# Patient Record
Sex: MALE | NOT HISPANIC OR LATINO | URBAN - METROPOLITAN AREA
[De-identification: names, ages, dates, MRNs, and addresses within clinical notes are randomized per-mention and may not be internally consistent; named-entity substitution may affect disease eponyms.]

---

## 2020-05-07 ENCOUNTER — APPOINTMENT (EMERGENCY)
Dept: CT IMAGING | Facility: HOSPITAL | Age: 55
DRG: 199 | End: 2020-05-07
Payer: COMMERCIAL

## 2020-05-07 ENCOUNTER — HOSPITAL ENCOUNTER (INPATIENT)
Facility: HOSPITAL | Age: 55
LOS: 1 days | Discharge: HOME | DRG: 199 | End: 2020-05-08
Attending: EMERGENCY MEDICINE | Admitting: INTERNAL MEDICINE
Payer: COMMERCIAL

## 2020-05-07 DIAGNOSIS — I10 BENIGN ESSENTIAL HYPERTENSION: ICD-10-CM

## 2020-05-07 DIAGNOSIS — R77.8 ELEVATED TROPONIN: ICD-10-CM

## 2020-05-07 DIAGNOSIS — H53.2 DOUBLE VISION: Primary | ICD-10-CM

## 2020-05-07 DIAGNOSIS — I16.0 HYPERTENSIVE URGENCY: ICD-10-CM

## 2020-05-07 DIAGNOSIS — I63.9 CVA (CEREBRAL VASCULAR ACCIDENT) (HCC): ICD-10-CM

## 2020-05-07 DIAGNOSIS — R42 DIZZINESS: ICD-10-CM

## 2020-05-07 PROBLEM — R73.01 IMPAIRED FASTING GLUCOSE: Status: ACTIVE | Noted: 2020-05-07

## 2020-05-07 PROBLEM — E66.9 OBESITY (BMI 35.0-39.9 WITHOUT COMORBIDITY): Status: ACTIVE | Noted: 2020-05-07

## 2020-05-07 LAB
ALBUMIN SERPL BCP-MCNC: 3.5 G/DL (ref 3.5–5)
ALP SERPL-CCNC: 65 U/L (ref 46–116)
ALT SERPL W P-5'-P-CCNC: 30 U/L (ref 12–78)
ANION GAP SERPL CALCULATED.3IONS-SCNC: 6 MMOL/L (ref 4–13)
APTT PPP: 30 SECONDS (ref 23–37)
AST SERPL W P-5'-P-CCNC: 15 U/L (ref 5–45)
ATRIAL RATE: 53 BPM
BASOPHILS # BLD AUTO: 0.05 THOUSANDS/ΜL (ref 0–0.1)
BASOPHILS NFR BLD AUTO: 1 % (ref 0–1)
BILIRUB SERPL-MCNC: 1.3 MG/DL (ref 0.2–1)
BUN SERPL-MCNC: 19 MG/DL (ref 5–25)
CALCIUM SERPL-MCNC: 8.7 MG/DL (ref 8.3–10.1)
CHLORIDE SERPL-SCNC: 97 MMOL/L (ref 100–108)
CO2 SERPL-SCNC: 29 MMOL/L (ref 21–32)
CREAT SERPL-MCNC: 1.17 MG/DL (ref 0.6–1.3)
EOSINOPHIL # BLD AUTO: 0.09 THOUSAND/ΜL (ref 0–0.61)
EOSINOPHIL NFR BLD AUTO: 1 % (ref 0–6)
ERYTHROCYTE [DISTWIDTH] IN BLOOD BY AUTOMATED COUNT: 13.7 % (ref 11.6–15.1)
GFR SERPL CREATININE-BSD FRML MDRD: 70 ML/MIN/1.73SQ M
GLUCOSE SERPL-MCNC: 153 MG/DL (ref 65–140)
HCT VFR BLD AUTO: 52.7 % (ref 36.5–49.3)
HGB BLD-MCNC: 17.6 G/DL (ref 12–17)
IMM GRANULOCYTES # BLD AUTO: 0.03 THOUSAND/UL (ref 0–0.2)
IMM GRANULOCYTES NFR BLD AUTO: 0 % (ref 0–2)
INR PPP: 1.07 (ref 0.84–1.19)
LYMPHOCYTES # BLD AUTO: 1.29 THOUSANDS/ΜL (ref 0.6–4.47)
LYMPHOCYTES NFR BLD AUTO: 16 % (ref 14–44)
MCH RBC QN AUTO: 28.3 PG (ref 26.8–34.3)
MCHC RBC AUTO-ENTMCNC: 33.4 G/DL (ref 31.4–37.4)
MCV RBC AUTO: 85 FL (ref 82–98)
MONOCYTES # BLD AUTO: 0.74 THOUSAND/ΜL (ref 0.17–1.22)
MONOCYTES NFR BLD AUTO: 9 % (ref 4–12)
NEUTROPHILS # BLD AUTO: 6.08 THOUSANDS/ΜL (ref 1.85–7.62)
NEUTS SEG NFR BLD AUTO: 73 % (ref 43–75)
NRBC BLD AUTO-RTO: 0 /100 WBCS
P AXIS: 68 DEGREES
PLATELET # BLD AUTO: 182 THOUSANDS/UL (ref 149–390)
PMV BLD AUTO: 10.7 FL (ref 8.9–12.7)
POTASSIUM SERPL-SCNC: 3.5 MMOL/L (ref 3.5–5.3)
PR INTERVAL: 148 MS
PROT SERPL-MCNC: 7.3 G/DL (ref 6.4–8.2)
PROTHROMBIN TIME: 13.6 SECONDS (ref 11.6–14.5)
QRS AXIS: 72 DEGREES
QRSD INTERVAL: 100 MS
QT INTERVAL: 478 MS
QTC INTERVAL: 448 MS
RBC # BLD AUTO: 6.22 MILLION/UL (ref 3.88–5.62)
SODIUM SERPL-SCNC: 132 MMOL/L (ref 136–145)
T WAVE AXIS: -79 DEGREES
TROPONIN I SERPL-MCNC: 0.17 NG/ML
VENTRICULAR RATE: 53 BPM
WBC # BLD AUTO: 8.28 THOUSAND/UL (ref 4.31–10.16)

## 2020-05-07 PROCEDURE — 85610 PROTHROMBIN TIME: CPT | Performed by: PHYSICIAN ASSISTANT

## 2020-05-07 PROCEDURE — 99285 EMERGENCY DEPT VISIT HI MDM: CPT | Performed by: PHYSICIAN ASSISTANT

## 2020-05-07 PROCEDURE — 99223 1ST HOSP IP/OBS HIGH 75: CPT | Performed by: INTERNAL MEDICINE

## 2020-05-07 PROCEDURE — 85730 THROMBOPLASTIN TIME PARTIAL: CPT | Performed by: PHYSICIAN ASSISTANT

## 2020-05-07 PROCEDURE — 85025 COMPLETE CBC W/AUTO DIFF WBC: CPT | Performed by: PHYSICIAN ASSISTANT

## 2020-05-07 PROCEDURE — 93010 ELECTROCARDIOGRAM REPORT: CPT | Performed by: INTERNAL MEDICINE

## 2020-05-07 PROCEDURE — 70450 CT HEAD/BRAIN W/O DYE: CPT

## 2020-05-07 PROCEDURE — 36415 COLL VENOUS BLD VENIPUNCTURE: CPT | Performed by: PHYSICIAN ASSISTANT

## 2020-05-07 PROCEDURE — 93005 ELECTROCARDIOGRAM TRACING: CPT

## 2020-05-07 PROCEDURE — 99285 EMERGENCY DEPT VISIT HI MDM: CPT

## 2020-05-07 PROCEDURE — 80053 COMPREHEN METABOLIC PANEL: CPT | Performed by: PHYSICIAN ASSISTANT

## 2020-05-07 PROCEDURE — 84484 ASSAY OF TROPONIN QUANT: CPT | Performed by: PHYSICIAN ASSISTANT

## 2020-05-07 PROCEDURE — 70498 CT ANGIOGRAPHY NECK: CPT

## 2020-05-07 PROCEDURE — 70496 CT ANGIOGRAPHY HEAD: CPT

## 2020-05-07 RX ORDER — ONDANSETRON 2 MG/ML
4 INJECTION INTRAMUSCULAR; INTRAVENOUS EVERY 6 HOURS PRN
Status: DISCONTINUED | OUTPATIENT
Start: 2020-05-07 | End: 2020-05-08 | Stop reason: HOSPADM

## 2020-05-07 RX ORDER — SODIUM CHLORIDE 9 MG/ML
125 INJECTION, SOLUTION INTRAVENOUS CONTINUOUS
Status: DISCONTINUED | OUTPATIENT
Start: 2020-05-07 | End: 2020-05-07

## 2020-05-07 RX ORDER — SODIUM CHLORIDE 9 MG/ML
125 INJECTION, SOLUTION INTRAVENOUS CONTINUOUS
Status: DISCONTINUED | OUTPATIENT
Start: 2020-05-07 | End: 2020-05-08

## 2020-05-07 RX ORDER — ATORVASTATIN CALCIUM 40 MG/1
40 TABLET, FILM COATED ORAL EVERY EVENING
Status: DISCONTINUED | OUTPATIENT
Start: 2020-05-07 | End: 2020-05-08

## 2020-05-07 RX ORDER — ACETAMINOPHEN 325 MG/1
650 TABLET ORAL EVERY 4 HOURS PRN
Status: DISCONTINUED | OUTPATIENT
Start: 2020-05-07 | End: 2020-05-08 | Stop reason: HOSPADM

## 2020-05-07 RX ORDER — ASPIRIN 81 MG/1
81 TABLET, CHEWABLE ORAL DAILY
Status: DISCONTINUED | OUTPATIENT
Start: 2020-05-08 | End: 2020-05-08 | Stop reason: HOSPADM

## 2020-05-07 RX ORDER — AMLODIPINE BESYLATE 5 MG/1
5 TABLET ORAL DAILY
Status: DISCONTINUED | OUTPATIENT
Start: 2020-05-08 | End: 2020-05-08 | Stop reason: HOSPADM

## 2020-05-07 RX ORDER — LABETALOL 20 MG/4 ML (5 MG/ML) INTRAVENOUS SYRINGE
10 EVERY 6 HOURS PRN
Status: DISCONTINUED | OUTPATIENT
Start: 2020-05-07 | End: 2020-05-08 | Stop reason: HOSPADM

## 2020-05-07 RX ORDER — ASPIRIN 81 MG/1
324 TABLET, CHEWABLE ORAL ONCE
Status: COMPLETED | OUTPATIENT
Start: 2020-05-07 | End: 2020-05-07

## 2020-05-07 RX ADMIN — IOHEXOL 85 ML: 350 INJECTION, SOLUTION INTRAVENOUS at 16:06

## 2020-05-07 RX ADMIN — SODIUM CHLORIDE 125 ML/HR: 0.9 INJECTION, SOLUTION INTRAVENOUS at 14:02

## 2020-05-07 RX ADMIN — ATORVASTATIN CALCIUM 40 MG: 40 TABLET, FILM COATED ORAL at 20:38

## 2020-05-07 RX ADMIN — SODIUM CHLORIDE 125 ML/HR: 0.9 INJECTION, SOLUTION INTRAVENOUS at 16:43

## 2020-05-07 RX ADMIN — ASPIRIN 81 MG 324 MG: 81 TABLET ORAL at 15:24

## 2020-05-07 RX ADMIN — SODIUM CHLORIDE 125 ML/HR: 0.9 INJECTION, SOLUTION INTRAVENOUS at 16:49

## 2020-05-08 ENCOUNTER — APPOINTMENT (INPATIENT)
Dept: MRI IMAGING | Facility: HOSPITAL | Age: 55
DRG: 199 | End: 2020-05-08
Payer: COMMERCIAL

## 2020-05-08 ENCOUNTER — APPOINTMENT (INPATIENT)
Dept: NON INVASIVE DIAGNOSTICS | Facility: HOSPITAL | Age: 55
DRG: 199 | End: 2020-05-08
Payer: COMMERCIAL

## 2020-05-08 VITALS
SYSTOLIC BLOOD PRESSURE: 171 MMHG | HEIGHT: 68 IN | RESPIRATION RATE: 17 BRPM | DIASTOLIC BLOOD PRESSURE: 104 MMHG | BODY MASS INDEX: 36.84 KG/M2 | HEART RATE: 56 BPM | WEIGHT: 243.1 LBS | TEMPERATURE: 98 F | OXYGEN SATURATION: 92 %

## 2020-05-08 LAB
ALBUMIN SERPL BCP-MCNC: 3.1 G/DL (ref 3.5–5)
ALP SERPL-CCNC: 59 U/L (ref 46–116)
ALT SERPL W P-5'-P-CCNC: 26 U/L (ref 12–78)
ANION GAP SERPL CALCULATED.3IONS-SCNC: 9 MMOL/L (ref 4–13)
AST SERPL W P-5'-P-CCNC: 16 U/L (ref 5–45)
BILIRUB SERPL-MCNC: 1.2 MG/DL (ref 0.2–1)
BUN SERPL-MCNC: 17 MG/DL (ref 5–25)
CALCIUM SERPL-MCNC: 7.9 MG/DL (ref 8.3–10.1)
CHLORIDE SERPL-SCNC: 103 MMOL/L (ref 100–108)
CHOLEST SERPL-MCNC: 127 MG/DL (ref 50–200)
CO2 SERPL-SCNC: 26 MMOL/L (ref 21–32)
CREAT SERPL-MCNC: 0.96 MG/DL (ref 0.6–1.3)
CRP SERPL QL: 1.6 MG/L
ERYTHROCYTE [DISTWIDTH] IN BLOOD BY AUTOMATED COUNT: 13.7 % (ref 11.6–15.1)
EST. AVERAGE GLUCOSE BLD GHB EST-MCNC: 137 MG/DL
EST. AVERAGE GLUCOSE BLD GHB EST-MCNC: 140 MG/DL
GFR SERPL CREATININE-BSD FRML MDRD: 89 ML/MIN/1.73SQ M
GLUCOSE SERPL-MCNC: 135 MG/DL (ref 65–140)
HBA1C MFR BLD: 6.4 %
HBA1C MFR BLD: 6.5 %
HCT VFR BLD AUTO: 50.1 % (ref 36.5–49.3)
HCYS SERPL-SCNC: 9.4 UMOL/L (ref 5.3–14.2)
HDLC SERPL-MCNC: 29 MG/DL
HGB BLD-MCNC: 16.5 G/DL (ref 12–17)
LDLC SERPL CALC-MCNC: 88 MG/DL (ref 0–100)
MCH RBC QN AUTO: 28.3 PG (ref 26.8–34.3)
MCHC RBC AUTO-ENTMCNC: 32.9 G/DL (ref 31.4–37.4)
MCV RBC AUTO: 86 FL (ref 82–98)
PLATELET # BLD AUTO: 161 THOUSANDS/UL (ref 149–390)
PMV BLD AUTO: 11.2 FL (ref 8.9–12.7)
POTASSIUM SERPL-SCNC: 3.5 MMOL/L (ref 3.5–5.3)
PROT SERPL-MCNC: 6.5 G/DL (ref 6.4–8.2)
RBC # BLD AUTO: 5.84 MILLION/UL (ref 3.88–5.62)
SODIUM SERPL-SCNC: 138 MMOL/L (ref 136–145)
TRIGL SERPL-MCNC: 49 MG/DL
TSH SERPL DL<=0.05 MIU/L-ACNC: 0.81 UIU/ML (ref 0.36–3.74)
WBC # BLD AUTO: 7.61 THOUSAND/UL (ref 4.31–10.16)

## 2020-05-08 PROCEDURE — 83090 ASSAY OF HOMOCYSTEINE: CPT | Performed by: INTERNAL MEDICINE

## 2020-05-08 PROCEDURE — 97163 PT EVAL HIGH COMPLEX 45 MIN: CPT

## 2020-05-08 PROCEDURE — 87086 URINE CULTURE/COLONY COUNT: CPT | Performed by: INTERNAL MEDICINE

## 2020-05-08 PROCEDURE — 85027 COMPLETE CBC AUTOMATED: CPT | Performed by: INTERNAL MEDICINE

## 2020-05-08 PROCEDURE — 93306 TTE W/DOPPLER COMPLETE: CPT

## 2020-05-08 PROCEDURE — 99232 SBSQ HOSP IP/OBS MODERATE 35: CPT | Performed by: INTERNAL MEDICINE

## 2020-05-08 PROCEDURE — 93975 VASCULAR STUDY: CPT | Performed by: SURGERY

## 2020-05-08 PROCEDURE — 80053 COMPREHEN METABOLIC PANEL: CPT | Performed by: INTERNAL MEDICINE

## 2020-05-08 PROCEDURE — 83036 HEMOGLOBIN GLYCOSYLATED A1C: CPT | Performed by: INTERNAL MEDICINE

## 2020-05-08 PROCEDURE — 70553 MRI BRAIN STEM W/O & W/DYE: CPT

## 2020-05-08 PROCEDURE — 84443 ASSAY THYROID STIM HORMONE: CPT | Performed by: INTERNAL MEDICINE

## 2020-05-08 PROCEDURE — A9585 GADOBUTROL INJECTION: HCPCS | Performed by: PHYSICIAN ASSISTANT

## 2020-05-08 PROCEDURE — 86140 C-REACTIVE PROTEIN: CPT | Performed by: INTERNAL MEDICINE

## 2020-05-08 PROCEDURE — 93975 VASCULAR STUDY: CPT

## 2020-05-08 PROCEDURE — 99238 HOSP IP/OBS DSCHRG MGMT 30/<: CPT | Performed by: INTERNAL MEDICINE

## 2020-05-08 PROCEDURE — 97165 OT EVAL LOW COMPLEX 30 MIN: CPT

## 2020-05-08 PROCEDURE — 99255 IP/OBS CONSLTJ NEW/EST HI 80: CPT | Performed by: PSYCHIATRY & NEUROLOGY

## 2020-05-08 PROCEDURE — 80061 LIPID PANEL: CPT | Performed by: INTERNAL MEDICINE

## 2020-05-08 RX ORDER — NICOTINE 21 MG/24HR
14 PATCH, TRANSDERMAL 24 HOURS TRANSDERMAL DAILY
Status: DISCONTINUED | OUTPATIENT
Start: 2020-05-08 | End: 2020-05-08 | Stop reason: HOSPADM

## 2020-05-08 RX ORDER — ATORVASTATIN CALCIUM 40 MG/1
40 TABLET, FILM COATED ORAL EVERY EVENING
Qty: 30 TABLET | Refills: 5 | Status: SHIPPED | OUTPATIENT
Start: 2020-05-08

## 2020-05-08 RX ORDER — METOPROLOL SUCCINATE 25 MG/1
50 TABLET, EXTENDED RELEASE ORAL DAILY
Qty: 30 TABLET | Refills: 5 | Status: SHIPPED | OUTPATIENT
Start: 2020-05-09 | End: 2020-06-23

## 2020-05-08 RX ORDER — METOPROLOL SUCCINATE 25 MG/1
25 TABLET, EXTENDED RELEASE ORAL DAILY
Status: DISCONTINUED | OUTPATIENT
Start: 2020-05-08 | End: 2020-05-08 | Stop reason: HOSPADM

## 2020-05-08 RX ORDER — ASPIRIN 81 MG/1
81 TABLET, CHEWABLE ORAL DAILY
Refills: 0
Start: 2020-05-09 | End: 2020-06-24 | Stop reason: HOSPADM

## 2020-05-08 RX ORDER — AMLODIPINE BESYLATE 5 MG/1
5 TABLET ORAL DAILY
Qty: 30 TABLET | Refills: 1 | Status: SHIPPED | OUTPATIENT
Start: 2020-05-09 | End: 2020-06-23

## 2020-05-08 RX ORDER — ATORVASTATIN CALCIUM 10 MG/1
10 TABLET, FILM COATED ORAL EVERY EVENING
Status: DISCONTINUED | OUTPATIENT
Start: 2020-05-08 | End: 2020-05-08 | Stop reason: HOSPADM

## 2020-05-08 RX ADMIN — METOPROLOL SUCCINATE 25 MG: 25 TABLET, EXTENDED RELEASE ORAL at 12:09

## 2020-05-08 RX ADMIN — ASPIRIN 81 MG 81 MG: 81 TABLET ORAL at 09:23

## 2020-05-08 RX ADMIN — NICOTINE 1 PATCH: 7 PATCH TRANSDERMAL at 09:19

## 2020-05-08 RX ADMIN — NICOTINE 14 MG: 14 PATCH TRANSDERMAL at 12:04

## 2020-05-08 RX ADMIN — PERFLUTREN 0.8 ML/MIN: 6.52 INJECTION, SUSPENSION INTRAVENOUS at 15:53

## 2020-05-08 RX ADMIN — LABETALOL 20 MG/4 ML (5 MG/ML) INTRAVENOUS SYRINGE 10 MG: at 14:27

## 2020-05-08 RX ADMIN — SODIUM CHLORIDE 125 ML/HR: 0.9 INJECTION, SOLUTION INTRAVENOUS at 05:48

## 2020-05-08 RX ADMIN — GADOBUTROL 11 ML: 604.72 INJECTION INTRAVENOUS at 10:13

## 2020-05-08 RX ADMIN — AMLODIPINE BESYLATE 5 MG: 5 TABLET ORAL at 09:23

## 2020-05-09 LAB — BACTERIA UR CULT: NORMAL

## 2020-05-11 PROCEDURE — 93306 TTE W/DOPPLER COMPLETE: CPT | Performed by: INTERNAL MEDICINE

## 2020-06-09 ENCOUNTER — TELEPHONE (OUTPATIENT)
Dept: NEUROLOGY | Facility: CLINIC | Age: 55
End: 2020-06-09

## 2020-06-23 ENCOUNTER — HOSPITAL ENCOUNTER (INPATIENT)
Facility: HOSPITAL | Age: 55
LOS: 1 days | Discharge: HOME | DRG: 045 | End: 2020-06-24
Attending: EMERGENCY MEDICINE | Admitting: INTERNAL MEDICINE
Payer: COMMERCIAL

## 2020-06-23 ENCOUNTER — APPOINTMENT (EMERGENCY)
Dept: CT IMAGING | Facility: HOSPITAL | Age: 55
DRG: 045 | End: 2020-06-23
Payer: COMMERCIAL

## 2020-06-23 ENCOUNTER — APPOINTMENT (INPATIENT)
Dept: MRI IMAGING | Facility: HOSPITAL | Age: 55
DRG: 045 | End: 2020-06-23
Payer: COMMERCIAL

## 2020-06-23 DIAGNOSIS — R93.89 ABNORMAL CT SCAN: ICD-10-CM

## 2020-06-23 DIAGNOSIS — I63.9 CVA (CEREBRAL VASCULAR ACCIDENT) (HCC): Primary | ICD-10-CM

## 2020-06-23 DIAGNOSIS — R77.8 ELEVATED TROPONIN: ICD-10-CM

## 2020-06-23 DIAGNOSIS — I72.0 CAROTID ANEURYSM, LEFT (HCC): ICD-10-CM

## 2020-06-23 DIAGNOSIS — I16.0 HYPERTENSIVE URGENCY: ICD-10-CM

## 2020-06-23 DIAGNOSIS — H53.2 DIPLOPIA: ICD-10-CM

## 2020-06-23 DIAGNOSIS — R73.01 IMPAIRED FASTING GLUCOSE: ICD-10-CM

## 2020-06-23 LAB
ANION GAP SERPL CALCULATED.3IONS-SCNC: 9 MMOL/L (ref 4–13)
APTT PPP: 31 SECONDS (ref 23–37)
ATRIAL RATE: 66 BPM
BUN SERPL-MCNC: 12 MG/DL (ref 5–25)
CALCIUM SERPL-MCNC: 8.4 MG/DL (ref 8.3–10.1)
CHLORIDE SERPL-SCNC: 102 MMOL/L (ref 100–108)
CO2 SERPL-SCNC: 27 MMOL/L (ref 21–32)
CREAT SERPL-MCNC: 1.11 MG/DL (ref 0.6–1.3)
DEPRECATED AT III PPP: 91 % OF NORMAL (ref 92–136)
ERYTHROCYTE [DISTWIDTH] IN BLOOD BY AUTOMATED COUNT: 13.7 % (ref 11.6–15.1)
GFR SERPL CREATININE-BSD FRML MDRD: 75 ML/MIN/1.73SQ M
GLUCOSE SERPL-MCNC: 259 MG/DL (ref 65–140)
GLUCOSE SERPL-MCNC: 261 MG/DL (ref 65–140)
HCT VFR BLD AUTO: 51 % (ref 36.5–49.3)
HGB BLD-MCNC: 17.2 G/DL (ref 12–17)
INR PPP: 1.15 (ref 0.84–1.19)
MCH RBC QN AUTO: 29.1 PG (ref 26.8–34.3)
MCHC RBC AUTO-ENTMCNC: 33.7 G/DL (ref 31.4–37.4)
MCV RBC AUTO: 86 FL (ref 82–98)
P AXIS: 64 DEGREES
PLATELET # BLD AUTO: 162 THOUSANDS/UL (ref 149–390)
PLATELET # BLD AUTO: 181 THOUSANDS/UL (ref 149–390)
PMV BLD AUTO: 10.5 FL (ref 8.9–12.7)
PMV BLD AUTO: 11.1 FL (ref 8.9–12.7)
POTASSIUM SERPL-SCNC: 4 MMOL/L (ref 3.5–5.3)
PR INTERVAL: 146 MS
PROT C AG ACT/NOR PPP IA: 93 % OF NORMAL (ref 60–150)
PROTHROMBIN TIME: 14.4 SECONDS (ref 11.6–14.5)
QRS AXIS: 66 DEGREES
QRSD INTERVAL: 100 MS
QT INTERVAL: 422 MS
QTC INTERVAL: 442 MS
RBC # BLD AUTO: 5.92 MILLION/UL (ref 3.88–5.62)
SODIUM SERPL-SCNC: 138 MMOL/L (ref 136–145)
T WAVE AXIS: 32 DEGREES
TROPONIN I SERPL-MCNC: 0.08 NG/ML
VENTRICULAR RATE: 66 BPM
WBC # BLD AUTO: 7.84 THOUSAND/UL (ref 4.31–10.16)

## 2020-06-23 PROCEDURE — 84484 ASSAY OF TROPONIN QUANT: CPT | Performed by: EMERGENCY MEDICINE

## 2020-06-23 PROCEDURE — 81240 F2 GENE: CPT | Performed by: PHYSICIAN ASSISTANT

## 2020-06-23 PROCEDURE — 85670 THROMBIN TIME PLASMA: CPT | Performed by: PHYSICIAN ASSISTANT

## 2020-06-23 PROCEDURE — 86146 BETA-2 GLYCOPROTEIN ANTIBODY: CPT | Performed by: PHYSICIAN ASSISTANT

## 2020-06-23 PROCEDURE — 99285 EMERGENCY DEPT VISIT HI MDM: CPT

## 2020-06-23 PROCEDURE — 80048 BASIC METABOLIC PNL TOTAL CA: CPT | Performed by: EMERGENCY MEDICINE

## 2020-06-23 PROCEDURE — 70498 CT ANGIOGRAPHY NECK: CPT

## 2020-06-23 PROCEDURE — 99291 CRITICAL CARE FIRST HOUR: CPT | Performed by: EMERGENCY MEDICINE

## 2020-06-23 PROCEDURE — 85732 THROMBOPLASTIN TIME PARTIAL: CPT | Performed by: PHYSICIAN ASSISTANT

## 2020-06-23 PROCEDURE — 99255 IP/OBS CONSLTJ NEW/EST HI 80: CPT | Performed by: PSYCHIATRY & NEUROLOGY

## 2020-06-23 PROCEDURE — 85303 CLOT INHIBIT PROT C ACTIVITY: CPT | Performed by: PHYSICIAN ASSISTANT

## 2020-06-23 PROCEDURE — 85049 AUTOMATED PLATELET COUNT: CPT | Performed by: INTERNAL MEDICINE

## 2020-06-23 PROCEDURE — 81241 F5 GENE: CPT | Performed by: PHYSICIAN ASSISTANT

## 2020-06-23 PROCEDURE — 36415 COLL VENOUS BLD VENIPUNCTURE: CPT | Performed by: PHYSICIAN ASSISTANT

## 2020-06-23 PROCEDURE — 85613 RUSSELL VIPER VENOM DILUTED: CPT | Performed by: PHYSICIAN ASSISTANT

## 2020-06-23 PROCEDURE — 93005 ELECTROCARDIOGRAM TRACING: CPT

## 2020-06-23 PROCEDURE — 85705 THROMBOPLASTIN INHIBITION: CPT | Performed by: PHYSICIAN ASSISTANT

## 2020-06-23 PROCEDURE — 85300 ANTITHROMBIN III ACTIVITY: CPT | Performed by: PHYSICIAN ASSISTANT

## 2020-06-23 PROCEDURE — 70496 CT ANGIOGRAPHY HEAD: CPT

## 2020-06-23 PROCEDURE — 93010 ELECTROCARDIOGRAM REPORT: CPT | Performed by: INTERNAL MEDICINE

## 2020-06-23 PROCEDURE — 85027 COMPLETE CBC AUTOMATED: CPT | Performed by: EMERGENCY MEDICINE

## 2020-06-23 PROCEDURE — 85306 CLOT INHIBIT PROT S FREE: CPT | Performed by: PHYSICIAN ASSISTANT

## 2020-06-23 PROCEDURE — 99223 1ST HOSP IP/OBS HIGH 75: CPT | Performed by: INTERNAL MEDICINE

## 2020-06-23 PROCEDURE — 70551 MRI BRAIN STEM W/O DYE: CPT

## 2020-06-23 PROCEDURE — 85305 CLOT INHIBIT PROT S TOTAL: CPT | Performed by: PHYSICIAN ASSISTANT

## 2020-06-23 PROCEDURE — 85610 PROTHROMBIN TIME: CPT | Performed by: EMERGENCY MEDICINE

## 2020-06-23 PROCEDURE — 85730 THROMBOPLASTIN TIME PARTIAL: CPT | Performed by: EMERGENCY MEDICINE

## 2020-06-23 PROCEDURE — 82948 REAGENT STRIP/BLOOD GLUCOSE: CPT

## 2020-06-23 PROCEDURE — 97165 OT EVAL LOW COMPLEX 30 MIN: CPT

## 2020-06-23 PROCEDURE — 86147 CARDIOLIPIN ANTIBODY EA IG: CPT | Performed by: PHYSICIAN ASSISTANT

## 2020-06-23 RX ORDER — ATORVASTATIN CALCIUM 40 MG/1
40 TABLET, FILM COATED ORAL EVERY EVENING
Status: DISCONTINUED | OUTPATIENT
Start: 2020-06-23 | End: 2020-06-24 | Stop reason: SDUPTHER

## 2020-06-23 RX ORDER — ASPIRIN 81 MG/1
243 TABLET ORAL DAILY
Status: DISCONTINUED | OUTPATIENT
Start: 2020-06-23 | End: 2020-06-23 | Stop reason: ALTCHOICE

## 2020-06-23 RX ORDER — ASPIRIN 81 MG/1
81 TABLET, CHEWABLE ORAL DAILY
Status: DISCONTINUED | OUTPATIENT
Start: 2020-06-23 | End: 2020-06-24 | Stop reason: SDUPTHER

## 2020-06-23 RX ORDER — CARVEDILOL 25 MG/1
25 TABLET ORAL 2 TIMES DAILY WITH MEALS
COMMUNITY

## 2020-06-23 RX ORDER — ASPIRIN 325 MG
325 TABLET ORAL ONCE
Status: DISCONTINUED | OUTPATIENT
Start: 2020-06-23 | End: 2020-06-23

## 2020-06-23 RX ORDER — HEPARIN SODIUM 5000 [USP'U]/ML
5000 INJECTION, SOLUTION INTRAVENOUS; SUBCUTANEOUS EVERY 8 HOURS SCHEDULED
Status: DISCONTINUED | OUTPATIENT
Start: 2020-06-23 | End: 2020-06-24 | Stop reason: HOSPADM

## 2020-06-23 RX ORDER — AMLODIPINE BESYLATE AND BENAZEPRIL HYDROCHLORIDE 10; 40 MG/1; MG/1
1 CAPSULE ORAL DAILY
COMMUNITY

## 2020-06-23 RX ORDER — CARVEDILOL 12.5 MG/1
25 TABLET ORAL 2 TIMES DAILY WITH MEALS
Status: DISCONTINUED | OUTPATIENT
Start: 2020-06-23 | End: 2020-06-24 | Stop reason: HOSPADM

## 2020-06-23 RX ORDER — LISINOPRIL 20 MG/1
20 TABLET ORAL DAILY
Status: DISCONTINUED | OUTPATIENT
Start: 2020-06-23 | End: 2020-06-24

## 2020-06-23 RX ORDER — AMLODIPINE BESYLATE 5 MG/1
10 TABLET ORAL DAILY
Status: DISCONTINUED | OUTPATIENT
Start: 2020-06-23 | End: 2020-06-24 | Stop reason: HOSPADM

## 2020-06-23 RX ADMIN — ASPIRIN 243 MG: 81 TABLET ORAL at 09:42

## 2020-06-23 RX ADMIN — IOHEXOL 100 ML: 350 INJECTION, SOLUTION INTRAVENOUS at 09:19

## 2020-06-23 RX ADMIN — HEPARIN SODIUM 5000 UNITS: 5000 INJECTION INTRAVENOUS; SUBCUTANEOUS at 22:29

## 2020-06-23 RX ADMIN — HEPARIN SODIUM 5000 UNITS: 5000 INJECTION INTRAVENOUS; SUBCUTANEOUS at 14:46

## 2020-06-23 RX ADMIN — NICOTINE 1 PATCH: 7 PATCH TRANSDERMAL at 12:36

## 2020-06-23 RX ADMIN — ATORVASTATIN CALCIUM 40 MG: 40 TABLET, FILM COATED ORAL at 17:30

## 2020-06-23 RX ADMIN — CARVEDILOL 25 MG: 12.5 TABLET, FILM COATED ORAL at 17:30

## 2020-06-24 ENCOUNTER — APPOINTMENT (INPATIENT)
Dept: NON INVASIVE DIAGNOSTICS | Facility: HOSPITAL | Age: 55
DRG: 045 | End: 2020-06-24
Payer: COMMERCIAL

## 2020-06-24 VITALS
HEIGHT: 67 IN | BODY MASS INDEX: 35.44 KG/M2 | WEIGHT: 225.8 LBS | DIASTOLIC BLOOD PRESSURE: 62 MMHG | RESPIRATION RATE: 16 BRPM | HEART RATE: 50 BPM | SYSTOLIC BLOOD PRESSURE: 142 MMHG | OXYGEN SATURATION: 99 % | TEMPERATURE: 98.1 F

## 2020-06-24 LAB
ALBUMIN SERPL BCP-MCNC: 3.3 G/DL (ref 3.5–5)
ALP SERPL-CCNC: 68 U/L (ref 46–116)
ALT SERPL W P-5'-P-CCNC: 49 U/L (ref 12–78)
ANION GAP SERPL CALCULATED.3IONS-SCNC: 6 MMOL/L (ref 4–13)
AST SERPL W P-5'-P-CCNC: 21 U/L (ref 5–45)
BILIRUB SERPL-MCNC: 0.8 MG/DL (ref 0.2–1)
BUN SERPL-MCNC: 13 MG/DL (ref 5–25)
CALCIUM SERPL-MCNC: 8.3 MG/DL (ref 8.3–10.1)
CARDIOLIPIN IGA SER IA-ACNC: <9 APL U/ML (ref 0–11)
CARDIOLIPIN IGG SER IA-ACNC: <9 GPL U/ML (ref 0–14)
CARDIOLIPIN IGM SER IA-ACNC: <9 MPL U/ML (ref 0–12)
CHLORIDE SERPL-SCNC: 105 MMOL/L (ref 100–108)
CHOLEST SERPL-MCNC: 75 MG/DL (ref 50–200)
CO2 SERPL-SCNC: 27 MMOL/L (ref 21–32)
CREAT SERPL-MCNC: 0.98 MG/DL (ref 0.6–1.3)
ERYTHROCYTE [DISTWIDTH] IN BLOOD BY AUTOMATED COUNT: 13.7 % (ref 11.6–15.1)
GFR SERPL CREATININE-BSD FRML MDRD: 87 ML/MIN/1.73SQ M
GLUCOSE SERPL-MCNC: 151 MG/DL (ref 65–140)
HCT VFR BLD AUTO: 47.7 % (ref 36.5–49.3)
HDLC SERPL-MCNC: 28 MG/DL
HGB BLD-MCNC: 15.9 G/DL (ref 12–17)
LDLC SERPL CALC-MCNC: 36 MG/DL (ref 0–100)
MCH RBC QN AUTO: 28.3 PG (ref 26.8–34.3)
MCHC RBC AUTO-ENTMCNC: 33.3 G/DL (ref 31.4–37.4)
MCV RBC AUTO: 85 FL (ref 82–98)
PLATELET # BLD AUTO: 165 THOUSANDS/UL (ref 149–390)
PMV BLD AUTO: 10.6 FL (ref 8.9–12.7)
POTASSIUM SERPL-SCNC: 3.9 MMOL/L (ref 3.5–5.3)
PROT SERPL-MCNC: 6.6 G/DL (ref 6.4–8.2)
RBC # BLD AUTO: 5.61 MILLION/UL (ref 3.88–5.62)
SARS-COV-2 RNA RESP QL NAA+PROBE: NEGATIVE
SODIUM SERPL-SCNC: 138 MMOL/L (ref 136–145)
TRIGL SERPL-MCNC: 55 MG/DL
WBC # BLD AUTO: 8.58 THOUSAND/UL (ref 4.31–10.16)

## 2020-06-24 PROCEDURE — 99232 SBSQ HOSP IP/OBS MODERATE 35: CPT | Performed by: INTERNAL MEDICINE

## 2020-06-24 PROCEDURE — 87635 SARS-COV-2 COVID-19 AMP PRB: CPT | Performed by: INTERNAL MEDICINE

## 2020-06-24 PROCEDURE — 93312 ECHO TRANSESOPHAGEAL: CPT

## 2020-06-24 PROCEDURE — 80061 LIPID PANEL: CPT | Performed by: INTERNAL MEDICINE

## 2020-06-24 PROCEDURE — 85027 COMPLETE CBC AUTOMATED: CPT | Performed by: INTERNAL MEDICINE

## 2020-06-24 PROCEDURE — 99232 SBSQ HOSP IP/OBS MODERATE 35: CPT | Performed by: PSYCHIATRY & NEUROLOGY

## 2020-06-24 PROCEDURE — 80053 COMPREHEN METABOLIC PANEL: CPT | Performed by: INTERNAL MEDICINE

## 2020-06-24 RX ORDER — PROPOFOL 10 MG/ML
INJECTION, EMULSION INTRAVENOUS AS NEEDED
Status: DISCONTINUED | OUTPATIENT
Start: 2020-06-24 | End: 2020-06-24 | Stop reason: SURG

## 2020-06-24 RX ORDER — ASPIRIN 81 MG/1
81 TABLET, CHEWABLE ORAL DAILY
Status: DISCONTINUED | OUTPATIENT
Start: 2020-06-24 | End: 2020-06-24

## 2020-06-24 RX ORDER — NICOTINE 21 MG/24HR
1 PATCH, TRANSDERMAL 24 HOURS TRANSDERMAL DAILY
Qty: 28 PATCH | Refills: 0 | Status: SHIPPED | OUTPATIENT
Start: 2020-06-24

## 2020-06-24 RX ORDER — LISINOPRIL 20 MG/1
40 TABLET ORAL DAILY
Status: DISCONTINUED | OUTPATIENT
Start: 2020-06-24 | End: 2020-06-24 | Stop reason: HOSPADM

## 2020-06-24 RX ORDER — CLOPIDOGREL BISULFATE 75 MG/1
300 TABLET ORAL ONCE
Status: DISCONTINUED | OUTPATIENT
Start: 2020-06-24 | End: 2020-06-24

## 2020-06-24 RX ORDER — NICOTINE 21 MG/24HR
21 PATCH, TRANSDERMAL 24 HOURS TRANSDERMAL DAILY
Status: DISCONTINUED | OUTPATIENT
Start: 2020-06-24 | End: 2020-06-24 | Stop reason: HOSPADM

## 2020-06-24 RX ORDER — CLOPIDOGREL BISULFATE 75 MG/1
300 TABLET ORAL ONCE
Status: COMPLETED | OUTPATIENT
Start: 2020-06-24 | End: 2020-06-24

## 2020-06-24 RX ORDER — ATORVASTATIN CALCIUM 40 MG/1
40 TABLET, FILM COATED ORAL EVERY EVENING
Status: DISCONTINUED | OUTPATIENT
Start: 2020-06-24 | End: 2020-06-24 | Stop reason: HOSPADM

## 2020-06-24 RX ORDER — SODIUM CHLORIDE 9 MG/ML
INJECTION, SOLUTION INTRAVENOUS CONTINUOUS PRN
Status: DISCONTINUED | OUTPATIENT
Start: 2020-06-24 | End: 2020-06-24 | Stop reason: SURG

## 2020-06-24 RX ORDER — CLOPIDOGREL BISULFATE 75 MG/1
75 TABLET ORAL DAILY
Status: DISCONTINUED | OUTPATIENT
Start: 2020-06-25 | End: 2020-06-24 | Stop reason: HOSPADM

## 2020-06-24 RX ORDER — CLOPIDOGREL BISULFATE 75 MG/1
75 TABLET ORAL DAILY
Qty: 30 TABLET | Refills: 0 | Status: SHIPPED | OUTPATIENT
Start: 2020-06-25

## 2020-06-24 RX ADMIN — PROPOFOL 100 MG: 10 INJECTION, EMULSION INTRAVENOUS at 16:01

## 2020-06-24 RX ADMIN — LISINOPRIL 40 MG: 20 TABLET ORAL at 12:44

## 2020-06-24 RX ADMIN — PROPOFOL 50 MG: 10 INJECTION, EMULSION INTRAVENOUS at 16:10

## 2020-06-24 RX ADMIN — PROPOFOL 50 MG: 10 INJECTION, EMULSION INTRAVENOUS at 16:23

## 2020-06-24 RX ADMIN — NICOTINE 1 PATCH: 7 PATCH TRANSDERMAL at 08:21

## 2020-06-24 RX ADMIN — PROPOFOL 50 MG: 10 INJECTION, EMULSION INTRAVENOUS at 16:21

## 2020-06-24 RX ADMIN — ASPIRIN 81 MG 81 MG: 81 TABLET ORAL at 08:19

## 2020-06-24 RX ADMIN — CARVEDILOL 25 MG: 12.5 TABLET, FILM COATED ORAL at 08:11

## 2020-06-24 RX ADMIN — PROPOFOL 50 MG: 10 INJECTION, EMULSION INTRAVENOUS at 16:18

## 2020-06-24 RX ADMIN — PROPOFOL 100 MG: 10 INJECTION, EMULSION INTRAVENOUS at 16:02

## 2020-06-24 RX ADMIN — HEPARIN SODIUM 5000 UNITS: 5000 INJECTION INTRAVENOUS; SUBCUTANEOUS at 05:25

## 2020-06-24 RX ADMIN — CARVEDILOL 25 MG: 12.5 TABLET, FILM COATED ORAL at 17:49

## 2020-06-24 RX ADMIN — PROPOFOL 50 MG: 10 INJECTION, EMULSION INTRAVENOUS at 16:14

## 2020-06-24 RX ADMIN — AMLODIPINE BESYLATE 10 MG: 5 TABLET ORAL at 08:10

## 2020-06-24 RX ADMIN — PROPOFOL 50 MG: 10 INJECTION, EMULSION INTRAVENOUS at 16:05

## 2020-06-24 RX ADMIN — SODIUM CHLORIDE: 0.9 INJECTION, SOLUTION INTRAVENOUS at 15:38

## 2020-06-24 RX ADMIN — PROPOFOL 50 MG: 10 INJECTION, EMULSION INTRAVENOUS at 16:04

## 2020-06-24 RX ADMIN — ATORVASTATIN CALCIUM 40 MG: 40 TABLET, FILM COATED ORAL at 17:50

## 2020-06-24 RX ADMIN — NICOTINE 1 PATCH: 7 PATCH TRANSDERMAL at 05:25

## 2020-06-24 RX ADMIN — NICOTINE 21 MG: 21 PATCH, EXTENDED RELEASE TRANSDERMAL at 12:50

## 2020-06-24 RX ADMIN — CLOPIDOGREL BISULFATE 300 MG: 75 TABLET ORAL at 17:53

## 2020-06-25 LAB
APTT SCREEN TO CONFIRM RATIO: 0.92 RATIO (ref 0–1.4)
CONFIRM APTT/NORMAL: 38.7 SEC (ref 0–55)
LA PPP-IMP: NORMAL
PROT S ACT/NOR PPP: 75 % (ref 57–157)
PROT S PPP-ACNC: 84 % (ref 60–150)
SCREEN APTT: 33 SEC (ref 0–51.9)
SCREEN DRVVT: 39.2 SEC (ref 0–47)
THROMBIN TIME: 18.9 SEC (ref 0–23)

## 2020-06-25 PROCEDURE — 93306 TTE W/DOPPLER COMPLETE: CPT | Performed by: INTERNAL MEDICINE

## 2020-06-26 LAB
B2 GLYCOPROT1 IGA SER-ACNC: <9 GPI IGA UNITS (ref 0–25)
B2 GLYCOPROT1 IGG SER-ACNC: <9 GPI IGG UNITS (ref 0–20)
B2 GLYCOPROT1 IGM SER-ACNC: <9 GPI IGM UNITS (ref 0–32)

## 2020-06-27 LAB — PROT S ACT/NOR PPP: 83 % (ref 63–140)

## 2020-06-29 LAB — F2 GENE MUT ANL BLD/T: NORMAL

## 2020-07-01 LAB — F5 GENE MUT ANL BLD/T: NORMAL

## 2020-07-06 ENCOUNTER — TELEPHONE (OUTPATIENT)
Dept: NEUROLOGY | Facility: CLINIC | Age: 55
End: 2020-07-06

## 2020-07-06 NOTE — PROGRESS NOTES
Patient ID: Sherrie Soto is a 47 y o  male  Assessment/Plan:  Annika Vicente is a 24-year-old gentleman who had recent left mid brain, and left thumb make infarction in May 2020, who then presented as a stroke alert for left leg weakness, left-sided numbness  MRI of the brain at that time confirmed acute to subacute infarct in the right corona Radiata, unclear etiology however suspect most likely related to microvascular disease  Patient however with multiple infarcts at this time, concern for possible embolic etiology  Thrombus panel was normal  --ASHTYN with EF of 65 percent, no thrombus noted, no evidence of a PFO   --continue Plavix 75 milligrams daily  --continue statin therapy  --blood pressure goal 130-140  --pt has stopped smoking  --recently diagnosed with DM, encouraged good blood glucose control, hemoglobin A1c goal less than 7 0  --given no clear etiology at this point, patient needs to see Cardiology with consideration for a loop recorder  --patient courage to obtain updated eye exam, particularly given new diagnosis of diabetes as well    -discussed with patient that should he have any further signs or symptoms suggestive of CVA, no new focal weakness, issues with his please, loss of vision, significant headache, he should call 911 or report back to the ER          No problem-specific Assessment & Plan notes found for this encounter  Diagnoses and all orders for this visit:    Cerebrovascular accident (CVA) due to thrombosis of vertebral artery, unspecified blood vessel laterality ([facility] [address] )  -     Ambulatory referral to Cardiology; Future    Hypertensive urgency  -     Ambulatory referral to Cardiology; Future           Subjective:    HPI   Annika Vicente is a 24-year-old gentleman with history of hypertension, diabetes  Patient presented to the hospital back in May with complaints of diplopia, dizziness and gait dysfunction  His diplopia improved when he closed 1 eye    Patient had presented to Urgent Care initially and was told to go to the ER but he did not follow through  He went home and went to sleep however noted increased worsening of his diplopia the next day, he was leaning to the right with ambulation, as well as having right hand numbness  No headache, no dysarthria, no facial droop  He subsequently presented back to the ER, blood pressure on arrival noted at 190/111  CT/CTA were unremarkable  MRI of the brain showed CVA in the left midbrain in the oculomotor own nucleus, and inferior medial nucleus as well as additional infarcts in the medial left thalamus, chronic infarcts seen in the right basal ganglia as well as chronic small-vessel disease  At that time, he was started on aspirin, his blood pressure meds were increased  He remained stable and was subsequently discharged a few days later  Patient then presented back to the hospital in June for left lower extremity weakness, left upper and lower extremity numbness  Patient went to stand up in the morning to ambulate to the bathroom he noted weakness and numbness on his left, he was dragging his left leg  He presented to the ER, BP at the time noted 211/103  His numbness had resolved in the ED  He was able to ambulate  Patient had been compliant with his aspirin and Lipitor  CT head showed new focal area of hypodensity in the right corona radiata, likely subacute ischemia  CTA head and neck without large vessel occlusion  Patient had previously been diagnosed at his last visit with diagnoses of diabetes however had not been able to obtain his diabetic medication, he also confirmed at the time that his blood pressure had been at a control  He continued to smoke  He was seen by Neurology, MRI revealed evidence of 2 recent ischemic strokes as well as acute to subacute infarction right corona radiata  Due to his prior strokes as well, concern for possible embolic etiology  Patient did undergo AHSTYN without any clear cardiac source    His aspirin was changed to Plavix, he was continued on his statin therapy  Today, patient states he is doing well  On occasion, he will move note mild slurred speech, mostly when fatigued or stressed  He denies any diplopia, however does note with extreme right lateral gaze that he will have difficulty with his vision  He has not been seen by an eye doctor  He denies any dizziness, no headaches  No focal weakness, no reported falls  Fortunately in the interim, he did stop smoking  He continues on Plavix and statin therapy  No side effects  He does have some chronic issues with right upper extremity numbness and tingling, no issues with dexterity or dropping of objects  This was present prior, told he might have some CTS  Patient in the interim between visits at the hospital was diagnosed with diabetes, he is on metformin  He states blood sugars running 110-190  Patient is following with his PCP with regards to his blood pressure  No reports of dizziness  In speaking with patient, he states that prior to his hospitalization, he did note episodes of palpitations  He had none during his hospital events, there was no evidence during his hospital stay of atrial fibrillation  Given his strokes, still consider embolic source  the below studies reviewed with patient at today's visit    MRI brain with without contrast 05/08/2020;   Acute infarct in the medial left midbrain and medial left thalamus No enhancing intraparenchymal lesion Moderate to severe periventricular and white matter T2 hyperintensities likely related to chronic small vessel No acute hemorrhage seen    MRI brain without contrast 06/23/2020;1  Acute to subacute infarct in the right corona radiata  No significant edema or mass effect  No hemorrhagic transformation     Sequela of chronic infarcts and white matter microangiopathy      CTA head/neck 06/23/2020;   1   Patent major vasculature of [place] of [place] without critical stenosis    2   Incidentally noted 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 measuring 1 5 mm at the neck and 3 mm from base to the apex    3   No hemodynamically significant stenosis of major cervical vasculature  Less than 50% stenosis at proximal internal carotid arteries by NASCET criteria   4   Dental caries and periapical lucencies are noted  ECHO 05/08/2020;  LEFT VENTRICLE:  Systolic function was normal  Ejection fraction was estimated to be 60 %  There were no regional wall motion abnormalities  There was mild to moderate concentric hypertrophy  Features were consistent with a pseudonormal left ventricular filling pattern, with concomitant abnormal relaxation and increased filling pressure (grade 2 diastolic dysfunction)    MITRAL VALVE:There was trace regurgitation        ASHTYN 06/24/2020;  LEFT VENTRICLE:  Systolic function was normal  Ejection fraction was estimated to be 65 %  There were no regional wall motion abnormalities  Wall thickness was increased   LEFT ATRIUM: No thrombus was identified   LEFT ATRIAL APPENDAGE: No thrombus was identified  ATRIAL SEPTUM: No defect or patent foramen ovale was identified  A bubble study was performed  There was no right-to-left shunt, with provocative maneuvers to increase right atrial pressure  MITRAL VALVE: There was trace regurgitation  TRICUSPID VALVE: There was trace regurgitation      Labs 06/24/2020;CH= 75, TG = 55, HDL = 20, LDL = 36, CBC normal, glucose 151, LFTs normal, GFR = 87, factor 5 Leiden negative,  prothrombin gene mutation negative , protein S activity/total and free antibodies normal, beta 2 glycoprotein antibodies normal, lupus anticoagulant not detected, anticardiolipin antibodies negative, protein C activity normal, anti thrombin 3 activity normal            05/08/2020; hemoglobin A1c = 6 5, TSH = 0 812      The following portions of the patient's history were reviewed and updated as appropriate: allergies, current medications, past family history, past medical history, past social history, past surgical history and problem list          Objective:  Current Outpatient Medications   Medication Sig Dispense Refill    amLODIPine-benazepril (LOTREL) 10-40 MG per capsule Take 1 capsule by mouth daily      atorvastatin (LIPITOR) 40 mg tablet Take 1 tablet (40 mg total) by mouth every evening 30 tablet 5    carvedilol (COREG) 25 mg tablet Take 25 mg by mouth 2 (two) times a day with meals      clopidogrel (PLAVIX) 75 mg tablet Take 1 tablet (75 mg total) by mouth daily 30 tablet 0    metFORMIN (GLUCOPHAGE) 500 mg tablet Take 1 tablet (500 mg total) by mouth 2 (two) times a day with meals 60 tablet 0    nicotine (NICODERM CQ) 21 mg/24 hr TD 24 hr patch Place 1 patch on the skin daily 28 patch 0     No current facility-administered medications for this visit  Blood pressure 165/88, pulse 60, temperature 97 6 °F (36 4 °C), weight 105 kg (231 lb)  Physical Exam   Constitutional: He appears well-developed  HENT:   Head: Normocephalic  Eyes: Pupils are equal, round, and reactive to light  Neck: Normal range of motion  Pulmonary/Chest: Effort normal    Musculoskeletal: Normal range of motion  Neurological: He has normal strength and normal reflexes  Coordination normal    Skin: Skin is warm  Psychiatric: He has a normal mood and affect  His speech is normal and behavior is normal  Judgment and thought content normal        Neurological Exam  Mental Status  Awake, alert and oriented to person, place and time  Speech is normal  Language is fluent with no aphasia  Cranial Nerves  CN II: Left visual acuity: normal  Patient with mild right lateral field cut  Right funduscopic exam: disc intact  Left funduscopic exam: disc intact  CN III, IV, VI: Extraocular movements intact bilaterally  Pupils equal round and reactive to light bilaterally    CN V: Facial sensation is normal   CN VII: Full and symmetric facial movement  CN VIII: Hearing is normal   CN XI: Shoulder shrug strength is normal   CN XII: Tongue midline without atrophy or fasciculations  Motor  Normal muscle bulk throughout  Normal muscle tone  No abnormal involuntary movements  Strength is 5/5 throughout all four extremities  Sensory  Temperature is normal in upper and lower extremities  Vibration is normal in upper and lower extremities  Reflexes  Deep tendon reflexes are 2+ and symmetric in all four extremities with downgoing toes bilaterally  Coordination  Finger-to-nose, rapid alternating movements and heel-to-shin normal bilaterally without dysmetria  Gait  Casual gait is normal including stance, stride, and arm swing  ROS:  Personally reviewed with patient at today's visit    Review of Systems  Constitutional: Negative  Negative for appetite change and fever  HENT: Negative  Negative for hearing loss, tinnitus, trouble swallowing and voice change  Eyes: Negative  Negative for photophobia and pain  Respiratory: Negative  Negative for shortness of breath  Cardiovascular: Negative  Negative for palpitations  Gastrointestinal: Negative  Negative for nausea and vomiting  Endocrine: Negative  Negative for cold intolerance  Genitourinary: Negative  Negative for dysuria, frequency and urgency  Musculoskeletal: Negative  Negative for myalgias and neck pain  Skin: Negative  Negative for rash  Neurological: Negative  Negative for dizziness, tremors, seizures, syncope, facial asymmetry, speech difficulty, weakness, light-headedness, numbness and headaches  Hematological: Negative  Does not bruise/bleed easily  Psychiatric/Behavioral: Negative    Negative for confusion, hallucinations and sleep disturbance

## 2020-07-07 ENCOUNTER — OFFICE VISIT (OUTPATIENT)
Dept: NEUROLOGY | Facility: CLINIC | Age: 55
End: 2020-07-07
Payer: COMMERCIAL

## 2020-07-07 VITALS
SYSTOLIC BLOOD PRESSURE: 165 MMHG | TEMPERATURE: 97.6 F | BODY MASS INDEX: 36.18 KG/M2 | WEIGHT: 231 LBS | HEART RATE: 60 BPM | DIASTOLIC BLOOD PRESSURE: 88 MMHG

## 2020-07-07 DIAGNOSIS — I63.019 CEREBROVASCULAR ACCIDENT (CVA) DUE TO THROMBOSIS OF VERTEBRAL ARTERY, UNSPECIFIED BLOOD VESSEL LATERALITY (HCC): Primary | ICD-10-CM

## 2020-07-07 DIAGNOSIS — I16.0 HYPERTENSIVE URGENCY: ICD-10-CM

## 2020-07-07 PROCEDURE — 99215 OFFICE O/P EST HI 40 MIN: CPT | Performed by: NURSE PRACTITIONER

## 2020-07-07 NOTE — PROGRESS NOTES
Review of Systems   Constitutional: Negative  Negative for appetite change and fever  HENT: Negative  Negative for hearing loss, tinnitus, trouble swallowing and voice change  Eyes: Negative  Negative for photophobia and pain  Respiratory: Negative  Negative for shortness of breath  Cardiovascular: Negative  Negative for palpitations  Gastrointestinal: Negative  Negative for nausea and vomiting  Endocrine: Negative  Negative for cold intolerance  Genitourinary: Negative  Negative for dysuria, frequency and urgency  Musculoskeletal: Negative  Negative for myalgias and neck pain  Skin: Negative  Negative for rash  Neurological: Negative  Negative for dizziness, tremors, seizures, syncope, facial asymmetry, speech difficulty, weakness, light-headedness, numbness and headaches  Hematological: Negative  Does not bruise/bleed easily  Psychiatric/Behavioral: Negative  Negative for confusion, hallucinations and sleep disturbance

## 2020-07-07 NOTE — PATIENT INSTRUCTIONS
continue Plavix, statin therapy  Encouraged good blood sugar control  Will refer to Cardiology for potential loop recorder  Patient following with PCP  Patient to have updated eye exam  Encouraged good blood pressure control, goal 130-140  Patient to report back to the ER with any signs or symptoms suggestive of a stroke

## 2020-08-17 ENCOUNTER — TELEPHONE (OUTPATIENT)
Dept: NEUROLOGY | Facility: CLINIC | Age: 55
End: 2020-08-17

## 2020-08-17 ENCOUNTER — TELEPHONE (OUTPATIENT)
Dept: OTHER | Facility: OTHER | Age: 55
End: 2020-08-17

## 2020-09-22 ENCOUNTER — TELEPHONE (OUTPATIENT)
Dept: NEUROLOGY | Facility: CLINIC | Age: 55
End: 2020-09-22

## 2020-09-22 NOTE — TELEPHONE ENCOUNTER
Amie from Dr Chelsie Boyer, PCP called and requested a copy of the office notes from 7/7/2020  [phone]

## 2020-09-23 ENCOUNTER — TELEPHONE (OUTPATIENT)
Dept: NEUROLOGY | Facility: CLINIC | Age: 55
End: 2020-09-23

## 2020-09-23 NOTE — TELEPHONE ENCOUNTER
L/m on v/m to confirm appt with Brooklyn Ricks on 10/6/20 @ 8:45 am   Requested c/b from patient to confirm the patient will be keeping the appointment

## 2020-09-23 NOTE — TELEPHONE ENCOUNTER
Patient calling in  He states that he was never contacted by cardiology so he never setup an appt  Patient states that he is frustrated that he was never called by cardiology  Patient states that he would only like to be seen in [facility]  I advised that he call [phone] to set up an appt  Transferred call      FYI - patient states he will call back to confirm Oct appt w Rigo Stephenson

## 2020-09-24 NOTE — TELEPHONE ENCOUNTER
2nd attempt to confirm 10/6/20 appointment with [facility]

## 2020-10-28 DIAGNOSIS — I63.9 CVA (CEREBRAL VASCULAR ACCIDENT) (HCC): ICD-10-CM

## 2020-10-28 RX ORDER — ATORVASTATIN CALCIUM 40 MG/1
TABLET, FILM COATED ORAL
Qty: 30 TABLET | Refills: 5 | OUTPATIENT
Start: 2020-10-28

## 2021-03-25 ENCOUNTER — TELEPHONE (OUTPATIENT)
Dept: NEUROLOGY | Facility: CLINIC | Age: 56
End: 2021-03-25

## 2021-03-25 NOTE — TELEPHONE ENCOUNTER
Spoke w pt  Trying to sched consult from Ref  Turns out he saw Brooklyn in [facility] on 7/7/20 as HFU  Pt canceled [place] f/u w Carol Gorman    Tried to sched f/u w Allen Rodriguez in [facility]  Pt will speak to pcp and c/b    Closing task

## 2022-10-05 NOTE — TELEPHONE ENCOUNTER
Patient stated he never heard from cardiology  Provided him their contact number  He will contact them 
Reports that he has been diagnosed with depression, OCD, mixed personality disorder  See HPI

## 2024-01-24 ENCOUNTER — OFFICE VISIT (OUTPATIENT)
Dept: FAMILY MEDICINE CLINIC | Facility: HOSPITAL | Age: 59
End: 2024-01-24
Payer: COMMERCIAL

## 2024-01-24 VITALS
OXYGEN SATURATION: 98 % | HEIGHT: 70 IN | DIASTOLIC BLOOD PRESSURE: 126 MMHG | RESPIRATION RATE: 16 BRPM | BODY MASS INDEX: 26.63 KG/M2 | SYSTOLIC BLOOD PRESSURE: 218 MMHG | HEART RATE: 76 BPM | TEMPERATURE: 98.4 F | WEIGHT: 186 LBS

## 2024-01-24 DIAGNOSIS — F17.200 TOBACCO DEPENDENCE: ICD-10-CM

## 2024-01-24 DIAGNOSIS — E78.2 MIXED HYPERLIPIDEMIA: ICD-10-CM

## 2024-01-24 DIAGNOSIS — N52.8 OTHER MALE ERECTILE DYSFUNCTION: ICD-10-CM

## 2024-01-24 DIAGNOSIS — I10 PRIMARY HYPERTENSION: ICD-10-CM

## 2024-01-24 DIAGNOSIS — E11.65 TYPE 2 DIABETES MELLITUS WITH HYPERGLYCEMIA, WITHOUT LONG-TERM CURRENT USE OF INSULIN (HCC): Primary | ICD-10-CM

## 2024-01-24 DIAGNOSIS — Z86.73 S/P STROKE DUE TO CEREBROVASCULAR DISEASE: ICD-10-CM

## 2024-01-24 PROBLEM — I16.0 HYPERTENSIVE URGENCY: Status: RESOLVED | Noted: 2020-05-07 | Resolved: 2024-01-24

## 2024-01-24 PROBLEM — H53.2 DIPLOPIA: Status: RESOLVED | Noted: 2020-05-07 | Resolved: 2024-01-24

## 2024-01-24 PROBLEM — E66.9 OBESITY (BMI 35.0-39.9 WITHOUT COMORBIDITY): Status: RESOLVED | Noted: 2020-05-07 | Resolved: 2024-01-24

## 2024-01-24 PROBLEM — R73.01 IMPAIRED FASTING GLUCOSE: Status: RESOLVED | Noted: 2020-05-07 | Resolved: 2024-01-24

## 2024-01-24 PROBLEM — I63.9 STROKE (CEREBRUM) (HCC): Status: RESOLVED | Noted: 2020-06-23 | Resolved: 2024-01-24

## 2024-01-24 PROCEDURE — 99205 OFFICE O/P NEW HI 60 MIN: CPT | Performed by: INTERNAL MEDICINE

## 2024-01-24 RX ORDER — ATORVASTATIN CALCIUM 40 MG/1
40 TABLET, FILM COATED ORAL EVERY EVENING
Qty: 90 TABLET | Refills: 3 | Status: SHIPPED | OUTPATIENT
Start: 2024-01-24

## 2024-01-24 RX ORDER — NICOTINE 21 MG/24HR
1 PATCH, TRANSDERMAL 24 HOURS TRANSDERMAL EVERY 24 HOURS
Qty: 28 PATCH | Refills: 0 | Status: SHIPPED | OUTPATIENT
Start: 2024-01-24

## 2024-01-24 RX ORDER — CARVEDILOL 25 MG/1
25 TABLET ORAL 2 TIMES DAILY WITH MEALS
Qty: 180 TABLET | Refills: 3 | Status: SHIPPED | OUTPATIENT
Start: 2024-01-24

## 2024-01-24 RX ORDER — SILDENAFIL 100 MG/1
100 TABLET, FILM COATED ORAL DAILY PRN
Qty: 10 TABLET | Refills: 0 | Status: SHIPPED | OUTPATIENT
Start: 2024-01-24

## 2024-01-24 RX ORDER — AMLODIPINE AND BENAZEPRIL HYDROCHLORIDE 10; 40 MG/1; MG/1
1 CAPSULE ORAL DAILY
Qty: 90 CAPSULE | Refills: 3 | Status: SHIPPED | OUTPATIENT
Start: 2024-01-24

## 2024-01-24 RX ORDER — CLOPIDOGREL BISULFATE 75 MG/1
75 TABLET ORAL DAILY
Qty: 90 TABLET | Refills: 3 | Status: SHIPPED | OUTPATIENT
Start: 2024-01-24

## 2024-01-24 NOTE — ASSESSMENT & PLAN NOTE
Lab Results   Component Value Date    HGBA1C 6.4 (H) 05/08/2020    HGBA1C 6.5 (H) 05/08/2020     Pt presents to establish care at this practice    Pt has not been taking any meds for a month.  His glucose was >400 last November when mother last check glucose.    Metformin caused diarrhea, pt didn't tolerate it    Pt denies loss of vision, hasn't seen ophthalmology  Denies symptoms of neuropathy    He changed his diet and decreased sweets intake and cut out sodas.    I am resuming Jardiance at 25 mg dose.  Will get blood test and urine test and see patient back in 2 weeks.    Patient's mother has been giving him some of her 75-25 insulin before dinner, will see if he needs to be on insulin.  I told him that it was hard for me to advise him about insulin without blood tests

## 2024-01-24 NOTE — ASSESSMENT & PLAN NOTE
He has difficulty maintaining erections.  Libido is intact.  Denies chest pain, shortness of breath, obstructive urinary complaints, low back pain.  He was on sildenafil 100 mg before that helped with erections.  I reordered that.

## 2024-01-24 NOTE — ASSESSMENT & PLAN NOTE
Pt has not taken antihypertensives for a month  Has htn since 50 yrs old    Patient's blood pressure is uncontrolled.  He is asymptomatic and probably has had uncontrolled hypertension for a long time.  He has no evidence of acute endorgan damage.    I am resuming his amlodipine-benazepril and carvedilol.    Ordered blood tests and will reassess him in 2 weeks

## 2024-01-24 NOTE — ASSESSMENT & PLAN NOTE
He has history of CVA.  I advised him to start taking blood pressure medication to prevent more strokes.  He will continue Plavix and atorvastatin

## 2024-01-24 NOTE — PROGRESS NOTES
Assessment/Plan:    Type 2 diabetes mellitus with hyperglycemia, without long-term current use of insulin ([facility])    Lab Results   Component Value Date    HGBA1C 6.4 (H) 05/08/2020    HGBA1C 6.5 (H) 05/08/2020     Pt presents to establish care at this practice    Pt has not been taking any meds for a month.  His glucose was >400 last November when mother last check glucose.    Metformin caused diarrhea, pt didn't tolerate it    Pt denies loss of vision, hasn't seen ophthalmology  Denies symptoms of neuropathy    He changed his diet and decreased sweets intake and cut out sodas.    I am resuming Jardiance at 25 mg dose.  Will get blood test and urine test and see patient back in 2 weeks.    Patient's mother has been giving him some of her 75-25 insulin before dinner, will see if he needs to be on insulin.  I told him that it was hard for me to advise him about insulin without blood tests      Primary hypertension  Pt has not taken antihypertensives for a month  Has htn since 50 yrs old    Patient's blood pressure is uncontrolled.  He is asymptomatic and probably has had uncontrolled hypertension for a long time.  He has no evidence of acute endorgan damage.    I am resuming his amlodipine-benazepril and carvedilol.    Ordered blood tests and will reassess him in 2 weeks      Mixed hyperlipidemia  He is s/p stroke and has hyperlipidemia.  I resumed his atorvastatin.  Will check his lipids    Other male erectile dysfunction  He has difficulty maintaining erections.  Libido is intact.  Denies chest pain, shortness of breath, obstructive urinary complaints, low back pain.  He was on sildenafil 100 mg before that helped with erections.  I reordered that.    S/P stroke due to cerebrovascular disease  He has history of CVA.  I advised him to start taking blood pressure medication to prevent more strokes.  He will continue Plavix and atorvastatin     Diagnoses and all orders for this visit:    Type 2 diabetes mellitus with  hyperglycemia, without long-term current use of insulin (HCC)  -     Empagliflozin 25 MG TABS; Take 1 tablet (25 mg total) by mouth daily  -     Hemoglobin A1C; Future  -     Comprehensive metabolic panel; Future  -     CBC and Platelet; Future  -     TSH, 3rd generation with Free T4 reflex; Future  -     Lipid Panel with Direct LDL reflex; Future  -     Albumin / creatinine urine ratio; Future  -     Hemoglobin A1C  -     Comprehensive metabolic panel  -     CBC and Platelet  -     TSH, 3rd generation with Free T4 reflex  -     Lipid Panel with Direct LDL reflex  -     Albumin / creatinine urine ratio    S/P stroke due to cerebrovascular disease  -     clopidogrel (PLAVIX) 75 mg tablet; Take 1 tablet (75 mg total) by mouth daily    Primary hypertension  -     amLODIPine-benazepril (LOTREL) 10-40 MG per capsule; Take 1 capsule by mouth daily  -     carvedilol (COREG) 25 mg tablet; Take 1 tablet (25 mg total) by mouth 2 (two) times a day with meals    Other male erectile dysfunction  -     sildenafil (VIAGRA) 100 mg tablet; Take 1 tablet (100 mg total) by mouth daily as needed for erectile dysfunction    Mixed hyperlipidemia  -     atorvastatin (LIPITOR) 40 mg tablet; Take 1 tablet (40 mg total) by mouth every evening    Tobacco dependence  -     nicotine (NICODERM CQ) 21 mg/24 hr TD 24 hr patch; Place 1 patch on the skin over 24 hours every 24 hours  -     nicotine polacrilex (Nicorette) 2 mg gum; Chew 1 each (2 mg total) as needed for smoking cessation    Other orders  -     Discontinue: Empagliflozin (Jardiance) 10 MG TABS tablet; Take 10 mg by mouth every morning          Subjective:      Patient ID: Wilder Vargas is a 58 y.o. male.      HPI    Patient presents for follow-up of chronic conditions as detailed in the assessment and plan.      The following portions of the patient's history were reviewed and updated as appropriate: current medications, past family history, past medical history, past social history,  "past surgical history and problem list.    Review of Systems   Constitutional:  Negative for fever.   HENT:  Negative for hearing loss.    Eyes:  Negative for visual disturbance.   Respiratory:  Negative for cough and shortness of breath.    Cardiovascular:  Negative for chest pain and palpitations.   Gastrointestinal:  Negative for abdominal pain, blood in stool, constipation and diarrhea.   Endocrine: Negative for polydipsia.   Genitourinary:  Negative for difficulty urinating and hematuria.   Musculoskeletal:  Negative for back pain and myalgias.   Skin:  Negative for rash.   Neurological:  Negative for weakness, numbness and headaches.   Psychiatric/Behavioral:  Negative for dysphoric mood.    All other systems reviewed and are negative.        Objective:    BP (!) 218/126 (BP Location: Right arm, Patient Position: Sitting)   Pulse 76   Temp 98.4 °F (36.9 °C) (Tympanic)   Resp 16   Ht 5' 10\" (1.778 m)   Wt 84.4 kg (186 lb)   SpO2 98%   BMI 26.69 kg/m²      Physical Exam  HENT:      Head: Normocephalic.      Mouth/Throat:      Mouth: Mucous membranes are moist.      Pharynx: No oropharyngeal exudate.   Eyes:      Conjunctiva/sclera: Conjunctivae normal.   Cardiovascular:      Rate and Rhythm: Normal rate and regular rhythm.      Heart sounds: No murmur heard.     No gallop.   Pulmonary:      Effort: No respiratory distress.      Breath sounds: No wheezing or rales.   Abdominal:      General: Bowel sounds are normal.      Palpations: Abdomen is soft.      Tenderness: There is no abdominal tenderness.   Musculoskeletal:         General: No tenderness.      Cervical back: Neck supple.   Skin:     General: Skin is warm and dry.      Comments: He has no lower extremity edema   Neurological:      Mental Status: He is alert and oriented to person, place, and time.      Cranial Nerves: No cranial nerve deficit.      Motor: No weakness.      Gait: Gait normal.   Psychiatric:         Mood and Affect: Mood normal.    "      Thought Content: Thought content normal.           Depression Screening and Follow-up Plan: Patient was screened for depression during today's encounter. They screened negative with a PHQ-2 score of 0.    Tobacco Cessation Counseling: Tobacco cessation counseling was provided. The patient is sincerely urged to quit consumption of tobacco. He is ready to quit tobacco. Medication options discussed. Nicotine patch was prescribed.         Gilmar Oro MD

## 2024-02-11 LAB
ALBUMIN SERPL-MCNC: 4.3 G/DL (ref 3.8–4.9)
ALBUMIN/CREAT UR: 229 MG/G CREAT (ref 0–29)
ALBUMIN/GLOB SERPL: 1.5 {RATIO} (ref 1.2–2.2)
ALP SERPL-CCNC: 89 IU/L (ref 44–121)
ALT SERPL-CCNC: 22 IU/L (ref 0–44)
AST SERPL-CCNC: 17 IU/L (ref 0–40)
BILIRUB SERPL-MCNC: 0.8 MG/DL (ref 0–1.2)
BUN SERPL-MCNC: 21 MG/DL (ref 6–24)
BUN/CREAT SERPL: 21 (ref 9–20)
CALCIUM SERPL-MCNC: 9.6 MG/DL (ref 8.7–10.2)
CHLORIDE SERPL-SCNC: 100 MMOL/L (ref 96–106)
CHOLEST SERPL-MCNC: 129 MG/DL (ref 100–199)
CO2 SERPL-SCNC: 23 MMOL/L (ref 20–29)
CREAT SERPL-MCNC: 1.01 MG/DL (ref 0.76–1.27)
CREAT UR-MCNC: 36.3 MG/DL
EGFR: 86 ML/MIN/1.73
ERYTHROCYTE [DISTWIDTH] IN BLOOD BY AUTOMATED COUNT: 12.5 % (ref 11.6–15.4)
EST. AVERAGE GLUCOSE BLD GHB EST-MCNC: 324 MG/DL
GLOBULIN SER-MCNC: 2.9 G/DL (ref 1.5–4.5)
GLUCOSE SERPL-MCNC: 226 MG/DL (ref 70–99)
HBA1C MFR BLD: 12.9 % (ref 4.8–5.6)
HCT VFR BLD AUTO: 56.3 % (ref 37.5–51)
HDLC SERPL-MCNC: 40 MG/DL
HGB BLD-MCNC: 19 G/DL (ref 13–17.7)
LDLC SERPL CALC-MCNC: 73 MG/DL (ref 0–99)
LDLC/HDLC SERPL: 1.8 RATIO (ref 0–3.6)
MCH RBC QN AUTO: 28.2 PG (ref 26.6–33)
MCHC RBC AUTO-ENTMCNC: 33.7 G/DL (ref 31.5–35.7)
MCV RBC AUTO: 84 FL (ref 79–97)
MICROALBUMIN UR-MCNC: 83 UG/ML
PLATELET # BLD AUTO: 213 X10E3/UL (ref 150–450)
POTASSIUM SERPL-SCNC: 4 MMOL/L (ref 3.5–5.2)
PROT SERPL-MCNC: 7.2 G/DL (ref 6–8.5)
RBC # BLD AUTO: 6.73 X10E6/UL (ref 4.14–5.8)
SL AMB VLDL CHOLESTEROL CALC: 16 MG/DL (ref 5–40)
SODIUM SERPL-SCNC: 138 MMOL/L (ref 134–144)
TRIGL SERPL-MCNC: 82 MG/DL (ref 0–149)
TSH SERPL DL<=0.005 MIU/L-ACNC: 0.65 UIU/ML (ref 0.45–4.5)
WBC # BLD AUTO: 8.8 X10E3/UL (ref 3.4–10.8)

## 2024-02-21 ENCOUNTER — OFFICE VISIT (OUTPATIENT)
Dept: FAMILY MEDICINE CLINIC | Facility: HOSPITAL | Age: 59
End: 2024-02-21
Payer: COMMERCIAL

## 2024-02-21 VITALS
SYSTOLIC BLOOD PRESSURE: 162 MMHG | WEIGHT: 184 LBS | TEMPERATURE: 97.6 F | OXYGEN SATURATION: 97 % | DIASTOLIC BLOOD PRESSURE: 94 MMHG | HEIGHT: 70 IN | HEART RATE: 60 BPM | RESPIRATION RATE: 16 BRPM | BODY MASS INDEX: 26.34 KG/M2

## 2024-02-21 DIAGNOSIS — I10 PRIMARY HYPERTENSION: ICD-10-CM

## 2024-02-21 DIAGNOSIS — K21.9 GASTROESOPHAGEAL REFLUX DISEASE WITHOUT ESOPHAGITIS: ICD-10-CM

## 2024-02-21 DIAGNOSIS — Z12.11 SCREENING FOR COLORECTAL CANCER: ICD-10-CM

## 2024-02-21 DIAGNOSIS — Z12.12 SCREENING FOR COLORECTAL CANCER: ICD-10-CM

## 2024-02-21 DIAGNOSIS — F17.210 SMOKING GREATER THAN 20 PACK YEARS: ICD-10-CM

## 2024-02-21 DIAGNOSIS — Z00.00 ANNUAL PHYSICAL EXAM: ICD-10-CM

## 2024-02-21 DIAGNOSIS — Z12.5 SCREENING FOR PROSTATE CANCER: ICD-10-CM

## 2024-02-21 DIAGNOSIS — E11.65 TYPE 2 DIABETES MELLITUS WITH HYPERGLYCEMIA, WITHOUT LONG-TERM CURRENT USE OF INSULIN (HCC): Primary | ICD-10-CM

## 2024-02-21 PROCEDURE — 99396 PREV VISIT EST AGE 40-64: CPT | Performed by: INTERNAL MEDICINE

## 2024-02-21 PROCEDURE — 99214 OFFICE O/P EST MOD 30 MIN: CPT | Performed by: INTERNAL MEDICINE

## 2024-02-21 RX ORDER — PIOGLITAZONEHYDROCHLORIDE 30 MG/1
30 TABLET ORAL EVERY MORNING
Qty: 90 TABLET | Refills: 1 | Status: SHIPPED | OUTPATIENT
Start: 2024-02-21

## 2024-02-21 RX ORDER — HYDROCHLOROTHIAZIDE 25 MG/1
25 TABLET ORAL EVERY MORNING
Qty: 90 TABLET | Refills: 1 | Status: SHIPPED | OUTPATIENT
Start: 2024-02-21 | End: 2024-08-19

## 2024-02-21 NOTE — ASSESSMENT & PLAN NOTE
Patient resumed taking amlodipine-benazepril and carvedilol.  His blood pressure is still uncontrolled.  I am adding hydrochlorothiazide to the above medications and recheck his electrolytes and renal function that were normal on February 9.    We discussed low-salt diet, I gave patient information on low-salt diet.  Patient has a blood pressure meter at home and I asked him to start taking his blood pressure and send me his blood pressure reading results in about 3 weeks

## 2024-02-21 NOTE — ASSESSMENT & PLAN NOTE
Lab Results   Component Value Date    HGBA1C 12.9 (H) 02/09/2024       Patient has type II does mellitus with uncontrolled hyperglycemia.  He started taking Jardiance and he tolerates it.  Jardiance is affordable.    We reviewed diet again to decrease carbs and sweets intakes    I added Actos 30 mg once daily to Jardiance    Patient prefers medications by mouth instead of injections    Recheck A1c, urine microalbumin in 3 months before next visit    I encourage patient to see ophthalmology

## 2024-02-21 NOTE — PROGRESS NOTES
ADULT ANNUAL PHYSICAL  [facility] [address]    NAME: Wilder Vargas  AGE: 58 y.o. SEX: male  : 1965     DATE: 2024     Assessment and Plan:     Problem List Items Addressed This Visit        Digestive    Gastroesophageal reflux disease without esophagitis     Patient has GERD.  He received Nexium for a friend of his that controls his acid reflux very well.  He denies dysphagia, odynophagia, signs of GI bleeding.  His abdomen was soft and nontender  Continue Nexium, I will reassess him in 3 months         Relevant Medications    esomeprazole (NexIUM) 20 mg capsule       Endocrine    Type 2 diabetes mellitus with hyperglycemia, without long-term current use of insulin ([facility]) - Primary       Lab Results   Component Value Date    HGBA1C 12.9 (H) 2024       Patient has type II does mellitus with uncontrolled hyperglycemia.  He started taking Jardiance and he tolerates it.  Jardiance is affordable.    We reviewed diet again to decrease carbs and sweets intakes    I added Actos 30 mg once daily to Jardiance    Patient prefers medications by mouth instead of injections    Recheck A1c, urine microalbumin in 3 months before next visit    I encourage patient to see ophthalmology         Relevant Medications    pioglitazone (ACTOS) 30 mg tablet    Other Relevant Orders    Comprehensive metabolic panel    Hemoglobin A1C    Albumin / creatinine urine ratio    CBC and differential       Cardiovascular and Mediastinum    Primary hypertension     Patient resumed taking amlodipine-benazepril and carvedilol.  His blood pressure is still uncontrolled.  I am adding hydrochlorothiazide to the above medications and recheck his electrolytes and renal function that were normal on .    We discussed low-salt diet, I gave patient information on low-salt diet.  Patient has a blood pressure meter at home and I asked him to start taking his blood pressure and  send me his blood pressure reading results in about 3 weeks         Relevant Medications    hydroCHLOROthiazide 25 mg tablet    Other Relevant Orders    Basic metabolic panel   Other Visit Diagnoses     Annual physical exam        Screening for colorectal cancer        Relevant Orders    Cologuard    Smoking greater than 20 pack years        Relevant Orders    CT lung screening program    Screening for prostate cancer        Relevant Orders    PSA (Reflex To Free) (Serial)          Immunizations and preventive care screenings were discussed with patient today. Appropriate education was printed on patient's after visit summary.        Counseling:  Exercise: the importance of regular exercise/physical activity was discussed. Recommend exercise 3-5 times per week for at least 30 minutes.   Lung cancer, colon cancer, prostate cancer screening and immunizations.  Patient declined colonoscopy but accepted Cologuard  He declined immunizations  I encourage patients to see an ophthalmologist and a dentist         Return in about 3 months (around 5/21/2024) for Recheck.     Chief Complaint:     Chief Complaint   Patient presents with   • Annual Exam      History of Present Illness:     Adult Annual Physical   Patient here for a comprehensive physical exam. The patient reports problems - as detailed in the assessment and plan .    Diet and Physical Activity  Diet/Nutrition:  We discussed low-salt and low-carb diet .   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  Currently he is idle in the winter but he will be very busy in the spring .      Depression Screening  PHQ-2/9 Depression Screening         General Health  Sleep: sleeps well.   Hearing: decreased - bilateral.  Vision: no vision problems.   Dental: no dental visits for >1 year.        Health  Symptoms include: none         Review of Systems:     Review of Systems   Constitutional:  Negative for fever.   HENT:  Positive for hearing loss.     Eyes:  Negative for visual disturbance.   Respiratory:  Negative for cough and shortness of breath.    Cardiovascular:  Negative for chest pain and palpitations.   Gastrointestinal:  Negative for abdominal pain, blood in stool, constipation and diarrhea.   Endocrine: Negative for polydipsia and polyphagia.   Genitourinary:  Negative for difficulty urinating and hematuria.   Musculoskeletal:  Negative for myalgias.   Skin:  Negative for rash.   Neurological:  Negative for headaches.   Psychiatric/Behavioral:  Negative for dysphoric mood.    All other systems reviewed and are negative.     Past Medical History:     Past Medical History:   Diagnosis Date   • CVA (cerebral vascular accident) (HCC)    • Diabetes mellitus (HCC)    • Hypertension       Past Surgical History:     Past Surgical History:   Procedure Laterality Date   • NO PAST SURGERIES        Family History:     Family History   Problem Relation Age of Onset   • Diabetes Mother    • Heart failure Father    • Substance Abuse Neg Hx    • Mental illness Neg Hx       Social History:     Social History     Socioeconomic History   • Marital status: Single     Spouse name: None   • Number of children: None   • Years of education: None   • Highest education level: None   Occupational History   • None   Tobacco Use   • Smoking status: Every Day     Current packs/day: 1.00     Average packs/day: 1 pack/day for 42.1 years (42.1 ttl pk-yrs)     Types: Cigarettes     Start date: 1982   • Smokeless tobacco: Never   Vaping Use   • Vaping status: Never Used   Substance and Sexual Activity   • Alcohol use: Not Currently   • Drug use: Never   • Sexual activity: None   Other Topics Concern   • None   Social History Narrative    Lives with his mom.    Feels safe at home.    Rare dental care.      Social Determinants of Health     Financial Resource Strain: Not on file   Food Insecurity: Not on file   Transportation Needs: Not on file   Physical Activity: Not on file   Stress:  "Not on file   Social Connections: Not on file   Intimate Partner Violence: Not on file   Housing Stability: Not on file      Current Medications:     Current Outpatient Medications   Medication Sig Dispense Refill   • amLODIPine-benazepril (LOTREL) 10-40 MG per capsule Take 1 capsule by mouth daily 90 capsule 3   • atorvastatin (LIPITOR) 40 mg tablet Take 1 tablet (40 mg total) by mouth every evening 90 tablet 3   • carvedilol (COREG) 25 mg tablet Take 1 tablet (25 mg total) by mouth 2 (two) times a day with meals 180 tablet 3   • clopidogrel (PLAVIX) 75 mg tablet Take 1 tablet (75 mg total) by mouth daily 90 tablet 3   • Empagliflozin 25 MG TABS Take 1 tablet (25 mg total) by mouth daily 90 tablet 3   • esomeprazole (NexIUM) 20 mg capsule Take 1 capsule (20 mg total) by mouth daily in the early morning 90 capsule 0   • hydroCHLOROthiazide 25 mg tablet Take 1 tablet (25 mg total) by mouth every morning 90 tablet 1   • nicotine (NICODERM CQ) 21 mg/24 hr TD 24 hr patch Place 1 patch on the skin over 24 hours every 24 hours 28 patch 0   • nicotine polacrilex (Nicorette) 2 mg gum Chew 1 each (2 mg total) as needed for smoking cessation 100 each 0   • pioglitazone (ACTOS) 30 mg tablet Take 1 tablet (30 mg total) by mouth every morning 90 tablet 1   • sildenafil (VIAGRA) 100 mg tablet Take 1 tablet (100 mg total) by mouth daily as needed for erectile dysfunction 10 tablet 0     No current facility-administered medications for this visit.      Allergies:     Allergies   Allergen Reactions   • Metformin Diarrhea      Physical Exam:     /94 (BP Location: Left arm, Patient Position: Sitting)   Pulse 60   Temp 97.6 °F (36.4 °C) (Tympanic)   Resp 16   Ht 5' 10\" (1.778 m)   Wt 83.5 kg (184 lb)   SpO2 97%   BMI 26.40 kg/m²     Physical Exam  Constitutional:       General: He is not in acute distress.     Appearance: He is well-developed. He is not toxic-appearing.   HENT:      Head: Normocephalic.      Right Ear: " Tympanic membrane normal. There is no impacted cerumen.      Left Ear: Tympanic membrane normal. There is no impacted cerumen.      Mouth/Throat:      Mouth: Mucous membranes are moist.      Pharynx: No posterior oropharyngeal erythema.   Eyes:      Conjunctiva/sclera: Conjunctivae normal.   Cardiovascular:      Rate and Rhythm: Normal rate and regular rhythm.      Pulses: no weak pulses.           Dorsalis pedis pulses are 2+ on the right side and 2+ on the left side.        Posterior tibial pulses are 2+ on the right side and 2+ on the left side.      Heart sounds: No murmur heard.  Pulmonary:      Effort: No respiratory distress.      Breath sounds: No wheezing or rales.   Abdominal:      General: Bowel sounds are normal.      Palpations: Abdomen is soft.      Tenderness: There is no abdominal tenderness.   Musculoskeletal:      Cervical back: Neck supple.   Feet:      Right foot:      Skin integrity: No ulcer.      Left foot:      Skin integrity: No ulcer.   Skin:     General: Skin is warm and dry.   Neurological:      Mental Status: He is alert and oriented to person, place, and time.      Cranial Nerves: No cranial nerve deficit.      Motor: No weakness.      Gait: Gait normal.   Psychiatric:         Mood and Affect: Mood normal.         Thought Content: Thought content normal.          Gilmar Oro MD  [facility] [address]    I discussed with him that he is a candidate for lung cancer CT screening.     The following Shared Decision-Making points were covered:  Benefits of screening were discussed, including the rates of reduction in death from lung cancer and other causes.  Harms of screening were reviewed, including false positive tests, radiation exposure levels, risks of invasive procedures, risks of complications of screening, and risk of overdiagnosis.  I counseled on the importance of adherence to annual lung cancer LDCT screening, impact of co-morbidities, and ability or  willingness to undergo diagnosis and treatment.  I counseled on the importance of maintaining abstinence as a former smoker or was counseled on the importance of smoking cessation if a current smoker    Review of Eligibility Criteria: He meets all of the criteria for Lung Cancer Screening.   He is 58 y.o.   He has 20 pack year tobacco history and is a current smoker or has quit within the past 15 years  He presents no signs or symptoms of lung cancer    After discussion, the patient decided to elect lung cancer screening.    Diabetic Foot Exam    Patient's shoes and socks removed.    Right Foot/Ankle   Right Foot Inspection  Skin Exam: No ulcer.     Sensory   Monofilament testing: intact    Vascular  The right DP pulse is 2+. The right PT pulse is 2+.     Left Foot/Ankle  Left Foot Inspection  Skin Exam: No ulcer.     Sensory   Monofilament testing: intact    Vascular  The left DP pulse is 2+. The left PT pulse is 2+.     Assign Risk Category  No deformity present  No loss of protective sensation  No weak pulses  Risk: 0

## 2024-02-21 NOTE — ASSESSMENT & PLAN NOTE
Patient has GERD.  He received Nexium for a friend of his that controls his acid reflux very well.  He denies dysphagia, odynophagia, signs of GI bleeding.  His abdomen was soft and nontender  Continue Nexium, I will reassess him in 3 months

## 2024-02-21 NOTE — PATIENT INSTRUCTIONS
Take your blood pressure once a day at various times of the day after resting in a sitting position for 5-10 minutes. Keep a log of your blood pressure and heart rate readings and send me the recordings in about 2-3 weeks!Take your blood pressure once a day at various times of the day after resting in a sitting position for 5-10 minutes. Keep a log of your blood pressure and heart rate readings and send me the recordings in about 2-3 weeks!    DASH Eating Plan   AMBULATORY CARE:   The DASH (Dietary Approaches to Stop Hypertension) Eating Plan  is designed to help prevent or lower high blood pressure. It can also help to lower LDL (bad) cholesterol and decrease your risk for heart disease. The plan is low in sodium, sugar, unhealthy fats, and total fat. It is high in potassium, calcium, magnesium, and fiber. These nutrients are added when you eat more fruits, vegetables, and whole grains. With the DASH eating plan, you need to eat a certain number of servings from each food group. This will help you get enough of certain nutrients and limit others. The amount of servings you should eat depends on how many calories you need. Your dietitian can help you create meal plans with the right number of servings for each food group.       What you need to know about sodium:  Your dietitian will tell you how much sodium is safe for you to have each day. People with high blood pressure should have no more than 1,500 to 2,300 mg of sodium in a day. A teaspoon (tsp) of salt has 2,300 mg of sodium. This may seem like a difficult goal, but small changes to the foods you eat can make a big difference. Your healthcare provider or dietitian can help you create a meal plan that follows your sodium limit.  Read food labels.  Food labels can help you choose foods that are low in sodium. The amount of sodium is listed in milligrams (mg). The % Daily Value (DV) column tells you how much of your daily needs are met by 1 serving of the food  for each nutrient listed. Choose foods that have less than 5% of the DV of sodium. These foods are considered low in sodium. Foods that have 20% or more of the DV of sodium are considered high in sodium. Avoid foods that have more than 300 mg of sodium in each serving. Choose foods that say low-sodium, reduced-sodium, or no salt added on the food label.         Limit added salt.  Do not salt food at the table if you add salt when you cook. Use herbs and spices, such as onions, garlic, and salt-free seasonings to add flavor. Try lemon or lime juice or vinegar to add a tart flavor. Use hot peppers or a small amount of hot pepper sauce to add a spicy flavor. Limit foods high in added salt, such as the following:    Seasonings made with salt, such as garlic salt, celery salt, onion salt, seasoned salt, meat tenderizers, and monosodium glutamate (MSG)    Miso soup and canned or dried soup mixes    Regular soy sauce, barbecue sauce, teriyaki sauce, steak sauce, Worcestershire sauce, and most flavored vinegars    Snack foods, such as salted chips, popcorn, pretzels, pork rinds, salted crackers, and salted nuts    Frozen foods, such as dinners, entrees, vegetables with sauces, and breaded meats    Ask about salt substitutes.  Ask your healthcare provider if you may use salt substitutes. Some salt substitutes have ingredients that can be harmful if you have certain health conditions.    Choose foods carefully at restaurants.  Meals from restaurants, especially fast food restaurants, are often high in sodium. Some restaurants have nutrition information that tells you the amount of sodium in their foods. Ask to have your food prepared with less, or no salt.    What you need to know about fats:  Healthy fats include unsaturated fats and omega-3 fatty acids. Unhealthy fats include saturated fats and trans fats.  Include healthy fats, such as the following:      Cooking oils, such as soybean, canola, olive, or sunflower    Fatty  fish, such as salmon, tuna, mackerel, or sardines    Flaxseed oil or ground flaxseed    ½ cup of cooked beans, such as black beans, kidney beans, or ibarra beans    1½ ounces of low-sodium nuts, such as almonds or walnuts    Low-sugar, low-sodium peanut butter    Seeds such as uma seeds or sunflower seeds       Limit or do not have unhealthy fats, such as the following:      Foods that contain fat from animals, such as fatty meats, whole milk, butter, and cream    Shortening, stick margarine, palm oil, and coconut oil    Full-fat or creamy salad dressing    Creamy soup    Crackers, chips, and baked goods made with margarine or shortening    Foods that are fried in unhealthy fats    Gravy and sauces, such as Jose or cheese sauces    What you need to know about carbohydrates (carbs):  All carbs break down into sugar. Complex carbs contain more fiber than simple carbs. This means complex carbs go into the bloodstream more slowly and cause less of a blood sugar spike. Try to include more complex carbs and fewer simple carbs.  Include complex carbs, such as the followin slice of whole-grain bread    1 ounce of dry cereal that does not contain added sugar    ½ cup of cooked oatmeal    2 ounces of cooked whole-grain pasta    ½ cup of cooked brown rice    Limit or do not have simple carbs, such as the following:      Baked goods, such as doughnuts, pastries, and cookies    Mixes for cornbread and biscuits    White rice and pasta mixes, such as boxed macaroni and cheese    Instant and cold cereals that contain sugar    Jelly, jam, and ice cream that contain sugar    Condiments such as ketchup    Drinks high in sugar, such as soft drinks, lemonade, and fruit juice    What you need to know about vegetables and fruits:  Vegetables and fruits can be fresh, frozen, or canned. If possible, try to choose low-sodium canned options.  Include a variety of vegetables and fruits, such as the followin medium apple,  pear, or peach (about ½ cup chopped)    ½ small banana    ½ cup berries, such as blueberries, strawberries, or blackberries    1 cup of raw leafy greens, such as lettuce, spinach, kale, or ela greens    ½ cup of frozen or canned (no added salt) vegetables, such as green beans    ½ cup of fresh, frozen, or canned fruit (canned in light syrup or fruit juice)    ½ cup of vegetable or fruit juice    Limit or do not have vegetables and fruits made in the following ways:      Frozen fruit such as cherries that have added sugar    Fruit in cream or butter sauce    Canned vegetables that are high in sodium    Sauerkraut, pickled vegetables, and other foods prepared in brine    Fried vegetables or vegetables in butter or high-fat sauces    What you need to know about protein foods:   Include lean or low-fat protein foods, such as the following:      Poultry (chicken, turkey) with no skin    Fish (especially fatty fish, such as salmon, fresh tuna, or mackerel)    Lean beef and pork (loin, round, extra lean hamburger)    Egg whites and egg substitutes    1 cup of nonfat (skim) or 1% milk    1½ ounces of fat-free or low-fat cheese    6 ounces of nonfat or low-fat yogurt    Limit or do not have high-fat protein foods, such as the following:      Smoked or cured meat, such as corned beef, tejada, ham, hot dogs, and sausage    Canned beans and canned meats or spreads, such as potted meats, sardines, anchovies, and imitation seafood    Deli or lunch meats, such as bologna, ham, turkey, and roast beef    High-fat meat (T-bone steak, regular hamburger, and ribs)    Whole eggs and egg yolks    Whole milk, 2% milk, and cream    Regular cheese and processed cheese    Other guidelines to follow:   Maintain a healthy weight.  Your risk for heart disease is higher if you have extra weight. Ask your healthcare provider what a healthy weight is for you. Your provider may suggest that you lose weight. You can lose weight by eating fewer  calories and foods that have added sugars and fat. The DASH meal plan can help you do this. Decrease calories by eating smaller portions at each meal and fewer snacks. Ask your provider for more information about how to lose weight.    Exercise regularly.  Regular exercise can help you reach or maintain a healthy weight. Regular exercise can also help decrease your blood pressure and improve your cholesterol levels. Get 30 minutes or more of moderate exercise each day of the week. To lose weight, get at least 60 minutes of exercise. Talk to your healthcare provider about the best exercise program for you.         Limit alcohol.  Women should limit alcohol to 1 drink a day. Men should limit alcohol to 2 drinks a day. A drink of alcohol is 12 ounces of beer, 5 ounces of wine, or 1½ ounces of liquor.    For more information:   [facility] [place]  [facility]  [address] [address]  [place] [address]  Phone: [phone]  Web Address: http://www.nhlbi.nih.gov/health/infoctr/index.htm    © Copyright [employer] 2023 Information is for End User's use only and may not be sold, redistributed or otherwise used for commercial purposes.  The above information is an  only. It is not intended as medical advice for individual conditions or treatments. Talk to your doctor, nurse or pharmacist before following any medical regimen to see if it is safe and effective for you.      Wellness Visit for Adults   AMBULATORY CARE:   A wellness visit  is when you see your healthcare provider to get screened for health problems. Your healthcare provider will also give you advice on how to stay healthy. Write down your questions so you remember to ask them. Ask your healthcare provider how often you should have a wellness visit.  What happens at a wellness visit:  Your healthcare provider will ask about your health, and your family history of health problems. This includes high blood  pressure, heart disease, and cancer. He or she will ask if you have symptoms that concern you, if you smoke, and about your mood. You may also be asked about your intake of medicines, supplements, food, and alcohol. Any of the following may be done:  Your weight  will be checked. Your height may also be checked so your body mass index (BMI) can be calculated. Your BMI shows if you are at a healthy weight.    Your blood pressure  and heart rate will be checked. Your temperature may also be checked.    Blood and urine tests  may be done. Blood tests may be done to check your cholesterol levels. Abnormal cholesterol levels increase your risk for heart disease and stroke. You may also need a blood or urine test to check for diabetes if you are at increased risk. Urine tests may be done to look for signs of an infection or kidney disease.    A physical exam  includes checking your heartbeat and lungs with a stethoscope. Your healthcare provider may also check your skin to look for sun damage.    Screening tests  may be recommended. A screening test is done to check for diseases that may not cause symptoms. The screening tests you may need depend on your age, gender, family history, and lifestyle habits. For example, colorectal screening may be recommended if you are 50 years old or older.    Screening tests you need if you are a woman:   A Pap smear  is used to screen for cervical cancer. Pap smears are usually done every 3 to 5 years depending on your age. You may need them more often if you have had abnormal Pap smear test results in the past. Ask your healthcare provider how often you should have a Pap smear.    A mammogram  is an x-ray of your breasts to screen for breast cancer. Experts recommend mammograms every 2 years starting at age 50 years. You may need a mammogram at age 49 years or younger if you have an increased risk for breast cancer. Talk to your healthcare provider about when you should start having  mammograms and how often you need them.    Vaccines you may need:   Get an influenza vaccine  every year. The influenza vaccine protects you from the flu. Several types of viruses cause the flu. The viruses change over time, so new vaccines are made each year.    Get a tetanus-diphtheria (Td) booster vaccine  every 10 years. This vaccine protects you against tetanus and diphtheria. Tetanus is a severe infection that may cause painful muscle spasms and lockjaw. Diphtheria is a severe bacterial infection that causes a thick covering in the back of your mouth and throat.    Get a human papillomavirus (HPV) vaccine  if you are female and aged 19 to 26 or male 19 to 21 and never received it. This vaccine protects you from HPV infection. HPV is the most common infection spread by sexual contact. HPV may also cause vaginal, penile, and anal cancers.    Get a pneumococcal vaccine  if you are aged 65 years or older. The pneumococcal vaccine is an injection given to protect you from pneumococcal disease. Pneumococcal disease is an infection caused by pneumococcal bacteria. The infection may cause pneumonia, meningitis, or an ear infection.    Get a shingles vaccine  if you are 60 or older, even if you have had shingles before. The shingles vaccine is an injection to protect you from the varicella-zoster virus. This is the same virus that causes chickenpox. Shingles is a painful rash that develops in people who had chickenpox or have been exposed to the virus.    How to eat healthy:  My Plate is a model for planning healthy meals. It shows the types and amounts of foods that should go on your plate. Fruits and vegetables make up about half of your plate, and grains and protein make up the other half. A serving of dairy is included on the side of your plate. The amount of calories and serving sizes you need depends on your age, gender, weight, and height. Examples of healthy foods are listed below:  Eat a variety of vegetables   such as dark green, red, and orange vegetables. You can also include canned vegetables low in sodium (salt) and frozen vegetables without added butter or sauces.    Eat a variety of fresh fruits , canned fruit in 100% juice, frozen fruit, and dried fruit.    Include whole grains.  At least half of the grains you eat should be whole grains. Examples include whole-wheat bread, wheat pasta, brown rice, and whole-grain cereals such as oatmeal.    Eat a variety of protein foods such as seafood (fish and shellfish), lean meat, and poultry without skin (turkey and chicken). Examples of lean meats include pork leg, shoulder, or tenderloin, and beef round, sirloin, tenderloin, and extra lean ground beef. Other protein foods include eggs and egg substitutes, beans, peas, soy products, nuts, and seeds.    Choose low-fat dairy products such as skim or 1% milk or low-fat yogurt, cheese, and cottage cheese.    Limit unhealthy fats  such as butter, hard margarine, and shortening.       Exercise:  Exercise at least 30 minutes per day on most days of the week. Some examples of exercise include walking, biking, dancing, and swimming. You can also fit in more physical activity by taking the stairs instead of the elevator or parking farther away from stores. Include muscle strengthening activities 2 days each week. Regular exercise provides many health benefits. It helps you manage your weight, and decreases your risk for type 2 diabetes, heart disease, stroke, and high blood pressure. Exercise can also help improve your mood. Ask your healthcare provider about the best exercise plan for you.       General health and safety guidelines:   Do not smoke.  Nicotine and other chemicals in cigarettes and cigars can cause lung damage. Ask your healthcare provider for information if you currently smoke and need help to quit. E-cigarettes or smokeless tobacco still contain nicotine. Talk to your healthcare provider before you use these  products.    Limit alcohol.  A drink of alcohol is 12 ounces of beer, 5 ounces of wine, or 1½ ounces of liquor.    Lose weight, if needed.  Being overweight increases your risk of certain health conditions. These include heart disease, high blood pressure, type 2 diabetes, and certain types of cancer.    Protect your skin.  Do not sunbathe or use tanning beds. Use sunscreen with a SPF 15 or higher. Apply sunscreen at least 15 minutes before you go outside. Reapply sunscreen every 2 hours. Wear protective clothing, hats, and sunglasses when you are outside.    Drive safely.  Always wear your seatbelt. Make sure everyone in your car wears a seatbelt. A seatbelt can save your life if you are in an accident. Do not use your cell phone when you are driving. This could distract you and cause an accident. Pull over if you need to make a call or send a text message.    Practice safe sex.  Use latex condoms if are sexually active and have more than one partner. Your healthcare provider may recommend screening tests for sexually transmitted infections (STIs).    Wear helmets, lifejackets, and protective gear.  Always wear a helmet when you ride a bike or motorcycle, go skiing, or play sports that could cause a head injury. Wear protective equipment when you play sports. Wear a lifejacket when you are on a boat or doing water sports.    © Copyright [employer] 2023 Information is for End User's use only and may not be sold, redistributed or otherwise used for commercial purposes.  The above information is an  only. It is not intended as medical advice for individual conditions or treatments. Talk to your doctor, nurse or pharmacist before following any medical regimen to see if it is safe and effective for you.

## 2024-03-18 ENCOUNTER — TELEPHONE (OUTPATIENT)
Dept: FAMILY MEDICINE CLINIC | Facility: HOSPITAL | Age: 59
End: 2024-03-18

## 2024-03-18 DIAGNOSIS — I10 PRIMARY HYPERTENSION: ICD-10-CM

## 2024-03-18 DIAGNOSIS — N52.8 OTHER MALE ERECTILE DYSFUNCTION: ICD-10-CM

## 2024-03-18 RX ORDER — SILDENAFIL 100 MG/1
100 TABLET, FILM COATED ORAL DAILY PRN
Qty: 10 TABLET | Refills: 0 | Status: CANCELLED | OUTPATIENT
Start: 2024-03-18

## 2024-03-18 RX ORDER — SILDENAFIL 100 MG/1
100 TABLET, FILM COATED ORAL DAILY PRN
Qty: 10 TABLET | Refills: 5 | Status: SHIPPED | OUTPATIENT
Start: 2024-03-18 | End: 2024-03-19

## 2024-03-19 RX ORDER — SILDENAFIL 100 MG/1
TABLET, FILM COATED ORAL
Qty: 10 TABLET | Refills: 5 | Status: SHIPPED | OUTPATIENT
Start: 2024-03-19

## 2024-03-24 DIAGNOSIS — F17.200 TOBACCO DEPENDENCE: ICD-10-CM

## 2024-03-25 RX ORDER — NICOTINE 21 MG/24HR
1 PATCH, TRANSDERMAL 24 HOURS TRANSDERMAL EVERY 24 HOURS
Qty: 28 PATCH | Refills: 1 | Status: SHIPPED | OUTPATIENT
Start: 2024-03-25

## 2024-03-26 LAB
BUN SERPL-MCNC: 17 MG/DL (ref 6–24)
BUN/CREAT SERPL: 18 (ref 9–20)
CALCIUM SERPL-MCNC: 8.9 MG/DL (ref 8.7–10.2)
CHLORIDE SERPL-SCNC: 101 MMOL/L (ref 96–106)
CO2 SERPL-SCNC: 22 MMOL/L (ref 20–29)
CREAT SERPL-MCNC: 0.95 MG/DL (ref 0.76–1.27)
EGFR: 93 ML/MIN/1.73
GLUCOSE SERPL-MCNC: 255 MG/DL (ref 70–99)
POTASSIUM SERPL-SCNC: 4.3 MMOL/L (ref 3.5–5.2)
SODIUM SERPL-SCNC: 139 MMOL/L (ref 134–144)

## 2024-03-26 NOTE — PROGRESS NOTES
I called patient today and left message on his voicemail asking him to call me back tomorrow to discuss his diabetes: His fasting blood sugar was 255 on blood work from March 25.

## 2024-03-27 ENCOUNTER — TELEPHONE (OUTPATIENT)
Dept: FAMILY MEDICINE CLINIC | Facility: HOSPITAL | Age: 59
End: 2024-03-27

## 2024-03-27 DIAGNOSIS — E11.65 TYPE 2 DIABETES MELLITUS WITH HYPERGLYCEMIA, WITHOUT LONG-TERM CURRENT USE OF INSULIN (HCC): Primary | ICD-10-CM

## 2024-03-27 DIAGNOSIS — E11.65 TYPE 2 DIABETES MELLITUS WITH HYPERGLYCEMIA, WITHOUT LONG-TERM CURRENT USE OF INSULIN (HCC): ICD-10-CM

## 2024-03-27 RX ORDER — INSULIN DEGLUDEC 100 U/ML
10 INJECTION, SOLUTION SUBCUTANEOUS
Qty: 15 ML | Refills: 1 | Status: SHIPPED | OUTPATIENT
Start: 2024-03-27 | End: 2024-03-29

## 2024-03-27 NOTE — PROGRESS NOTES
I I called patient and spoke with him on the phone regarding his blood sugars that are on between 1  in the morning fasting.  He has been taking Jardiance and Actos.  His blood pressure is not 140 systolic    His blood sugars are not controlled.  I am adding Tresiba 10 units at at bedtime.  I asked patient to keep taking his fasting blood sugars in the morning and call me with results in about a week after starting the insulin    Will keep the appointment scheduled in May, I asked him to get the blood work done that I ordered in February that includes A1c and urine microalbumin.  He told me that he has the order forms.

## 2024-03-28 LAB — COLOGUARD RESULT REPORTABLE: NEGATIVE

## 2024-03-29 RX ORDER — INSULIN GLARGINE 100 [IU]/ML
10 INJECTION, SOLUTION SUBCUTANEOUS
Qty: 15 ML | Refills: 3 | Status: SHIPPED | OUTPATIENT
Start: 2024-03-29

## 2024-04-01 DIAGNOSIS — E11.65 TYPE 2 DIABETES MELLITUS WITH HYPERGLYCEMIA, WITHOUT LONG-TERM CURRENT USE OF INSULIN (HCC): ICD-10-CM

## 2024-04-01 RX ORDER — INSULIN GLARGINE 100 [IU]/ML
10 INJECTION, SOLUTION SUBCUTANEOUS
Qty: 15 ML | Refills: 3 | Status: SHIPPED | OUTPATIENT
Start: 2024-04-01

## 2024-05-19 LAB
ALBUMIN SERPL-MCNC: 4.1 G/DL (ref 3.8–4.9)
ALBUMIN/CREAT UR: 390 MG/G CREAT (ref 0–29)
ALBUMIN/GLOB SERPL: 1.4 {RATIO} (ref 1.2–2.2)
ALP SERPL-CCNC: 92 IU/L (ref 44–121)
ALT SERPL-CCNC: 15 IU/L (ref 0–44)
AST SERPL-CCNC: 12 IU/L (ref 0–40)
BASOPHILS # BLD AUTO: 0.1 X10E3/UL (ref 0–0.2)
BASOPHILS NFR BLD AUTO: 1 %
BILIRUB SERPL-MCNC: 0.7 MG/DL (ref 0–1.2)
BUN SERPL-MCNC: 15 MG/DL (ref 6–24)
BUN/CREAT SERPL: 18 (ref 9–20)
CALCIUM SERPL-MCNC: 9.8 MG/DL (ref 8.7–10.2)
CHLORIDE SERPL-SCNC: 97 MMOL/L (ref 96–106)
CO2 SERPL-SCNC: 23 MMOL/L (ref 20–29)
CREAT SERPL-MCNC: 0.85 MG/DL (ref 0.76–1.27)
CREAT UR-MCNC: 45.8 MG/DL
EGFR: 101 ML/MIN/1.73
EOSINOPHIL # BLD AUTO: 0.2 X10E3/UL (ref 0–0.4)
EOSINOPHIL NFR BLD AUTO: 2 %
ERYTHROCYTE [DISTWIDTH] IN BLOOD BY AUTOMATED COUNT: 14.7 % (ref 11.6–15.4)
EST. AVERAGE GLUCOSE BLD GHB EST-MCNC: 295 MG/DL
GLOBULIN SER-MCNC: 2.9 G/DL (ref 1.5–4.5)
GLUCOSE SERPL-MCNC: 308 MG/DL (ref 70–99)
HBA1C MFR BLD: 11.9 % (ref 4.8–5.6)
HCT VFR BLD AUTO: 56.4 % (ref 37.5–51)
HGB BLD-MCNC: 19.2 G/DL (ref 13–17.7)
IMM GRANULOCYTES # BLD: 0 X10E3/UL (ref 0–0.1)
IMM GRANULOCYTES NFR BLD: 0 %
LYMPHOCYTES # BLD AUTO: 1.7 X10E3/UL (ref 0.7–3.1)
LYMPHOCYTES NFR BLD AUTO: 20 %
MCH RBC QN AUTO: 29 PG (ref 26.6–33)
MCHC RBC AUTO-ENTMCNC: 34 G/DL (ref 31.5–35.7)
MCV RBC AUTO: 85 FL (ref 79–97)
MICROALBUMIN UR-MCNC: 178.5 UG/ML
MONOCYTES # BLD AUTO: 0.6 X10E3/UL (ref 0.1–0.9)
MONOCYTES NFR BLD AUTO: 7 %
NEUTROPHILS # BLD AUTO: 6 X10E3/UL (ref 1.4–7)
NEUTROPHILS NFR BLD AUTO: 70 %
PLATELET # BLD AUTO: 189 X10E3/UL (ref 150–450)
POTASSIUM SERPL-SCNC: 4.3 MMOL/L (ref 3.5–5.2)
PROT SERPL-MCNC: 7 G/DL (ref 6–8.5)
RBC # BLD AUTO: 6.61 X10E6/UL (ref 4.14–5.8)
SODIUM SERPL-SCNC: 135 MMOL/L (ref 134–144)
WBC # BLD AUTO: 8.5 X10E3/UL (ref 3.4–10.8)

## 2024-05-22 ENCOUNTER — OFFICE VISIT (OUTPATIENT)
Dept: FAMILY MEDICINE CLINIC | Facility: HOSPITAL | Age: 59
End: 2024-05-22
Payer: COMMERCIAL

## 2024-05-22 VITALS
HEART RATE: 84 BPM | WEIGHT: 166 LBS | HEIGHT: 70 IN | SYSTOLIC BLOOD PRESSURE: 178 MMHG | OXYGEN SATURATION: 97 % | RESPIRATION RATE: 16 BRPM | BODY MASS INDEX: 23.77 KG/M2 | DIASTOLIC BLOOD PRESSURE: 110 MMHG | TEMPERATURE: 97.6 F

## 2024-05-22 DIAGNOSIS — I10 PRIMARY HYPERTENSION: ICD-10-CM

## 2024-05-22 DIAGNOSIS — Z79.4 TYPE 2 DIABETES MELLITUS WITH DIABETIC MICROALBUMINURIA, WITH LONG-TERM CURRENT USE OF INSULIN (HCC): ICD-10-CM

## 2024-05-22 DIAGNOSIS — E11.65 TYPE 2 DIABETES MELLITUS WITH HYPERGLYCEMIA, WITHOUT LONG-TERM CURRENT USE OF INSULIN (HCC): Primary | ICD-10-CM

## 2024-05-22 DIAGNOSIS — K21.9 GASTROESOPHAGEAL REFLUX DISEASE WITHOUT ESOPHAGITIS: ICD-10-CM

## 2024-05-22 DIAGNOSIS — D75.1 ERYTHROCYTOSIS: ICD-10-CM

## 2024-05-22 DIAGNOSIS — R10.10 PAIN OF UPPER ABDOMEN: ICD-10-CM

## 2024-05-22 DIAGNOSIS — E11.29 TYPE 2 DIABETES MELLITUS WITH DIABETIC MICROALBUMINURIA, WITH LONG-TERM CURRENT USE OF INSULIN (HCC): ICD-10-CM

## 2024-05-22 DIAGNOSIS — R80.9 TYPE 2 DIABETES MELLITUS WITH DIABETIC MICROALBUMINURIA, WITH LONG-TERM CURRENT USE OF INSULIN (HCC): ICD-10-CM

## 2024-05-22 PROCEDURE — 99214 OFFICE O/P EST MOD 30 MIN: CPT | Performed by: INTERNAL MEDICINE

## 2024-05-22 RX ORDER — INSULIN GLARGINE 100 [IU]/ML
20 INJECTION, SOLUTION SUBCUTANEOUS
Qty: 15 ML | Refills: 3 | Status: SHIPPED | OUTPATIENT
Start: 2024-05-22

## 2024-05-22 RX ORDER — PANTOPRAZOLE SODIUM 40 MG/1
40 TABLET, DELAYED RELEASE ORAL EVERY MORNING
Qty: 30 TABLET | Refills: 0 | Status: SHIPPED | OUTPATIENT
Start: 2024-05-22 | End: 2024-06-21

## 2024-05-22 RX ORDER — DOXAZOSIN MESYLATE 4 MG/1
4 TABLET ORAL
Qty: 30 TABLET | Refills: 1 | Status: SHIPPED | OUTPATIENT
Start: 2024-05-22

## 2024-05-22 NOTE — ASSESSMENT & PLAN NOTE
Lab Results   Component Value Date    HGBA1C 11.9 (H) 05/17/2024       He had microalbuminuria likely due to uncontrolled hyperglycemia.  Continue benazepril and Jardiance.  I will try to decrease his blood sugars and blood pressure to protect his kidneys

## 2024-05-22 NOTE — ASSESSMENT & PLAN NOTE
He has acid reflux.  The abdominal burning sensation could be a manifestation of that.  We discussed ulcer free diet.  I am trying him on Protonix 40 mg in the morning for 30 days

## 2024-05-22 NOTE — ASSESSMENT & PLAN NOTE
Lab Results   Component Value Date    HGBA1C 11.9 (H) 05/17/2024     Patient's A1c is better but still uncontrolled.  He usually eats 1 meal a day which is after work in the evening.  He sometimes has small breakfast.    When he was checking his blood sugar in the morning he got numbers between 160 and 230.    He discontinued Actos thinking that that could cause abdominal burning.  I told him I disagree with that.    We agreed to increase Lantus to 20 units in the evening and asked patient to check his blood sugars fasting in the morning and before dinner for 5 days and drop of the readings in the office    Will continue taking Jardiance

## 2024-05-22 NOTE — ASSESSMENT & PLAN NOTE
Patient had increased red blood cell counts with hemoglobin of >19 this month.  He smokes and he had hyperglycemia with blood sugars more than 300 that day.    Exocytosis could be due to smoking, intravascular volume depletion.  I ordered CAT scan of the abdomen to look for masses in the liver and kidney

## 2024-05-22 NOTE — ASSESSMENT & PLAN NOTE
Patient developed abdominal burning discomfort associated with anorexia, 20 pounds weight loss since February and change in bowel habits: Intermittent constipation for several days.    Orange juice and beer made the abdominal burning sensation better.    He discontinued Actos and hydrochlorothiazide couple weeks ago thinking that they could cause the abdominal burning sensation.    He denies dysphagia, signs of GI bleeding.  He denies dysuria, hematuria, difficulty urinating.  CBC showed polycythemia, LFTs were normal, renal function was normal on May 17.    His abdominal examination is completely normal today    I am concerned about the anorexia, weight loss, change in bowel habits and abdominal burning sensation.  I ordered CT of the abdomen and pelvis with contrast to look for intra-abdominal masses especially in the liver, kidneys, pancreas gland.    I referred patient to GI as she most likely will need an EGD and colonoscopy    We discussed ulcer free diet, advised patient to avoid orange juice, tomato products, he does not drink alcohol frequently asked him to decrease the.  He does not take NSAIDs and asked him to continue that.

## 2024-05-22 NOTE — PATIENT INSTRUCTIONS
Increase your evening Lantus insulin to 20 units.    Please check your blood sugar after waking up in the morning and before your evening meal for 5 days and send me your results!

## 2024-05-22 NOTE — ASSESSMENT & PLAN NOTE
Blood pressure is uncontrolled.  He told me that he is adherent to taking Lotrel and Coreg but discontinued HCTZ because he thought that could cause the burning sensation in his stomach.  I told him that I disagreed with that.    Nevertheless we will try doxazosin in addition to Lotrel and carvedilol to decrease his blood pressure.  I will reassess him in 4 weeks

## 2024-05-22 NOTE — PROGRESS NOTES
Assessment/Plan:    Pain of upper abdomen  Patient developed abdominal burning discomfort associated with anorexia, 20 pounds weight loss since February and change in bowel habits: Intermittent constipation for several days.    Orange juice and beer made the abdominal burning sensation better.    He discontinued Actos and hydrochlorothiazide couple weeks ago thinking that they could cause the abdominal burning sensation.    He denies dysphagia, signs of GI bleeding.  He denies dysuria, hematuria, difficulty urinating.  CBC showed polycythemia, LFTs were normal, renal function was normal on May 17.    His abdominal examination is completely normal today    I am concerned about the anorexia, weight loss, change in bowel habits and abdominal burning sensation.  I ordered CT of the abdomen and pelvis with contrast to look for intra-abdominal masses especially in the liver, kidneys, pancreas gland.    I referred patient to GI as she most likely will need an EGD and colonoscopy    We discussed ulcer free diet, advised patient to avoid orange juice, tomato products, he does not drink alcohol frequently asked him to decrease the.  He does not take NSAIDs and asked him to continue that.        Primary hypertension  Blood pressure is uncontrolled.  He told me that he is adherent to taking Lotrel and Coreg but discontinued HCTZ because he thought that could cause the burning sensation in his stomach.  I told him that I disagreed with that.    Nevertheless we will try doxazosin in addition to Lotrel and carvedilol to decrease his blood pressure.  I will reassess him in 4 weeks    Type 2 diabetes mellitus with hyperglycemia, without long-term current use of insulin ([facility])    Lab Results   Component Value Date    HGBA1C 11.9 (H) 05/17/2024     Patient's A1c is better but still uncontrolled.  He usually eats 1 meal a day which is after work in the evening.  He sometimes has small breakfast.    When he was checking his blood sugar  in the morning he got numbers between 160 and 230.    He discontinued Actos thinking that that could cause abdominal burning.  I told him I disagree with that.    We agreed to increase Lantus to 20 units in the evening and asked patient to check his blood sugars fasting in the morning and before dinner for 5 days and drop of the readings in the office    Will continue taking Jardiance    Type 2 diabetes mellitus with diabetic microalbuminuria, with long-term current use of insulin ([facility])    Lab Results   Component Value Date    HGBA1C 11.9 (H) 05/17/2024       He had microalbuminuria likely due to uncontrolled hyperglycemia.  Continue benazepril and Jardiance.  I will try to decrease his blood sugars and blood pressure to protect his kidneys    Gastroesophageal reflux disease without esophagitis  He has acid reflux.  The abdominal burning sensation could be a manifestation of that.  We discussed ulcer free diet.  I am trying him on Protonix 40 mg in the morning for 30 days    Erythrocytosis  Patient had increased red blood cell counts with hemoglobin of >19 this month.  He smokes and he had hyperglycemia with blood sugars more than 300 that day.    Exocytosis could be due to smoking, intravascular volume depletion.  I ordered CAT scan of the abdomen to look for masses in the liver and kidney       Diagnoses and all orders for this visit:    Type 2 diabetes mellitus with hyperglycemia, without long-term current use of insulin ([facility])  -     Insulin Glargine Solostar (Lantus SoloStar) 100 UNIT/ML SOPN; Inject 0.2 mL (20 Units total) under the skin daily at bedtime    Type 2 diabetes mellitus with diabetic microalbuminuria, with long-term current use of insulin ([facility])    Gastroesophageal reflux disease without esophagitis  -     pantoprazole (PROTONIX) 40 mg tablet; Take 1 tablet (40 mg total) by mouth every morning  -     Ambulatory referral to Gastroenterology; Future    Primary hypertension  -     doxazosin (CARDURA) 4  "mg tablet; Take 1 tablet (4 mg total) by mouth daily at bedtime    Pain of upper abdomen  -     CT abdomen pelvis w contrast; Future  -     Ambulatory referral to Gastroenterology; Future    Erythrocytosis          Subjective:      Patient ID: Wilder Vargas is a 58 y.o. male.      HPI    Patient presents for follow-up of chronic conditions as detailed in the assessment and plan.      The following portions of the patient's history were reviewed and updated as appropriate: current medications, past family history, past medical history, past social history, past surgical history and problem list.    Review of Systems      Objective:    BP (!) 178/110 (BP Location: Left arm, Patient Position: Sitting)   Pulse 84   Temp 97.6 °F (36.4 °C) (Tympanic)   Resp 16   Ht 5' 10\" (1.778 m)   Wt 75.3 kg (166 lb)   SpO2 97%   BMI 23.82 kg/m²      Physical Exam  HENT:      Head: Normocephalic.   Eyes:      Conjunctiva/sclera: Conjunctivae normal.   Cardiovascular:      Rate and Rhythm: Normal rate and regular rhythm.      Heart sounds: No murmur heard.  Pulmonary:      Effort: No respiratory distress.      Breath sounds: No wheezing or rales.   Abdominal:      General: Bowel sounds are normal.      Palpations: Abdomen is soft. There is no mass.      Tenderness: There is no abdominal tenderness.   Musculoskeletal:      Cervical back: Neck supple.   Skin:     General: Skin is warm and dry.   Neurological:      Mental Status: He is alert.      Cranial Nerves: No cranial nerve deficit.      Motor: No weakness.      Gait: Gait normal.   Psychiatric:         Mood and Affect: Mood normal.             Gilmar Oro MD  "

## 2024-05-24 LAB
PSA SERPL-MCNC: 1.2 NG/ML (ref 0–4)
SL AMB REFLEX CRITERIA: NORMAL

## 2024-05-29 NOTE — TELEPHONE ENCOUNTER
Keep all medicines the same and call if you need anything.    Please have Caden call us about Jardiance application.   Lm to cb. Does he need viagra? I dont' see Cialis on current med list.

## 2024-06-04 ENCOUNTER — HOSPITAL ENCOUNTER (OUTPATIENT)
Dept: CT IMAGING | Facility: HOSPITAL | Age: 59
Discharge: HOME | End: 2024-06-04
Attending: INTERNAL MEDICINE
Payer: COMMERCIAL

## 2024-06-04 DIAGNOSIS — R10.10 PAIN OF UPPER ABDOMEN: ICD-10-CM

## 2024-06-04 PROCEDURE — 74177 CT ABD & PELVIS W/CONTRAST: CPT

## 2024-06-04 RX ADMIN — IOHEXOL 80 ML: 350 INJECTION, SOLUTION INTRAVENOUS at 18:57

## 2024-06-05 DIAGNOSIS — F17.200 TOBACCO DEPENDENCE: ICD-10-CM

## 2024-06-05 DIAGNOSIS — K21.9 GASTROESOPHAGEAL REFLUX DISEASE WITHOUT ESOPHAGITIS: ICD-10-CM

## 2024-06-06 RX ORDER — PANTOPRAZOLE SODIUM 40 MG/1
40 TABLET, DELAYED RELEASE ORAL EVERY MORNING
Qty: 30 TABLET | Refills: 5 | Status: SHIPPED | OUTPATIENT
Start: 2024-06-06

## 2024-06-06 RX ORDER — NICOTINE 21 MG/24HR
1 PATCH, TRANSDERMAL 24 HOURS TRANSDERMAL EVERY 24 HOURS
Qty: 28 PATCH | Refills: 1 | Status: SHIPPED | OUTPATIENT
Start: 2024-06-06

## 2024-06-23 DIAGNOSIS — F17.200 TOBACCO DEPENDENCE: ICD-10-CM

## 2024-07-03 ENCOUNTER — TELEPHONE (OUTPATIENT)
Dept: FAMILY MEDICINE CLINIC | Facility: HOSPITAL | Age: 59
End: 2024-07-03

## 2024-07-15 DIAGNOSIS — I10 PRIMARY HYPERTENSION: ICD-10-CM

## 2024-07-15 RX ORDER — DOXAZOSIN MESYLATE 4 MG/1
4 TABLET ORAL
Qty: 100 TABLET | Refills: 1 | Status: SHIPPED | OUTPATIENT
Start: 2024-07-15

## 2024-07-16 ENCOUNTER — OFFICE VISIT (OUTPATIENT)
Dept: FAMILY MEDICINE CLINIC | Facility: HOSPITAL | Age: 59
End: 2024-07-16
Payer: COMMERCIAL

## 2024-07-16 VITALS
SYSTOLIC BLOOD PRESSURE: 144 MMHG | TEMPERATURE: 98.4 F | WEIGHT: 172 LBS | HEART RATE: 88 BPM | DIASTOLIC BLOOD PRESSURE: 92 MMHG | RESPIRATION RATE: 16 BRPM | BODY MASS INDEX: 24.62 KG/M2 | HEIGHT: 70 IN | OXYGEN SATURATION: 97 %

## 2024-07-16 DIAGNOSIS — I10 PRIMARY HYPERTENSION: ICD-10-CM

## 2024-07-16 DIAGNOSIS — K21.9 GASTROESOPHAGEAL REFLUX DISEASE WITHOUT ESOPHAGITIS: ICD-10-CM

## 2024-07-16 DIAGNOSIS — E11.65 TYPE 2 DIABETES MELLITUS WITH HYPERGLYCEMIA, WITHOUT LONG-TERM CURRENT USE OF INSULIN (HCC): Primary | ICD-10-CM

## 2024-07-16 DIAGNOSIS — N52.8 OTHER MALE ERECTILE DYSFUNCTION: ICD-10-CM

## 2024-07-16 DIAGNOSIS — Z86.73 S/P STROKE DUE TO CEREBROVASCULAR DISEASE: ICD-10-CM

## 2024-07-16 DIAGNOSIS — E78.2 MIXED HYPERLIPIDEMIA: ICD-10-CM

## 2024-07-16 PROBLEM — R10.10 PAIN OF UPPER ABDOMEN: Status: RESOLVED | Noted: 2024-05-22 | Resolved: 2024-07-16

## 2024-07-16 PROCEDURE — 99214 OFFICE O/P EST MOD 30 MIN: CPT | Performed by: INTERNAL MEDICINE

## 2024-07-16 RX ORDER — SILDENAFIL 100 MG/1
100 TABLET, FILM COATED ORAL DAILY PRN
Qty: 10 TABLET | Refills: 0 | Status: SHIPPED | OUTPATIENT
Start: 2024-07-16

## 2024-07-16 RX ORDER — INSULIN GLARGINE 100 [IU]/ML
30 INJECTION, SOLUTION SUBCUTANEOUS
Qty: 15 ML | Refills: 3 | Status: SHIPPED | OUTPATIENT
Start: 2024-07-16

## 2024-07-16 RX ORDER — PRAVASTATIN SODIUM 20 MG
20 TABLET ORAL
Qty: 100 TABLET | Refills: 3 | Status: SHIPPED | OUTPATIENT
Start: 2024-07-16

## 2024-07-16 NOTE — ASSESSMENT & PLAN NOTE
Patient has had CVAs in the past.  I reviewed MRI and CAT scan of the head results from 2020.    He stopped taking atorvastatin thinking that that was causing his abdominal pain.  Abdominal pain resolved.    He was not taking clopidogrel either.    I asked him to resume taking clopidogrel to prevent further strokes    I switched him from atorvastatin to pravastatin for secondary ASCVD prevention

## 2024-07-16 NOTE — PATIENT INSTRUCTIONS
Please check your blood sugar before you administer lantus at 4pm in the afternoon and call me with results in a week.    CAll [id number] to schedule you lung cancer screening ct of your chest!

## 2024-07-16 NOTE — ASSESSMENT & PLAN NOTE
Patient denies abdominal burning sensation or abdominal pain.  Denies signs of GI bleeding.  His abdomen was completely benign and nontender today.  Continue pantoprazole  I will check his hemoglobin

## 2024-07-16 NOTE — ASSESSMENT & PLAN NOTE
He has at dysfunction: Difficulty maintaining erection.  He denies obstructive urinary complaints or urinary retention.  Denies hematuria.    Sildenafil was not helping him.  He took brand-name Viagra before that was effective for his erectile dysfunction.  I tried to prescribe brand-name Viagra for him

## 2024-07-16 NOTE — ASSESSMENT & PLAN NOTE
Patient blood pressure is better controlled.  We went over the medications and he will continue taking Lotrel, Coreg and doxazosin    I we will reassess his electrolytes and renal function

## 2024-07-16 NOTE — PROGRESS NOTES
Assessment/Plan:    Type 2 diabetes mellitus with hyperglycemia, without long-term current use of insulin ([facility])    Lab Results   Component Value Date    HGBA1C 11.9 (H) 05/17/2024     Patient presents for diabetes follow-up  FBS the last 5 days was 161-188, he doesn't check his blood sugar in the afternoon or evening    He increased his lantus to 30 units at around 4 pm in the afternoon    I asked pt to check his glucose before his administers lantus at 4pm in the afternoon and call me with results in a week.    Will check A1c after 8/17/24    I encouraged him to see ophthalmology for retinal examination              Primary hypertension  Patient blood pressure is better controlled.  We went over the medications and he will continue taking Lotrel, Coreg and doxazosin    I we will reassess his electrolytes and renal function    S/P stroke due to cerebrovascular disease  Patient has had CVAs in the past.  I reviewed MRI and CAT scan of the head results from 2020.    He stopped taking atorvastatin thinking that that was causing his abdominal pain.  Abdominal pain resolved.    He was not taking clopidogrel either.    I asked him to resume taking clopidogrel to prevent further strokes    I switched him from atorvastatin to pravastatin for secondary ASCVD prevention    Gastroesophageal reflux disease without esophagitis  Patient denies abdominal burning sensation or abdominal pain.  Denies signs of GI bleeding.  His abdomen was completely benign and nontender today.  Continue pantoprazole  I will check his hemoglobin    Mixed hyperlipidemia  Patient has hyperlipidemia.  He discontinued taking atorvastatin I switched him to pravastatin    Other male erectile dysfunction  He has at dysfunction: Difficulty maintaining erection.  He denies obstructive urinary complaints or urinary retention.  Denies hematuria.    Sildenafil was not helping him.  He took brand-name Viagra before that was effective for his erectile dysfunction.  " I tried to prescribe brand-name Viagra for him     Diagnoses and all orders for this visit:    Type 2 diabetes mellitus with hyperglycemia, without long-term current use of insulin ([facility])  -     Insulin Glargine Solostar (Lantus SoloStar) 100 UNIT/ML SOPN; Inject 0.3 mL (30 Units total) under the skin daily at bedtime  -     Hemoglobin A1C; Future  -     Comprehensive metabolic panel; Future  -     CBC and differential; Future  -     Hemoglobin A1C  -     Comprehensive metabolic panel  -     CBC and differential    Other male erectile dysfunction  -     sildenafil (Viagra) 100 mg tablet; Take 1 tablet (100 mg total) by mouth daily as needed for erectile dysfunction    Mixed hyperlipidemia  -     pravastatin (PRAVACHOL) 20 mg tablet; Take 1 tablet (20 mg total) by mouth daily at bedtime    Primary hypertension    S/P stroke due to cerebrovascular disease    Gastroesophageal reflux disease without esophagitis          Subjective:      Patient ID: Wilder Vargas is a 58 y.o. male.      HPI    Patient presents for follow-up of chronic conditions as detailed in the assessment and plan.      The following portions of the patient's history were reviewed and updated as appropriate: current medications, past family history, past medical history, past social history, past surgical history and problem list.    Review of Systems      Objective:    /92 (BP Location: Left arm, Patient Position: Sitting)   Pulse 88   Temp 98.4 °F (36.9 °C) (Tympanic)   Resp 16   Ht 5' 10\" (1.778 m)   Wt 78 kg (172 lb)   SpO2 97%   BMI 24.68 kg/m²      Physical Exam  Constitutional:       General: He is not in acute distress.     Appearance: He is normal weight.   HENT:      Head: Normocephalic.   Eyes:      Conjunctiva/sclera: Conjunctivae normal.   Cardiovascular:      Rate and Rhythm: Normal rate and regular rhythm.      Heart sounds: No murmur heard.     No gallop.   Pulmonary:      Effort: No respiratory distress.      Breath " sounds: No wheezing or rales.   Abdominal:      General: Bowel sounds are normal. There is no distension.      Palpations: Abdomen is soft.      Tenderness: There is no abdominal tenderness.   Neurological:      Mental Status: He is alert.             Gilmar Oro MD

## 2024-07-16 NOTE — ASSESSMENT & PLAN NOTE
Lab Results   Component Value Date    HGBA1C 11.9 (H) 05/17/2024     Patient presents for diabetes follow-up  FBS the last 5 days was 161-188, he doesn't check his blood sugar in the afternoon or evening    He increased his lantus to 30 units at around 4 pm in the afternoon    I asked pt to check his glucose before his administers lantus at 4pm in the afternoon and call me with results in a week.    Will check A1c after 8/17/24    I encouraged him to see ophthalmology for retinal examination

## 2024-07-17 ENCOUNTER — TELEPHONE (OUTPATIENT)
Age: 59
End: 2024-07-17

## 2024-08-10 DIAGNOSIS — F17.200 TOBACCO DEPENDENCE: ICD-10-CM

## 2024-08-12 RX ORDER — NICOTINE 21 MG/24HR
1 PATCH, TRANSDERMAL 24 HOURS TRANSDERMAL EVERY 24 HOURS
Qty: 28 PATCH | Refills: 1 | Status: SHIPPED | OUTPATIENT
Start: 2024-08-12

## 2024-09-05 ENCOUNTER — TELEPHONE (OUTPATIENT)
Dept: FAMILY MEDICINE CLINIC | Facility: HOSPITAL | Age: 59
End: 2024-09-05

## 2024-09-10 ENCOUNTER — APPOINTMENT (EMERGENCY)
Dept: CT IMAGING | Facility: HOSPITAL | Age: 59
DRG: 045 | End: 2024-09-10
Attending: EMERGENCY MEDICINE
Payer: COMMERCIAL

## 2024-09-10 ENCOUNTER — APPOINTMENT (INPATIENT)
Dept: MRI IMAGING | Facility: HOSPITAL | Age: 59
DRG: 045 | End: 2024-09-10
Payer: COMMERCIAL

## 2024-09-10 ENCOUNTER — HOSPITAL ENCOUNTER (INPATIENT)
Facility: HOSPITAL | Age: 59
LOS: 10 days | DRG: 045 | End: 2024-09-20
Attending: EMERGENCY MEDICINE | Admitting: INTERNAL MEDICINE
Payer: COMMERCIAL

## 2024-09-10 ENCOUNTER — APPOINTMENT (INPATIENT)
Dept: CT IMAGING | Facility: HOSPITAL | Age: 59
DRG: 045 | End: 2024-09-10
Payer: COMMERCIAL

## 2024-09-10 DIAGNOSIS — I16.0 HYPERTENSIVE URGENCY: ICD-10-CM

## 2024-09-10 DIAGNOSIS — I63.9 ACUTE CVA (CEREBROVASCULAR ACCIDENT) (HCC): ICD-10-CM

## 2024-09-10 DIAGNOSIS — E11.65 TYPE 2 DIABETES MELLITUS WITH HYPERGLYCEMIA, WITHOUT LONG-TERM CURRENT USE OF INSULIN (HCC): ICD-10-CM

## 2024-09-10 DIAGNOSIS — R29.90 STROKE-LIKE EPISODE: Primary | ICD-10-CM

## 2024-09-10 PROBLEM — Z72.0 TOBACCO USE: Status: ACTIVE | Noted: 2024-09-10

## 2024-09-10 PROBLEM — I16.1 HYPERTENSIVE EMERGENCY: Status: ACTIVE | Noted: 2024-01-24

## 2024-09-10 LAB
2HR DELTA HS TROPONIN: 1 NG/L
ALBUMIN SERPL BCG-MCNC: 4.3 G/DL (ref 3.5–5)
ALP SERPL-CCNC: 83 U/L (ref 34–104)
ALT SERPL W P-5'-P-CCNC: 10 U/L (ref 7–52)
ANION GAP SERPL CALCULATED.3IONS-SCNC: 7 MMOL/L (ref 4–13)
APTT PPP: 20 SECONDS (ref 23–34)
AST SERPL W P-5'-P-CCNC: 11 U/L (ref 13–39)
BILIRUB DIRECT SERPL-MCNC: 0.11 MG/DL (ref 0–0.2)
BILIRUB SERPL-MCNC: 0.81 MG/DL (ref 0.2–1)
BUN SERPL-MCNC: 15 MG/DL (ref 5–25)
CALCIUM SERPL-MCNC: 9.2 MG/DL (ref 8.4–10.2)
CARDIAC TROPONIN I PNL SERPL HS: 5 NG/L
CARDIAC TROPONIN I PNL SERPL HS: 6 NG/L
CHLORIDE SERPL-SCNC: 102 MMOL/L (ref 96–108)
CHOLEST SERPL-MCNC: 172 MG/DL
CO2 SERPL-SCNC: 28 MMOL/L (ref 21–32)
CREAT SERPL-MCNC: 0.81 MG/DL (ref 0.6–1.3)
ERYTHROCYTE [DISTWIDTH] IN BLOOD BY AUTOMATED COUNT: 12.6 % (ref 11.6–15.1)
GFR SERPL CREATININE-BSD FRML MDRD: 97 ML/MIN/1.73SQ M
GLUCOSE SERPL-MCNC: 173 MG/DL (ref 65–140)
GLUCOSE SERPL-MCNC: 209 MG/DL (ref 65–140)
GLUCOSE SERPL-MCNC: 229 MG/DL (ref 65–140)
GLUCOSE SERPL-MCNC: 231 MG/DL (ref 65–140)
HCT VFR BLD AUTO: 56 % (ref 36.5–49.3)
HDLC SERPL-MCNC: 46 MG/DL
HGB BLD-MCNC: 19.3 G/DL (ref 12–17)
INR PPP: 1.05 (ref 0.85–1.19)
LDLC SERPL CALC-MCNC: 108 MG/DL (ref 0–100)
MCH RBC QN AUTO: 29.1 PG (ref 26.8–34.3)
MCHC RBC AUTO-ENTMCNC: 34.6 G/DL (ref 31.4–37.4)
MCV RBC AUTO: 84 FL (ref 82–98)
PLATELET # BLD AUTO: 213 THOUSANDS/UL (ref 149–390)
PMV BLD AUTO: 10.1 FL (ref 8.9–12.7)
POTASSIUM SERPL-SCNC: 3.5 MMOL/L (ref 3.5–5.3)
PROT SERPL-MCNC: 8 G/DL (ref 6.4–8.4)
PROTHROMBIN TIME: 14.2 SECONDS (ref 12.3–15)
RBC # BLD AUTO: 6.64 MILLION/UL (ref 3.88–5.62)
SODIUM SERPL-SCNC: 137 MMOL/L (ref 135–147)
TRIGL SERPL-MCNC: 88 MG/DL
TSH SERPL DL<=0.05 MIU/L-ACNC: 1.17 UIU/ML (ref 0.45–4.5)
WBC # BLD AUTO: 10.44 THOUSAND/UL (ref 4.31–10.16)

## 2024-09-10 PROCEDURE — 80076 HEPATIC FUNCTION PANEL: CPT | Performed by: EMERGENCY MEDICINE

## 2024-09-10 PROCEDURE — 70450 CT HEAD/BRAIN W/O DYE: CPT

## 2024-09-10 PROCEDURE — 85610 PROTHROMBIN TIME: CPT | Performed by: EMERGENCY MEDICINE

## 2024-09-10 PROCEDURE — 96365 THER/PROPH/DIAG IV INF INIT: CPT

## 2024-09-10 PROCEDURE — 85027 COMPLETE CBC AUTOMATED: CPT | Performed by: EMERGENCY MEDICINE

## 2024-09-10 PROCEDURE — 70496 CT ANGIOGRAPHY HEAD: CPT

## 2024-09-10 PROCEDURE — 80048 BASIC METABOLIC PNL TOTAL CA: CPT | Performed by: EMERGENCY MEDICINE

## 2024-09-10 PROCEDURE — 99223 1ST HOSP IP/OBS HIGH 75: CPT | Performed by: INTERNAL MEDICINE

## 2024-09-10 PROCEDURE — 99285 EMERGENCY DEPT VISIT HI MDM: CPT

## 2024-09-10 PROCEDURE — 83036 HEMOGLOBIN GLYCOSYLATED A1C: CPT

## 2024-09-10 PROCEDURE — 84484 ASSAY OF TROPONIN QUANT: CPT | Performed by: EMERGENCY MEDICINE

## 2024-09-10 PROCEDURE — 99285 EMERGENCY DEPT VISIT HI MDM: CPT | Performed by: EMERGENCY MEDICINE

## 2024-09-10 PROCEDURE — 70551 MRI BRAIN STEM W/O DYE: CPT

## 2024-09-10 PROCEDURE — 85730 THROMBOPLASTIN TIME PARTIAL: CPT | Performed by: EMERGENCY MEDICINE

## 2024-09-10 PROCEDURE — 3E03317 INTRODUCTION OF OTHER THROMBOLYTIC INTO PERIPHERAL VEIN, PERCUTANEOUS APPROACH: ICD-10-PCS | Performed by: INTERNAL MEDICINE

## 2024-09-10 PROCEDURE — 82948 REAGENT STRIP/BLOOD GLUCOSE: CPT

## 2024-09-10 PROCEDURE — 36415 COLL VENOUS BLD VENIPUNCTURE: CPT | Performed by: EMERGENCY MEDICINE

## 2024-09-10 PROCEDURE — 80061 LIPID PANEL: CPT

## 2024-09-10 PROCEDURE — 99291 CRITICAL CARE FIRST HOUR: CPT | Performed by: PHYSICIAN ASSISTANT

## 2024-09-10 PROCEDURE — 96375 TX/PRO/DX INJ NEW DRUG ADDON: CPT

## 2024-09-10 PROCEDURE — 70498 CT ANGIOGRAPHY NECK: CPT

## 2024-09-10 PROCEDURE — 84443 ASSAY THYROID STIM HORMONE: CPT

## 2024-09-10 RX ORDER — SODIUM CHLORIDE, SODIUM GLUCONATE, SODIUM ACETATE, POTASSIUM CHLORIDE, MAGNESIUM CHLORIDE, SODIUM PHOSPHATE, DIBASIC, AND POTASSIUM PHOSPHATE .53; .5; .37; .037; .03; .012; .00082 G/100ML; G/100ML; G/100ML; G/100ML; G/100ML; G/100ML; G/100ML
50 INJECTION, SOLUTION INTRAVENOUS CONTINUOUS
Status: DISCONTINUED | OUTPATIENT
Start: 2024-09-10 | End: 2024-09-10

## 2024-09-10 RX ORDER — NICOTINE 21 MG/24HR
1 PATCH, TRANSDERMAL 24 HOURS TRANSDERMAL DAILY
Status: DISCONTINUED | OUTPATIENT
Start: 2024-09-10 | End: 2024-09-10

## 2024-09-10 RX ORDER — ONDANSETRON 2 MG/ML
4 INJECTION INTRAMUSCULAR; INTRAVENOUS ONCE
Status: DISCONTINUED | OUTPATIENT
Start: 2024-09-10 | End: 2024-09-10

## 2024-09-10 RX ORDER — INSULIN LISPRO 100 [IU]/ML
1-6 INJECTION, SOLUTION INTRAVENOUS; SUBCUTANEOUS EVERY 6 HOURS SCHEDULED
Status: DISCONTINUED | OUTPATIENT
Start: 2024-09-10 | End: 2024-09-11

## 2024-09-10 RX ORDER — LABETALOL HYDROCHLORIDE 5 MG/ML
10 INJECTION, SOLUTION INTRAVENOUS ONCE
Status: COMPLETED | OUTPATIENT
Start: 2024-09-10 | End: 2024-09-10

## 2024-09-10 RX ORDER — ONDANSETRON 2 MG/ML
4 INJECTION INTRAMUSCULAR; INTRAVENOUS ONCE
Status: COMPLETED | OUTPATIENT
Start: 2024-09-10 | End: 2024-09-10

## 2024-09-10 RX ORDER — NICOTINE 21 MG/24HR
1 PATCH, TRANSDERMAL 24 HOURS TRANSDERMAL EVERY 24 HOURS
Status: DISCONTINUED | OUTPATIENT
Start: 2024-09-10 | End: 2024-09-20 | Stop reason: HOSPADM

## 2024-09-10 RX ADMIN — LABETALOL HYDROCHLORIDE 10 MG: 5 INJECTION, SOLUTION INTRAVENOUS at 21:40

## 2024-09-10 RX ADMIN — SODIUM CHLORIDE 15 MG/HR: 0.9 INJECTION, SOLUTION INTRAVENOUS at 18:57

## 2024-09-10 RX ADMIN — SODIUM CHLORIDE, SODIUM GLUCONATE, SODIUM ACETATE, POTASSIUM CHLORIDE, MAGNESIUM CHLORIDE, SODIUM PHOSPHATE, DIBASIC, AND POTASSIUM PHOSPHATE 75 ML/HR: .53; .5; .37; .037; .03; .012; .00082 INJECTION, SOLUTION INTRAVENOUS at 16:12

## 2024-09-10 RX ADMIN — ONDANSETRON 4 MG: 2 INJECTION INTRAMUSCULAR; INTRAVENOUS at 12:47

## 2024-09-10 RX ADMIN — NICOTINE 1 PATCH: 21 PATCH, EXTENDED RELEASE TRANSDERMAL at 17:58

## 2024-09-10 RX ADMIN — SODIUM CHLORIDE 15 MG/HR: 0.9 INJECTION, SOLUTION INTRAVENOUS at 20:49

## 2024-09-10 RX ADMIN — SODIUM CHLORIDE 12.5 MG/HR: 0.9 INJECTION, SOLUTION INTRAVENOUS at 22:35

## 2024-09-10 RX ADMIN — Medication 19 MG: at 13:46

## 2024-09-10 RX ADMIN — INSULIN LISPRO 2 UNITS: 100 INJECTION, SOLUTION INTRAVENOUS; SUBCUTANEOUS at 23:57

## 2024-09-10 RX ADMIN — SODIUM CHLORIDE 2.5 MG/HR: 0.9 INJECTION, SOLUTION INTRAVENOUS at 13:05

## 2024-09-10 RX ADMIN — SODIUM CHLORIDE 7.5 MG/HR: 0.9 INJECTION, SOLUTION INTRAVENOUS at 16:27

## 2024-09-10 NOTE — ASSESSMENT & PLAN NOTE
"Wilder Vargas is a 59 y.o. male with multiple stroke in the past (chronic right corona radiata and left thalamic infarcts on CT), not on AP/AC therapy, HTN, HLD, DM type 2, tobacco abuse who presented to [facility] ED on 9/10/2024 as a stroke alert with right sided weakness, slurred speech, and coordination difficulties in right arm.     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 Etiology likely small vessel in the setting of uncontrolled vascular risk factors.     Plan:  Admit on stroke pathway   - Status post IV TNK at 1346 on 9/10/2024  - Repeat CTH 24h post TNK  - Echo pending   - Hold aspirin, anticoagulation, and DVT ppx 24h post TNK  - If repeat CT head 24H s/p TNK stable, recommend DAPT (ASA 81 mg daily and Plavix 75 mg daily x 21 days) then transition to Plavix monotherapy   - Recommend initiating Pravastatin when able as patient reported intolerance with atorvastatin in the past   - Strict BP control <180/105  - Euglycemia, Normothermia   - Telemetry  - PT/OT/Speech   - Frequent neuro checks  - STAT CTH for acute change  - Medical management " and supportive care per primary team, notify with changes

## 2024-09-10 NOTE — ASSESSMENT & PLAN NOTE
Outpatient regimen: 10-40mg amlodipine-benazepril, 25mg carvedilol, 4mg doxazosin  History on medication non-compliance  Presented to ED with -220s and started on Nicardipine gtt    Plan  Hold while NPO  Continue Nicardipine gtt with goal -180

## 2024-09-10 NOTE — PHYSICAL THERAPY NOTE
PHYSICAL THERAPY CANCELLATION NOTE      Patient Name: Wilder Vargas  Today's Date: 9/10/2024       09/10/24 1613   PT Last Visit   PT Visit Date 09/10/24   Note Type   Note type Cancelled Session   Cancel Reasons   (s/p TNK administration)   Additional Comments PT orders received for stroke pathway, chart review performed. Pt received TNK ealier this PM, currently has bed rest w/ bathroom priviledges. Will f/u in the morning to see if patient is appropriate to mobilize prior to the 24 hour s/p TNK tere, otherwise will see >24 hours s/p TNK.       Ashia Rajput, PT, DPT

## 2024-09-10 NOTE — PLAN OF CARE
Problem: PAIN - ADULT  Goal: Verbalizes/displays adequate comfort level or baseline comfort level  Description: Interventions:  - Encourage patient to monitor pain and request assistance  - Assess pain using appropriate pain scale  - Administer analgesics based on type and severity of pain and evaluate response  - Implement non-pharmacological measures as appropriate and evaluate response  - Consider cultural and social influences on pain and pain management  - Notify physician/advanced practitioner if interventions unsuccessful or patient reports new pain  9/10/2024 1707 by Ama Smith RN  Outcome: Progressing  9/10/2024 1707 by Ama Smith RN  Outcome: Progressing     Problem: Communication Impairment  Goal: Ability to express needs and understand communication  Description: Assess patient's communication skills and ability to understand information.  Patient will demonstrate use of effective communication techniques, alternative methods of communication and understanding even if not able to speak.     - Encourage communication and provide alternate methods of communication as needed.  - Collaborate with case management/ for discharge needs.  - Include patient/family/caregiver in decisions related to communication.  9/10/2024 1707 by Ama Smith RN  Outcome: Progressing  9/10/2024 1707 by Ama Smith RN  Outcome: Progressing     Problem: Activity Intolerance/Impaired Mobility  Goal: Mobility/activity is maintained at optimum level for patient  Description: Interventions:  - Assess and monitor patient  barriers to mobility and need for assistive/adaptive devices.  - Assess patient's emotional response to limitations.  - Collaborate with interdisciplinary team and initiate plans and interventions as ordered.  - Encourage independent activity per ability.  - Maintain proper body alignment.  - Perform active/passive rom as tolerated/ordered.  - Plan activities to conserve  energy.  - Turn patient as appropriate  9/10/2024 1707 by Ama Smith RN  Outcome: Progressing  9/10/2024 1707 by Ama Smith RN  Outcome: Progressing     Problem: Neurological Deficit  Goal: Neurological status is stable or improving  Description: Interventions:  - Monitor and assess patient's level of consciousness, motor function, sensory function, and level of assistance needed for ADLs.   - Monitor and report changes from baseline. Collaborate with interdisciplinary team to initiate plan and implement interventions as ordered.   - Provide and maintain a safe environment.  - Consider seizure precautions.  - Consider fall precautions.  - Consider aspiration precautions.  - Consider bleeding precautions.  9/10/2024 1707 by Ama Smith RN  Outcome: Progressing  9/10/2024 1707 by Ama Smith RN  Outcome: Progressing     Problem: Potential for Aspiration  Goal: Non-ventilated patient's risk of aspiration is minimized  Description: Assess and monitor vital signs, respiratory status, and labs (WBC).  Monitor for signs of aspiration (tachypnea, cough, rales, wheezing, cyanosis, fever).    - Assess and monitor patient's ability to swallow.  - Place patient up in chair to eat if possible.  - HOB up at 90 degrees to eat if unable to get patient up into chair.  - Supervise patient during oral intake.   - Instruct patient/ family to take small bites.  - Instruct patient/ family to take small single sips when taking liquids.  - Follow patient-specific strategies generated by speech pathologist.  9/10/2024 1707 by Ama Smith RN  Outcome: Progressing  9/10/2024 1707 by Ama Smith RN  Outcome: Progressing  Goal: Ventilated patient's risk of aspiration is minimized  Description: Assess and monitor vital signs, respiratory status, airway cuff pressure, and labs (WBC).  Monitor for signs of aspiration (tachypnea, cough, rales, wheezing, cyanosis, fever).    - Elevate head of bed 30 degrees if patient  has tube feeding.  - Monitor tube feeding.  9/10/2024 1707 by Ama Smith RN  Outcome: Progressing  9/10/2024 1707 by Ama Smith RN  Outcome: Progressing

## 2024-09-10 NOTE — H&P
H&P - Critical Care/ICU   Name: Wilder Vargas 59 y.o. male I MRN: [id number]  Unit/Bed#: ED 06 I Date of Admission: 9/10/2024   Date of Service: 9/10/2024 I Hospital Day: 0       Assessment & Plan  Stroke-like symptom  History of past CVAs (May and June 2020) with history of medication noncompliance.  Outpatient regimen: Plavix and pravastatin  LKW 12:15 PM  NIHSS 7 (right facial weakness, RLE weakness, RUE ataxia, and moderate dysarthria )   CTH:   Unremarkable for acute intracranial abnormalities.   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  TNK at 1346 ->improvement of symptoms with slight facial droop and 4/5 strength in RUE/RLL  At 1500 while in ED, patient's symptoms returned with severe right facial droop, garbled speech and 2/5 strength.  Repeat CTH pending read but no obvious bleed noted  Dr. Roberts notified who advised laying patient flat and starting IVF to optimize cerebral perfusion.    Plan  Stroke Pathway  Hold AC/AP and DVT ppx for 24hrs post TNK  Statin therapy  No needle sticks  MRI  ECHO  CTH 24hr post TNK   Lipid panel, Hgb A1C and TSH  Telemetry  PT/OT/Speech  Neuro checks ->STAT CTH if acute change  Continue Nicardipine gtt for goal BP <180/105  Neurology following  Hypertensive emergency  Outpatient regimen: 10-40mg amlodipine-benazepril, 25mg carvedilol, 4mg doxazosin  History on medication non-compliance  Presented to ED with -220s and started on Nicardipine gtt    Plan  Hold while NPO  Continue Nicardipine gtt with goal -180  Mixed hyperlipidemia  Outpatient regimen: 20 pravastatin  History of medication non-compliance    Plan  Hold while NPO  Type 2 diabetes mellitus with hyperglycemia (HCC)    Lab Results   Component Value Date    HGBA1C 11.9 (H) 05/17/2024     Outpatient  regimen: Jaurdiance and 30U Lantus    Plan  SSI ->hold lantus while NPO  Goal -180  Hypoglycemia protocol  Check Hgb A1C  Tobacco use  Currently smokes 1-1.5 packs daily ->previously smoking >2 packs/daily  46 year history    Plan  Encourage smoke cessation  Nicotine patch  Disposition: Critical care    History of Present Illness   Wilder Vargas is a 59 y.o. who with PMH of DM2 on lantus, HTN, HLD, GERD, CVA x2 (2020), current tobacco use and long history of medication non-compliance who presented to ED with confusion, slurred speech and right sided weakness. Severe HTN (-220s) and started on Nicardipine gtt. Received TNK at 1346 with initial improvement in stroke like symptoms. However, at 1500 patient's symptoms returned and with no obvious bleed noted on repeat CTH. Admitted to ICU under stroke pathway.     History obtained from chart review and the patient.  Review of Systems: Review of Systems   Constitutional: Negative.    HENT: Negative.     Eyes: Negative.    Respiratory: Negative.     Cardiovascular: Negative.    Gastrointestinal: Negative.    Endocrine: Negative.    Genitourinary: Negative.    Musculoskeletal: Negative.    Skin: Negative.    Allergic/Immunologic: Negative.    Neurological:  Positive for facial asymmetry, speech difficulty and weakness. Negative for numbness and headaches.   Hematological: Negative.    Psychiatric/Behavioral: Negative.         Historical Information   Past Medical History:  No date: CVA (cerebral vascular accident) (HCC)  No date: Diabetes mellitus (HCC)  No date: Hypertension Past Surgical History:  No date: NO PAST SURGERIES   Current Outpatient Medications   Medication Instructions    amLODIPine-benazepril (LOTREL) 10-40 MG per capsule 1 capsule, Oral, Daily    carvedilol (COREG) 25 mg, Oral, 2 times daily with meals    clopidogrel (PLAVIX) 75 mg, Oral, Daily    doxazosin (CARDURA) 4 mg, Oral, Daily at bedtime    Empagliflozin 25 mg, Oral, Daily    Insulin  Glargine Solostar (LANTUS SOLOSTAR) 30 Units, Subcutaneous, Daily at bedtime    nicotine (NICODERM CQ) 21 mg/24 hr TD 24 hr patch 1 patch, Transdermal, Every 24 hours    nicotine polacrilex (NICORETTE) 2 mg, Mouth/Throat, As needed    pantoprazole (PROTONIX) 40 mg, Oral, Every morning    pravastatin (PRAVACHOL) 20 mg, Oral, Daily at bedtime    sildenafil (VIAGRA) 100 mg, Oral, Daily PRN    Allergies   Allergen Reactions    Metformin Diarrhea      Social History     Tobacco Use    Smoking status: Every Day     Current packs/day: 1.00     Average packs/day: 1 pack/day for 42.7 years (42.7 ttl pk-yrs)     Types: Cigarettes     Start date: 1982    Smokeless tobacco: Never   Vaping Use    Vaping status: Never Used   Substance Use Topics    Alcohol use: Not Currently    Drug use: Yes     Types: Marijuana     Comment: few days    Family History   Problem Relation Age of Onset    Diabetes Mother     Heart failure Father     Substance Abuse Neg Hx     Mental illness Neg Hx           Objective                          Vitals I/O      Most Recent Min/Max in 24hrs   Temp 98.1 °F (36.7 °C) Temp  Min: 98.1 °F (36.7 °C)  Max: 98.2 °F (36.8 °C)   Pulse 85 Pulse  Min: 70  Max: 86   Resp (!) 26 Resp  Min: 18  Max: 26   BP (!) 172/95 BP  Min: 168/90  Max: 248/133   O2 Sat 94 % SpO2  Min: 94 %  Max: 98 %    No intake or output data in the 24 hours ending 09/10/24 1533    No diet orders on file    Invasive Monitoring           Physical Exam   Physical Exam  Vitals and nursing note reviewed.   Eyes:      Extraocular Movements: Extraocular movements intact.      Pupils: Pupils are equal, round, and reactive to light.   Skin:     General: Skin is dry.      Coloration: Skin is not jaundiced or pale.   HENT:      Head: Normocephalic and atraumatic.      Mouth/Throat:      Mouth: Mucous membranes are moist.   Cardiovascular:      Rate and Rhythm: Normal rate and regular rhythm.      Pulses: Normal pulses.   Musculoskeletal:      Right lower  leg: No edema.      Left lower leg: No edema.   Abdominal: General: Bowel sounds are normal. There is no distension.      Palpations: Abdomen is soft.      Tenderness: There is no abdominal tenderness.   Constitutional:       General: He is not in acute distress.     Appearance: He is well-developed and well-nourished. He is not ill-appearing.   Pulmonary:      Effort: Pulmonary effort is normal.      Breath sounds: Normal breath sounds.   Neurological:      Mental Status: He is alert and oriented to person, place and time. He is calm.      Comments: Significant right facial droop, slurred/garbled speech and +2 strength in RUE/RLE. Sensation intact in throughout.    Genitourinary/Anorectal:     Comments: Spontaneous voiding         Diagnostic Studies        Lab Results: I have reviewed the following results: CMP, CBC, troponin, PTT/INR     Medications:  Scheduled PRN          Continuous    multi-electrolyte, 75 mL/hr  niCARdipine, 1-15 mg/hr, Last Rate: 10 mg/hr (09/10/24 1500)         Labs:   CBC    Recent Labs     09/10/24  1247   WBC 10.44*   HGB 19.3*   HCT 56.0*        BMP    Recent Labs     09/10/24  1247   SODIUM 137   K 3.5      CO2 28   AGAP 7   BUN 15   CREATININE 0.81   CALCIUM 9.2       Coags    Recent Labs     09/10/24  1320   INR 1.05   PTT 20*        Additional Electrolytes  No recent results       Blood Gas    No recent results  No recent results LFTs  Recent Labs     09/10/24  1247   ALT 10   AST 11*   ALKPHOS 83   ALB 4.3   TBILI 0.81       Infectious  No recent results  Glucose  Recent Labs     09/10/24  1247   GLUC 209*

## 2024-09-10 NOTE — ASSESSMENT & PLAN NOTE
Lab Results   Component Value Date    HGBA1C 11.9 (H) 05/17/2024     Outpatient regimen: Jaurdiance and 30U Lantus    Plan  SSI ->hold lantus while NPO  Goal -180  Hypoglycemia protocol  Check Hgb A1C

## 2024-09-10 NOTE — ED NOTES
Lab notified us that HCT is >55%, they need a redraw and are sending up the tube. Nurse notified     Meredith New  09/10/24 0510

## 2024-09-10 NOTE — ASSESSMENT & PLAN NOTE
Currently smokes 1-1.5 packs daily ->previously smoking >2 packs/daily  46 year history    Plan  Encourage smoke cessation  Nicotine patch

## 2024-09-10 NOTE — CONSULTS
"Wilder Vargas is a 59 y.o. male with multiple stroke in the past (chronic right corona radiata and left thalamic infarcts on CT), not on AP/AC therapy, HTN, HLD, DM type 2, tobacco abuse who presented to [facility] ED on 9/10/2024 as a stroke alert with right sided weakness, slurred speech, and coordination difficulties in right arm.     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 Specifically discussed were the potential benefits, risks, and side effects of the proposed stroke intervention(s) and care; the likelihood of the patient achieving their goals; and any potential problems that might occur as a result of the intervention(s); reasonable alternatives to the proposed stroke intervention(s) and care. The discussion encompasses risks, benefits and side effects related to the alternatives and the risks related to not receiving the proposed stroke intervention(s) and care. Verbal consent was obtained from the patient..  Consent was obtained by Neurology advanced practitioner Aleta Ray PA-C after discussion with both attending neurologist Dr. Marques Laureano and ED provider Dr. Henry Perez . IV TNK was delayed due to HTN.     Plan:  Admit on stroke pathway  - Status post IV TNK at 1346 on 9/10/2024  - Repeat CTH 24h post TNK  - Recommend MRI brain when stable   - Recommend Echo   - Recommend: Fasting lipid panel, Hemoglobin " A1c, TSH  - Hold aspirin, anticoagulation, and DVT ppx 24h post TNK  - Continue Statin therapy   - Strict BP control <180/105  - Euglycemia, Normothermia   - Telemetry  - PT/OT/Speech   - Frequent neuro checks  - STAT CTH for acute change  - Medical management and supportive care per primary team, notify with changes    Recommendations for outpatient neurological follow up have yet to be determined.    History of Present Illness   Hx and PE limited by: N/A  Patient last known well: 1215 on 9/10/2024  Stroke alert called: 1243 on 9/10/2024  Neurology time of arrival: Attending neurologist responded immediately to the phone call   HPI: Wilder Vargas is a 59 y.o.  male with multiple stroke in the past (chronic right corona radiata and left thalamic infarcts on CT), not on AP/AC therapy, HTN, HLD, DM type 2, tobacco abuse who presented to [facility] ED on 9/10/2024 as a stroke alert with right sided weakness, slurred speech, and coordination difficulties in right arm.     Patient girlfriend at bedside reports she was with the patient while he was driving when he suddenly lost the ability to coordinate the vehicle. She then noticed that the patient was slurring his words and had right sided weakness. This occurred at approximately 12:15 PM today 9/10/2024, reported last known well. Patient was then brought to the hospital. Girlfriend at bedside reports patients symptoms have been fluctuating, initially his symptoms were severe, then improved on the way to the hospital, then worsened when he reached the hospital.  Patient denies any use of antiplatelets/anticoagulation at baseline.  Patient reports that he has had 3 prior strokes in the past.  Girlfriend reports that patient has residual left-sided weakness from the prior strokes.  Patient states that he is supposed be on Plavix at baseline, however was lost in follow-up and has not been taking it.  Patient states that current symptoms are not similar prior stroke  symptoms.  Patient denies any current or previous issues with bleeding, history of GI bleeding, history of brain bleed, blood in urine/feces, recent head trauma.  Reports last stroke was approximately 3 years ago.    Patient presented to [facility] ED on 9/10/2024 as a stroke alert, BP on presentation 243/146.  NIHSS of 7. CT head unremarkable for acute intracranial abnormalities. CTA head and neck unremarkable for large vessel occlusion or critical stenosis. After a discussion of risks, benefits and alternatives reviewing inclusion and exclusion criteria the decision was made to proceed with thrombolytic therapy. Patient consented receiving IV TNK, consent obtained by neurology advanced practitioner Aleta Ray PA-C after discussion with attending neurologist Dr. Laureano and ED provider Dr. Perez.  IV TNK was delayed due to hypertensive urgency.  Patient received IV TNK at 1346 on 9/10/2024.    Review of Systems  12 point ROS limited to acuity of condition    Historical Information   Past Medical History:   Diagnosis Date    CVA (cerebral vascular accident) (HCC)     Diabetes mellitus (HCC)     Hypertension      Past Surgical History:   Procedure Laterality Date    NO PAST SURGERIES       Social History     Tobacco Use    Smoking status: Every Day     Current packs/day: 1.00     Average packs/day: 1 pack/day for 42.7 years (42.7 ttl pk-yrs)     Types: Cigarettes     Start date: 1982    Smokeless tobacco: Never   Vaping Use    Vaping status: Never Used   Substance and Sexual Activity    Alcohol use: Not Currently    Drug use: Yes     Types: Marijuana     Comment: few days    Sexual activity: Not on file     E-Cigarette/Vaping    E-Cigarette Use Never User      E-Cigarette/Vaping Substances     Family History   Problem Relation Age of Onset    Diabetes Mother     Heart failure Father     Substance Abuse Neg Hx     Mental illness Neg Hx      Social History     Tobacco Use    Smoking status: Every Day     Current  packs/day: 1.00     Average packs/day: 1 pack/day for 42.7 years (42.7 ttl pk-yrs)     Types: Cigarettes     Start date: 1982    Smokeless tobacco: Never   Vaping Use    Vaping status: Never Used   Substance and Sexual Activity    Alcohol use: Not Currently    Drug use: Yes     Types: Marijuana     Comment: few days    Sexual activity: Not on file       Current Facility-Administered Medications:     niCARdipine (CARDENE) 25 mg (STANDARD CONCENTRATION) in sodium chloride 0.9% 250 mL, Titrated, Last Rate: 7.5 mg/hr (09/10/24 1339)  Prior to Admission Medications   Prescriptions Last Dose Informant Patient Reported? Taking?   Empagliflozin 25 MG TABS   No No   Sig: Take 1 tablet (25 mg total) by mouth daily   Insulin Glargine Solostar (Lantus SoloStar) 100 UNIT/ML SOPN   No No   Sig: Inject 0.3 mL (30 Units total) under the skin daily at bedtime   amLODIPine-benazepril (LOTREL) 10-40 MG per capsule   No No   Sig: Take 1 capsule by mouth daily   carvedilol (COREG) 25 mg tablet   No No   Sig: Take 1 tablet (25 mg total) by mouth 2 (two) times a day with meals   clopidogrel (PLAVIX) 75 mg tablet   No No   Sig: Take 1 tablet (75 mg total) by mouth daily   Patient not taking: Reported on 7/16/2024   doxazosin (CARDURA) 4 mg tablet   No No   Sig: TAKE 1 TABLET BY MOUTH DAILY AT BEDTIME   Patient not taking: Reported on 7/16/2024   nicotine (NICODERM CQ) 21 mg/24 hr TD 24 hr patch   No No   Sig: PLACE 1 PATCH ON THE SKIN OVER 24 HOURS EVERY 24 HOURS   nicotine polacrilex (NICORETTE) 2 mg gum   No No   Sig: CHEW 1 EACH (2 MG TOTAL) AS NEEDED FOR SMOKING CESSATION   Patient not taking: Reported on 7/16/2024   pantoprazole (PROTONIX) 40 mg tablet   No No   Sig: TAKE 1 TABLET BY MOUTH EVERY DAY IN THE MORNING   pravastatin (PRAVACHOL) 20 mg tablet   No No   Sig: Take 1 tablet (20 mg total) by mouth daily at bedtime   sildenafil (Viagra) 100 mg tablet   No No   Sig: Take 1 tablet (100 mg total) by mouth daily as needed for  erectile dysfunction      Facility-Administered Medications: None     Metformin    Objective      Temp:  [98.1 °F (36.7 °C)-98.2 °F (36.8 °C)] 98.1 °F (36.7 °C)  HR:  [70-86] 80  Resp:  [18-20] 20  BP: (168-248)/() 188/95  O2 Device: None (Room air)          No intake/output data recorded.  Lines/Drains/Airways       Active Status       None                  Physical Exam  Vitals and nursing note reviewed.   Constitutional:       General: He is not in acute distress.     Appearance: Normal appearance. He is not ill-appearing, toxic-appearing or diaphoretic.   HENT:      Head: Normocephalic and atraumatic.   Eyes:      General: No scleral icterus.        Right eye: No discharge.         Left eye: No discharge.      Extraocular Movements: Extraocular movements intact.      Conjunctiva/sclera: Conjunctivae normal.   Skin:     General: Skin is warm and dry.      Coloration: Skin is not jaundiced or pale.   Neurological:      Mental Status: He is alert.   Psychiatric:         Mood and Affect: Mood normal.         Behavior: Behavior normal.         Thought Content: Thought content normal.         Judgment: Judgment normal.     Neurologic Exam     Mental Status   Patient is alert, sitting up in bed, accompanied by girlfriend at bedside  Oriented to person, place, year.  Able to state age.  Able to name all objects provided on the NIHSS booklet  Able to follow central and appendicular commands and answers all questions appropriately.  No dysarthria noted.  No evidence of aphasia.     Cranial Nerves   Primary gaze midline, conjugate gaze noted  No visual field deficits noted in all quadrants  EOMs intact, no evidence of nystagmus  Unable to reach end gaze to the right  Facial sensation intact to light touch  Right lower facial weakness noted  Tongue midline     Motor Exam   Muscle bulk: normal  Overall muscle tone: normal  Motor exam fluctuating throughout multiple encounters    Initially, patient was able to elevate  BUE and LLE off the bed and maintain antigravity without drift; difficulty elevating RLE off the bed and when placed in antigravity position, fell immediately to the bed    On second reexamination, patient was able to elevate bilateral upper and lower extremities off the bed and maintain antigravity without drift; bilateral , biceps, triceps, deltoid, hip flexion, dorsiflexion, plantarflexion strength 5/5 throughout    On third reexamination after IV TNK was administered, patient was unable to move right upper and lower extremity on command, flaccid; continued able to elevate LUE and LLE off the bed and maintain antigravity without drift     Sensory Exam   Light touch normal.   No evidence of extinction with bilateral simultaneous stimulation     Gait, Coordination, and Reflexes   Ataxia noted in right upper extremity finger-nose testing  LUE coordination testing WNL    Unable to accurately assess RLE coordination due to significant weakness  Able to perform LLE heel-to-shin testing without difficulty    No involuntary movements or rhythmic seizure-like activity noted throughout exam     NIHSS:  1a.Level of Consciousness: 0 = Alert   1b. LOC Questions: 0 = Answers both correctly   1c. LOC Commands: 0 = Obeys both correctly   2. Best Gaze: 0 = Normal   3. Visual: 0 = No visual field loss   4. Facial Palsy: 2=Partial paralysis (total or near total paralysis of the lower face)   5a. Motor Right Arm: 0=No drift, limb holds 90 (or 45) degrees for full 10 seconds   5b. Motor Left Arm: 0=No drift, limb holds 90 (or 45) degrees for full 10 seconds   6a. Motor Right Leg: 3=No effort against gravity, limb falls   6b. Motor Left Le=No drift, limb holds 90 (or 45) degrees for full 10 seconds   7. Limb Ataxia:  1=Present in one limb   8. Sensory: 0=Normal; no sensory loss   9. Best Language:  0=No aphasia, normal   10. Dysarthria: 1=Mild to moderate, patient slurs at least some words and at worst, can be understood with  "some difficulty   11. Extinction and Inattention (formerly Neglect): 0=No abnormality   Total Score: 7     Time NIHSS was completed: 1250 on 9/10/2024    Modified Newton Score:  Unable to determine currently, will gather additional data      Lab Results: I have personally reviewed pertinent reports.  Recent Results (from the past 24 hour(s))   Basic metabolic panel    Collection Time: 09/10/24 12:47 PM   Result Value Ref Range    Sodium 137 135 - 147 mmol/L    Potassium 3.5 3.5 - 5.3 mmol/L    Chloride 102 96 - 108 mmol/L    CO2 28 21 - 32 mmol/L    ANION GAP 7 4 - 13 mmol/L    BUN 15 5 - 25 mg/dL    Creatinine 0.81 0.60 - 1.30 mg/dL    Glucose 209 (H) 65 - 140 mg/dL    Calcium 9.2 8.4 - 10.2 mg/dL    eGFR 97 ml/min/1.73sq m   CBC and Platelet    Collection Time: 09/10/24 12:47 PM   Result Value Ref Range    WBC 10.44 (H) 4.31 - 10.16 Thousand/uL    RBC 6.64 (H) 3.88 - 5.62 Million/uL    Hemoglobin 19.3 (H) 12.0 - 17.0 g/dL    Hematocrit 56.0 (H) 36.5 - 49.3 %    MCV 84 82 - 98 fL    MCH 29.1 26.8 - 34.3 pg    MCHC 34.6 31.4 - 37.4 g/dL    RDW 12.6 11.6 - 15.1 %    Platelets 213 149 - 390 Thousands/uL    MPV 10.1 8.9 - 12.7 fL   HS Troponin 0hr (reflex protocol)    Collection Time: 09/10/24 12:47 PM   Result Value Ref Range    hs TnI 0hr 5 \"Refer to ACS Flowchart\"- see link ng/L   Hepatic function panel    Collection Time: 09/10/24 12:47 PM   Result Value Ref Range    Total Bilirubin 0.81 0.20 - 1.00 mg/dL    Bilirubin, Direct 0.11 0.00 - 0.20 mg/dL    Alkaline Phosphatase 83 34 - 104 U/L    AST 11 (L) 13 - 39 U/L    ALT 10 7 - 52 U/L    Total Protein 8.0 6.4 - 8.4 g/dL    Albumin 4.3 3.5 - 5.0 g/dL   Fingerstick Glucose (POCT)    Collection Time: 09/10/24 12:51 PM   Result Value Ref Range    POC Glucose 231 (H) 65 - 140 mg/dl   Protime-INR    Collection Time: 09/10/24  1:20 PM   Result Value Ref Range    Protime 14.2 12.3 - 15.0 seconds    INR 1.05 0.85 - 1.19   APTT    Collection Time: 09/10/24  1:20 PM   Result " Value Ref Range    PTT 20 (L) 23 - 34 seconds     Imaging Studies: I have personally reviewed pertinent reports and I have personally reviewed pertinent films in PACS.    EKG, Pathology, and Other Studies: I have personally reviewed pertinent reports.    VTE Prophylaxis: Reason for no pharmacologic prophylaxis status post IV TNK    Code Status: Prior    Administrative Statements   I have spent a total time of 60 minutes critical care time in caring for this patient on the day of the visit/encounter including Diagnostic results, Risks and benefits of tx options, Instructions for management, Patient and family education, Counseling / Coordination of care, Documenting in the medical record, Reviewing / ordering tests, medicine, procedures  , Obtaining or reviewing history  , and Communicating with other healthcare professionals .

## 2024-09-10 NOTE — ASSESSMENT & PLAN NOTE
History of past CVAs (May and June 2020) with history of medication noncompliance.  Outpatient regimen: Plavix and pravastatin  LKW 12:15 PM  NIHSS 7 (right facial weakness, RLE weakness, RUE ataxia, and moderate dysarthria )   CTH:   Unremarkable for acute intracranial abnormalities.   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  TNK at 1346 ->improvement of symptoms with slight facial droop and 4/5 strength in RUE/RLL  At 1500 while in ED, patient's symptoms returned with severe right facial droop, garbled speech and 2/5 strength.  Repeat CTH pending read but no obvious bleed noted  Dr. Roberts notified who advised laying patient flat and starting IVF to optimize cerebral perfusion.    Plan  Stroke Pathway  Hold AC/AP and DVT ppx for 24hrs post TNK  Statin therapy  No needle sticks  MRI  ECHO  CTH 24hr post TNK   Lipid panel, Hgb A1C and TSH  Telemetry  PT/OT/Speech  Neuro checks ->STAT CTH if acute change  Continue Nicardipine gtt for goal BP <180/105  Neurology following

## 2024-09-11 ENCOUNTER — APPOINTMENT (INPATIENT)
Dept: CT IMAGING | Facility: HOSPITAL | Age: 59
DRG: 045 | End: 2024-09-11
Payer: COMMERCIAL

## 2024-09-11 ENCOUNTER — APPOINTMENT (INPATIENT)
Dept: NON INVASIVE DIAGNOSTICS | Facility: HOSPITAL | Age: 59
DRG: 045 | End: 2024-09-11
Payer: COMMERCIAL

## 2024-09-11 ENCOUNTER — TELEPHONE (OUTPATIENT)
Age: 59
End: 2024-09-11

## 2024-09-11 LAB
AORTIC ROOT: 3.5 CM
BSA FOR ECHO PROCEDURE: 1.94 M2
E WAVE DECELERATION TIME: 166 MS
E/A RATIO: 0.94
EST. AVERAGE GLUCOSE BLD GHB EST-MCNC: 217 MG/DL
GLUCOSE SERPL-MCNC: 168 MG/DL (ref 65–140)
GLUCOSE SERPL-MCNC: 217 MG/DL (ref 65–140)
GLUCOSE SERPL-MCNC: 218 MG/DL (ref 65–140)
GLUCOSE SERPL-MCNC: 262 MG/DL (ref 65–140)
HBA1C MFR BLD: 9.2 %
INTERVENTRICULAR SEPTUM IN DIASTOLE (PARASTERNAL SHORT AXIS VIEW): 1.6 CM
INTERVENTRICULAR SEPTUM: 1.6 CM (ref 0.6–1.1)
LEFT VENTRICULAR INTERNAL DIMENSION IN DIASTOLE: 4.7 CM (ref 3.5–6)
LEFT VENTRICULAR POSTERIOR WALL IN END DIASTOLE: 1.2 CM
MV E'TISSUE VEL-LAT: 8 CM/S
MV E'TISSUE VEL-SEP: 7 CM/S
MV PEAK A VEL: 0.78 M/S
MV PEAK E VEL: 73 CM/S
MV STENOSIS PRESSURE HALF TIME: 49 MS
MV VALVE AREA P 1/2 METHOD: 4.49
RA PRESSURE ESTIMATED: 3 MMHG
SL CV LV EF: 75
SL CV PED ECHO LEFT VENTRICLE DIASTOLIC VOLUME (MOD BIPLANE) 2D: 104 ML
TRICUSPID ANNULAR PLANE SYSTOLIC EXCURSION: 3.1 CM

## 2024-09-11 PROCEDURE — 97163 PT EVAL HIGH COMPLEX 45 MIN: CPT

## 2024-09-11 PROCEDURE — 93306 TTE W/DOPPLER COMPLETE: CPT | Performed by: INTERNAL MEDICINE

## 2024-09-11 PROCEDURE — 70450 CT HEAD/BRAIN W/O DYE: CPT

## 2024-09-11 PROCEDURE — 99223 1ST HOSP IP/OBS HIGH 75: CPT | Performed by: PHYSICIAN ASSISTANT

## 2024-09-11 PROCEDURE — 82948 REAGENT STRIP/BLOOD GLUCOSE: CPT

## 2024-09-11 PROCEDURE — 92610 EVALUATE SWALLOWING FUNCTION: CPT

## 2024-09-11 PROCEDURE — 93005 ELECTROCARDIOGRAM TRACING: CPT

## 2024-09-11 PROCEDURE — 97530 THERAPEUTIC ACTIVITIES: CPT

## 2024-09-11 PROCEDURE — 99233 SBSQ HOSP IP/OBS HIGH 50: CPT | Performed by: PSYCHIATRY & NEUROLOGY

## 2024-09-11 PROCEDURE — 97167 OT EVAL HIGH COMPLEX 60 MIN: CPT

## 2024-09-11 PROCEDURE — 99232 SBSQ HOSP IP/OBS MODERATE 35: CPT | Performed by: INTERNAL MEDICINE

## 2024-09-11 PROCEDURE — 93306 TTE W/DOPPLER COMPLETE: CPT

## 2024-09-11 RX ORDER — CARVEDILOL 12.5 MG/1
25 TABLET ORAL 2 TIMES DAILY WITH MEALS
Status: DISCONTINUED | OUTPATIENT
Start: 2024-09-11 | End: 2024-09-13

## 2024-09-11 RX ORDER — PANTOPRAZOLE SODIUM 40 MG/1
40 TABLET, DELAYED RELEASE ORAL
Status: DISCONTINUED | OUTPATIENT
Start: 2024-09-11 | End: 2024-09-20 | Stop reason: HOSPADM

## 2024-09-11 RX ORDER — INSULIN GLARGINE 100 [IU]/ML
30 INJECTION, SOLUTION SUBCUTANEOUS
Status: DISCONTINUED | OUTPATIENT
Start: 2024-09-11 | End: 2024-09-13

## 2024-09-11 RX ORDER — INSULIN LISPRO 100 [IU]/ML
1-6 INJECTION, SOLUTION INTRAVENOUS; SUBCUTANEOUS
Status: DISCONTINUED | OUTPATIENT
Start: 2024-09-11 | End: 2024-09-13

## 2024-09-11 RX ADMIN — INSULIN LISPRO 1 UNITS: 100 INJECTION, SOLUTION INTRAVENOUS; SUBCUTANEOUS at 05:53

## 2024-09-11 RX ADMIN — INSULIN GLARGINE 30 UNITS: 100 INJECTION, SOLUTION SUBCUTANEOUS at 21:14

## 2024-09-11 RX ADMIN — SODIUM CHLORIDE 2.5 MG/HR: 0.9 INJECTION, SOLUTION INTRAVENOUS at 09:24

## 2024-09-11 RX ADMIN — SODIUM CHLORIDE 7.5 MG/HR: 0.9 INJECTION, SOLUTION INTRAVENOUS at 05:30

## 2024-09-11 RX ADMIN — CARVEDILOL 25 MG: 12.5 TABLET, FILM COATED ORAL at 16:08

## 2024-09-11 RX ADMIN — SODIUM CHLORIDE 7.5 MG/HR: 0.9 INJECTION, SOLUTION INTRAVENOUS at 01:00

## 2024-09-11 RX ADMIN — PERFLUTREN 0.4 ML/MIN: 6.52 INJECTION, SUSPENSION INTRAVENOUS at 12:15

## 2024-09-11 RX ADMIN — INSULIN LISPRO 2 UNITS: 100 INJECTION, SOLUTION INTRAVENOUS; SUBCUTANEOUS at 16:10

## 2024-09-11 RX ADMIN — NICOTINE 1 PATCH: 21 PATCH, EXTENDED RELEASE TRANSDERMAL at 16:08

## 2024-09-11 RX ADMIN — PANTOPRAZOLE SODIUM 40 MG: 40 TABLET, DELAYED RELEASE ORAL at 11:55

## 2024-09-11 RX ADMIN — INSULIN LISPRO 2 UNITS: 100 INJECTION, SOLUTION INTRAVENOUS; SUBCUTANEOUS at 21:15

## 2024-09-11 NOTE — SPEECH THERAPY NOTE
Speech Language/Pathology  Speech-Language Pathology Bedside Swallow Evaluation      Patient Name: Wilder Vargas    Today's Date: 9/11/2024     Problem List  Principal Problem:    Stroke-like symptom  Active Problems:    Type 2 diabetes mellitus with hyperglycemia (HCC)    Hypertensive emergency    Mixed hyperlipidemia    Tobacco use      Past Medical History  Past Medical History:   Diagnosis Date    CVA (cerebral vascular accident) (HCC)     Diabetes mellitus (HCC)     Hypertension        Past Surgical History  Past Surgical History:   Procedure Laterality Date    NO PAST SURGERIES         Summary   Pt presents w/ mild oral dysphagia suspected functional pharyngeal swallow skills.     Weak labial seal for retrieval w/ min R sided anterior bolus loss w/ thin liquids via cup sip and puree textures vis tspn, noted improvement in retrieval via tsp as session progressed. Adequate labial seal for containment. Mildly prolonged rotary mastication of regular textures, timely rotary mastication of dental soft textures w/ complete bolus breakdown and adequate bolus transfer. Mild oral residue noted w/ regular textures, cleared w/ liquid wash. Pt noted to independently utilize finger sweep throughout evaluation. Suspected fairly prompt swallow initiation and fair hyolaryngeal elevation to palpation. No overt s/s of aspiration at this time.     Education initiated w/ pt regarding strategies to optimize swallow safety including frequent/thorough oral care and to notify medical staff if s/s of aspiration arise. Pt verbalized understanding and agreement, denies questions or concerns at this time.     Pt w/ increased risk of aspiration 2/2 current admission for CVA, deficits noted on oral mechanism exam, and multiple medical co-morbidities, of note cannot rule out possible sensory deficit at bedside. SLP to f/u for diet tolerance, potential diet upgrade and completion of motor speech evaluation as able and appropriate. Discussed w/  CRNP rationale for completion of CXR in ~3 days to aid in determining diet tolerance.      Recommended Diet: soft/level 3 diet and thin liquids   Recommended Form of Meds: as tolerated    Aspiration precautions and swallowing strategies: upright posture, only feed when fully alert, slow rate of feeding, small bites/sips, and alternating bites and sips  Other Recommendations: Continue frequent oral care, assist w/ set up assistance as necessary         Current Medical Status  Pt is a 59 y.o. male with PMH of DM2 on lantus, HTN, HLD, GERD, CVA x2 (2020), current tobacco use who presented with confused, slurred speech and R sided weakness. He also was noted to have severe HTN and started on Nicardipine. Received TNK at 13:46 with initial improvement and then again worsening of symptoms that occurred at 1500. Repeat CT was performed and does not seem to demonstrate acute bleeding.     Current Precautions:  Fall  Aspiration     Allergies:  No known food allergies    Past medical history:  Please see H&P for details    Special Studies:  9/10/24 MRI brain wo contrast:  1.  Acute infarct involving left thalamocapsular region. There is minimal FLAIR hyperintensity/edema without mass effect or hemorrhagic transformation.  2.  Chronic lacunar infarcts in the left thalamus, right corona radiata and right basal ganglia.  3.  Nonspecific white matter change most compatible with chronic microangiopathy.  4.  Focus of old microhemorrhage in the right temporal lobe, likely on the basis of chronic hypertension.  5.  Right maxillary sinusitis.    9/10/24 CT head wo contrast:  No acute intracranial abnormality.     9/10/24 CTA stroke alert (head/neck):  1.  Patent major vessels of the [place] of [place] without high-grade stenosis.  2.  No significant stenosis in the cervical carotid or vertebral arteries.  3.  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    9/10/24 CT stroke alert  brain:  1.  No acute intracranial CT abnormality.  2.  Sequela of chronic infarct in the right corona radiata.  3.  Stable chronic infarct in the ventrolateral left thalamus.  4.  Chronic right microangiopathic change.  5.  Findings suggestive of acute right maxillary sinusitis.      History of VFSS:  N/A     Social/Education/Vocational Hx:  Pt lives with family    Swallow Information   Current Diet: NPO   Baseline Diet: regular diet and thin liquids per pt report       Baseline Assessment   Behavior/Cognition: alert  Speech/Language Status: able to participate in conversation and able to follow commands  Patient Positioning: upright in bed  Pain Status/Interventions/Response to Interventions: No report of or nonverbal indications of pain.       Oral Mechanism Exam  Facial: right facial droop  Labial: decreased ROM right side  Lingual: WFL  Velum: symmetrical  Mandible: adequate ROM  Dentition: adequate, missing few teeth  Vocal quality:clear/adequate   Speech: garbled at times, noted increased intelligibility when pt utilizes over annunciation and increased vocal volume    Volitional Cough: weak   Respiratory Status: on RA      Consistencies Assessed and Performance   Consistencies Administered: thin liquids, puree, mechanical soft solids, soft solids, and hard solids    Oral Stage:   Weak labial seal for retrieval w/ min R sided anterior bolus loss w/ thin liquids via cup sip and puree textures vis tspn, noted improvement in retrieval via tsp as session progressed. Adequate labial seal for containment. Mildly prolonged rotary mastication of regular textures, timely rotary mastication of dental soft textures w/ complete bolus breakdown and adequate bolus transfer. Mild oral residue noted w/ regular textures, cleared w/ liquid wash. Pt noted to independently utilize finger sweep throughout evaluation.     Pharyngeal Stage:   Suspected fairly prompt swallow initiation and fair hyolaryngeal elevation to palpation. No  overt s/s of aspiration at this time.     Esophageal Concerns: none reported    Summary and Recommendations (see above)    Results Reviewed with: patient, RN, and MD     Treatment Recommended: as able and appropriate for diet tolerance and potential diet upgrade, as well as a motor speech evaluation    Dysphagia LTG  -Patient will demonstrate safe and effective oral intake (without overt s/s significant oral/pharyngeal dysphagia including s/s penetration or aspiration) for the highest appropriate diet level.     Short Term Goals:    -Pt will tolerate Dysphagia 3/advanced (dental soft) diet and thin liquid with no significant s/s oral or pharyngeal dysphagia across 1-3 diagnostic session/s.    -Patient will tolerate trials of upgraded food and/or liquid texture with no significant s/s of oral or pharyngeal dysphagia including aspiration across 1-3 diagnostic sessions     -Patient will comply with a Video/Modified Barium Swallow study for more complete assessment of swallowing anatomy/physiology/aspiration risk and to assess efficacy of treatment techniques so as to best guide treatment plan if medically necessary     Speech Therapy Prognosis   Prognosis: good    Prognosis Considerations: age, medical status, and prior medical history

## 2024-09-11 NOTE — CONSULTS
"      Consultation - Palliative and Supportive Care   Wilder Vargas 59 y.o. male [id number]    Assessment/Problems actively addressed this visit:  Goals of care counseling  Encounter for palliative and supportive care  Acute left thalamocapsular CVA with minimal cerebral edema   DMII with hyperglycemia  Hypertension, chronic uncontrolled  Hypertensive emergency, resolved  Unspecified abdominal pain  Unspecified weight loss   Poor compliance with prescription medication   Ongoing tobacco abuse    PLAN:  1. Goals of care  I introduced the role of palliative and supportive care to Wilder and mother/Eilleen and significant other.  The patient has multiple vascular risk factors and prior Hx of CVA, yet he has not been taking his prescribed medications due to \"stomach burning\".   We reviewed recent CT A/P from 6/2024 ordered by PCP. Pt did not follow with GI  however he is now open to doing so after discharge.   Recommend GI referral at discharge.   If indicated, consider optimizing parenteral/alternate medication modalities such as 100% SQ insulin regimen (rather than PO antihyperglycemics) and TD clonidine patch, etc. Hoping this would mitigate pt's stomach complaints.    Regardless, the patient and his family seem to understand that the patient's health is \"spiraling\" due to poor compliance and inability to follow recommended medical regimen. He understands that his health outcomes will not be good if he continues to have poorly controlled DMII, hypertension, and ongoing tobacco use, etc.    Madi plans to follow up with GI after discharge. SO says she will help him keep appointments.    IF Madi continues to experience compliance difficulty, I urged him to call palliative care for ongoing support and re-evaluation of his plan.  Madi will, of course, have to take ownership for his own behaviors and poor health outcomes due to noncompliance however we can help make sure his goals of care are consistent and clear to all " parties.   They are welcome to call with further questions or concerns. PSC will sign off at this time.     Social support:  Time spent providing supportive listening.     Patient is finding comfort in family support               I have reviewed the patient's controlled substance dispensing history in the Prescription Drug Monitoring Program in compliance with the [facility] regulations before prescribing any controlled substances.  Last refills  PDMP reviewed - no active scripts.   Decisional apparatus:  Patient is likely competent on exam today.  If competence is lost, patient's substitute decision maker would default to mother by [place] Act 169.  Advance Directive/Living Will/POLST: none on file     We appreciate the invitation to be involved in this patient's care.  We will continue to follow throughout this hospitalization.  Please do not hesitate to reach our on call provider through our clinic answering service at [phone] should you have acute symptom control concerns.    Alexandra Sood PA-C  Palliative and Supportive Care  Clinic/Answering Service: [phone]  You can find me on [employer]!     IDENTIFICATION:  Inpatient consult to Palliative Care  Consult performed by: Alexandra Sood PA-C  Consult ordered by: KRYSTAL Allison        Physician Requesting Consult: Harris Santos DO  Reason for Consult / Principal Problem: GOC counseling and SM secondary to acute CVA with multiple vascular factors    History of Present Illness:  Wilder Vargas is a 59 y.o. male who presents with strokelike symptoms.  Wilder has a past medical history of multiple vascular factors including: DM type II prescribed oral antihyperglycemics + Lantus, HTN, HLD, GERD, and prior CVAs in 2020 as long as current tobacco abuse and along standing history of medication noncompliance.  He presented to the emergency department on 9/10 with right hemiparesis, confusion, and slurred speech.  He required Cardene  infusion due to hypertensive emergency and was found to have acute CVA.  He received TNK with initial improvement of symptoms however they quickly returned.  He was admitted to ICU for close monitoring.  Palliative and supportive care was consulted due to the patient's significant noncompliance with his recommended medications leading to recurrent CVA.      ROS    Past Medical History:   Diagnosis Date    CVA (cerebral vascular accident) (HCC)     Diabetes mellitus (HCC)     Hypertension      Past Surgical History:   Procedure Laterality Date    NO PAST SURGERIES       Social History     Socioeconomic History    Marital status: Single     Spouse name: Not on file    Number of children: Not on file    Years of education: Not on file    Highest education level: Not on file   Occupational History    Not on file   Tobacco Use    Smoking status: Every Day     Current packs/day: 1.00     Average packs/day: 1 pack/day for 42.7 years (42.7 ttl pk-yrs)     Types: Cigarettes     Start date: 1982    Smokeless tobacco: Never   Vaping Use    Vaping status: Never Used   Substance and Sexual Activity    Alcohol use: Not Currently    Drug use: Yes     Types: Marijuana     Comment: few days    Sexual activity: Not on file   Other Topics Concern    Not on file   Social History Narrative    Lives with his mom.    Feels safe at home.    Rare dental care.      Social Determinants of Health     Financial Resource Strain: Not on file   Food Insecurity: Not on file   Transportation Needs: Not on file   Physical Activity: Not on file   Stress: Not on file   Social Connections: Not on file   Intimate Partner Violence: Not on file   Housing Stability: Not on file     Family History   Problem Relation Age of Onset    Diabetes Mother     Heart failure Father     Substance Abuse Neg Hx     Mental illness Neg Hx        Medications:  all current active meds have been reviewed and current meds:   Current Facility-Administered Medications:      "carvedilol (COREG) tablet 25 mg, BID With Meals    insulin glargine (LANTUS) subcutaneous injection 30 Units 0.3 mL, HS    insulin lispro (HumALOG/ADMELOG) 100 units/mL subcutaneous injection 1-6 Units, 4x Daily (AC & HS) **AND** Fingerstick Glucose (POCT), 4x Daily AC and at bedtime    niCARdipine (CARDENE) 25 mg (STANDARD CONCENTRATION) in sodium chloride 0.9% 250 mL, Titrated, Last Rate: 5 mg/hr (09/11/24 1345)    nicotine (NICODERM CQ) 21 mg/24 hr TD 24 hr patch 1 patch, Q24H    pantoprazole (PROTONIX) EC tablet 40 mg, Early Morning    Allergies   Allergen Reactions    Metformin Diarrhea         Medications    Current Facility-Administered Medications:     carvedilol (COREG) tablet 25 mg, 25 mg, Oral, BID With Meals, KRYSTAL Allison    insulin glargine (LANTUS) subcutaneous injection 30 Units 0.3 mL, 30 Units, Subcutaneous, HS, KRYSTAL Allison    insulin lispro (HumALOG/ADMELOG) 100 units/mL subcutaneous injection 1-6 Units, 1-6 Units, Subcutaneous, 4x Daily (AC & HS) **AND** Fingerstick Glucose (POCT), , , 4x Daily AC and at bedtime, KRYSTAL Allison    niCARdipine (CARDENE) 25 mg (STANDARD CONCENTRATION) in sodium chloride 0.9% 250 mL, 1-15 mg/hr, Intravenous, Titrated, KRYSTAL Allison, Last Rate: 25 mL/hr at 09/11/24 1309, 2.5 mg/hr at 09/11/24 1309    nicotine (NICODERM CQ) 21 mg/24 hr TD 24 hr patch 1 patch, 1 patch, Transdermal, Q24H, KRYSTAL Allison, 1 patch at 09/10/24 1758    pantoprazole (PROTONIX) EC tablet 40 mg, 40 mg, Oral, Early Morning, KRYSTAL Allison, 40 mg at 09/11/24 1155    Objective  BP (!) 199/96   Pulse 85   Temp 97.9 °F (36.6 °C) (Oral)   Resp 18   Ht 5' 8\" (1.727 m)   Wt 79.8 kg (176 lb)   SpO2 95%   BMI 26.76 kg/m²     Physical Exam:   Physical Exam  Vitals and nursing note reviewed.   Constitutional:       General: He is not in acute distress.     Appearance: He is well-developed.   HENT:      Head: Normocephalic and " "atraumatic.   Eyes:      Conjunctiva/sclera: Conjunctivae normal.   Cardiovascular:      Rate and Rhythm: Normal rate.   Pulmonary:      Effort: Pulmonary effort is normal. No respiratory distress.   Musculoskeletal:      Cervical back: Neck supple.   Skin:     General: Skin is warm.   Neurological:      Mental Status: He is alert.      Cranial Nerves: Cranial nerve deficit present.      Motor: Weakness (rigt sided) present.   Psychiatric:         Mood and Affect: Mood normal.         Behavior: Behavior normal.       Lab Results: I have personally reviewed pertinent labs., CBC: No results found for: \"WBC\", \"HGB\", \"HCT\", \"MCV\", \"PLT\", \"ADJUSTEDWBC\", \"RBC\", \"MCH\", \"MCHC\", \"RDW\", \"MPV\", \"NRBC\", CMP: No results found for: \"SODIUM\", \"K\", \"CL\", \"CO2\", \"ANIONGAP\", \"BUN\", \"CREATININE\", \"GLUCOSE\", \"CALCIUM\", \"AST\", \"ALT\", \"ALKPHOS\", \"PROT\", \"BILITOT\", \"EGFR\"  Imaging Studies: I have personally reviewed pertinent reports.  EKG, Pathology, and Other Studies: I have personally reviewed pertinent reports.    Counseling / Coordination of Care  Total floor / unit time spent today 90 minutes. Greater than 50% of total time was spent with the patient and / or family counseling and / or coordination of care. A description of the counseling / coordination of care: reviewed chart, reviewed lab values, reviewed imaging, provided medical updates, discussed palliative care and symptom management, discussed goals of care, assessed POA/HCA, provided supportive listening, provided anticipatory guidance, provided psychosocial and emotional support, assessed competency and decision-making, and facilitated interdisciplinary communication. Reviewed with ICU team, RN and CM.    Portions of this document may have been created using dictation software and as such some \"sound alike\" terms may have been generated by the system. Do not hesitate to contact me with any questions or clarifications.    "

## 2024-09-11 NOTE — PHYSICAL THERAPY NOTE
PHYSICAL THERAPY EVALUATION NOTE      Patient Name: Wilder Vargas  Today's Date: 2024    AGE:   59 y.o.  Mrn:   [id number]  ADMIT DX:  Slurred speech [R47.81]  Stroke-like episode [R29.90]    Past Medical History:   Diagnosis Date    CVA (cerebral vascular accident) (HCC)     Diabetes mellitus (HCC)     Hypertension      Length Of Stay: 1  PHYSICAL THERAPY EVALUATION :   Patient's identity confirmed via 2 patient identifiers (full name and ) at start of session       24 1452   PT Last Visit   PT Visit Date 24   Note Type   Note type Evaluation   Pain Assessment   Pain Assessment Tool 0-10   Pain Score No Pain   Restrictions/Precautions   Weight Bearing Precautions Per Order No   Other Precautions Cognitive;Chair Alarm;Bed Alarm;Fall Risk   Home Living   Type of Home House   Home Layout Two level  (1 [address]WAL[address])   Additional Comments Pt independent PTA   Prior Function   Level of [place] Independent with ADLs;Independent with functional mobility   Lives With Family  (Parents)   Receives Help From Family;Other (Comment)  (Girlfriend)   IADLs Independent with driving;Independent with medication management;Independent with meal prep   Falls in the last 6 months 0   Vocational Full time employment  ([employer])   General   Family/Caregiver Present Yes  (Mother, girlfriend)   Cognition   Overall Cognitive Status WFL   Arousal/Participation Alert   Attention Attends with cues to redirect   Orientation Level Oriented X4   Memory Within functional limits   Following Commands Follows multistep commands without difficulty   Comments Pt is moderately dysarthric. Pleasant, cooperated, highly motivated to get up   RUE Strength   RUE Overall Strength Deficits  (0/5)   RUE Tone   RUE Tone Hypotonic  (flaccid)   RLE Assessment   RLE Assessment X   Strength RLE   RLE Overall Strength 0/5  (potentially trace at hip flexors)   Tone RLE    RLE Tone Flaccid  (- clonus)   LLE Assessment   LLE Assessment WFL   Strength LLE   LLE Overall Strength 4/5   Vision-Basic Assessment   Current Vision No visual deficits   Light Touch   RLE Light Touch Grossly intact   LLE Light Touch Grossly intact   Bed Mobility   Supine to Sit 3  Moderate assistance   Additional items Assist x 2  (to patient's left side)   Additional Comments Pt is able to sit EOB x 10 min w/ supervision. He is able to maintain midline throguhout   Transfers   Sit to Stand 3  Moderate assistance   Additional items Assist x 2   Stand to Sit 3  Moderate assistance   Additional items Assist x 2   Additional Comments Obtained sling for RUE to reduce risk of dislocation during mobility. Utilized belt and hammock method for control around patient's hips. He is able to perform a STS w/ mod A x 2 w/ R knee block. Returned to sitting EOB.  (See tx note for OOB transfer w/ QuickMove)   Balance   Static Sitting Fair +   Static Standing Zero  (Ax2)   Activity Tolerance   Activity Tolerance Patient tolerated treatment well   Medical Staff Made Aware MELITA Baugh   Nurse Made Aware YAN Serrato   Assessment   Problem List Decreased strength;Decreased range of motion;Decreased endurance;Impaired balance;Decreased mobility   Assessment Wilder Vargas is a 59 y.o. Male who presents to [facility] on [facility]/10/2024 from home w/ c/o acute R sided weakness w/ N+V and diagnosis of Acute infarct involving left thalamocapsular region . Orders for PT eval and treat received. Pt presents w/ comorbidities of uncontrolled DMII, HTN, tobacco abuse, hx of CVA, GERD. At baseline, pt mobilizes independently w/ no AD, and reports 0 falls in the last 6 months. Upon evaluation, pt presents w/ the following deficits: weakness, decreased ROM, impaired tone, and impaired balance. Upon eval, pt requires mod A x 2 for bed mobility, mod A x 2 for transfers, is able to sit EOB x 10 min w/ supervision in midline. Based on this PT evaluation  today, patient's discharge recommendation is for Level I - Maximum Rehab Resource Intensity. During this admission, pt would benefit from continued skilled inpatient PT in the acute care setting in order to address the abovementioned deficits to maximize function and mobility before DC from acute care.   Barriers to Discharge Comments acute CVA, (+) social support, pt is highly motivated; pt is currently mod A x 2 for all functional mobility when independent at baseline   Goals   Patient Goals to sit OOB in chair   STG Expiration Date 09/21/24   Short Term Goal #1 Patient will: Perform all bed mobility tasks w/ modx1 to improve pt's independence w/ repositioning for decrease risk of skin breakdown, Perform all transfers w/ modx1 consistently from various height surfaces in order to improve I w/ engagement w/ real-world environments/situations, Increase all balance 1/2 grade to decrease risk for falls, Tolerate 3 hr OOB to faciliate upright tolerance, Tolerate seated at EOB at least 20 minutes w/ supervision in order to work on trunk strength/endurance for functional tasks, and Tolerate standing at least 2 minutes w/ modx1 to facilitate functional task performance and facilitate towards readiness for challenging upright mobility   PT Treatment Day 0   Plan   Treatment/Interventions Functional transfer training;LE strengthening/ROM;Therapeutic exercise;Endurance training;Patient/family training;Equipment eval/education;Bed mobility;Gait training   PT Frequency 3-5x/wk   Discharge Recommendation   Rehab Resource Intensity Level, PT I (Maximum Resource Intensity)   AM-PAC Basic Mobility Inpatient   Turning in Flat Bed Without Bedrails 2   Lying on Back to Sitting on Edge of Flat Bed Without Bedrails 1   Moving Bed to Chair 1   Standing Up From Chair Using Arms 1   Walk in Room 1   Climb 3-5 Stairs With Railing 1   Basic Mobility Inpatient Raw Score 7   Turning Head Towards Sound 4   Follow Simple Instructions 4   Low  Function Basic Mobility Raw Score  15   Low Function Basic Mobility Standardized Score  23.9   [facility] Highest Level Of Mobility   -[facility] Goal 2: Bed activities/Dependent transfer   -[facility] Achieved 4: Move to chair/commode   Additional Treatment Session   Start Time 1508   End Time 1518   Treatment Assessment Patient seated EOB w/ sling on RUE. Provided education that the sling is only to wear during transfers. Obtained QuickMove to provide BLE knee block and patient can use his strong LUE to pull up. He is able to eprform STS w/ mod A x 2. Pt w/ increased R sided lean when wheeled over to recliner, requires mod A x 1 to maintain midline, likely due to his fatiguing. He requires mod A x 2 to perform STS from QuickMove into the recliner chair. Once seated in recliner, elevatred and supported RUE/shoulder/ arm on pillows. Also provided education on HEP of NME, mostly for proximal activiation including shoulder shrugs and glute sets to attempt to regain neural pathways post stroke   Equipment Use QuickMove   Additional Treatment Day 1   End of Consult   Patient Position at End of Consult Bedside chair;Bed/Chair alarm activated;All needs within reach       The patient's AM-PAC Basic Mobility Inpatient Short Form Raw Score is 7, Standardized Score is  . A standardized score less than 38.32 (raw score of 16) suggests the patient may benefit from discharge to post-acute rehabilitation services which may not coincide with above PT recommendations. However please refer to therapist recommendation for discharge planning given other factors that may influence destination.    Pt would benefit from skilled inpatient PT during this admission in order to facilitate progress towards goals to maximize functional independence    Ashia Rajput PT, DPT      ADDENDUM:  [id number]    Received TT from YAN Serrato that patient wants to return to bed. He is able to perform STS w/ Quickmove w/ MOD A x 1 to stand x 2, both to get in and out  of the QuickMove. He requires mod A x 2 to return to supine in bed    Ashia Rajput, PT, DPT

## 2024-09-11 NOTE — PLAN OF CARE
Problem: PHYSICAL THERAPY ADULT  Goal: Performs mobility at highest level of function for planned discharge setting.  See evaluation for individualized goals.  Description: Treatment/Interventions: Functional transfer training, LE strengthening/ROM, Therapeutic exercise, Endurance training, Patient/family training, Equipment eval/education, Bed mobility, Gait training          See flowsheet documentation for full assessment, interventions and recommendations.  9/11/2024 1558 by Ashia Rajput, PT  Note:    Problem List: Decreased strength, Decreased range of motion, Decreased endurance, Impaired balance, Decreased mobility  Assessment: Wilder Vargas is a 59 y.o. Male who presents [facility] on 9/10/2024 from home w/ c/o acute R sided weakness w/ N+V and diagnosis of Acute infarct involving left thalamocapsular region . Orders for PT eval and treat received. Pt presents w/ comorbidities of uncontrolled DMII, HTN, tobacco abuse, hx of CVA, GERD. At baseline, pt mobilizes independently w/ no AD, and reports 0 falls in the last 6 months. Upon evaluation, pt presents w/ the following deficits: weakness, decreased ROM, impaired tone, and impaired balance. Upon eval, pt requires mod A x 2 for bed mobility, mod A x 2 for transfers, is able to sit EOB x 10 min w/ supervision in midline. Based on this PT evaluation today, patient's discharge recommendation is for Level I - Maximum Rehab Resource Intensity. During this admission, pt would benefit from continued skilled inpatient PT in the acute care setting in order to address the abovementioned deficits to maximize function and mobility before DC from acute care.     Barriers to Discharge Comments: acute CVA, (+) social support, pt is highly motivated; pt is currently mod A x 2 for all functional mobility when independent at baseline  Rehab Resource Intensity Level, PT: I (Maximum Resource Intensity)    See flowsheet documentation for full assessment.

## 2024-09-11 NOTE — OCCUPATIONAL THERAPY NOTE
Occupational Therapy Cx Note     Patient Name: Wilder Vargas  Today's Date: 9/11/2024  Problem List  Principal Problem:    Stroke-like symptom  Active Problems:    Type 2 diabetes mellitus with hyperglycemia (HCC)    Hypertensive emergency    Mixed hyperlipidemia    Tobacco use              09/11/24 0914   OT Last Visit   OT Visit Date 09/11/24   Note Type   Note type Cancelled Session   Cancel Reasons Medical status   Additional Comments OT orders received, chart reviewed. Pt received TNK at 1346 9/10/24. Per ICU protocol, pt to be on bedrest x24 hrs s/p TNK administration. Will f/u later this afternoon as able & medically appropriate.       Beverly Lizama, OTR/L

## 2024-09-11 NOTE — ASSESSMENT & PLAN NOTE
- Lipid panel: Total 172, triglycerides 88, HDL 46, LDL elevated 108  - Recommend initiating Pravastatin when able as patient reported intolerance with atorvastatin in the past

## 2024-09-11 NOTE — QUICK NOTE
Received patient on Isolyte 50/hr, Cardene 15 with -200s.     Review of case with Dr. Fregoso via telephone, including current PE, BP trends and goals and imaging results. Given inability to control BP while on Isolyte, as well as patient appearing euvolemic, will d/c Isolyte for now. Continue Cardene for goal -180 per Neurology.

## 2024-09-11 NOTE — ASSESSMENT & PLAN NOTE
Outpatient regimen: 10-40mg amlodipine-benazepril, 25mg carvedilol, 4mg doxazosin  History on medication non-compliance  Presented to ED with -220s and started on Nicardipine gtt    Plan  Hold while NPO  Continue Nicardipine gtt with goal -180  Overnight required labetalol x1 for tachycardia 130s in the setting of agitation, and  on Cardene 15

## 2024-09-11 NOTE — TELEPHONE ENCOUNTER
STILL ADMITTED 9/10/2024 - present (1 day)  [facility]      HFU/ [facility]/ Stroke-like symptom     DC-      ----- Message from Aleta Ray PA-C sent at 9/11/2024  2:28 PM EDT -----  Regarding: Hospital Follow-Up  Wilder Vargas will need follow-up in in 6 weeks with neurovascular team for Other in 60 minute appointment. They will not require outpatient neurological testing.

## 2024-09-11 NOTE — ED PROVIDER NOTES
1. Stroke-like episode      ED Disposition       ED Disposition   Admit    Condition   Stable    Date/Time   Tue Sep 10, 2024  1:15 PM    Comment                  Assessment & Plan       Medical Decision Making  Diff includes acute stroke, consider intracranial spontaneous hemorrhage vs infarct or embolus.  Other consideration, bells palsy, arrhythmia, hypoglycemia, electrolyte or renal abnormalities, anemia, infection, heart attack    Amount and/or Complexity of Data Reviewed  Labs: ordered.  Radiology: ordered.    Risk  Prescription drug management.  Decision regarding hospitalization.      Stroke alert called.  Neuro ap immediately at bedside and guided decision-making.  Patient candidate for tnk, within 4 hour period, no suspicion for large vessel occlusion.  Patient with HTN in ER - Cardene drip immediately started and titrated up until BP adequate to administer TNK.          Stroke Assessment       Row Name 09/10/24 1250             NIH Stroke Scale    Interval Baseline      Level of Consciousness (1a.) 0      LOC Questions (1b.) 0      LOC Commands (1c.) 0      Best Gaze (2.) 0      Visual (3.) 0      Facial Palsy (4.) 3      Motor Arm, Left (5a.) 0      Motor Arm, Right (5b.) 0      Motor Leg, Left (6a.) 0      Motor Leg, Right (6b.) 4      Limb Ataxia (7.) 1      Sensory (8.) 0      Best Language (9.) 0      Dysarthria (10.) 1      Extinction and Inattention (11.) (Formerly Neglect) 0      Total 9                           Medications   niCARdipine (CARDENE) 25 mg (STANDARD CONCENTRATION) in sodium chloride 0.9% 250 mL (10 mg/hr Intravenous Rate/Dose Change 9/10/24 1500)   nicotine (NICODERM CQ) 21 mg/24 hr TD 24 hr patch 1 patch (has no administration in time range)   ondansetron (ZOFRAN) injection 4 mg (4 mg Intravenous Given 9/10/24 1247)   tenecteplase (TNKase) injection 19 mg (19 mg Intravenous Given 9/10/24 1346)       History of Present Illness       Hx from patient and girlfriend - most hx from  girlfriend as patient had nausea and vomited upon initial eval.  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  This was about 20 minutes prior to arrival around.  Before that point he was fine.  Then he couldn't drive right so they pulled over.  When she tried to get him out of the car, his right leg wouldn't move.            Review of Systems   Constitutional:  Negative for chills and fever.   HENT:  Negative for rhinorrhea and sore throat.    Respiratory:  Negative for shortness of breath.    Cardiovascular:  Negative for chest pain.   Gastrointestinal:  Negative for constipation, diarrhea, nausea and vomiting.   Genitourinary:  Negative for dysuria and frequency.   Skin:  Negative for rash.   Neurological:  Positive for facial asymmetry, speech difficulty and weakness.   All other systems reviewed and are negative.          Objective     ED Triage Vitals   Temperature Pulse Blood Pressure Respirations SpO2 Patient Position - Orthostatic VS   09/10/24 1346 09/10/24 1245 09/10/24 1245 09/10/24 1245 09/10/24 1245 09/10/24 1245   98.2 °F (36.8 °C) 86 (!) 243/146 18 96 % Sitting      Temp Source Heart Rate Source BP Location FiO2 (%) Pain Score    09/10/24 1346 09/10/24 1359 09/10/24 1245 -- 09/10/24 1455    Oral Monitor Left arm  No Pain        Physical Exam  Vitals and nursing note reviewed.   Constitutional:       Appearance: He is well-developed.   HENT:      Head: Normocephalic and atraumatic.      Right Ear: External ear normal.      Left Ear: External ear normal.      Nose: Nose normal.   Eyes:      General: No visual field deficit.     Conjunctiva/sclera: Conjunctivae normal.      Pupils: Pupils are equal, round, and reactive to light.   Cardiovascular:      Rate and Rhythm: Normal rate and regular rhythm.      Heart sounds: Normal heart sounds.   Pulmonary:      Effort: Pulmonary effort is normal. No  respiratory distress.      Breath sounds: Normal breath sounds. No wheezing.   Abdominal:      General: Bowel sounds are normal. There is no distension.      Palpations: Abdomen is soft.      Tenderness: There is no abdominal tenderness.   Musculoskeletal:         General: No deformity. Normal range of motion.      Cervical back: Normal range of motion and neck supple. No spinous process tenderness.   Skin:     General: Skin is warm and dry.      Findings: No rash.   Neurological:      General: No focal deficit present.      Mental Status: He is alert.      GCS: GCS eye subscore is 4. GCS verbal subscore is 5. GCS motor subscore is 6.      Cranial Nerves: Cranial nerve deficit, dysarthria and facial asymmetry present.      Sensory: Sensation is intact. No sensory deficit.      Motor: Weakness present.      Coordination: Coordination abnormal.      Comments: Right face droop sparing the forehead.  Slurred speech but able to understand.  Can't move right leg, finger to nose nl and heel to shin nl on left side   Psychiatric:         Mood and Affect: Mood normal.         Labs Reviewed   BASIC METABOLIC PANEL - Abnormal       Result Value    Sodium 137      Potassium 3.5      Chloride 102      CO2 28      ANION GAP 7      BUN 15      Creatinine 0.81      Glucose 209 (*)     Calcium 9.2      eGFR 97      Narrative:     National Kidney Disease Foundation guidelines for Chronic Kidney Disease (CKD):     Stage 1 with normal or high GFR (GFR > 90 mL/min/1.73 square meters)    Stage 2 Mild CKD (GFR = 60-89 mL/min/1.73 square meters)    Stage 3A Moderate CKD (GFR = 45-59 mL/min/1.73 square meters)    Stage 3B Moderate CKD (GFR = 30-44 mL/min/1.73 square meters)    Stage 4 Severe CKD (GFR = 15-29 mL/min/1.73 square meters)    Stage 5 End Stage CKD (GFR <15 mL/min/1.73 square meters)  Note: GFR calculation is accurate only with a steady state creatinine   CBC AND PLATELET - Abnormal    WBC 10.44 (*)     RBC 6.64 (*)      Hemoglobin 19.3 (*)     Hematocrit 56.0 (*)     MCV 84      MCH 29.1      MCHC 34.6      RDW 12.6      Platelets 213      MPV 10.1     APTT - Abnormal    PTT 20 (*)    HEPATIC FUNCTION PANEL - Abnormal    Total Bilirubin 0.81      Bilirubin, Direct 0.11      Alkaline Phosphatase 83      AST 11 (*)     ALT 10      Total Protein 8.0      Albumin 4.3     HEMOGLOBIN A1C - Abnormal    Hemoglobin A1C 9.2 (*)          LIPID PANEL WITH DIRECT LDL REFLEX - Abnormal    Cholesterol 172      Triglycerides 88      HDL, Direct 46      LDL Calculated 108 (*)    POCT GLUCOSE - Abnormal    POC Glucose 231 (*)    POCT GLUCOSE - Abnormal    POC Glucose 173 (*)    POCT GLUCOSE - Abnormal    POC Glucose 229 (*)    POCT GLUCOSE - Abnormal    POC Glucose 168 (*)    POCT GLUCOSE - Abnormal    POC Glucose 262 (*)    PROTIME-INR - Normal    Protime 14.2      INR 1.05      Narrative:     INR Therapeutic Range    Indication                                             INR Range      Atrial Fibrillation                                               2.0-3.0  Hypercoagulable State                                    2.0.2.3  Left Ventricular Asist Device                            2.0-3.0  Mechanical Heart Valve                                  -    Aortic(with afib, MI, embolism, HF, LA enlargement,    and/or coagulopathy)                                     2.0-3.0 (2.5-3.5)     Mitral                                                             2.5-3.5  Prosthetic/Bioprosthetic Heart Valve               2.0-3.0  Venous thromboembolism (VTE: VT, PE        2.0-3.0   HS TROPONIN I 0HR - Normal    hs TnI 0hr 5     HS TROPONIN I 2HR - Normal    hs TnI 2hr 6      Delta 2hr hsTnI 1     TSH, 3RD GENERATION WITH FREE T4 REFLEX - Normal    TSH 3RD GENERATON 1.169       CT stroke alert brain   Final Interpretation by Padmini Collins MD (09/10 5177)      1.  No acute intracranial CT abnormality.   2.  Sequela of chronic infarct in the right corona  radiata.   3.  Stable chronic infarct in the ventrolateral left thalamus.   4.  Chronic right microangiopathic change.   5.  Findings suggestive of acute right maxillary sinusitis.                     Findings were directly discussed with Marques Laureano at 1:09 pm      Workstation performed: [id number]         CTA stroke alert (head/neck)   Final Interpretation by Padmini Collins MD (09/10 1319)      1.  Patent major vessels of the [place] without high-grade stenosis.   2.  No significant stenosis in the cervical carotid or vertebral arteries.   3.  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                  Findings were directly discussed with Marques Laureano at 1:09 pm      Workstation performed: [id number]             Procedures       Henry Perez DO  09/11/24 1315

## 2024-09-11 NOTE — ASSESSMENT & PLAN NOTE
Outpatient regimen: Jaurdiance and 30U Lantus    Plan  SSI q6h for NPO for goal glucose 140-180  Hypoglycemia protocol  Check Hgb A1C

## 2024-09-11 NOTE — PLAN OF CARE
Problem: PAIN - ADULT  Goal: Verbalizes/displays adequate comfort level or baseline comfort level  Description: Interventions:  - Encourage patient to monitor pain and request assistance  - Assess pain using appropriate pain scale  - Administer analgesics based on type and severity of pain and evaluate response  - Implement non-pharmacological measures as appropriate and evaluate response  - Consider cultural and social influences on pain and pain management  - Notify physician/advanced practitioner if interventions unsuccessful or patient reports new pain  Outcome: Progressing     Problem: CARDIOVASCULAR - ADULT  Goal: Maintains optimal cardiac output and hemodynamic stability  Description: INTERVENTIONS:  - Monitor I/O, vital signs and rhythm  - Monitor for S/S and trends of decreased cardiac output  - Administer and titrate ordered vasoactive medications to optimize hemodynamic stability  - Assess quality of pulses, skin color and temperature  - Assess for signs of decreased coronary artery perfusion  - Instruct patient to report change in severity of symptoms  Outcome: Progressing  Goal: Absence of cardiac dysrhythmias or at baseline rhythm  Description: INTERVENTIONS:  - Continuous cardiac monitoring, vital signs, obtain 12 lead EKG if ordered  - Administer antiarrhythmic and heart rate control medications as ordered  - Monitor electrolytes and administer replacement therapy as ordered  Outcome: Progressing     Problem: Neurological Deficit  Goal: Neurological status is stable or improving  Description: Interventions:  - Monitor and assess patient's level of consciousness, motor function, sensory function, and level of assistance needed for ADLs.   - Monitor and report changes from baseline. Collaborate with interdisciplinary team to initiate plan and implement interventions as ordered.   - Provide and maintain a safe environment.  - Consider seizure precautions.  - Consider fall precautions.  - Consider  aspiration precautions.  - Consider bleeding precautions.  Outcome: Progressing     Problem: Activity Intolerance/Impaired Mobility  Goal: Mobility/activity is maintained at optimum level for patient  Description: Interventions:  - Assess and monitor patient  barriers to mobility and need for assistive/adaptive devices.  - Assess patient's emotional response to limitations.  - Collaborate with interdisciplinary team and initiate plans and interventions as ordered.  - Encourage independent activity per ability.  - Maintain proper body alignment.  - Perform active/passive rom as tolerated/ordered.  - Plan activities to conserve energy.  - Turn patient as appropriate  Outcome: Progressing     Problem: Communication Impairment  Goal: Ability to express needs and understand communication  Description: Assess patient's communication skills and ability to understand information.  Patient will demonstrate use of effective communication techniques, alternative methods of communication and understanding even if not able to speak.     - Encourage communication and provide alternate methods of communication as needed.  - Collaborate with case management/ for discharge needs.  - Include patient/family/caregiver in decisions related to communication.  Outcome: Progressing     Problem: Potential for Aspiration  Goal: Non-ventilated patient's risk of aspiration is minimized  Description: Assess and monitor vital signs, respiratory status, and labs (WBC).  Monitor for signs of aspiration (tachypnea, cough, rales, wheezing, cyanosis, fever).    - Assess and monitor patient's ability to swallow.  - Place patient up in chair to eat if possible.  - HOB up at 90 degrees to eat if unable to get patient up into chair.  - Supervise patient during oral intake.   - Instruct patient/ family to take small bites.  - Instruct patient/ family to take small single sips when taking liquids.  - Follow patient-specific strategies  generated by speech pathologist.  Outcome: Progressing  Goal: Ventilated patient's risk of aspiration is minimized  Description: Assess and monitor vital signs, respiratory status, airway cuff pressure, and labs (WBC).  Monitor for signs of aspiration (tachypnea, cough, rales, wheezing, cyanosis, fever).    - Elevate head of bed 30 degrees if patient has tube feeding.  - Monitor tube feeding.  Outcome: Progressing

## 2024-09-11 NOTE — CASE MANAGEMENT
Case Management Assessment & Discharge Planning Note    Patient name Wilder Vargas  Location /-01 MRN [id number]  : 1965 Date 2024       Current Admission Date: 9/10/2024  Current Admission Diagnosis:Stroke-like symptom   Patient Active Problem List    Diagnosis Date Noted Date Diagnosed    Tobacco use 09/10/2024     Type 2 diabetes mellitus with diabetic microalbuminuria, with long-term current use of insulin (HCC) 2024     Erythrocytosis 2024     Gastroesophageal reflux disease without esophagitis 2024     Type 2 diabetes mellitus with hyperglycemia (HCC) 2024     S/P stroke due to cerebrovascular disease 2024     Hypertensive emergency 2024     Other male erectile dysfunction 2024     Mixed hyperlipidemia 2024     Stroke-like symptom 2020       LOS (days): 1  Geometric Mean LOS (GMLOS) (days): 2.9  Days to GMLOS:1.9     OBJECTIVE:    Risk of Unplanned Readmission Score: 7.83         Current admission status: Inpatient       Preferred Pharmacy:   [id number] - [place], [place] - [address]  [address]  [place] [address]  Phone: [phone] Fax: [phone]    Primary Care Provider: Gilmar Oro MD    Primary Insurance: [employer]  Secondary Insurance:     ASSESSMENT:  Active Health Care Proxies       Navdeep Vargas [facility] Care Representative - Mother   Primary Phone: [phone] (Home)                 Advance Directives  Does patient have a Health Care POA?: No  Was patient offered paperwork?: Yes (refused paperwork)              Patient Information  Admitted from:: Home  Mental Status: Alert  During Assessment patient was accompanied by: Not accompanied during assessment  Assessment information provided by:: Patient  Primary Caregiver: Self  Support Systems: Parent, Spouse/significant other  What city do you live in?: [place]  Home entry access options. Select all that apply.: No  steps to enter home  Type of Current Residence: 2 story home  Upon entering residence, is there a bedroom on the main floor (no further steps)?: No  A bedroom is located on the following floor levels of residence (select all that apply):: 2nd Floor  Upon entering residence, is there a bathroom on the main floor (no further steps)?: Yes  Number of steps to 2nd floor from main floor: 5  Living Arrangements: Lives w/ Parent(s)  Is patient a ?: No    Activities of Daily Living Prior to Admission  Functional Status: Independent  Completes ADLs independently?: Yes  Ambulates independently?: Yes  Does patient use assisted devices?: No  Does patient currently own DME?: No  Does patient have a history of Outpatient Therapy (PT/OT)?: No  Does the patient have a history of Short-Term Rehab?: No  Does patient have a history of HHC?: No  Does patient currently have HHC?: No         Patient Information Continued  Income Source: Employed  Does patient have prescription coverage?: Yes  Does patient receive dialysis treatments?: No  Does patient have a history of substance abuse?: Currently using  Current substance use preference: Marijuana  Historical substance use preference: Marijuana  Is patient currently in treatment for substance abuse?: No. Patient declined treatment information.  Does patient have a history of Mental Health Diagnosis?: No         Means of Transportation  Means of Transport to Appts:: Drives Self      Social Determinants of Health (SDOH)      Flowsheet Row Most Recent Value   Housing Stability    In the last 12 months, was there a time when you were not able to pay the mortgage or rent on time? N   In the past 12 months, how many times have you moved where you were living? 0   At any time in the past 12 months, were you homeless or living in a shelter (including now)? N   Transportation Needs    In the past 12 months, has lack of transportation kept you from medical appointments or from getting  medications? no   In the past 12 months, has lack of transportation kept you from meetings, work, or from getting things needed for daily living? No   Food Insecurity    Within the past 12 months, you worried that your food would run out before you got the money to buy more. Never true   Within the past 12 months, the food you bought just didn't last and you didn't have money to get more. Never true   Utilities    In the past 12 months has the electric, gas, oil, or water company threatened to shut off services in your home? No            DISCHARGE DETAILS:    Discharge planning discussed with:: Pt at bedside  [place] of Choice: Yes     CM contacted family/caregiver?: No- see comments (pt delinced a call to this mother)  Were Treatment Team discharge recommendations reviewed with patient/caregiver?: Yes  Did patient/caregiver verbalize understanding of patient care needs?: Yes  Were patient/caregiver advised of the risks associated with not following Treatment Team discharge recommendations?: Yes          Additional Comments: Met with pt at bedside to review CM role and possible discharge planning needs. Pt stated that he lives with his mother in a 2SH with no steps to enter. Pt has been independent with all ADL care prior to admission. Pt reports no hx of DME, HHC or STR placement. Pt has severe Right sided weakness, waiting on PT OT evaluation when medically stable. Pt will need placement at d/c, waiting on recommendations of acute vs. SNF. Pt reports that he uses marijuana daily. Made pt aware of CM availability for ongoing discharge planning needs.

## 2024-09-11 NOTE — OCCUPATIONAL THERAPY NOTE
"    Occupational Therapy Evaluation & Treatment     Patient Name: Wilder Vargas  Today's Date: 9/11/2024  Problem List  Principal Problem:    Stroke-like symptom  Active Problems:    Type 2 diabetes mellitus with hyperglycemia (HCC)    Hypertensive emergency    Mixed hyperlipidemia    Tobacco use    Past Medical History  Past Medical History:   Diagnosis Date    CVA (cerebral vascular accident) (HCC)     Diabetes mellitus (HCC)     Hypertension      Past Surgical History  Past Surgical History:   Procedure Laterality Date    NO PAST SURGERIES               09/11/24 1500   OT Last Visit   OT Visit Date 09/11/24   Note Type   Note type Evaluation   Pain Assessment   Pain Assessment Tool 0-10   Pain Score No Pain   Restrictions/Precautions   Weight Bearing Precautions Per Order No   Other Precautions Cognitive;Chair Alarm;Bed Alarm;Fall Risk   Home Living   Type of Home House   Home Layout Two level  (1STE)   Additional Comments No DME PTA   Prior Function   Level of [place] Independent with ADLs;Independent with functional mobility;Independent with IADLS   Lives With Other (Comment)  (Mother)   Receives Help From Other (Comment);Family  (Girlfriend)   IADLs Independent with medication management;Independent with driving;Independent with meal prep   General   Family/Caregiver Present Yes   Additional General Comments Mother & SO present   Subjective   Subjective \"It feels good to sit up\"   ADL   Eating Assistance 5  Supervision/Setup   Eating Deficit Setup   Grooming Assistance 5  Supervision/Setup   Grooming Deficit Setup   UB Bathing Assistance 3  Moderate Assistance   LB Bathing Assistance 3  Moderate Assistance   UB Dressing Assistance 3  Moderate Assistance   LB Dressing Assistance 3  Moderate Assistance   Toileting Assistance  2  Maximal Assistance   Bed Mobility   Supine to Sit 3  Moderate assistance   Additional items Assist x 2;Increased time required;Verbal cues;LE management;Bedrails;HOB elevated "   Additional Comments Pt able to sit EOB x10 min with supervision - able to maintain midline entirety of time   Transfers   Sit to Stand 3  Moderate assistance   Additional items Assist x 2;Increased time required;Verbal cues;Bedrails   Stand to Sit 3  Moderate assistance   Additional items Assist x 2;Increased time required;Verbal cues   Additional Comments See tx session for additional txfers   Balance   Static Sitting Normal   Dynamic Sitting Good   Static Standing Poor -   Ambulatory Zero   Activity Tolerance   Activity Tolerance Patient tolerated treatment well   Medical Staff Made Aware PT YAN Ang   RUFRANKLIN Assessment   RUE Assessment X  (0/5 MMT trapezius > hand)   LUE Assessment   LUE Assessment WNL   Hand Function   Gross Motor Coordination Impaired   Fine Motor Coordination Impaired   Hand Function Comments 0/5 mass grasp   Sensation   Light Touch No apparent deficits   Vision-Basic Assessment   Current Vision No visual deficits   Cognition   Overall Cognitive Status WFL   Arousal/Participation Alert   Attention Within functional limits   Orientation Level Oriented X4   Memory Within functional limits   Following Commands Follows all commands and directions without difficulty   Comments Moderately dysarthric   Assessment   Limitation Decreased ADL status;Decreased UE ROM;Decreased UE strength;Decreased high-level ADLs;Decreased self-care trans;Non-func R UE   Prognosis Fair   Assessment Pt is a 59 y.o. male seen for OT evaluation at [facility], admitted 9/10/2024 w/ Stroke-like symptom. OT completed extensive review of pt's medical and social history. Comorbidities affecting pt's functional performance at time of assessment include: DM2, h/o CVA in January 2024, tobacco use. Personal factors affecting pt at time of IE include: steps to enter environment, limited home support, difficulty performing ADLS, difficulty performing IADLS , compliance, health management , and environment. Prior to  admission, pt was living with his mother in a 2SH with 1 OSWALDO.  Pt was I w/  ADLS and w/ IADLS, (+) drove, & required use of no DME/AD PTA. Upon evaluation: Pt requires Mod Ax2 for bed mobility, Mod Ax2 for functional transfers, ALIREZA for functional mobility, Mod A for UB ADLs and Mod A for LB ADLS 2* the following deficits impacting occupational performance: weakness, decreased ROM, decreased strength, decreased balance, impaired GMC, impaired FMC, and abnormal tone. Full objective findings from OT assessment regarding body systems outlined above. Pt to benefit from continued skilled OT tx while in the hospital to address deficits as defined above and maximize level of functional independence w/ ADL's and functional mobility. Occupational Performance areas to address include: eating, grooming, bathing/shower, toilet hygiene, dressing, functional mobility, and clothing management. Based on findings, pt is of high complexity. The patient's raw score on the -PAC Daily Activity inpatient short form is 14, standardized score is 33.39, less than 39.4. Patients at this level are likely to benefit from DC to post-acute rehabilitation services. However, please refer to therapist recommendation for discharge planning given other factors that may influence destination. At this time, OT recommendations at time of discharge are DC with Level I - Maximum Rehab Resource Intensity resources.   Goals   Patient Goals Pt wants to sit OOB to chair   Plan   Treatment Interventions ADL retraining;Functional transfer training;UE strengthening/ROM;Endurance training;Patient/family training;Neuromuscular reeducation;Compensatory technique education;Fine motor coordination activities;Continued evaluation   Goal Expiration Date 09/25/24   OT Treatment Day 0   OT Frequency 3-5x/wk   Discharge Recommendation   Rehab Resource Intensity Level, OT I (Maximum Resource Intensity)   AM-PAC Daily Activity Inpatient   Lower Body Dressing 2   Bathing 2    Toileting 2   Upper Body Dressing 2   Grooming 3   Eating 3   Daily Activity Raw Score 14   Daily Activity Standardized Score (Calc for Raw Score >=11) 33.39   AM-PAC Applied Cognition Inpatient   Following a Speech/Presentation 4   Understanding Ordinary Conversation 4   Taking Medications 4   Remembering Where Things Are Placed or Put Away 4   Remembering List of 4-5 Errands 4   Taking Care of Complicated Tasks 4   Applied Cognition Raw Score 24   Applied Cognition Standardized Score 62.21   Additional Treatment Session   Start Time 1508   End Time 1517   Treatment Assessment Pt performed additional sit <> stand with Mod Ax2. Pt performed txfer with mechnical lift (SaraSteady/quick move) with Mod Ax3 (third person for safety/steering). Pt required Mod A on R side due to R sided lean to maintain midline while using mechnical lift. Pt left OOB to recliner with (+) chair alarm, all needs in reach, RN aware. Pt educated use of sling to RUE for txfers/mobility only & elevating with pillow when at rest. Pt verbalizes understanding.   End of Consult   Education Provided Yes;Family or social support of family present for education by provider   Patient Position at End of Consult Bedside chair;Bed/Chair alarm activated;All needs within reach   Nurse Communication Nurse aware of consult     Pt will achieve the following goals within 14 days.    *Pt will be able to feed self with Mod I & DME PRN    *Pt will complete grooming with Mod I & DME PRN.    *Pt will complete UB bathing and dressing with S & DME PRN.    *Pt will complete LB bathing and dressing with Min A & DME PRN.    *Pt will complete toileting w/ Min A w/ G hygiene/thoroughness using DME PRN    *Pt will complete bed mobility with Min Ax1, with HOB elevated & use of side rails PRN to prep for purposeful tasks    *Pt will perform functional transfers with on/off all surfaces with Mod Ax1 using DME as needed w/ G balance/safety.    *Pt will increase standing  tolerance to 5+ minutes with Mod Ax1 & use of AD PRN for increased activity tolerance in order to maximize independence in ADL/IADL performance.    *Pt will participate in UE therapeutic exercise in order to maximize strength for ADL transfers.    *Assess DME needs    Beverly Lizama OTR/L

## 2024-09-11 NOTE — UTILIZATION REVIEW
Initial Clinical Review    Admission: Date/Time/Statement:   Admission Orders (From admission, onward)       Ordered        09/10/24 1407  INPATIENT ADMISSION  Once                          Orders Placed This Encounter   Procedures    INPATIENT ADMISSION     Standing Status:   Standing     Number of Occurrences:   1     Order Specific Question:   Level of Care     Answer:   Critical Care [15]     Order Specific Question:   Estimated length of stay     Answer:   More than 2 Midnights     Order Specific Question:   Certification     Answer:   I certify that inpatient services are medically necessary for this patient for a duration of greater than two midnights. See H&P and MD Progress Notes for additional information about the patient's course of treatment.     ED Arrival Information       Expected   -    Arrival   9/10/2024 12:35    Acuity   Emergent              Means of arrival   Walk-In    Escorted by   Friend    Service   Critical Care/ICU    Admission type   Emergency              Arrival complaint   slurred speech             Chief Complaint   Patient presents with    Slurred Speech     Pt girlfriend stated that 20mins ago started with slurred speech and pt couldn't move right side of the body. Pt just vomited        Initial Presentation: 59 y.o. male  to ED via walk in from home.    Admitted to inpatient with Dx: Stroke like symptoms/Hypertensive emergency/Medical noncompliance/Active tobacco use/Hyperlipidemia/DM type 2 on lantus .  Presented to ED with incoordination while driving then slurring of speech and right sided weakness starting about 15 minutes prior to arrival.  Has chronic left sided weakness, not taking Plavix as prescribed . PMHx: multiple stroke in the past (chronic right corona radiata and left thalamic infarcts on CT), not on AP/AC therapy, HTN, HLD, DM type 2, tobacco abuse.  On exam: hypertensive.  NIHSS 7 for right facial weakness, RLE weakness, RUE ataxia, and moderate dysarthria.    Imaging shows  no acute findings on ct head, has chronic infarcts.  ED treatment:  Zofran, started on Cardene drip,  TNK.    Plan includes neuro checks.  Statin. Hold AC/AP and DVT ppx for 24hrs post TNK. MRI.  Ct 24 hours post TNK.  Echo.  Telemetry.  Continue Cardene drip for goal BP < 180/105.  Neurology consult.   Hold home 10-40mg amlodipine-benazepril, 25mg carvedilol, 4mg doxazosin.  Hold home Lantus and Jardiance.  Start SSI.  NPO .     9/20/24 per neurology  - stroke alert.  NIHSS 7 for right facial weakness, RLE weakness, RUE ataxia, and moderate dysarthria. Hypertensive to 243/146.   Decision for thrombolytics.  IV TNK delayed due to hypertension.   Plan:  neuro checks.  Ct head 24 hours post TNK.  MRI when stable.  Echo.  Fasting lipid panel, Hemoglobin A1c, TSH.  Hold aspirin, anticoagulation, and DVT ppx 24h post TNK.  Continue statin.   BP control, < 180/105.  Telemetry.      Date: 9/11/24    Day 2: Acute CVA with L thalamocapsular region, chronic lacunar infarcts, old micro hemorrhage/hypertensive Urgency, resolved/DM 2/hyperlipidemia. Overnight became agitated with NPO status and bedside swallow done and deemed safe for small sips of water with direct supervision. During the night, max on Cardene and given Labetalol.   Today complaints of hunger.  Tachycardia to 120s with agitation.   On exam: bowel sounds decreased.   Alert and oriented.  Speech slurred.  Right sided facial droop.   No ataxia of LUE, unable to assess RUE due to weakness.  houlder shrugs L 5/5, R 3/5.   Hand grasps L 5/5, R 1/5. Arm raises L 5/5, R 0/5.  UE push/pull L 5/5, R 0/5.  Plantar/dorsi flexion L 5/5, R 3/5.  Leg lifts L 5/5, R 3/5.  MRI + acute infarct.    Repeat ct at 24 hours pending.   Continue neuro checks.  Nicardipine off.  Restart  statin.  Start Coreg.  Restart Protonix.  Restart Plavix.  Restart Lantus and continue SSI    ED Triage Vitals   Temperature Pulse Respirations Blood Pressure SpO2 Pain Score   09/10/24  1346 09/10/24 1245 09/10/24 1245 09/10/24 1245 09/10/24 1245 09/10/24 1455   98.2 °F (36.8 °C) 86 18 (!) 243/146 96 % No Pain     Weight (last 2 days)       Date/Time Weight    09/11/24 1001 79.8 (176)    09/11/24 0555 80 (176.37)    09/10/24 1951 77.5 (170.86)    09/10/24 1245 74.4 (164)            Vital Signs (last 3 days)       Date/Time Temp Pulse Resp BP MAP (mmHg) SpO2 O2 Device Patient Position - Orthostatic VS Ariana Coma Scale Score Pain    09/11/24 1200 -- 101 19 182/100 134 95 % -- -- -- --    09/11/24 1136 97.9 °F (36.6 °C) -- -- -- -- -- None (Room air) Lying -- --    09/11/24 1130 -- 82 23 173/82 119 93 % -- -- 15 --    09/11/24 1115 -- 97 27 197/93 132 93 % -- -- -- --    09/11/24 1100 -- 82 24 163/79 113 92 % -- -- -- --    09/11/24 1045 -- 79 23 151/74 106 92 % -- -- -- --    09/11/24 1030 -- 90 26 156/77 107 92 % -- -- 15 --    09/11/24 1015 -- 107 24 166/76 115 94 % -- -- -- --    09/11/24 1001 -- 95 -- 192/114 -- -- -- -- -- --    09/11/24 1000 -- 121 27 198/103 139 96 % -- -- -- --    09/11/24 0945 -- 100 27 178/94 126 95 % -- -- -- --    09/11/24 0930 -- 83 21 174/81 116 94 % -- -- 15 --    09/11/24 0915 -- 83 21 164/83 115 94 % -- -- -- --    09/11/24 0900 -- 96 26 176/85 122 95 % -- -- -- --    09/11/24 0845 -- 85 21 159/83 111 94 % -- -- -- --    09/11/24 0830 -- 90 23 164/77 112 95 % -- -- 15 --    09/11/24 0815 -- 109 27 186/96 130 96 % -- -- -- --    09/11/24 0800 -- 109 24 185/98 134 95 % -- -- -- No Pain    09/11/24 0745 -- 85 22 174/84 119 95 % -- -- -- --    09/11/24 0730 -- 94 23 175/85 120 95 % -- -- 15 --    09/11/24 0721 97.9 °F (36.6 °C) 83 22 158/76 109 94 % None (Room air) Lying -- --    09/11/24 0700 -- 90 22 166/79 113 95 % -- -- -- --    09/11/24 0645 -- 140 42 192/114 143 96 % -- -- -- --    09/11/24 0630 -- 90 23 168/78 112 95 % -- -- -- --    09/11/24 0615 -- 86 21 159/74 107 95 % -- -- -- --    09/11/24 0600 -- 82 23 178/81 116 94 % -- -- -- --    09/11/24 0545 -- 82 20  162/70 106 94 % -- -- -- --    09/11/24 0530 -- 80 19 187/84 121 95 % -- -- -- --    09/11/24 0515 -- 81 20 174/76 117 94 % -- -- -- --    09/11/24 0500 -- 87 -- 171/80 115 96 % -- -- -- --    09/11/24 0445 -- 92 28 173/82 118 96 % -- -- -- --    09/11/24 0430 -- 87 22 169/77 111 95 % -- -- 15 No Pain    09/11/24 0415 -- 86 45 167/77 110 95 % -- -- -- --    09/11/24 0400 -- 90 31 173/80 115 94 % -- -- -- --    09/11/24 0351 97.5 °F (36.4 °C) -- -- -- -- -- -- -- -- --    09/11/24 0345 -- 94 22 162/78 111 94 % -- -- -- --    09/11/24 0332 -- -- -- 188/94 131 -- -- -- -- --    09/11/24 0331 -- 123 27 205/108 145 95 % -- -- -- --    09/11/24 0315 -- 109 23 174/87 120 94 % -- -- -- --    09/11/24 0300 98 °F (36.7 °C) 116 23 186/93 130 95 % None (Room air) Lying -- --    09/11/24 0245 -- 89 21 165/76 109 94 % -- -- -- --    09/11/24 0230 -- 80 20 168/77 110 92 % -- -- -- --    09/11/24 0215 -- 88 22 173/81 117 94 % -- -- -- --    09/11/24 0200 -- 78 22 169/79 114 94 % -- -- -- --    09/11/24 0145 -- 80 22 159/76 109 94 % -- -- -- --    09/11/24 0130 -- 72 21 166/76 109 95 % -- -- -- --    09/11/24 0115 -- 73 20 158/72 104 95 % -- -- -- --    09/11/24 0100 -- 82 20 156/73 104 95 % -- -- -- --    09/11/24 0045 -- 87 21 157/72 104 93 % -- -- -- --    09/11/24 0030 -- 105 16 176/77 114 93 % -- -- 15 No Pain    09/11/24 0015 -- 111 31 177/88 124 95 % -- -- -- --    09/11/24 0000 -- 92 25 164/78 112 94 % -- -- -- --    09/10/24 2345 -- 85 22 159/71 105 94 % -- -- -- --    09/10/24 2330 98 °F (36.7 °C) 85 22 165/77 110 93 % None (Room air) Lying -- --    09/10/24 2315 -- 77 22 149/70 101 92 % -- -- -- --    09/10/24 2300 -- 76 21 164/75 108 93 % -- -- -- --    09/10/24 2245 -- 76 21 161/69 107 93 % -- -- -- --    09/10/24 2230 -- 78 21 151/72 103 92 % -- -- -- --    09/10/24 2215 -- 82 18 156/78 110 93 % -- -- -- --    09/10/24 2200 -- 83 22 143/75 104 94 % -- -- -- --    09/10/24 2145 -- 96 25 169/90 120 93 % -- -- -- --     09/10/24 2130 -- 129 28 206/106 143 96 % -- -- -- --    09/10/24 2115 -- 88 22 164/75 108 94 % -- -- -- --    09/10/24 2100 -- 85 22 164/72 103 94 % -- -- -- --    09/10/24 2045 -- 87 22 168/81 117 92 % -- -- -- --    09/10/24 2030 -- 93 22 156/76 109 92 % -- -- -- --    09/10/24 2015 -- 105 25 198/96 136 94 % -- -- -- --    09/10/24 2000 -- 102 22 172/83 118 93 % -- -- 15 No Pain    09/10/24 1951 97.8 °F (36.6 °C) 105 25 187/88 -- -- -- -- -- --    09/10/24 1945 -- 105 25 187/88 127 94 % -- -- -- --    09/10/24 1930 -- 108 25 184/101 134 95 % -- -- -- --    09/10/24 1915 -- 107 27 191/100 138 94 % -- -- -- --    09/10/24 1900 97.8 °F (36.6 °C) 96 24 190/101 138 93 % None (Room air) Lying -- --    09/10/24 1845 -- 84 23 182/88 127 -- -- -- -- --    09/10/24 1830 98.5 °F (36.9 °C) 82 22 192/89 128 93 % -- -- 15 --    09/10/24 1815 -- 82 22 170/81 117 92 % -- -- -- --    09/10/24 1800 -- 102 25 210/102 147 95 % -- -- 15 --    09/10/24 1745 -- 95 18 196/101 139 95 % -- -- -- --    09/10/24 1730 -- 74 25 202/101 142 94 % -- -- 15 --    09/10/24 1700 -- 92 -- 170/88 107 -- -- -- 15 --    09/10/24 1630 -- 109 34 227/139 176 96 % -- -- 15 --    09/10/24 1617 -- -- -- -- -- -- -- -- -- No Pain    09/10/24 1600 -- 83 24 178/90 126 95 % -- -- 15 --    09/10/24 1553 98.5 °F (36.9 °C) -- -- -- -- -- -- -- -- --    09/10/24 15:31:48 -- -- -- -- -- -- -- -- -- No Pain    09/10/24 1531 -- 85 26 172/95 127 94 % -- Lying -- --    09/10/24 1525 -- 80 26 185/93 -- 94 % None (Room air) -- -- --    09/10/24 15:17:59 -- -- -- -- -- -- -- -- 15 No Pain    09/10/24 1516 -- 85 23 187/99 134 95 % None (Room air) Lying -- --    09/10/24 1455 -- 82 25 186/105 -- 96 % None (Room air) -- 15 No Pain    09/10/24 14:40:51 -- 81 20 180/95 -- 98 % None (Room air) Lying 15 --    09/10/24 1430 -- 80 20 188/95 -- 98 % None (Room air) -- -- --    09/10/24 14:25:25 -- 77 20 183/99 -- 98 % None (Room air) -- 15 --    09/10/24 1420 -- 70 20 172/89 -- 97 %  None (Room air) -- -- --    09/10/24 1415 -- 73 20 174/91 -- 97 % None (Room air) -- -- --    09/10/24 14:13:21 -- 72 20 173/93 -- -- -- Lying 15 --    09/10/24 1410 -- 74 20 176/92 -- 97 % None (Room air) -- 15 --    09/10/24 1400 -- 74 20 168/90 -- 98 % None (Room air) -- -- --    09/10/24 13:59:45 98.1 °F (36.7 °C) 73 20 -- -- -- -- Lying 15 --    09/10/24 1355 -- 73 20 185/92 -- 96 % -- -- 15 --    09/10/24 13:51:59 -- 75 20 192/90 -- -- -- -- -- --    09/10/24 1350 -- 81 20 215/102 -- 97 % -- -- -- --    09/10/24 13:49:56 -- 70 18 174/92 -- -- -- -- -- --    09/10/24 1346 98.2 °F (36.8 °C) -- -- -- -- -- -- -- -- --    09/10/24 13:45:42 -- 71 20 184/89 -- -- -- -- -- --    09/10/24 1340 -- 73 20 188/100 -- 96 % -- -- 15 --    09/10/24 1339 -- 74 -- 190/98 -- -- -- -- -- --    09/10/24 1330 -- 76 20 193/105 -- 96 % -- -- -- --    09/10/24 13:25:48 -- 77 20 213/118 -- 94 % -- -- -- --    09/10/24 1325 -- 77 20 213/118 -- 94 % -- -- 15 --    09/10/24 1323 -- 76 -- 216/109 -- -- -- -- -- --    09/10/24 1315 -- 76 20 228/125 -- 95 % -- -- -- --    09/10/24 1310 -- 70 20 215/114 -- 97 % -- -- 15 --    09/10/24 1305 -- 78 -- 248/133 -- -- -- -- -- --    09/10/24 1255 -- -- -- -- -- -- -- -- 15 --    09/10/24 1245 -- 86 18 243/146 -- 96 % None (Room air) Sitting -- --    09/10/24 1240 -- -- -- -- -- -- -- -- 15 --            Pertinent Labs/Diagnostic Test Results:   Radiology:  MRI brain wo contrast   Final Interpretation by Padmini Collins MD (09/10 5963)      1.  Acute infarct involving left thalamocapsular region. There is minimal FLAIR hyperintensity/edema without mass effect or hemorrhagic transformation.   2.  Chronic lacunar infarcts in the left thalamus, right corona radiata and right basal ganglia.   3.  Nonspecific white matter change most compatible with chronic microangiopathy.   4.  Focus of old microhemorrhage in the right temporal lobe, likely on the basis of chronic hypertension.   5.  Right maxillary  sinusitis.      Workstation performed: [id number]         CT head wo contrast   Final Interpretation by Stewart Cee DO (09/10 1974)   No acute intracranial abnormality.                  Workstation performed: [id number]         CT stroke alert brain   Final Interpretation by Padmini Collins MD (09/10 1318)      1.  No acute intracranial CT abnormality.   2.  Sequela of chronic infarct in the right corona radiata.   3.  Stable chronic infarct in the ventrolateral left thalamus.   4.  Chronic right microangiopathic change.   5.  Findings suggestive of acute right maxillary sinusitis.                     Findings were directly discussed with Marques Laureano at 1:09 pm      Workstation performed: [id number]         CTA stroke alert (head/neck)   Final Interpretation by Padmini Collins MD (09/10 1319)      1.  Patent major vessels of the [place] without high-grade stenosis.   2.  No significant stenosis in the cervical carotid or vertebral arteries.   3.  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                  Findings were directly discussed with Marques Laureano at 1:09 pm      Workstation performed: [id number]         CT head wo contrast    (Results Pending)     Cardiology:  Echo complete w/ contrast if indicated   Final Result by Bucky Alberto MD (09/11 1223)        Left Ventricle: Left ventricular cavity size is normal. Wall thickness    is moderately increased. The left ventricular ejection fraction is 75% by    visual estimation. Systolic function is hyperdynamic.     Aortic Valve: There is aortic valve sclerosis.      The heart is not visualized for most of this study.         ECG 12 lead    by Interface, Ris Results In (09/11 8036)        Results from last 7 days   Lab Units 09/10/24  1247   WBC Thousand/uL 10.44*   HEMOGLOBIN g/dL 19.3*   HEMATOCRIT % 56.0*   PLATELETS Thousands/uL 213     Results from last 7 days   Lab Units 09/10/24  1247    SODIUM mmol/L 137   POTASSIUM mmol/L 3.5   CHLORIDE mmol/L 102   CO2 mmol/L 28   ANION GAP mmol/L 7   BUN mg/dL 15   CREATININE mg/dL 0.81   EGFR ml/min/1.73sq m 97   CALCIUM mg/dL 9.2     Results from last 7 days   Lab Units 09/10/24  1247   AST U/L 11*   ALT U/L 10   ALK PHOS U/L 83   TOTAL PROTEIN g/dL 8.0   ALBUMIN g/dL 4.3   TOTAL BILIRUBIN mg/dL 0.81   BILIRUBIN DIRECT mg/dL 0.11     Results from last 7 days   Lab Units 09/11/24  1135 09/11/24  0552 09/10/24  2356 09/10/24  1756 09/10/24  1251   POC GLUCOSE mg/dl 262* 168* 229* 173* 231*     Results from last 7 days   Lab Units 09/10/24  1247   GLUCOSE RANDOM mg/dL 209*     Results from last 7 days   Lab Units 09/10/24  1247   HEMOGLOBIN A1C % 9.2*   EAG mg/dl 217     Results from last 7 days   Lab Units 09/10/24  1434 09/10/24  1247   HS TNI 0HR ng/L  --  5   HS TNI 2HR ng/L 6  --    HSTNI D2 ng/L 1  --      Results from last 7 days   Lab Units 09/10/24  1320   PROTIME seconds 14.2   INR  1.05   PTT seconds 20*     Results from last 7 days   Lab Units 09/10/24  1247   TSH 3RD GENERATON uIU/mL 1.169         ED Treatment-Medication Administration from 09/10/2024 1235 to 09/10/2024 1551         Date/Time Order Dose Route Action     09/10/2024 1247 ondansetron (ZOFRAN) injection 4 mg 4 mg Intravenous Given     09/10/2024 1305 niCARdipine (CARDENE) 25 mg (STANDARD CONCENTRATION) in sodium chloride 0.9% 250 mL 2.5 mg/hr Intravenous New Bag     09/10/2024 1323 niCARdipine (CARDENE) 25 mg (STANDARD CONCENTRATION) in sodium chloride 0.9% 250 mL 5 mg/hr Intravenous Rate/Dose Change     09/10/2024 1339 niCARdipine (CARDENE) 25 mg (STANDARD CONCENTRATION) in sodium chloride 0.9% 250 mL 7.5 mg/hr Intravenous Rate/Dose Change     09/10/2024 1500 niCARdipine (CARDENE) 25 mg (STANDARD CONCENTRATION) in sodium chloride 0.9% 250 mL 10 mg/hr Intravenous Rate/Dose Change     09/10/2024 1346 tenecteplase (TNKase) injection 19 mg 19 mg Intravenous Given            Past Medical  History:   Diagnosis Date    CVA (cerebral vascular accident) (HCC)     Diabetes mellitus (HCC)     Hypertension      Present on Admission:   Stroke-like symptom   Type 2 diabetes mellitus with hyperglycemia (HCC)   Hypertensive emergency   Mixed hyperlipidemia      Admitting Diagnosis: Slurred speech [R47.81]  Stroke-like episode [R29.90]  Age/Sex: 59 y.o. male  Admission Orders:  Scheduled Medications:  carvedilol, 25 mg, Oral, BID With Meals  insulin glargine, 30 Units, Subcutaneous, HS  insulin lispro, 1-6 Units, Subcutaneous, 4x Daily (AC & HS)  nicotine, 1 patch, Transdermal, Q24H  pantoprazole, 40 mg, Oral, Early Morning    labetalol (NORMODYNE) injection 10 mg  Dose: 10 mg  Freq: Once Route: IV  Start: 09/10/24 2145 End: 09/10/24 2140    Continuous IV Infusions:  niCARdipine, 1-15 mg/hr, Intravenous, Titrated    multi-electrolyte (PLASMALYTE-A/ISOLYTE-S PH 7.4) IV solution  Rate: 50 mL/hr Dose: 50 mL/hr  Freq: Continuous Route: IV  Indications of Use: IV Hydration  Last Dose: Stopped (09/10/24 2048)  Start: 09/10/24 1545 End: 09/10/24 2045    PRN Meds: none      Neuro checks Every 15 minutes for 2 hours, then 2) Every 30 minutes for 6 hours, then 3) Every 60 minutes  for 16 hours, then per unit protocol.  4) Call physician if any deterioration, development of severe headache, nausea or vomiting.   Thrombolytic administration:  AVOID arterial sticks and venipuncture for 24 hours after thrombolytic administration    No aspirin, warfarin, heparin, low molecular weight heparin, dipyridamole, clopidigrel, non-steroid anti-inflammatory agents for 24 hours following thrombolytic administration.   Bilateral SCDs  PT/OT/Speech    IP CONSULT TO NEUROLOGY  IP CONSULT TO PALLIATIVE CARE    Network Utilization Review Department  ATTENTION: Please call with any questions or concerns to [phone] and carefully listen to the prompts so that you are directed to the right person. All voicemails are confidential.   For  Discharge needs, contact Care Management DC Support Team at [phone] opt. 2  Send all requests for admission clinical reviews, approved or denied determinations and any other requests to dedicated fax number below belonging to the campus where the patient is receiving treatment. List of dedicated fax numbers for the Facilities:  FACILITY NAME UR FAX NUMBER   ADMISSION DENIALS (Administrative/Medical Necessity) [phone]   DISCHARGE SUPPORT TEAM (NETWORK) [phone]   PARENT CHILD HEALTH (Maternity/NICU/Pediatrics) [phone]   [facility] [phone]   [facility] [phone]   [facility] [phone]   [facility] [phone]   [facility] [phone]   [facility] [phone]   [facility] [phone]   [facility] [phone]   [facility] [phone]   [facility] [phone]   [facility] [phone]   [facility] [phone]

## 2024-09-11 NOTE — PLAN OF CARE
Problem: OCCUPATIONAL THERAPY ADULT  Goal: Performs self-care activities at highest level of function for planned discharge setting.  See evaluation for individualized goals.  Description: Treatment Interventions: ADL retraining, Functional transfer training, UE strengthening/ROM, Endurance training, Patient/family training, Neuromuscular reeducation, Compensatory technique education, Fine motor coordination activities, Continued evaluation          See flowsheet documentation for full assessment, interventions and recommendations.   9/11/2024 1546 by Beverly Lizama OT  Note: Limitation: Decreased ADL status, Decreased UE ROM, Decreased UE strength, Decreased high-level ADLs, Decreased self-care trans, Non-func R UE  Prognosis: Fair  Assessment: Pt is a 59 y.o. male seen for OT evaluation at [facility], admitted 9/10/2024 w/ Stroke-like symptom. OT completed extensive review of pt's medical and social history. Comorbidities affecting pt's functional performance at time of assessment include: DM2, h/o CVA in January 2024, tobacco use. Personal factors affecting pt at time of IE include: steps to enter environment, limited home support, difficulty performing ADLS, difficulty performing IADLS , compliance, health management , and environment. Prior to admission, pt was living with his mother in a 2SH with 1 OSWALDO.  Pt was I w/  ADLS and w/ IADLS, (+) drove, & required use of no DME/AD PTA. Upon evaluation: Pt requires Mod Ax2 for bed mobility, Mod Ax2 for functional transfers, ALIREZA for functional mobility, Mod A for UB ADLs and Mod A for LB ADLS 2* the following deficits impacting occupational performance: weakness, decreased ROM, decreased strength, decreased balance, impaired GMC, impaired FMC, and abnormal tone. Full objective findings from OT assessment regarding body systems outlined above. Pt to benefit from continued skilled OT tx while in the hospital to address deficits as defined above and  maximize level of functional independence w/ ADL's and functional mobility. Occupational Performance areas to address include: eating, grooming, bathing/shower, toilet hygiene, dressing, functional mobility, and clothing management. Based on findings, pt is of high complexity. The patient's raw score on the AM-PAC Daily Activity inpatient short form is 14, standardized score is 33.39, less than 39.4. Patients at this level are likely to benefit from DC to post-acute rehabilitation services. However, please refer to therapist recommendation for discharge planning given other factors that may influence destination. At this time, OT recommendations at time of discharge are DC with Level I - Maximum Rehab Resource Intensity resources.     Rehab Resource Intensity Level, OT: I (Maximum Resource Intensity)       9/11/2024 1546 by Beverly Lizama OT  Note: Limitation: Decreased ADL status, Decreased UE ROM, Decreased UE strength, Decreased high-level ADLs, Decreased self-care trans, Non-func R UE  Prognosis: Fair  Assessment: Pt is a 59 y.o. male seen for OT evaluation at [facility], admitted 9/10/2024 w/ Stroke-like symptom. OT completed extensive review of pt's medical and social history. Comorbidities affecting pt's functional performance at time of assessment include: DM2, h/o CVA in January 2024, tobacco use. Personal factors affecting pt at time of IE include: steps to enter environment, limited home support, difficulty performing ADLS, difficulty performing IADLS , compliance, health management , and environment. Prior to admission, pt was living with his mother in a 2SH with 1 OSWALDO.  Pt was I w/  ADLS and w/ IADLS, (+) drove, & required use of no DME/AD PTA. Upon evaluation: Pt requires Mod Ax2 for bed mobility, Mod Ax2 for functional transfers, ALRIEZA for functional mobility, Mod A for UB ADLs and Mod A for LB ADLS 2* the following deficits impacting occupational performance: weakness, decreased ROM,  decreased strength, decreased balance, impaired GMC, impaired FMC, and abnormal tone. Full objective findings from OT assessment regarding body systems outlined above. Pt to benefit from continued skilled OT tx while in the hospital to address deficits as defined above and maximize level of functional independence w/ ADL's and functional mobility. Occupational Performance areas to address include: eating, grooming, bathing/shower, toilet hygiene, dressing, functional mobility, and clothing management. Based on findings, pt is of high complexity. The patient's raw score on the -PAC Daily Activity inpatient short form is 14, standardized score is 33.39, less than 39.4. Patients at this level are likely to benefit from DC to post-acute rehabilitation services. However, please refer to therapist recommendation for discharge planning given other factors that may influence destination. At this time, OT recommendations at time of discharge are DC with Level I - Maximum Rehab Resource Intensity resources.     Rehab Resource Intensity Level, OT: I (Maximum Resource Intensity)

## 2024-09-11 NOTE — ASSESSMENT & PLAN NOTE
Lab Results   Component Value Date    HGBA1C 9.2 (H) 09/10/2024       Recent Labs     09/10/24  1251 09/10/24  1756 09/10/24  2356 09/11/24  0552   POCGLU 231* 173* 229* 168*       Blood Sugar Average: Last 72 hrs:  (P) 200.25    Hemoglobin A1c: Elevated 9.2

## 2024-09-11 NOTE — QUICK NOTE
Provided an update to patient's mother, Lorena Vargas,  regarding patient's current status and treatments. All questions answered. Lorena expressed understanding and appreciation.

## 2024-09-11 NOTE — ASSESSMENT & PLAN NOTE
History of past CVAs (May and June 2020) with history of medication noncompliance.  Outpatient regimen: Plavix and pravastatin  LKW 12:15 PM  NIHSS 7 (right facial weakness, RLE weakness, RUE ataxia, and moderate dysarthria )   9/10 CTH: Unremarkable for acute intracranial abnormalities. 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  TNK at 1346 ->improvement of symptoms with slight facial droop and 4/5 strength in RUE/RLL  At 1500 while in ED, patient's symptoms returned with severe right facial droop, garbled speech and 2/5 strength.  Repeat CTH pending read but no obvious bleed noted  Dr. Roberts notified who advised laying patient flat and starting IVF to optimize cerebral perfusion  9/10 Repeat CTH -- neg   9/10 MRI:  Acute infarct involving left thalamocapsular region. There is minimal FLAIR hyperintensity/edema without mass effect or hemorrhagic transformation. Chronic lacunar infarcts in the left thalamus, right corona radiata and right basal ganglia. Focus of old microhemorrhage in the right temporal lobe, likely on the basis of chronic hypertension. Right maxillary sinusitis.    Plan  Stroke Pathway  Hold AC/AP and DVT ppx for 24hrs post TNK  Cont statin   CTH 24hr post TNK -- order placed  Lipid panel, Hgb A1C and TSH  PT/OT/Speech  Serial Neuro checks ->STAT CTH if acute change  Continue Nicardipine gtt for goal BP <180/105  Neurology following

## 2024-09-11 NOTE — ASSESSMENT & PLAN NOTE
Lab Results   Component Value Date    HGBA1C 9.2 (H) 09/10/2024       Recent Labs     09/10/24  1251 09/10/24  1756 09/10/24  2356 09/11/24  0552   POCGLU 231* 173* 229* 168*       Blood Sugar Average: Last 72 hrs:  (P) 200.25

## 2024-09-11 NOTE — PROGRESS NOTES
"Progress Note - Neurology   Name: Wilder Vargas 59 y.o. male I MRN: [id number]  Unit/Bed#: -01 I Date of Admission: 9/10/2024   Date of Service: 9/11/2024 I Hospital Day: 1     Assessment & Plan  Stroke-like symptom  Wilder Vargas is a 59 y.o. male with multiple stroke in the past (chronic right corona radiata and left thalamic infarcts on CT), not on AP/AC therapy, HTN, HLD, DM type 2, tobacco abuse who presented to [facility] ED on 9/10/2024 as a stroke alert with right sided weakness, slurred speech, and coordination difficulties in right arm.     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 Etiology likely small vessel in the setting of uncontrolled vascular risk factors.     Plan:  Admit on stroke pathway   - Status post IV TNK at 1346 on 9/10/2024  - Repeat CTH 24h post TNK  - Echo pending   - Hold aspirin, anticoagulation, and DVT ppx 24h post TNK  - If repeat CT head 24H s/p TNK stable, recommend DAPT (ASA 81 mg daily and Plavix 75 mg daily x 21 days) then transition to Plavix monotherapy   - Recommend initiating Pravastatin when able as " patient reported intolerance with atorvastatin in the past   - Strict BP control <180/105  - Euglycemia, Normothermia   - Telemetry  - PT/OT/Speech   - Frequent neuro checks  - STAT CTH for acute change  - Medical management and supportive care per primary team, notify with changes  Type 2 diabetes mellitus with hyperglycemia (HCC)  Lab Results   Component Value Date    HGBA1C 9.2 (H) 09/10/2024       Recent Labs     09/10/24  1251 09/10/24  1756 09/10/24  2356 09/11/24  0552   POCGLU 231* 173* 229* 168*       Blood Sugar Average: Last 72 hrs:  (P) 200.25    Hypertensive emergency  - BP on presentation elevated 243/146  - Strict BP control <180/105 s/p IV TNK  Mixed hyperlipidemia  - Lipid panel: Total 172, triglycerides 88, HDL 46, LDL elevated 108  - Recommend initiating Pravastatin when able as patient reported intolerance with atorvastatin in the past   Tobacco use  - Smoking cessation education     Wilder Vargas will need follow-up in in 6 weeks with neurovascular team for Other in 60 minute appointment. They will not require outpatient neurological testing.     Subjective:   Today patient reports he has no movement in right upper and lower extremity. He states he is able to chew his food okay while working with speech therapy.     Objective      Temp:  [97.5 °F (36.4 °C)-98.5 °F (36.9 °C)] 97.9 °F (36.6 °C)  HR:  [] 83  Resp:  [16-45] 22  BP: (143-248)/() 158/76  O2 Device: None (Room air)          I/O last 24 hours:  In: 2432.1 [P.O.:480; I.V.:1952.1]  Out: 725 [Urine:725]  Lines/Drains/Airways       Active Status       None                  Physical Exam  Vitals and nursing note reviewed.   Constitutional:       General: He is not in acute distress.     Appearance: He is normal weight. He is not ill-appearing, toxic-appearing or diaphoretic.   HENT:      Head: Normocephalic and atraumatic.   Eyes:      General: No scleral icterus.        Right eye: No discharge.         Left eye: No discharge.       Extraocular Movements: Extraocular movements intact.      Conjunctiva/sclera: Conjunctivae normal.   Skin:     General: Skin is warm and dry.      Coloration: Skin is not jaundiced or pale.   Neurological:      Mental Status: He is alert.   Psychiatric:      Comments: Appears frustrated throughout exam      Neurologic Exam     Mental Status   Patient is alert, sitting up in bed, working with speech therapy   Able to answer all questions appropriately   Moderate dysarthria noted throughout majority of exam, at times severely dysarthric to the point where patient is unintelligible  No evidence of aphasia     Cranial Nerves   Primary gaze midline, conjugate gaze noted   Horizontal EOMs grossly intact   No clear evidence of nystagmus   Decreased wrinkling in forehead on the right  Able to right eyebrows up bilaterally, visualizing forehead wrinkling bilaterally   Right lower facial weakness noted      Motor Exam   Muscle bulk: normal  No movement of RUE and RLE on command   LUE strength full throughout   Able to elevate LLE off the bed, some difficulty due to positioning      Sensory Exam   Light touch normal.   Temperature sensation intact throughout      Gait, Coordination, and Reflexes     Tremor   Resting tremor: absent  Unable to assess coordination in RUE and RLE due to weakness     Right toe upgoing     No involuntary movements or rhythmic seizure like activity noted throughout exam        Lab Results: I have personally reviewed pertinent reports.  Recent Results (from the past 24 hour(s))   Basic metabolic panel    Collection Time: 09/10/24 12:47 PM   Result Value Ref Range    Sodium 137 135 - 147 mmol/L    Potassium 3.5 3.5 - 5.3 mmol/L    Chloride 102 96 - 108 mmol/L    CO2 28 21 - 32 mmol/L    ANION GAP 7 4 - 13 mmol/L    BUN 15 5 - 25 mg/dL    Creatinine 0.81 0.60 - 1.30 mg/dL    Glucose 209 (H) 65 - 140 mg/dL    Calcium 9.2 8.4 - 10.2 mg/dL    eGFR 97 ml/min/1.73sq m   CBC and Platelet    Collection Time:  "09/10/24 12:47 PM   Result Value Ref Range    WBC 10.44 (H) 4.31 - 10.16 Thousand/uL    RBC 6.64 (H) 3.88 - 5.62 Million/uL    Hemoglobin 19.3 (H) 12.0 - 17.0 g/dL    Hematocrit 56.0 (H) 36.5 - 49.3 %    MCV 84 82 - 98 fL    MCH 29.1 26.8 - 34.3 pg    MCHC 34.6 31.4 - 37.4 g/dL    RDW 12.6 11.6 - 15.1 %    Platelets 213 149 - 390 Thousands/uL    MPV 10.1 8.9 - 12.7 fL   HS Troponin 0hr (reflex protocol)    Collection Time: 09/10/24 12:47 PM   Result Value Ref Range    hs TnI 0hr 5 \"Refer to ACS Flowchart\"- see link ng/L   Hepatic function panel    Collection Time: 09/10/24 12:47 PM   Result Value Ref Range    Total Bilirubin 0.81 0.20 - 1.00 mg/dL    Bilirubin, Direct 0.11 0.00 - 0.20 mg/dL    Alkaline Phosphatase 83 34 - 104 U/L    AST 11 (L) 13 - 39 U/L    ALT 10 7 - 52 U/L    Total Protein 8.0 6.4 - 8.4 g/dL    Albumin 4.3 3.5 - 5.0 g/dL   Hemoglobin A1c w/EAG Estimation    Collection Time: 09/10/24 12:47 PM   Result Value Ref Range    Hemoglobin A1C 9.2 (H) Normal 4.0-5.6%; PreDiabetic 5.7-6.4%; Diabetic >=6.5%; Glycemic control for adults with diabetes <7.0% %     mg/dl   Lipid Panel with Direct LDL reflex    Collection Time: 09/10/24 12:47 PM   Result Value Ref Range    Cholesterol 172 See Comment mg/dL    Triglycerides 88 See Comment mg/dL    HDL, Direct 46 >=40 mg/dL    LDL Calculated 108 (H) 0 - 100 mg/dL   TSH, 3rd generation with Free T4 reflex    Collection Time: 09/10/24 12:47 PM   Result Value Ref Range    TSH 3RD GENERATON 1.169 0.450 - 4.500 uIU/mL   Fingerstick Glucose (POCT)    Collection Time: 09/10/24 12:51 PM   Result Value Ref Range    POC Glucose 231 (H) 65 - 140 mg/dl   Protime-INR    Collection Time: 09/10/24  1:20 PM   Result Value Ref Range    Protime 14.2 12.3 - 15.0 seconds    INR 1.05 0.85 - 1.19   APTT    Collection Time: 09/10/24  1:20 PM   Result Value Ref Range    PTT 20 (L) 23 - 34 seconds   HS Troponin I 2hr    Collection Time: 09/10/24  2:34 PM   Result Value Ref Range    " "hs TnI 2hr 6 \"Refer to ACS Flowchart\"- see link ng/L    Delta 2hr hsTnI 1 <20 ng/L   Fingerstick Glucose (POCT)    Collection Time: 09/10/24  5:56 PM   Result Value Ref Range    POC Glucose 173 (H) 65 - 140 mg/dl   Fingerstick Glucose (POCT)    Collection Time: 09/10/24 11:56 PM   Result Value Ref Range    POC Glucose 229 (H) 65 - 140 mg/dl   ECG 12 lead    Collection Time: 09/11/24  4:58 AM   Result Value Ref Range    Ventricular Rate 82 BPM    Atrial Rate 82 BPM    [place] Interval 140 ms    QRSD Interval 98 ms    QT Interval 384 ms    QTC Interval 448 ms    P [place] 69 degrees    QRS Axis 58 degrees    T Wave Axis -25 degrees   Fingerstick Glucose (POCT)    Collection Time: 09/11/24  5:52 AM   Result Value Ref Range    POC Glucose 168 (H) 65 - 140 mg/dl     Imaging Studies: I have personally reviewed pertinent reports and I have personally reviewed pertinent films in PACS.    EKG, Pathology, and Other Studies: I have personally reviewed pertinent reports.    VTE Pharmacologic Prophylaxis: Sequential compression device (Venodyne)     Administrative Statements   I have spent a total time of 35 minutes in caring for this patient on the day of the visit/encounter including Diagnostic results, Prognosis, Patient and family education, Importance of tx compliance, Risk factor reductions, Impressions, Counseling / Coordination of care, Documenting in the medical record, Reviewing / ordering tests, medicine, procedures  , and Communicating with other healthcare professionals .  "

## 2024-09-11 NOTE — QUICK NOTE
Wilder became markedly agitated, stating he would like something to drink. Despite multiple staff members educating him on acute stroke, as well as continued facial droop and slurred speech, and acute concern for aspiration, Wilder continues to be agitated and verbalizes repeatedly that he would like something to drink.     Bedside swallow eval was performed by YAN Parisi, while I was present at the bedside. The patient was able to utilize straw to take in water, and was observed to hold water in mouth while swallowing 2-3 times. No coughing, choking or drooling was noted. Will allow small sips of water with direct visualization by RN overnight. NO food intake permitted, and no liquids to be left at the bedside.     Wilder is aware he is pending formal speech evaluation and only sips of water are permitted as above given significant risk for aspiration. I did explain to him that even with these precautions, he is at significant risk for aspiration. He verbalizes understanding and wishes to proceed with sips of water.

## 2024-09-11 NOTE — PLAN OF CARE
Problem: Potential for Falls  Goal: Patient will remain free of falls  Description: INTERVENTIONS:  - Educate patient/family on patient safety including physical limitations  - Instruct patient to call for assistance with activity   - Consult OT/PT to assist with strengthening/mobility   - Keep Call bell within reach  - Keep bed low and locked with side rails adjusted as appropriate  - Keep care items and personal belongings within reach  - Initiate and maintain comfort rounds  - Make Fall Risk Sign visible to staff  - Apply yellow socks and bracelet for high fall risk patients  - Consider moving patient to room near nurses station  Outcome: Progressing     Problem: PAIN - ADULT  Goal: Verbalizes/displays adequate comfort level or baseline comfort level  Description: Interventions:  - Encourage patient to monitor pain and request assistance  - Assess pain using appropriate pain scale  - Administer analgesics based on type and severity of pain and evaluate response  - Implement non-pharmacological measures as appropriate and evaluate response  - Consider cultural and social influences on pain and pain management  - Notify physician/advanced practitioner if interventions unsuccessful or patient reports new pain  Outcome: Progressing     Problem: INFECTION - ADULT  Goal: Absence or prevention of progression during hospitalization  Description: INTERVENTIONS:  - Assess and monitor for signs and symptoms of infection  - Monitor lab/diagnostic results  - Monitor all insertion sites, i.e. indwelling lines, tubes, and drains  - Monitor endotracheal if appropriate and nasal secretions for changes in amount and color  - [place] appropriate cooling/warming therapies per order  - Administer medications as ordered  - Instruct and encourage patient and family to use good hand hygiene technique  - Identify and instruct in appropriate isolation precautions for identified infection/condition  Outcome: Progressing  Goal: Absence  of fever/infection during neutropenic period  Description: INTERVENTIONS:  - Monitor WBC    Outcome: Progressing     Problem: SAFETY ADULT  Goal: Patient will remain free of falls  Description: INTERVENTIONS:  - Educate patient/family on patient safety including physical limitations  - Instruct patient to call for assistance with activity   - Consult OT/PT to assist with strengthening/mobility   - Keep Call bell within reach  - Keep bed low and locked with side rails adjusted as appropriate  - Keep care items and personal belongings within reach  - Initiate and maintain comfort rounds  - Make Fall Risk Sign visible to staff  - Apply yellow socks and bracelet for high fall risk patients  - Consider moving patient to room near nurses station  Outcome: Progressing  Goal: Maintain or return to baseline ADL function  Description: INTERVENTIONS:  -  Assess patient's ability to carry out ADLs; assess patient's baseline for ADL function and identify physical deficits which impact ability to perform ADLs (bathing, care of mouth/teeth, toileting, grooming, dressing, etc.)  - Assess/evaluate cause of self-care deficits   - Assess range of motion  - Assess patient's mobility; develop plan if impaired  - Assess patient's need for assistive devices and provide as appropriate  - Encourage maximum independence but intervene and supervise when necessary  - Involve family in performance of ADLs  - Assess for home care needs following discharge   - Consider OT consult to assist with ADL evaluation and planning for discharge  - Provide patient education as appropriate  Outcome: Progressing  Goal: Maintains/Returns to pre admission functional level  Description: INTERVENTIONS:  - Perform AM-PAC 6 Click Basic Mobility/ Daily Activity assessment daily.  - Set and communicate daily mobility goal to care team and patient/family/caregiver.   - Collaborate with rehabilitation services on mobility goals if consulted  - Out of bed for  toileting  - Record patient progress and toleration of activity level   Outcome: Progressing     Problem: DISCHARGE PLANNING  Goal: Discharge to home or other facility with appropriate resources  Description: INTERVENTIONS:  - Identify barriers to discharge w/patient and caregiver  - Arrange for needed discharge resources and transportation as appropriate  - Identify discharge learning needs (meds, wound care, etc.)  - Arrange for interpretive services to assist at discharge as needed  - Refer to Case Management Department for coordinating discharge planning if the patient needs post-hospital services based on physician/advanced practitioner order or complex needs related to functional status, cognitive ability, or social support system  Outcome: Progressing     Problem: CARDIOVASCULAR - ADULT  Goal: Maintains optimal cardiac output and hemodynamic stability  Description: INTERVENTIONS:  - Monitor I/O, vital signs and rhythm  - Monitor for S/S and trends of decreased cardiac output  - Administer and titrate ordered vasoactive medications to optimize hemodynamic stability  - Assess quality of pulses, skin color and temperature  - Assess for signs of decreased coronary artery perfusion  - Instruct patient to report change in severity of symptoms  Outcome: Progressing  Goal: Absence of cardiac dysrhythmias or at baseline rhythm  Description: INTERVENTIONS:  - Continuous cardiac monitoring, vital signs, obtain 12 lead EKG if ordered  - Administer antiarrhythmic and heart rate control medications as ordered  - Monitor electrolytes and administer replacement therapy as ordered  Outcome: Progressing     Problem: Neurological Deficit  Goal: Neurological status is stable or improving  Description: Interventions:  - Monitor and assess patient's level of consciousness, motor function, sensory function, and level of assistance needed for ADLs.   - Monitor and report changes from baseline. Collaborate with interdisciplinary team  to initiate plan and implement interventions as ordered.   - Provide and maintain a safe environment.  - Consider seizure precautions.  - Consider fall precautions.  - Consider aspiration precautions.  - Consider bleeding precautions.  Outcome: Progressing     Problem: Activity Intolerance/Impaired Mobility  Goal: Mobility/activity is maintained at optimum level for patient  Description: Interventions:  - Assess and monitor patient  barriers to mobility and need for assistive/adaptive devices.  - Assess patient's emotional response to limitations.  - Collaborate with interdisciplinary team and initiate plans and interventions as ordered.  - Encourage independent activity per ability.  - Maintain proper body alignment.  - Perform active/passive rom as tolerated/ordered.  - Plan activities to conserve energy.  - Turn patient as appropriate  Outcome: Progressing     Problem: Communication Impairment  Goal: Ability to express needs and understand communication  Description: Assess patient's communication skills and ability to understand information.  Patient will demonstrate use of effective communication techniques, alternative methods of communication and understanding even if not able to speak.     - Encourage communication and provide alternate methods of communication as needed.  - Collaborate with case management/ for discharge needs.  - Include patient/family/caregiver in decisions related to communication.  Outcome: Progressing     Problem: Potential for Aspiration  Goal: Non-ventilated patient's risk of aspiration is minimized  Description: Assess and monitor vital signs, respiratory status, and labs (WBC).  Monitor for signs of aspiration (tachypnea, cough, rales, wheezing, cyanosis, fever).    - Assess and monitor patient's ability to swallow.  - Place patient up in chair to eat if possible.  - HOB up at 90 degrees to eat if unable to get patient up into chair.  - Supervise patient during oral  intake.   - Instruct patient/ family to take small bites.  - Instruct patient/ family to take small single sips when taking liquids.  - Follow patient-specific strategies generated by speech pathologist.  Outcome: Progressing  Goal: Ventilated patient's risk of aspiration is minimized  Description: Assess and monitor vital signs, respiratory status, airway cuff pressure, and labs (WBC).  Monitor for signs of aspiration (tachypnea, cough, rales, wheezing, cyanosis, fever).    - Elevate head of bed 30 degrees if patient has tube feeding.  - Monitor tube feeding.  Outcome: Progressing     Problem: Nutrition  Goal: Nutrition/Hydration status is improving  Description: Monitor and assess patient's nutrition/hydration status for malnutrition (ex- brittle hair, bruises, dry skin, pale skin and conjunctiva, muscle wasting, smooth red tongue, and disorientation). Collaborate with interdisciplinary team and initiate plan and interventions as ordered.  Monitor patient's weight and dietary intake as ordered or per policy. Utilize nutrition screening tool and intervene per policy. Determine patient's food preferences and provide high-protein, high-caloric foods as appropriate.     - Assist patient with eating.  - Allow adequate time for meals.  - Encourage patient to take dietary supplement as ordered.  - Collaborate with clinical nutritionist.  - Include patient/family/caregiver in decisions related to nutrition.  Outcome: Progressing     Problem: Prexisting or High Potential for Compromised Skin Integrity  Goal: Skin integrity is maintained or improved  Description: INTERVENTIONS:  - Identify patients at risk for skin breakdown  - Assess and monitor skin integrity  - Assess and monitor nutrition and hydration status  - Monitor labs   - Assess for incontinence   - Turn and reposition patient  - Assist with mobility/ambulation  - Relieve pressure over bony prominences  - Avoid friction and shearing  - Provide appropriate hygiene  as needed including keeping skin clean and dry  - Evaluate need for skin moisturizer/barrier cream  - Collaborate with interdisciplinary team   - Patient/family teaching  - Consider wound care consult   Outcome: Progressing

## 2024-09-11 NOTE — PROGRESS NOTES
Progress Note - Critical Care/ICU   Name: Wilder Vargas 59 y.o. male I MRN: [id number]  Unit/Bed#: -01 I Date of Admission: 9/10/2024   Date of Service: 9/11/2024 I Hospital Day: 1      Assessment & Plan  Stroke-like symptom  History of past CVAs (May and June 2020) with history of medication noncompliance.  Outpatient regimen: Plavix and pravastatin  LKW 12:15 PM  NIHSS 7 (right facial weakness, RLE weakness, RUE ataxia, and moderate dysarthria )   9/10 CTH: Unremarkable for acute intracranial abnormalities. 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  TNK at 1346 ->improvement of symptoms with slight facial droop and 4/5 strength in RUE/RLL  At 1500 while in ED, patient's symptoms returned with severe right facial droop, garbled speech and 2/5 strength.  Repeat CTH pending read but no obvious bleed noted  Dr. Roberts notified who advised laying patient flat and starting IVF to optimize cerebral perfusion  9/10 Repeat CTH -- neg   9/10 MRI:  Acute infarct involving left thalamocapsular region. There is minimal FLAIR hyperintensity/edema without mass effect or hemorrhagic transformation. Chronic lacunar infarcts in the left thalamus, right corona radiata and right basal ganglia. Focus of old microhemorrhage in the right temporal lobe, likely on the basis of chronic hypertension. Right maxillary sinusitis.    Plan  Stroke Pathway  Hold AC/AP and DVT ppx for 24hrs post TNK  Cont statin   CTH 24hr post TNK -- order placed  Lipid panel, Hgb A1C and TSH  PT/OT/Speech  Serial Neuro checks ->STAT CTH if acute change  Continue Nicardipine gtt for goal BP <180/105  Neurology following  Hypertensive emergency  Outpatient regimen: 10-40mg amlodipine-benazepril, 25mg carvedilol, 4mg doxazosin  History on medication  non-compliance  Presented to ED with -220s and started on Nicardipine gtt    Plan  Hold while NPO  Continue Nicardipine gtt with goal -180  Overnight required labetalol x1 for tachycardia 130s in the setting of agitation, and  on Cardene 15  Mixed hyperlipidemia  Outpatient regimen: 20 pravastatin  History of medication non-compliance    Plan  Hold while NPO  Type 2 diabetes mellitus with hyperglycemia (HCC)  Outpatient regimen: Jaurdiance and 30U Lantus    Plan  SSI q6h for NPO for goal glucose 140-180  Hypoglycemia protocol  Check Hgb A1C  Tobacco use  Currently smokes 1-1.5 packs daily ->previously smoking >2 packs/daily  46 year history    Plan  Encourage smoke cessation  Nicotine patch  Disposition: Critical care    ICU Core Measures     A: Assess, Prevent, and Manage Pain Has pain been assessed? Yes  Need for changes to pain regimen? No   B: Both SAT/SAT  N/A   C: Choice of Sedation RASS Goal: N/A patient not on sedation  Need for changes to sedation or analgesia regimen? NA   D: Delirium CAM-ICU: Negative   E: Early Mobility  Plan for early mobility? Yes   F: Family Engagement Plan for family engagement today? Yes         Prophylaxis:  VTE Contraindicated secondary to: s/p TNK   Stress Ulcer  not orderedcovered bypantoprazole (PROTONIX) 40 mg tablet [[id number]] (Long-Term Med)         24 Hour Events   24hr events: 58yo M with PMH HTN, HLD, DM2, concern for medication noncompliance who is HD 2 for CVA Alert s/p TNK. Overnight, Isolyte was d/c'ed, Cardene was continued and he required labetalol x1 for persistent hypertension with episode of tachycardia in 130s. Wilder became acutely agitated early in the night 2/2 NPO status, and a bedside swallow eval was performed by RN with me present, and patient was deemed safe for small sips of water with direct supervision of RN. He endorses hunger and has no further complaints. While on telemetry, he does notably become tachycardic to 120s with  urinal usage and agitation.     Subjective   Review of Systems: See HPI for Review of Systems    Objective                          Vitals I/O      Most Recent Min/Max in 24hrs   Temp 97.5 °F (36.4 °C) Temp  Min: 97.5 °F (36.4 °C)  Max: 98.5 °F (36.9 °C)   Pulse 90 Pulse  Min: 70  Max: 129   Resp (!) 31 Resp  Min: 16  Max: 34   BP (!) 173/80 BP  Min: 143/75  Max: 248/133   O2 Sat 94 % SpO2  Min: 92 %  Max: 98 %      Intake/Output Summary (Last 24 hours) at 9/11/2024 0412  Last data filed at 9/11/2024 0400  Gross per 24 hour   Intake 2319.58 ml   Output 725 ml   Net 1594.58 ml       Diet NPO; Sips with meds    Invasive Monitoring           Physical Exam   Physical Exam  Vitals and nursing note reviewed.   Skin:     General: Skin is warm.      Capillary Refill: Capillary refill takes less than 2 seconds.   HENT:      Head: Normocephalic and atraumatic.      Mouth/Throat:      Lips: Pink.      Mouth: Mucous membranes are moist.   Cardiovascular:      Rate and Rhythm: Normal rate and regular rhythm.   Musculoskeletal:      Right lower leg: No edema.      Left lower leg: No edema.   Abdominal: General: Abdomen is flat. Bowel sounds are decreased.      Palpations: Abdomen is soft.      Tenderness: There is no abdominal tenderness.   Constitutional:       General: He is awake. He is not in acute distress.     Appearance: Normal appearance. He is well-developed.   Pulmonary:      Effort: Pulmonary effort is normal.      Breath sounds: Normal breath sounds.   Psychiatric:         Behavior: Behavior is cooperative.   Neurological:      Mental Status: He is alert.      Comments: AAO x3, in NAD, speech slurred   R sided facial droop   No ataxia of LUE, unable to assess RUE 2/2 weakness   Shoulder shrugs L 5/5, R 3/5  Hand grasps L 5/5, R 1/5  Arm raises L 5/5, R 0/5  UE push/pull L 5/5, R 0/5  Plantar/dorsi flexion L 5/5, R 3/5  Leg lifts L 5/5, R 3/5   Genitourinary/Anorectal:     Comments: Voiding independently           Diagnostic Studies        Lab Results: I have reviewed the following results: CBC/BMP:   .     09/10/24  1247   WBC 10.44*   HGB 19.3*   HCT 56.0*      SODIUM 137   K 3.5      CO2 28   BUN 15   CREATININE 0.81   GLUC 209*    , PTT/INR:  .     09/10/24  1320   PTT 20*   INR 1.05         Medications:  Scheduled PRN   insulin lispro, 1-6 Units, Q6H JASON  nicotine, 1 patch, Q24H          Continuous    niCARdipine, 1-15 mg/hr, Last Rate: 5 mg/hr (09/11/24 0237)         Labs:   CBC    Recent Labs     09/10/24  1247   WBC 10.44*   HGB 19.3*   HCT 56.0*        BMP    Recent Labs     09/10/24  1247   SODIUM 137   K 3.5      CO2 28   AGAP 7   BUN 15   CREATININE 0.81   CALCIUM 9.2       Coags    Recent Labs     09/10/24  1320   INR 1.05   PTT 20*        Additional Electrolytes  No recent results       Blood Gas    No recent results  No recent results LFTs  Recent Labs     09/10/24  1247   ALT 10   AST 11*   ALKPHOS 83   ALB 4.3   TBILI 0.81       Infectious  No recent results  Glucose  Recent Labs     09/10/24  1247   GLUC 209*

## 2024-09-12 LAB
ANION GAP SERPL CALCULATED.3IONS-SCNC: 9 MMOL/L (ref 4–13)
ATRIAL RATE: 82 BPM
BASOPHILS # BLD AUTO: 0.06 THOUSANDS/ΜL (ref 0–0.1)
BASOPHILS NFR BLD AUTO: 1 % (ref 0–1)
BUN SERPL-MCNC: 12 MG/DL (ref 5–25)
CALCIUM SERPL-MCNC: 8.6 MG/DL (ref 8.4–10.2)
CHLORIDE SERPL-SCNC: 103 MMOL/L (ref 96–108)
CO2 SERPL-SCNC: 24 MMOL/L (ref 21–32)
CREAT SERPL-MCNC: 0.64 MG/DL (ref 0.6–1.3)
EOSINOPHIL # BLD AUTO: 0.11 THOUSAND/ΜL (ref 0–0.61)
EOSINOPHIL NFR BLD AUTO: 1 % (ref 0–6)
ERYTHROCYTE [DISTWIDTH] IN BLOOD BY AUTOMATED COUNT: 12.7 % (ref 11.6–15.1)
FERRITIN SERPL-MCNC: 224 NG/ML (ref 24–336)
GFR SERPL CREATININE-BSD FRML MDRD: 107 ML/MIN/1.73SQ M
GLUCOSE SERPL-MCNC: 141 MG/DL (ref 65–140)
GLUCOSE SERPL-MCNC: 172 MG/DL (ref 65–140)
GLUCOSE SERPL-MCNC: 223 MG/DL (ref 65–140)
GLUCOSE SERPL-MCNC: 225 MG/DL (ref 65–140)
GLUCOSE SERPL-MCNC: 234 MG/DL (ref 65–140)
HCT VFR BLD AUTO: 52.8 % (ref 36.5–49.3)
HGB BLD-MCNC: 17.9 G/DL (ref 12–17)
IMM GRANULOCYTES # BLD AUTO: 0.03 THOUSAND/UL (ref 0–0.2)
IMM GRANULOCYTES NFR BLD AUTO: 0 % (ref 0–2)
LYMPHOCYTES # BLD AUTO: 1.67 THOUSANDS/ΜL (ref 0.6–4.47)
LYMPHOCYTES NFR BLD AUTO: 19 % (ref 14–44)
MAGNESIUM SERPL-MCNC: 1.8 MG/DL (ref 1.9–2.7)
MCH RBC QN AUTO: 28.3 PG (ref 26.8–34.3)
MCHC RBC AUTO-ENTMCNC: 33.9 G/DL (ref 31.4–37.4)
MCV RBC AUTO: 84 FL (ref 82–98)
MONOCYTES # BLD AUTO: 0.77 THOUSAND/ΜL (ref 0.17–1.22)
MONOCYTES NFR BLD AUTO: 9 % (ref 4–12)
NEUTROPHILS # BLD AUTO: 6.32 THOUSANDS/ΜL (ref 1.85–7.62)
NEUTS SEG NFR BLD AUTO: 70 % (ref 43–75)
NRBC BLD AUTO-RTO: 0 /100 WBCS
P AXIS: 69 DEGREES
PLATELET # BLD AUTO: 192 THOUSANDS/UL (ref 149–390)
PMV BLD AUTO: 10.3 FL (ref 8.9–12.7)
POTASSIUM SERPL-SCNC: 3.2 MMOL/L (ref 3.5–5.3)
PR INTERVAL: 140 MS
QRS AXIS: 58 DEGREES
QRSD INTERVAL: 98 MS
QT INTERVAL: 384 MS
QTC INTERVAL: 448 MS
RBC # BLD AUTO: 6.32 MILLION/UL (ref 3.88–5.62)
SODIUM SERPL-SCNC: 136 MMOL/L (ref 135–147)
T WAVE AXIS: -25 DEGREES
VENTRICULAR RATE: 82 BPM
WBC # BLD AUTO: 8.96 THOUSAND/UL (ref 4.31–10.16)

## 2024-09-12 PROCEDURE — 99232 SBSQ HOSP IP/OBS MODERATE 35: CPT | Performed by: INTERNAL MEDICINE

## 2024-09-12 PROCEDURE — 85025 COMPLETE CBC W/AUTO DIFF WBC: CPT | Performed by: NURSE PRACTITIONER

## 2024-09-12 PROCEDURE — 93010 ELECTROCARDIOGRAM REPORT: CPT | Performed by: INTERNAL MEDICINE

## 2024-09-12 PROCEDURE — 82948 REAGENT STRIP/BLOOD GLUCOSE: CPT

## 2024-09-12 PROCEDURE — 99232 SBSQ HOSP IP/OBS MODERATE 35: CPT | Performed by: PSYCHIATRY & NEUROLOGY

## 2024-09-12 PROCEDURE — 83735 ASSAY OF MAGNESIUM: CPT | Performed by: NURSE PRACTITIONER

## 2024-09-12 PROCEDURE — 82728 ASSAY OF FERRITIN: CPT

## 2024-09-12 PROCEDURE — 92523 SPEECH SOUND LANG COMPREHEN: CPT

## 2024-09-12 PROCEDURE — 80048 BASIC METABOLIC PNL TOTAL CA: CPT | Performed by: NURSE PRACTITIONER

## 2024-09-12 PROCEDURE — 81270 JAK2 GENE: CPT

## 2024-09-12 RX ORDER — LISINOPRIL 20 MG/1
40 TABLET ORAL DAILY
Status: DISCONTINUED | OUTPATIENT
Start: 2024-09-12 | End: 2024-09-15

## 2024-09-12 RX ORDER — POTASSIUM CHLORIDE 1500 MG/1
40 TABLET, EXTENDED RELEASE ORAL ONCE
Status: COMPLETED | OUTPATIENT
Start: 2024-09-12 | End: 2024-09-12

## 2024-09-12 RX ORDER — PRAVASTATIN SODIUM 20 MG
20 TABLET ORAL
Status: DISCONTINUED | OUTPATIENT
Start: 2024-09-12 | End: 2024-09-15

## 2024-09-12 RX ORDER — HYDRALAZINE HYDROCHLORIDE 20 MG/ML
5 INJECTION INTRAMUSCULAR; INTRAVENOUS ONCE
Status: COMPLETED | OUTPATIENT
Start: 2024-09-12 | End: 2024-09-12

## 2024-09-12 RX ORDER — MAGNESIUM SULFATE HEPTAHYDRATE 40 MG/ML
2 INJECTION, SOLUTION INTRAVENOUS ONCE
Status: COMPLETED | OUTPATIENT
Start: 2024-09-12 | End: 2024-09-12

## 2024-09-12 RX ORDER — AMLODIPINE BESYLATE 5 MG/1
10 TABLET ORAL DAILY
Status: DISCONTINUED | OUTPATIENT
Start: 2024-09-12 | End: 2024-09-20 | Stop reason: HOSPADM

## 2024-09-12 RX ORDER — POTASSIUM CHLORIDE 14.9 MG/ML
20 INJECTION INTRAVENOUS
Status: COMPLETED | OUTPATIENT
Start: 2024-09-12 | End: 2024-09-12

## 2024-09-12 RX ORDER — CLOPIDOGREL BISULFATE 75 MG/1
75 TABLET ORAL DAILY
Status: DISCONTINUED | OUTPATIENT
Start: 2024-09-12 | End: 2024-09-20 | Stop reason: HOSPADM

## 2024-09-12 RX ADMIN — SODIUM CHLORIDE 6 MG/HR: 0.9 INJECTION, SOLUTION INTRAVENOUS at 14:26

## 2024-09-12 RX ADMIN — INSULIN LISPRO 3 UNITS: 100 INJECTION, SOLUTION INTRAVENOUS; SUBCUTANEOUS at 12:41

## 2024-09-12 RX ADMIN — PANTOPRAZOLE SODIUM 40 MG: 40 TABLET, DELAYED RELEASE ORAL at 06:31

## 2024-09-12 RX ADMIN — INSULIN LISPRO 1 UNITS: 100 INJECTION, SOLUTION INTRAVENOUS; SUBCUTANEOUS at 07:43

## 2024-09-12 RX ADMIN — HYDRALAZINE HYDROCHLORIDE 5 MG: 20 INJECTION INTRAMUSCULAR; INTRAVENOUS at 03:56

## 2024-09-12 RX ADMIN — CLOPIDOGREL BISULFATE 75 MG: 75 TABLET ORAL at 10:33

## 2024-09-12 RX ADMIN — NICOTINE 1 PATCH: 21 PATCH, EXTENDED RELEASE TRANSDERMAL at 15:58

## 2024-09-12 RX ADMIN — INSULIN LISPRO 2 UNITS: 100 INJECTION, SOLUTION INTRAVENOUS; SUBCUTANEOUS at 15:59

## 2024-09-12 RX ADMIN — CARVEDILOL 25 MG: 12.5 TABLET, FILM COATED ORAL at 15:59

## 2024-09-12 RX ADMIN — PRAVASTATIN SODIUM 20 MG: 20 TABLET ORAL at 15:59

## 2024-09-12 RX ADMIN — INSULIN GLARGINE 30 UNITS: 100 INJECTION, SOLUTION SUBCUTANEOUS at 21:04

## 2024-09-12 RX ADMIN — POTASSIUM CHLORIDE 40 MEQ: 1500 TABLET, EXTENDED RELEASE ORAL at 10:32

## 2024-09-12 RX ADMIN — POTASSIUM CHLORIDE 20 MEQ: 14.9 INJECTION, SOLUTION INTRAVENOUS at 10:59

## 2024-09-12 RX ADMIN — SODIUM CHLORIDE 8.5 MG/HR: 0.9 INJECTION, SOLUTION INTRAVENOUS at 08:38

## 2024-09-12 RX ADMIN — CARVEDILOL 25 MG: 12.5 TABLET, FILM COATED ORAL at 07:42

## 2024-09-12 RX ADMIN — MAGNESIUM SULFATE HEPTAHYDRATE 2 G: 40 INJECTION, SOLUTION INTRAVENOUS at 10:59

## 2024-09-12 RX ADMIN — INSULIN LISPRO 2 UNITS: 100 INJECTION, SOLUTION INTRAVENOUS; SUBCUTANEOUS at 21:05

## 2024-09-12 RX ADMIN — AMLODIPINE BESYLATE 10 MG: 5 TABLET ORAL at 10:32

## 2024-09-12 RX ADMIN — ASPIRIN 81 MG: 81 TABLET, COATED ORAL at 10:33

## 2024-09-12 RX ADMIN — POTASSIUM CHLORIDE 20 MEQ: 14.9 INJECTION, SOLUTION INTRAVENOUS at 12:41

## 2024-09-12 RX ADMIN — SODIUM CHLORIDE 1 MG/HR: 0.9 INJECTION, SOLUTION INTRAVENOUS at 04:47

## 2024-09-12 RX ADMIN — LISINOPRIL 40 MG: 20 TABLET ORAL at 10:32

## 2024-09-12 NOTE — ASSESSMENT & PLAN NOTE
"Wilder Vargas is a 59 y.o. male with multiple stroke in the past (chronic right corona radiata and left thalamic infarcts on CT), not on AP/AC therapy, HTN, HLD, DM type 2, tobacco abuse who presented to [facility] ED on 9/10/2024 as a stroke alert with right sided weakness, slurred speech, and coordination difficulties in right arm.     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 No acute intracranial hemorrhage status post thrombolytic administration.   - Echo - ef 75%, atrium limited secondary to poor quality.   - Lipid panel: Total 172, triglycerides 88, HDL 46, LDL elevated 108  - Hemoglobin A1c: Elevated 9.2    Acute left thalamocapsular infarct s/p IV TNK on 9/10/2024 at 1346. Etiology likely small vessel in the setting of uncontrolled vascular risk factors.     Plan:  On stroke pathway   - Status post IV TNK at 1346 on 9/10/2024  - Echo completed as above  - Repeat CT head 24H s/p TNK as above no hemorrhage seen, recommend DAPT (ASA 81 mg daily and Plavix 75 mg daily x 21 days) then transition to Plavix monotherapy   - Recommend initiating Pravastatin when able as patient reported intolerance with atorvastatin in the past   - " Goal normotensive, the patient has been HTN and on nicardipine drip  - Euglycemia, Normothermia   - Telemetry  - PT/OT/Speech, consider physiatry consultation.   - Frequent neuro checks  - STAT CTH for acute change  - Medical management and supportive care per primary team, notify with changes

## 2024-09-12 NOTE — ASSESSMENT & PLAN NOTE
Lab Results   Component Value Date    HGBA1C 9.2 (H) 09/10/2024       Recent Labs     09/11/24  1135 09/11/24  1607 09/11/24  2101 09/12/24  0720   POCGLU 262* 218* 217* 172*       Blood Sugar Average: Last 72 hrs:  (P) 208.75    Management per primary team.

## 2024-09-12 NOTE — ASSESSMENT & PLAN NOTE
Outpatient regimen: 10-40mg amlodipine-benazepril, 25mg carvedilol, 4mg doxazosin  History on medication non-compliance  Presented to ED with -220s and started on Nicardipine gtt    Plan  9/11 home coreg restarted  Cardene off briefly, however with refractory htn overnight, necessitating restarting of cardene   Continue Nicardipine gtt with goal -180

## 2024-09-12 NOTE — ASSESSMENT & PLAN NOTE
History of past CVAs (May and June 2020) with history of medication noncompliance.  Outpatient regimen: Plavix and pravastatin  LKW 12:15 PM  NIHSS 7 (right facial weakness, RLE weakness, RUE ataxia, and moderate dysarthria )   9/10 CTH: Unremarkable for acute intracranial abnormalities. 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  TNK at 1346 ->improvement of symptoms with slight facial droop and 4/5 strength in RUE/RLL  At 1500 while in ED, patient's symptoms returned with severe right facial droop, garbled speech and 2/5 strength.  Repeat CTH pending read but no obvious bleed noted  Dr. Roberts notified who advised laying patient flat and starting IVF to optimize cerebral perfusion  9/10 Repeat CTH -- neg   9/10 MRI:  Acute infarct involving left thalamocapsular region. There is minimal FLAIR hyperintensity/edema without mass effect or hemorrhagic transformation. Chronic lacunar infarcts in the left thalamus, right corona radiata and right basal ganglia. Focus of old microhemorrhage in the right temporal lobe, likely on the basis of chronic hypertension. Right maxillary sinusitis.    Plan  Stroke Pathway  Hold AC/AP and DVT ppx for 24hrs post TNK  Cont statin   CTH 24hr post TNK -- neg   PT/OT/Speech  Serial Neuro checks  Continue Nicardipine gtt for goal normotension   Neurology following

## 2024-09-12 NOTE — PROGRESS NOTES
"Progress Note - Neurology   Name: iWlder Vargas 59 y.o. male I MRN: [id number]  Unit/Bed#: -01 I Date of Admission: 9/10/2024   Date of Service: 9/12/2024 I Hospital Day: 2     Assessment & Plan  Stroke-like symptom  Wilder Vargas is a 59 y.o. male with multiple stroke in the past (chronic right corona radiata and left thalamic infarcts on CT), not on AP/AC therapy, HTN, HLD, DM type 2, tobacco abuse who presented to [facility] ED on 9/10/2024 as a stroke alert with right sided weakness, slurred speech, and coordination difficulties in right arm.     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 No acute intracranial hemorrhage status post thrombolytic administration.   - Echo - ef 75%, atrium limited secondary to poor quality.   - Lipid panel: Total 172, triglycerides 88, HDL 46, LDL elevated 108  - Hemoglobin A1c: Elevated 9.2    Acute left thalamocapsular infarct s/p IV TNK on 9/10/2024 at 1346. Etiology likely small vessel in the setting of uncontrolled vascular risk factors.     Plan:  On stroke pathway   - Status post IV TNK at 1346 on 9/10/2024  - Echo completed as above  - Repeat CT head 24H s/p TNK as above no hemorrhage " seen, recommend DAPT (ASA 81 mg daily and Plavix 75 mg daily x 21 days) then transition to Plavix monotherapy   - Recommend initiating Pravastatin when able as patient reported intolerance with atorvastatin in the past   - Goal normotensive, the patient has been HTN and on nicardipine drip  - Euglycemia, Normothermia   - Telemetry  - PT/OT/Speech, consider physiatry consultation.   - Frequent neuro checks  - STAT CTH for acute change  - Medical management and supportive care per primary team, notify with changes  Type 2 diabetes mellitus with hyperglycemia (HCC)  Lab Results   Component Value Date    HGBA1C 9.2 (H) 09/10/2024       Recent Labs     09/11/24  1135 09/11/24  1607 09/11/24  2101 09/12/24  0720   POCGLU 262* 218* 217* 172*       Blood Sugar Average: Last 72 hrs:  (P) 208.75    Management per primary team.   Hypertensive emergency  Management per ICU team, blood pressures elevated overnight.  On nicardipine drip.   Mixed hyperlipidemia  - Lipid panel: Total 172, triglycerides 88, HDL 46, LDL elevated 108  - Pravastatin reported intolerance with atorvastatin in the past   Tobacco use  - Smoking cessation education       History of Present Illness   The patient was noted to be HTN overnight.   The patients 24  hour CT of head was completed, please see above.   The patient continues to be in ICU.  No new neurological complaints reported.   No events overnight reported.  Continues to have right sided weakness.     12 point ROS as per HPI, other wise negative.    Objective      Current Facility-Administered Medications   Medication Dose Route Frequency Provider Last Rate    aspirin  81 mg Oral Daily KRYSTAL Allison      carvedilol  25 mg Oral BID With Meals KRYSTAL Allison      clopidogrel  75 mg Oral Daily KRYSTAL Allison      insulin glargine  30 Units Subcutaneous HS KRYSTAL Allison      insulin lispro  1-6 Units Subcutaneous 4x Daily (AC & HS) Pascale Murphy  CRNP      magnesium sulfate  2 g Intravenous Once Pascale Murphy CRNP      niCARdipine  1-15 mg/hr Intravenous Titrated Anushka Ames CRNP 11 mg/hr (09/12/24 0745)    nicotine  1 patch Transdermal Q24H Pascale Murphy, CRNP      pantoprazole  40 mg Oral Early Morning Pascale Murphy, CRNP      potassium chloride  40 mEq Oral Once Pascale Murphy, CRNP      potassium chloride  20 mEq Intravenous Q2H Pascale HOOVER Katherine CRSOUTH      pravastatin  20 mg Oral Daily With Dinner Pascale HOOVER KRYSTAL Murphy        Temp:  [97.7 °F (36.5 °C)-98.7 °F (37.1 °C)] 98.7 °F (37.1 °C)  HR:  [] 99  Resp:  [18-31] 18  BP: (138-215)/() 183/97  O2 Device: None (Room air)          I/O last 24 hours:  In: 1663.8 [P.O.:1080; I.V.:583.8]  Out: 3025 [Urine:3025]  Lines/Drains/Airways       Active Status       None                  Neurological  Mental status - the patient is awake alert oriented x 3, with no evidence of aphasia , patient is able to follow simple commands, is able to follow complex commands.  No para-phasic errors noted.  Moderate dysarthria noted. (Improved from yesterday).  The patient is able to provide hx.  Flat affect.   Cranial nerves 2 through 12 are intact, right facial weakness noted  Motor -normal bulk, no movement of right upper and lower extremity left upper extremity strength full throughout, left lower extremity able to elevate off the bed  No tremor observed  Sensation - non-focal to touch, no neglect. Non focal to cold sensation.   Coordination -assess coordination of the right upper lower extremity due to weakness   Right toe is upgoing  No evidence of clonus or myoclonus or tremor.  No evidence of seizure activity, observed.     (Examined alongside attending).      Lab Results: I have reviewed the following results: CBC/BMP:   .     09/12/24  0437   WBC 8.96   HGB 17.9*   HCT 52.8*      SODIUM 136   K 3.2*      CO2 24   BUN 12   CREATININE 0.64   GLUC 141*   MG 1.8*     ", Creatinine Clearance: Estimated Creatinine Clearance: 120.2 mL/min (by C-G formula based on SCr of 0.64 mg/dL)., LFTs: No new results in last 24 hours. , PTT/INR:No new results in last 24 hours. , Troponin,BNP:No new results in last 24 hours. , Lactic Acid: No new results in last 24 hours. , Procalcitonin: No results found for: \"PROCALCITONI\", ABG: No new results in last 24 hours. , Lipase: No results found for: \"LIPASE\", Amylase: No results found for: \"AMYLASE\", Ammonia:   , Vitamins B1, B6, B12, A, and D: No results found for: \"B1\", \"THYROGLB\", \"GOSZFPDA72\", \"[id number]\", Iron: No results found for: \"IRON\", Transferrin: No results found for: \"TRANSFERRIN\", Lipid Profile:   Results from last 7 days   Lab Units 09/10/24  1247   HDL mg/dL 46   LDL CALC mg/dL 108*   TRIGLYCERIDES mg/dL 88   , Lipid Profile:   Results from last 7 days   Lab Units 09/10/24  1247   HDL mg/dL 46   LDL CALC mg/dL 108*   TRIGLYCERIDES mg/dL 88   , Drug Screen:       Per radiology interpretation -    \"  Procedure: CT head wo contrast    Result Date: 9/11/2024  Narrative: CT BRAIN - WITHOUT CONTRAST INDICATION:   24 h post thrombolytic administration. COMPARISON: MRI brain without contrast 9/10/2024. CT head without contrast 9/10/2024. CT stroke alert brain and CTA stroke alert head and neck 9/10/2024. TECHNIQUE:  CT examination of the brain was performed.  Multiplanar 2D reformatted images were created from the source data. Radiation dose length product (DLP) for this visit:  860 mGy-cm .  This examination, like all CT scans performed in the [facility], was performed utilizing techniques to minimize radiation dose exposure, including the use of iterative reconstruction and automated exposure control. IMAGE QUALITY:  Diagnostic. FINDINGS: PARENCHYMA: Evolving acute infarct in left internal capsule posterior limb. Unchanged chronic lacunar infarcts in right corona radiata, right basal ganglia, and bilateral thalami. " Decreased attenuation is noted in periventricular and subcortical white matter demonstrating an appearance that is statistically most likely to represent moderate microangiopathic change, similar to prior exam. No intracranial mass, mass effect or midline shift.  No acute parenchymal hemorrhage. Arterial calcifications of carotid siphons. VENTRICLES AND EXTRA-AXIAL SPACES:  Normal for the patient's age. VISUALIZED ORBITS: Normal visualized orbits. PARANASAL SINUSES: Moderate mucosal thickening in right maxillary sinus. CALVARIUM AND EXTRACRANIAL SOFT TISSUES:  Normal.     Impression: Evolving acute infarct in in left internal capsule posterior limb. No acute intracranial hemorrhage status post thrombolytic administration. Workstation performed: [id number]     Procedure: Echo complete w/ contrast if indicated    Result Date: 9/11/2024  Narrative:   Left Ventricle: Left ventricular cavity size is normal. Wall thickness is moderately increased. The left ventricular ejection fraction is 75% by visual estimation. Systolic function is hyperdynamic.   Aortic Valve: There is aortic valve sclerosis. The heart is not visualized for most of this study.     Procedure: MRI brain wo contrast    Result Date: 9/10/2024  Narrative: MRI BRAIN WITHOUT CONTRAST INDICATION: stroke. COMPARISON:   Same day CT. TECHNIQUE:  Multiplanar, multisequence imaging of the brain was performed. IMAGE QUALITY:  Diagnostic. FINDINGS: BRAIN PARENCHYMA: There is acute infarct involving lateral left thalamus and adjacent posterior internal capsule. There is minimal FLAIR signal abnormality/edema. There is no hemorrhagic transformation. Chronic lacunar infarct in the left thalamus, right corona radiata, right basal ganglia. Sequela of remote paramedian left midbrain infarct noted in prior MRI is not conspicuous in current exam. Nonspecific  foci of T2/FLAIR hyperintensities involving periventricular and subcortical white matter, most compatible with  moderate microangiopathic change. There is no discrete mass, mass effect or midline shift. There is focus of old microhemorrhage in the right temporal lobe new from MRI 6/23/2020 (series 6 image 58) VENTRICLES:  Normal for the patient's age. SELLA AND PITUITARY GLAND:  Normal. ORBITS:  Normal. PARANASAL SINUSES: Moderate to severe right maxillary sinus mucosal thickening. VASCULATURE:  Evaluation of the major intracranial vasculature demonstrates appropriate flow voids. CALVARIUM AND SKULL BASE:  Normal. EXTRACRANIAL SOFT TISSUES:  Normal.     Impression: 1.  Acute infarct involving left thalamocapsular region. There is minimal FLAIR hyperintensity/edema without mass effect or hemorrhagic transformation. 2.  Chronic lacunar infarcts in the left thalamus, right corona radiata and right basal ganglia. 3.  Nonspecific white matter change most compatible with chronic microangiopathy. 4.  Focus of old microhemorrhage in the right temporal lobe, likely on the basis of chronic hypertension. 5.  Right maxillary sinusitis. Workstation performed: [id number]     Procedure: CT head wo contrast    Result Date: 9/10/2024  Narrative: CT BRAIN - WITHOUT CONTRAST INDICATION:   return of stroke symptoms s/p TNK. COMPARISON: 1:00 p.m. (2 hours ago) TECHNIQUE:  CT examination of the brain was performed.  Multiplanar 2D reformatted images were created from the source data. Radiation dose length product (DLP) for this visit:  1053.28 mGy-cm .  This examination, like all CT scans performed in the [facility], was performed utilizing techniques to minimize radiation dose exposure, including the use of iterative reconstruction and automated exposure control. IMAGE QUALITY:  Diagnostic. FINDINGS: PARENCHYMA:  No intracranial mass, mass effect or midline shift. No CT signs of acute infarction.  No acute parenchymal hemorrhage. Residual enhancement of the intracranial vasculature from recent contrast test. VENTRICLES AND  EXTRA-AXIAL SPACES:  Normal for the patient's age. VISUALIZED ORBITS: Normal visualized orbits. PARANASAL SINUSES: Moderate mucosal thickening of the right maxillary sinus with persistent fluid level CALVARIUM AND EXTRACRANIAL SOFT TISSUES:  Normal.     Impression: No acute intracranial abnormality. Workstation performed: [id number]     Procedure: CTA stroke alert (head/neck)    Result Date: 9/10/2024  Narrative: CTA NECK AND BRAIN WITH AND WITHOUT CONTRAST INDICATION: Stroke Alert COMPARISON:   CTA head and neck 6/23/2020. TECHNIQUE:  Post contrast imaging was performed after administration of iodinated contrast through the neck and brain. Post contrast axial 0.625 mm images timed to opacify the arterial system.  3D rendering was performed on an independent workstation.   MIP reconstructions performed. Coronal and sagittal reconstructions were performed of the non contrast portion of the brain. Radiation dose length product (DLP) for this visit:  563 mGy-cm .  This examination, like all CT scans performed in the [facility], was performed utilizing techniques to minimize radiation dose exposure, including the use of iterative reconstruction and automated exposure control. IV Contrast: Given IMAGE QUALITY:   Diagnostic FINDINGS: CTA NECK ARCH AND GREAT VESSELS: Mild atherosclerotic changes. Patent great vessel origins without significant stenosis. RIGHT VERTEBRAL ARTERY: Patent origin. Extracranial segments are patent. LEFT VERTEBRAL ARTERY: Patent origin.  Patent extracranial segments. RIGHT CAROTID: Partially calcified atherosclerotic plaque primarily involving the carotid bifurcation. There is mild (less than 50%) ICA stenosis. LEFT CAROTID: Partially calcified atherosclerotic plaque primarily involving the carotid bifurcation.There is mild (less than 50%) ICA stenosis. NASCET criteria was used to determine the degree of internal carotid artery diameter stenosis. CTA BRAIN: INTERNAL CAROTID  ARTERIES: Mild atherosclerotic changes. No significant stenosis. Stable 2 mm hypoplastic right posterior communicating artery origin infundibulum or aneurysm (series 309 image 423) ANTERIOR CIRCULATION:  No significant stenosis. MIDDLE CEREBRAL ARTERY CIRCULATION: No high-grade stenosis. DISTAL VERTEBRAL ARTERIES: No significant stenosis.   PICA origins are patent. BASILAR ARTERY:No significant stenosis.   Redemonstrated fenestration in vertebrobasilar junction. POSTERIOR CEREBRAL ARTERIES: No significant stenosis.    Hypoplastic posterior communicating arteries VENOUS STRUCTURES:  Normal. NON VASCULAR ANATOMY BONY STRUCTURES: Focal degenerative change most pronounced at the level C6-C7. No acute osseous abnormality. SOFT TISSUES OF THE NECK:  Normal. THORACIC INLET:  Unremarkable.     Impression: 1.  Patent major vessels of the [place] without high-grade stenosis. 2.  No significant stenosis in the cervical carotid or vertebral arteries. 3.  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 Findings were directly discussed with Marques Laureano at 1:09 pm Workstation performed: [id number]     Procedure: CT stroke alert brain    Result Date: 9/10/2024  Narrative: CT BRAIN - STROKE ALERT PROTOCOL INDICATION:   Stroke Alert. COMPARISON: Head CT 6/23/2020. TECHNIQUE:  CT examination of the brain was performed.  In addition to axial images, coronal reformatted images were created and submitted for interpretation. Radiation dose length product (DLP) for this visit: .  This examination, like all CT scans performed in the [facility], was performed utilizing techniques to minimize radiation dose exposure, including the use of iterative reconstruction  and automated exposure control. IMAGE QUALITY:  Diagnostic. FINDINGS: PARENCHYMA: Nonspecific  decreased attenuation involving periventricular and subcortical white matter, most compatible with mild  "microangiopathic change. Sequela of chronic infarct in the right corona radiata. Stable chronic infarct in the ventrolateral left thalamus. No intracranial mass, mass effect or midline shift. No CT signs of acute infarction.  No acute parenchymal hemorrhage. VENTRICLES AND EXTRA-AXIAL SPACES:  Normal for the patient's age. VISUALIZED ORBITS: Normal visualized orbits. PARANASAL SINUSES: There is mucosal thickening and air-fluid level within the right maxillary sinus. CALVARIUM AND EXTRACRANIAL SOFT TISSUES:   Normal.     Impression: 1.  No acute intracranial CT abnormality. 2.  Sequela of chronic infarct in the right corona radiata. 3.  Stable chronic infarct in the ventrolateral left thalamus. 4.  Chronic right microangiopathic change. 5.  Findings suggestive of acute right maxillary sinusitis. Findings were directly discussed with Marques Laureano at 1:09 pm Workstation performed: [id number]     \"    Reviewed case with neurology attending, history and physical examination, labs and imaging completed, plan of care as per attending physician.  Please see attestation for further details.  Examined alongside attending physician.  Acted as scribe in this case.   "

## 2024-09-12 NOTE — CASE MANAGEMENT
Case Management Progress Note    Patient name Wilder Quinn /-01 MRN [id number]  : 1965 Date 2024       LOS (days): 2  Geometric Mean LOS (GMLOS) (days): 2.9  Days to GMLOS:0.9        OBJECTIVE:        Current admission status: Inpatient  Preferred Pharmacy:   [id number] - [place], [place] - [address]  [address] [place] [address]  Phone: [phone] Fax: [phone]    Primary Care Provider: Gilmar Oro MD    Primary Insurance: [employer]  Secondary Insurance:     PROGRESS NOTE:  Met with Pt to discuss acute rehab and SNF. Pt agreeable for rehab and has no facility preference at this time. [place] acute rehab and snf referrals sent within 20 miles via [facility]. Await determination. Will need to obtain insurance auth prior to discharge. CM to follow.

## 2024-09-12 NOTE — PROGRESS NOTES
Progress Note - Critical Care/ICU   Name: Wilder Vargas 59 y.o. male I MRN: [id number]  Unit/Bed#: -01 I Date of Admission: 9/10/2024   Date of Service: 9/12/2024 I Hospital Day: 2      Assessment & Plan  Stroke-like symptom  History of past CVAs (May and June 2020) with history of medication noncompliance.  Outpatient regimen: Plavix and pravastatin  LKW 12:15 PM  NIHSS 7 (right facial weakness, RLE weakness, RUE ataxia, and moderate dysarthria )   9/10 CTH: Unremarkable for acute intracranial abnormalities. 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  TNK at 1346 ->improvement of symptoms with slight facial droop and 4/5 strength in RUE/RLL  At 1500 while in ED, patient's symptoms returned with severe right facial droop, garbled speech and 2/5 strength.  Repeat CTH pending read but no obvious bleed noted  Dr. Roberts notified who advised laying patient flat and starting IVF to optimize cerebral perfusion  9/10 Repeat CTH -- neg   9/10 MRI:  Acute infarct involving left thalamocapsular region. There is minimal FLAIR hyperintensity/edema without mass effect or hemorrhagic transformation. Chronic lacunar infarcts in the left thalamus, right corona radiata and right basal ganglia. Focus of old microhemorrhage in the right temporal lobe, likely on the basis of chronic hypertension. Right maxillary sinusitis.    Plan  Stroke Pathway  Hold AC/AP and DVT ppx for 24hrs post TNK  Cont statin   CTH 24hr post TNK -- neg   PT/OT/Speech  Serial Neuro checks  Continue Nicardipine gtt for goal normotension   Neurology following  Hypertensive emergency  Outpatient regimen: 10-40mg amlodipine-benazepril, 25mg carvedilol, 4mg doxazosin  History on medication non-compliance  Presented to ED with -220s and started on  Nicardipine gtt    Plan  9/11 home coreg restarted  Cardene off briefly, however with refractory htn overnight, necessitating restarting of cardene   Continue Nicardipine gtt with goal -180  Mixed hyperlipidemia  Outpatient regimen: 20 pravastatin  History of medication non-compliance    Plan  Hold while NPO  Type 2 diabetes mellitus with hyperglycemia (HCC)  Outpatient regimen: Jaurdiance and 30U Lantus    Plan  SSI achs for goal glucose 140-180  Hypoglycemia protocol  Check Hgb A1C  Tobacco use  Currently smokes 1-1.5 packs daily ->previously smoking >2 packs/daily  46 year history    Plan  Encourage smoke cessation  Nicotine patch  Disposition: Critical care    ICU Core Measures     A: Assess, Prevent, and Manage Pain Has pain been assessed? Yes  Need for changes to pain regimen? No   B: Both SAT/SAT  N/A   C: Choice of Sedation RASS Goal: N/A patient not on sedation  Need for changes to sedation or analgesia regimen? NA   D: Delirium CAM-ICU: Negative   E: Early Mobility  Plan for early mobility? Yes   F: Family Engagement Plan for family engagement today? Yes         Prophylaxis:  VTE Contraindicated secondary to: TNK   Stress Ulcer  covered bypantoprazole (PROTONIX) 40 mg tablet [[id number]] (Long-Term Med), pantoprazole (PROTONIX) EC tablet 40 mg [[id number]]         24 Hour Events   24hr events: Yesterday, he passed speech eval with modified diet. He briefly was off cardene after home carvedilol initiated, however restarted early this morning for refractory hypertension not responsive to hydralazine.   Subjective   Review of Systems: See HPI for Review of Systems    Objective                          Vitals I/O      Most Recent Min/Max in 24hrs   Temp 98.3 °F (36.8 °C) Temp  Min: 97.7 °F (36.5 °C)  Max: 98.3 °F (36.8 °C)   Pulse 68 Pulse  Min: 55  Max: 140   Resp (!) 25 Resp  Min: 18  Max: 42   BP (!) 204/107 BP  Min: 138/74  Max: 215/116   O2 Sat 93 % SpO2  Min: 92 %  Max: 97 %      Intake/Output  Summary (Last 24 hours) at 9/12/2024 0458  Last data filed at 9/12/2024 0434  Gross per 24 hour   Intake 1431.25 ml   Output 2575 ml   Net -1143.75 ml       Diet Dysphagia/Modified Consistency; Dysphagia 3-Dental Soft; Thin Liquid; Consistent Carbohydrate Diet Level 2 (5 carb servings/75 grams CHO/meal)    Invasive Monitoring           Physical Exam   Physical Exam  Vitals and nursing note reviewed.   Skin:     General: Skin is warm.      Capillary Refill: Capillary refill takes less than 2 seconds.      Coloration: Skin is not pale.   HENT:      Head: Normocephalic and atraumatic.      Mouth/Throat:      Lips: Pink.      Mouth: Mucous membranes are moist.   Cardiovascular:      Rate and Rhythm: Normal rate and regular rhythm.   Musculoskeletal:      Right lower leg: No edema.      Left lower leg: No edema.   Abdominal: General: Abdomen is flat. Bowel sounds are decreased.      Palpations: Abdomen is soft.      Tenderness: There is no abdominal tenderness.   Constitutional:       General: He is awake.      Appearance: He is well-developed. He is not ill-appearing.   Pulmonary:      Effort: Pulmonary effort is normal.      Breath sounds: Normal breath sounds.   Psychiatric:         Behavior: Behavior is cooperative.   Neurological:      Mental Status: He is alert.      Comments: AAO x3, in NAD, speech slurred   R sided facial droop   No ataxia of LUE, unable to assess RUE 2/2 weakness   Shoulder shrugs L 5/5, R 3/5  Hand grasps L 5/5, R 1/5  Arm raises L 5/5, R 0/5  UE push/pull L 5/5, R 0/5  Plantar/dorsi flexion L 5/5, R 3/5  Leg lifts L 5/5, R 3/5    Genitourinary/Anorectal:     Comments: Voiding independently          Diagnostic Studies        Lab Results: I have reviewed the following results: CBC/BMP: No new results in last 24 hours.      Medications:  Scheduled PRN   carvedilol, 25 mg, BID With Meals  insulin glargine, 30 Units, HS  insulin lispro, 1-6 Units, 4x Daily (AC & HS)  nicotine, 1 patch,  Q24H  pantoprazole, 40 mg, Early Morning          Continuous    niCARdipine, 1-15 mg/hr, Last Rate: 1 mg/hr (09/12/24 0447)         Labs:   CBC    Recent Labs     09/10/24  1247   WBC 10.44*   HGB 19.3*   HCT 56.0*        BMP    Recent Labs     09/10/24  1247   SODIUM 137   K 3.5      CO2 28   AGAP 7   BUN 15   CREATININE 0.81   CALCIUM 9.2       Coags    Recent Labs     09/10/24  1320   INR 1.05   PTT 20*        Additional Electrolytes  No recent results       Blood Gas    No recent results  No recent results LFTs  Recent Labs     09/10/24  1247   ALT 10   AST 11*   ALKPHOS 83   ALB 4.3   TBILI 0.81       Infectious  No recent results  Glucose  Recent Labs     09/10/24  1247   GLUC 209*

## 2024-09-12 NOTE — SPEECH THERAPY NOTE
Speech Language/Pathology  Speech/Language Pathology  Assessment    Patient Name: Wilder Vargas  Today's Date: 9/12/2024     Problem List  Principal Problem:    Stroke-like symptom  Active Problems:    Type 2 diabetes mellitus with hyperglycemia (HCC)    Hypertensive emergency    Mixed hyperlipidemia    Tobacco use    Past Medical History  Past Medical History:   Diagnosis Date    CVA (cerebral vascular accident) (HCC)     Diabetes mellitus (HCC)     Hypertension      Past Surgical History  Past Surgical History:   Procedure Laterality Date    NO PAST SURGERIES         Patient Name: Wilder Vargas    Today's Date: 9/12/2024     Problem List  Principal Problem:    Stroke-like symptom  Active Problems:    Type 2 diabetes mellitus with hyperglycemia (HCC)    Hypertensive emergency    Mixed hyperlipidemia    Tobacco use      Past Medical History  Past Medical History:   Diagnosis Date    CVA (cerebral vascular accident) (HCC)     Diabetes mellitus (HCC)     Hypertension        Past Surgical History  Past Surgical History:   Procedure Laterality Date    NO PAST SURGERIES         Summary:   Pt presents w/ mild-mod dysarthria characterized by R-sided weakness w/ imprecise articulation of fricatives, affricates, and glides at the single, multi-syllablic and sentence level production. Given verbal cue for increased vocal volume and over articulation, noted improved speech intelligibility.     Current Medical Status  Pt is a 59 y.o. male PMH of DM2 on lantus, HTN, HLD, GERD, CVA x2 (2020), current tobacco use who presented with confused, slurred speech and R sided weakness. He also was noted to have severe HTN and started on Nicardipine. Received TNK at 13:46 with initial improvement and then again worsening of symptoms that occurred at 1500. Repeat CT was performed and does not seem to demonstrate acute bleeding.     Current Precautions:  Fall  Aspiration     Allergies:  No known food allergies    Past medical history:  Please  see H&P for details    Special Studies:  9/10/24 MRI brain wo contrast:  1.  Acute infarct involving left thalamocapsular region. There is minimal FLAIR hyperintensity/edema without mass effect or hemorrhagic transformation.  2.  Chronic lacunar infarcts in the left thalamus, right corona radiata and right basal ganglia.  3.  Nonspecific white matter change most compatible with chronic microangiopathy.  4.  Focus of old microhemorrhage in the right temporal lobe, likely on the basis of chronic hypertension.  5.  Right maxillary sinusitis.     9/10/24 CT head wo contrast:  No acute intracranial abnormality.      9/10/24 CTA stroke alert (head/neck):  1.  Patent major vessels of the [place] without high-grade stenosis.  2.  No significant stenosis in the cervical carotid or vertebral arteries.  3.  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     9/10/24 CT stroke alert brain:  1.  No acute intracranial CT abnormality.  2.  Sequela of chronic infarct in the right corona radiata.  3.  Stable chronic infarct in the ventrolateral left thalamus.  4.  Chronic right microangiopathic change.  5.  Findings suggestive of acute right maxillary sinusitis    Social/Education/Vocational Hx:  Pt lives with family      Baseline Assessment   Behavior/Cognition: alert  Speech/Language Status: able to participate in conversation and able to follow commands  Patient Positioning: upright in bed  Pain Status/Interventions/Response to Interventions: No report of or nonverbal indications of pain.     Oral Mechanism Exam  Facial: right facial droop  Labial: decreased ROM right side  Lingual: WFL  Velum: symmetrical  Mandible: adequate ROM  Dentition: adequate, missing few teeth  Vocal quality:clear/adequate   Speech: garbled at times, noted increased intelligibility when pt utilizes over annunciation and increased vocal volume    Volitional Cough: weak   Respiratory Status: on RA       Motor Speech:  During attempted speech production pt utilized correct intonation though frequently produces distorted consonants. Clinician provided pt w/ verbal prompts to over-articulate and increase vocal volume, ultimately able to improve intelligibility.    Pt w/ vowel prolongation for 4 seconds (WNL= 15-20 seconds)    Diadokinesis:   -(puh-puh-puh) 1.4 repetitions per second (14 repetitions/10 seconds) (WNL= 3 to 5.5 repetitions per second)  -(tuh-tuh-tuh) 11 repetitions per 10 seconds (WNL= 25 repetitions per 10 seconds)   -(kuh-kuh-kuh) 12 repetitions per 10 seconds (WNL= 25 repetitions per 10 seconds)  -(puh-tuh-kuh) 6 repetitions per 10 seconds (WNL= 8 repetitions per 10 seconds)    Pt noted to have difficulty w/ repetition of single, multi-syllabic and sentence length verbal stimuli presented via clinician. Noted decreased articulatory precision w/ fricatives, affricates, and glides.    Goals:   Long Term Goals:  Pt will increase speech intelligibility across current hospital admission.     Short Term Goals:   Motor Speech:  Given word length verbal/visual stimuli, pt will utilize strategies (ie: over-articulation, slower speech production, and utilizing an appropriate speaking volume) to increase speech intelligibility to to 70% across 3 consecutive therapy sessions at the acute care level.     Given verbal/visual stimuli, pt will orally read or repeat mono through multi-syllabic words containing fricatives with 70% accuracy across 3 consecutive therapy sessions at the acute care level.     Given verbal/visual stimuli, pt will orally read or repeat mono through multi-syllabic words containing affricates with 70% accuracy across 3 consecutive therapy sessions at the acute care level.     Given verbal/visual stimuli, pt will orally read or repeat mono through multi-syllabic words containing glides with 70% accuracy across 3 consecutive therapy sessions at the acute care level.     -Patient will  complete daily oral motor exercise to increase labial strength, range of motion and coordination with min cues to 90% effectiveness to strengthen oral musculature and aid in increasing speech intelligibility     Treatment Recommended and Frequency:  As able and appropriate     Impression and Recommendations reviewed with:  Pt, RN    Education Initiated with:  Pt    Therapy Prognosis: good  Prognosis considerations: age, medical status, PMHx

## 2024-09-12 NOTE — ASSESSMENT & PLAN NOTE
- Lipid panel: Total 172, triglycerides 88, HDL 46, LDL elevated 108  - Pravastatin reported intolerance with atorvastatin in the past

## 2024-09-12 NOTE — ASSESSMENT & PLAN NOTE
Outpatient regimen: Jaurdiance and 30U Lantus    Plan  SSI achs for goal glucose 140-180  Hypoglycemia protocol  Check Hgb A1C

## 2024-09-12 NOTE — PLAN OF CARE
Problem: Potential for Falls  Goal: Patient will remain free of falls  Description: INTERVENTIONS:  - Educate patient/family on patient safety including physical limitations  - Instruct patient to call for assistance with activity   - Consult OT/PT to assist with strengthening/mobility   - Keep Call bell within reach  - Keep bed low and locked with side rails adjusted as appropriate  - Keep care items and personal belongings within reach  - Initiate and maintain comfort rounds  - Make Fall Risk Sign visible to staff  - Apply yellow socks and bracelet for high fall risk patients  - Consider moving patient to room near nurses station  Outcome: Progressing     Problem: PAIN - ADULT  Goal: Verbalizes/displays adequate comfort level or baseline comfort level  Description: Interventions:  - Encourage patient to monitor pain and request assistance  - Assess pain using appropriate pain scale  - Administer analgesics based on type and severity of pain and evaluate response  - Implement non-pharmacological measures as appropriate and evaluate response  - Consider cultural and social influences on pain and pain management  - Notify physician/advanced practitioner if interventions unsuccessful or patient reports new pain  Outcome: Progressing     Problem: INFECTION - ADULT  Goal: Absence or prevention of progression during hospitalization  Description: INTERVENTIONS:  - Assess and monitor for signs and symptoms of infection  - Monitor lab/diagnostic results  - Monitor all insertion sites, i.e. indwelling lines, tubes, and drains  - Monitor endotracheal if appropriate and nasal secretions for changes in amount and color  - [place] appropriate cooling/warming therapies per order  - Administer medications as ordered  - Instruct and encourage patient and family to use good hand hygiene technique  - Identify and instruct in appropriate isolation precautions for identified infection/condition  Outcome: Progressing  Goal: Absence  of fever/infection during neutropenic period  Description: INTERVENTIONS:  - Monitor WBC    Outcome: Progressing     Problem: SAFETY ADULT  Goal: Patient will remain free of falls  Description: INTERVENTIONS:  - Educate patient/family on patient safety including physical limitations  - Instruct patient to call for assistance with activity   - Consult OT/PT to assist with strengthening/mobility   - Keep Call bell within reach  - Keep bed low and locked with side rails adjusted as appropriate  - Keep care items and personal belongings within reach  - Initiate and maintain comfort rounds  - Apply yellow socks and bracelet for high fall risk patients  - Consider moving patient to room near nurses station  Outcome: Progressing  Goal: Maintain or return to baseline ADL function  Description: INTERVENTIONS:  -  Assess patient's ability to carry out ADLs; assess patient's baseline for ADL function and identify physical deficits which impact ability to perform ADLs (bathing, care of mouth/teeth, toileting, grooming, dressing, etc.)  - Assess/evaluate cause of self-care deficits   - Assess range of motion  - Assess patient's mobility; develop plan if impaired  - Assess patient's need for assistive devices and provide as appropriate  - Encourage maximum independence but intervene and supervise when necessary  - Involve family in performance of ADLs  - Assess for home care needs following discharge   - Consider OT consult to assist with ADL evaluation and planning for discharge  - Provide patient education as appropriate  Outcome: Progressing  Goal: Maintains/Returns to pre admission functional level  Description: INTERVENTIONS:  - Perform AM-PAC 6 Click Basic Mobility/ Daily Activity assessment daily.  - Set and communicate daily mobility goal to care team and patient/family/caregiver.   - Collaborate with rehabilitation services on mobility goals if consulted  - Out of bed for toileting  - Record patient progress and  toleration of activity level   Outcome: Progressing     Problem: DISCHARGE PLANNING  Goal: Discharge to home or other facility with appropriate resources  Description: INTERVENTIONS:  - Identify barriers to discharge w/patient and caregiver  - Arrange for needed discharge resources and transportation as appropriate  - Identify discharge learning needs (meds, wound care, etc.)  - Arrange for interpretive services to assist at discharge as needed  - Refer to Case Management Department for coordinating discharge planning if the patient needs post-hospital services based on physician/advanced practitioner order or complex needs related to functional status, cognitive ability, or social support system  Outcome: Progressing     Problem: CARDIOVASCULAR - ADULT  Goal: Maintains optimal cardiac output and hemodynamic stability  Description: INTERVENTIONS:  - Monitor I/O, vital signs and rhythm  - Monitor for S/S and trends of decreased cardiac output  - Administer and titrate ordered vasoactive medications to optimize hemodynamic stability  - Assess quality of pulses, skin color and temperature  - Assess for signs of decreased coronary artery perfusion  - Instruct patient to report change in severity of symptoms  Outcome: Progressing  Goal: Absence of cardiac dysrhythmias or at baseline rhythm  Description: INTERVENTIONS:  - Continuous cardiac monitoring, vital signs, obtain 12 lead EKG if ordered  - Administer antiarrhythmic and heart rate control medications as ordered  - Monitor electrolytes and administer replacement therapy as ordered  Outcome: Progressing     Problem: Neurological Deficit  Goal: Neurological status is stable or improving  Description: Interventions:  - Monitor and assess patient's level of consciousness, motor function, sensory function, and level of assistance needed for ADLs.   - Monitor and report changes from baseline. Collaborate with interdisciplinary team to initiate plan and implement  interventions as ordered.   - Provide and maintain a safe environment.  - Consider seizure precautions.  - Consider fall precautions.  - Consider aspiration precautions.  - Consider bleeding precautions.  Outcome: Progressing     Problem: Activity Intolerance/Impaired Mobility  Goal: Mobility/activity is maintained at optimum level for patient  Description: Interventions:  - Assess and monitor patient  barriers to mobility and need for assistive/adaptive devices.  - Assess patient's emotional response to limitations.  - Collaborate with interdisciplinary team and initiate plans and interventions as ordered.  - Encourage independent activity per ability.  - Maintain proper body alignment.  - Perform active/passive rom as tolerated/ordered.  - Plan activities to conserve energy.  - Turn patient as appropriate  Outcome: Progressing     Problem: Communication Impairment  Goal: Ability to express needs and understand communication  Description: Assess patient's communication skills and ability to understand information.  Patient will demonstrate use of effective communication techniques, alternative methods of communication and understanding even if not able to speak.     - Encourage communication and provide alternate methods of communication as needed.  - Collaborate with case management/ for discharge needs.  - Include patient/family/caregiver in decisions related to communication.  Outcome: Progressing     Problem: Potential for Aspiration  Goal: Non-ventilated patient's risk of aspiration is minimized  Description: Assess and monitor vital signs, respiratory status, and labs (WBC).  Monitor for signs of aspiration (tachypnea, cough, rales, wheezing, cyanosis, fever).    - Assess and monitor patient's ability to swallow.  - Place patient up in chair to eat if possible.  - HOB up at 90 degrees to eat if unable to get patient up into chair.  - Supervise patient during oral intake.   - Instruct patient/  family to take small bites.  - Instruct patient/ family to take small single sips when taking liquids.  - Follow patient-specific strategies generated by speech pathologist.  Outcome: Progressing  Goal: Ventilated patient's risk of aspiration is minimized  Description: Assess and monitor vital signs, respiratory status, airway cuff pressure, and labs (WBC).  Monitor for signs of aspiration (tachypnea, cough, rales, wheezing, cyanosis, fever).    - Elevate head of bed 30 degrees if patient has tube feeding.  - Monitor tube feeding.  Outcome: Progressing     Problem: Nutrition  Goal: Nutrition/Hydration status is improving  Description: Monitor and assess patient's nutrition/hydration status for malnutrition (ex- brittle hair, bruises, dry skin, pale skin and conjunctiva, muscle wasting, smooth red tongue, and disorientation). Collaborate with interdisciplinary team and initiate plan and interventions as ordered.  Monitor patient's weight and dietary intake as ordered or per policy. Utilize nutrition screening tool and intervene per policy. Determine patient's food preferences and provide high-protein, high-caloric foods as appropriate.     - Assist patient with eating.  - Allow adequate time for meals.  - Encourage patient to take dietary supplement as ordered.  - Collaborate with clinical nutritionist.  - Include patient/family/caregiver in decisions related to nutrition.  Outcome: Progressing     Problem: Prexisting or High Potential for Compromised Skin Integrity  Goal: Skin integrity is maintained or improved  Description: INTERVENTIONS:  - Identify patients at risk for skin breakdown  - Assess and monitor skin integrity  - Assess and monitor nutrition and hydration status  - Monitor labs   - Assess for incontinence   - Turn and reposition patient  - Assist with mobility/ambulation  - Relieve pressure over bony prominences  - Avoid friction and shearing  - Provide appropriate hygiene as needed including keeping  skin clean and dry  - Evaluate need for skin moisturizer/barrier cream  - Collaborate with interdisciplinary team   - Patient/family teaching  - Consider wound care consult   Outcome: Progressing

## 2024-09-13 LAB
ANION GAP SERPL CALCULATED.3IONS-SCNC: 9 MMOL/L (ref 4–13)
BASOPHILS # BLD AUTO: 0.05 THOUSANDS/ΜL (ref 0–0.1)
BASOPHILS NFR BLD AUTO: 1 % (ref 0–1)
BUN SERPL-MCNC: 13 MG/DL (ref 5–25)
CALCIUM SERPL-MCNC: 8.6 MG/DL (ref 8.4–10.2)
CHLORIDE SERPL-SCNC: 107 MMOL/L (ref 96–108)
CO2 SERPL-SCNC: 24 MMOL/L (ref 21–32)
CREAT SERPL-MCNC: 0.64 MG/DL (ref 0.6–1.3)
EOSINOPHIL # BLD AUTO: 0.12 THOUSAND/ΜL (ref 0–0.61)
EOSINOPHIL NFR BLD AUTO: 1 % (ref 0–6)
ERYTHROCYTE [DISTWIDTH] IN BLOOD BY AUTOMATED COUNT: 13 % (ref 11.6–15.1)
GFR SERPL CREATININE-BSD FRML MDRD: 107 ML/MIN/1.73SQ M
GLUCOSE SERPL-MCNC: 125 MG/DL (ref 65–140)
GLUCOSE SERPL-MCNC: 140 MG/DL (ref 65–140)
GLUCOSE SERPL-MCNC: 165 MG/DL (ref 65–140)
GLUCOSE SERPL-MCNC: 217 MG/DL (ref 65–140)
GLUCOSE SERPL-MCNC: 254 MG/DL (ref 65–140)
GLUCOSE SERPL-MCNC: 309 MG/DL (ref 65–140)
HCT VFR BLD AUTO: 53.5 % (ref 36.5–49.3)
HGB BLD-MCNC: 18.1 G/DL (ref 12–17)
IMM GRANULOCYTES # BLD AUTO: 0.06 THOUSAND/UL (ref 0–0.2)
IMM GRANULOCYTES NFR BLD AUTO: 1 % (ref 0–2)
LYMPHOCYTES # BLD AUTO: 1.3 THOUSANDS/ΜL (ref 0.6–4.47)
LYMPHOCYTES NFR BLD AUTO: 12 % (ref 14–44)
MAGNESIUM SERPL-MCNC: 1.9 MG/DL (ref 1.9–2.7)
MCH RBC QN AUTO: 28 PG (ref 26.8–34.3)
MCHC RBC AUTO-ENTMCNC: 33.8 G/DL (ref 31.4–37.4)
MCV RBC AUTO: 83 FL (ref 82–98)
MONOCYTES # BLD AUTO: 0.93 THOUSAND/ΜL (ref 0.17–1.22)
MONOCYTES NFR BLD AUTO: 9 % (ref 4–12)
NEUTROPHILS # BLD AUTO: 8.28 THOUSANDS/ΜL (ref 1.85–7.62)
NEUTS SEG NFR BLD AUTO: 76 % (ref 43–75)
NRBC BLD AUTO-RTO: 0 /100 WBCS
PHOSPHATE SERPL-MCNC: 2.4 MG/DL (ref 2.7–4.5)
PLATELET # BLD AUTO: 197 THOUSANDS/UL (ref 149–390)
PMV BLD AUTO: 9.9 FL (ref 8.9–12.7)
POTASSIUM SERPL-SCNC: 3.5 MMOL/L (ref 3.5–5.3)
RBC # BLD AUTO: 6.47 MILLION/UL (ref 3.88–5.62)
SODIUM SERPL-SCNC: 140 MMOL/L (ref 135–147)
WBC # BLD AUTO: 10.74 THOUSAND/UL (ref 4.31–10.16)

## 2024-09-13 PROCEDURE — 99232 SBSQ HOSP IP/OBS MODERATE 35: CPT | Performed by: INTERNAL MEDICINE

## 2024-09-13 PROCEDURE — 84100 ASSAY OF PHOSPHORUS: CPT

## 2024-09-13 PROCEDURE — 82948 REAGENT STRIP/BLOOD GLUCOSE: CPT

## 2024-09-13 PROCEDURE — 85025 COMPLETE CBC W/AUTO DIFF WBC: CPT

## 2024-09-13 PROCEDURE — 92526 ORAL FUNCTION THERAPY: CPT

## 2024-09-13 PROCEDURE — 83735 ASSAY OF MAGNESIUM: CPT

## 2024-09-13 PROCEDURE — 80048 BASIC METABOLIC PNL TOTAL CA: CPT

## 2024-09-13 PROCEDURE — 92507 TX SP LANG VOICE COMM INDIV: CPT

## 2024-09-13 RX ORDER — INSULIN LISPRO 100 [IU]/ML
2-12 INJECTION, SOLUTION INTRAVENOUS; SUBCUTANEOUS
Status: DISCONTINUED | OUTPATIENT
Start: 2024-09-13 | End: 2024-09-13

## 2024-09-13 RX ORDER — DOXAZOSIN 1 MG/1
4 TABLET ORAL DAILY
Status: DISCONTINUED | OUTPATIENT
Start: 2024-09-13 | End: 2024-09-20 | Stop reason: HOSPADM

## 2024-09-13 RX ORDER — HYDRALAZINE HYDROCHLORIDE 20 MG/ML
10 INJECTION INTRAMUSCULAR; INTRAVENOUS EVERY 6 HOURS PRN
Status: DISCONTINUED | OUTPATIENT
Start: 2024-09-13 | End: 2024-09-20 | Stop reason: HOSPADM

## 2024-09-13 RX ORDER — LABETALOL HYDROCHLORIDE 5 MG/ML
10 INJECTION, SOLUTION INTRAVENOUS EVERY 6 HOURS PRN
Status: DISCONTINUED | OUTPATIENT
Start: 2024-09-13 | End: 2024-09-20 | Stop reason: HOSPADM

## 2024-09-13 RX ORDER — CLONIDINE HYDROCHLORIDE 0.1 MG/1
0.2 TABLET ORAL EVERY 12 HOURS SCHEDULED
Status: DISCONTINUED | OUTPATIENT
Start: 2024-09-13 | End: 2024-09-13

## 2024-09-13 RX ORDER — HYDRALAZINE HYDROCHLORIDE 20 MG/ML
20 INJECTION INTRAMUSCULAR; INTRAVENOUS ONCE
Status: COMPLETED | OUTPATIENT
Start: 2024-09-13 | End: 2024-09-13

## 2024-09-13 RX ORDER — INSULIN GLARGINE 100 [IU]/ML
35 INJECTION, SOLUTION SUBCUTANEOUS
Status: DISCONTINUED | OUTPATIENT
Start: 2024-09-13 | End: 2024-09-20 | Stop reason: HOSPADM

## 2024-09-13 RX ORDER — MAGNESIUM SULFATE HEPTAHYDRATE 40 MG/ML
2 INJECTION, SOLUTION INTRAVENOUS ONCE
Status: COMPLETED | OUTPATIENT
Start: 2024-09-13 | End: 2024-09-13

## 2024-09-13 RX ORDER — INSULIN LISPRO 100 [IU]/ML
5-25 INJECTION, SOLUTION INTRAVENOUS; SUBCUTANEOUS
Status: DISCONTINUED | OUTPATIENT
Start: 2024-09-13 | End: 2024-09-20 | Stop reason: HOSPADM

## 2024-09-13 RX ORDER — ENOXAPARIN SODIUM 100 MG/ML
40 INJECTION SUBCUTANEOUS
Status: DISCONTINUED | OUTPATIENT
Start: 2024-09-13 | End: 2024-09-20 | Stop reason: HOSPADM

## 2024-09-13 RX ORDER — CARVEDILOL 12.5 MG/1
12.5 TABLET ORAL 2 TIMES DAILY WITH MEALS
Status: DISCONTINUED | OUTPATIENT
Start: 2024-09-14 | End: 2024-09-15

## 2024-09-13 RX ADMIN — CARVEDILOL 25 MG: 12.5 TABLET, FILM COATED ORAL at 07:58

## 2024-09-13 RX ADMIN — NICOTINE 1 PATCH: 21 PATCH, EXTENDED RELEASE TRANSDERMAL at 15:34

## 2024-09-13 RX ADMIN — ASPIRIN 81 MG: 81 TABLET, COATED ORAL at 08:01

## 2024-09-13 RX ADMIN — INSULIN LISPRO 8 UNITS: 100 INJECTION, SOLUTION INTRAVENOUS; SUBCUTANEOUS at 15:34

## 2024-09-13 RX ADMIN — MAGNESIUM SULFATE HEPTAHYDRATE 2 G: 40 INJECTION, SOLUTION INTRAVENOUS at 07:56

## 2024-09-13 RX ADMIN — DOXAZOSIN 4 MG: 1 TABLET ORAL at 11:44

## 2024-09-13 RX ADMIN — CLONIDINE HYDROCHLORIDE 0.2 MG: 0.1 TABLET ORAL at 08:02

## 2024-09-13 RX ADMIN — INSULIN LISPRO 5 UNITS: 100 INJECTION, SOLUTION INTRAVENOUS; SUBCUTANEOUS at 21:36

## 2024-09-13 RX ADMIN — PRAVASTATIN SODIUM 20 MG: 20 TABLET ORAL at 15:34

## 2024-09-13 RX ADMIN — AMLODIPINE BESYLATE 10 MG: 5 TABLET ORAL at 08:01

## 2024-09-13 RX ADMIN — CARVEDILOL 25 MG: 12.5 TABLET, FILM COATED ORAL at 15:34

## 2024-09-13 RX ADMIN — PANTOPRAZOLE SODIUM 40 MG: 40 TABLET, DELAYED RELEASE ORAL at 05:04

## 2024-09-13 RX ADMIN — SODIUM CHLORIDE 6 MG/HR: 0.9 INJECTION, SOLUTION INTRAVENOUS at 05:41

## 2024-09-13 RX ADMIN — HYDRALAZINE HYDROCHLORIDE 20 MG: 20 INJECTION INTRAMUSCULAR; INTRAVENOUS at 02:36

## 2024-09-13 RX ADMIN — INSULIN GLARGINE 35 UNITS: 100 INJECTION, SOLUTION SUBCUTANEOUS at 21:35

## 2024-09-13 RX ADMIN — POTASSIUM & SODIUM PHOSPHATES POWDER PACK 280-160-250 MG 2 PACKET: 280-160-250 PACK at 07:56

## 2024-09-13 RX ADMIN — INSULIN LISPRO 2 UNITS: 100 INJECTION, SOLUTION INTRAVENOUS; SUBCUTANEOUS at 12:10

## 2024-09-13 RX ADMIN — SODIUM CHLORIDE 3.5 MG/HR: 0.9 INJECTION, SOLUTION INTRAVENOUS at 00:53

## 2024-09-13 RX ADMIN — LISINOPRIL 40 MG: 20 TABLET ORAL at 08:01

## 2024-09-13 RX ADMIN — CLOPIDOGREL BISULFATE 75 MG: 75 TABLET ORAL at 08:01

## 2024-09-13 RX ADMIN — ENOXAPARIN SODIUM 40 MG: 40 INJECTION SUBCUTANEOUS at 11:45

## 2024-09-13 NOTE — SPEECH THERAPY NOTE
Speech Language/Pathology    Speech/Language Pathology Progress Note    Patient Name: Wilder Vargas  Today's Date: 9/13/2024     Problem List  Principal Problem:    Stroke-like symptom  Active Problems:    Type 2 diabetes mellitus with hyperglycemia (HCC)    Hypertensive emergency    Mixed hyperlipidemia    Tobacco use       Past Medical History  Past Medical History:   Diagnosis Date    CVA (cerebral vascular accident) (HCC)     Diabetes mellitus (HCC)     Hypertension         Past Surgical History  Past Surgical History:   Procedure Laterality Date    NO PAST SURGERIES         Updated History:  Pt last seen by SLP 9/12 for motor speech evaluation, last seen 9/11 for swallow evaluation recommending dysphagia 3/dental soft textures and thin liquids, meds as tolerated. Complete CXR ~3 days from the 11th to aid in determining diet tolerance.     Updated Imaging:  No new imaging.     AAO: person, place, date     Subjective:  Pt awake and alert upon arrival, meal tray of dysphagia 3/dental soft textures and thin liquids present. Pt agreeable to swallow re-evaluation at this time. S/w RN prior, stating no concerns at this time, pt has compensated for R sided weakness w/ use of a straw rather than use of cup.     Objective:  Materials administered: dysphagia 3/dental soft textures and thin liquids via straw given pt preference for compensation of R sided facial weakness/R sided anterior bolus loss    Complete labial seal for retrieval and containment of all materials (via tsp and straw sip), no anterior bolus loss present. Min prolonged rotary mastication of regular textures, ultimately functional. Complete bolus breakdown and adequate bolus transfer. Min diffuse oral residue noted, cleared w/ liquid wash. Suspected fairly prompt swallow initiation and fair hyolaryngeal elevation to palpation. No overt s/s of aspiration at this time.     Discussed recommendation of regular textures and thin liquids though suspect pt would  benefit from continued dysphagia 3/dental soft textures given functional use of L hand/arm for ADLs (ie: feeding) as the dental soft textures are pre-cut. Pt verbalized agreement, denies questions or concerns at this time.     Assessment:  Pt presents w/ min oral dysphagia, ultimately functional and suspected functional pharyngeal swallow skills characterized by descriptions above. SLP to return later this date for motor speech tx as able and appropriate, allow pt time to consume lunch tray at this time.     Plan/Recommendations:  Continued dysphagia 3/dental soft textures and thin liquids, allow for regular textured snacks   Meds as tolerated   Swallow strategies: upright posture, small bites/sips, alternate solids/liquids  Frequent/thorough oral care   ST to f/u for diet tolerance as able and appropriate as well as motor speech treatment

## 2024-09-13 NOTE — PROGRESS NOTES
Progress Note - Critical Care/ICU   Name: Wilder Vargas 59 y.o. male I MRN: [id number]  Unit/Bed#: -01 I Date of Admission: 9/10/2024   Date of Service: 9/13/2024 I Hospital Day: 3      Assessment & Plan  Stroke-like symptom  History of past CVAs (May and June 2020) with history of medication noncompliance.  Outpatient regimen: Plavix and pravastatin  NIHSS 7 (right facial weakness, RLE weakness, RUE ataxia, and moderate dysarthria )   9/10 CTH: Unremarkable for acute intracranial abnormalities. 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  TNK at 1346 ->improvement of symptoms with slight facial droop and 4/5 strength in RUE/RLL  At 1500 while in ED, patient's symptoms returned with severe right facial droop, garbled speech and 2/5 strength.  Repeat CTH pending read but no obvious bleed noted  Dr. Roberts notified who advised laying patient flat and starting IVF to optimize cerebral perfusion  9/10 Repeat CTH -- neg   9/10 MRI:  Acute infarct involving left thalamocapsular region. There is minimal FLAIR hyperintensity/edema without mass effect or hemorrhagic transformation. Chronic lacunar infarcts in the left thalamus, right corona radiata and right basal ganglia. Focus of old microhemorrhage in the right temporal lobe, likely on the basis of chronic hypertension. Right maxillary sinusitis.    Plan  Stroke Pathway  Cont statin, asa, plavix   CTH 24hr post TNK -- neg   PT/OT/Speech  Serial Neuro checks  Continue Nicardipine gtt for goal normotension   Neurology following  Hypertensive emergency  Outpatient regimen: 10-40mg amlodipine-benazepril, 25mg carvedilol, 4mg doxazosin  History on medication non-compliance  Presented to ED with -220s and started on Nicardipine gtt  2020 Renal US neg      Plan  9/11 home coreg restarted  9/12 norvasc, lisinopril restarted  Continue Nicardipine gtt with goal normotension   Trial of hydralazine 20 x1 IV overnight   May initiate scheduled hydralazine   May consider repeat renal US  Mixed hyperlipidemia  Outpatient regimen: 20 pravastatin  History of medication non-compliance    Plan  Hold while NPO  Type 2 diabetes mellitus with hyperglycemia (HCC)  Outpatient regimen: Jaurdiance and 30U Lantus    Plan  SSI achs for goal glucose 140-180 -- increase to alg 4 for improved glycemic control   Cont Lantus HS, but increase to 35  Hypoglycemia protocol  Check Hgb A1C  Tobacco use  Currently smokes 1-1.5 packs daily ->previously smoking >2 packs/daily  46 year history    Plan  Encourage smoke cessation  Nicotine patch  Disposition: Critical care    ICU Core Measures     A: Assess, Prevent, and Manage Pain Has pain been assessed? Yes  Need for changes to pain regimen? No   B: Both SAT/SAT  N/A   C: Choice of Sedation RASS Goal: N/A patient not on sedation  Need for changes to sedation or analgesia regimen? NA   D: Delirium CAM-ICU: Negative   E: Early Mobility  Plan for early mobility? Yes   F: Family Engagement Plan for family engagement today? Yes         Prophylaxis:  VTE Contraindicated secondary to: TNK   Stress Ulcer  covered bypantoprazole (PROTONIX) 40 mg tablet [[id number]] (Long-Term Med), pantoprazole (PROTONIX) EC tablet 40 mg [[id number]]         24 Hour Events   24hr events: Continues on Cardene despite addition of home antihypertensive regimen. Lantus and SSI alg increased for improved glycemic control.     Subjective   Review of Systems: See HPI for Review of Systems    Objective                          Vitals I/O      Most Recent Min/Max in 24hrs   Temp 98 °F (36.7 °C) Temp  Min: 97.9 °F (36.6 °C)  Max: 98.7 °F (37.1 °C)   Pulse 69 Pulse  Min: 53  Max: 102   Resp 22 Resp  Min: 16  Max: 31   /84 BP  Min: 131/80  Max: 227/113   O2 Sat 96 % SpO2  Min: 92 %   Max: 99 %      Intake/Output Summary (Last 24 hours) at 9/13/2024 0440  Last data filed at 9/13/2024 0211  Gross per 24 hour   Intake 2557.58 ml   Output 1700 ml   Net 857.58 ml       Diet Dysphagia/Modified Consistency; Dysphagia 3-Dental Soft; Thin Liquid; Consistent Carbohydrate Diet Level 2 (5 carb servings/75 grams CHO/meal), Sodium 2 GM    Invasive Monitoring           Physical Exam   Physical Exam  Vitals and nursing note reviewed.   HENT:      Head: Normocephalic and atraumatic.      Mouth/Throat:      Lips: Pink.      Mouth: Mucous membranes are moist.   Cardiovascular:      Rate and Rhythm: Normal rate and regular rhythm.   Musculoskeletal:      Right lower leg: No edema.      Left lower leg: No edema.   Abdominal: General: Abdomen is flat. Bowel sounds are decreased.      Palpations: Abdomen is soft.      Tenderness: There is no abdominal tenderness.   Constitutional:       General: He is awake. He is not in acute distress.     Appearance: He is well-developed. He is not ill-appearing.   Pulmonary:      Effort: Pulmonary effort is normal.      Breath sounds: Normal breath sounds.   Psychiatric:         Behavior: Behavior is cooperative.   Neurological:      Mental Status: He is alert.      Comments: AAO x3, in NAD, speech slurred   R sided facial droop   No ataxia of LUE, unable to assess RUE 2/2 weakness   Shoulder shrugs L 5/5, R 3/5  Hand grasps L 5/5, R 1/5  Arm raises L 5/5, R 0/5  UE push/pull L 5/5, R 0/5  Plantar/dorsi flexion L 5/5, R 3/5  Leg lifts L 5/5, R 3/5     Genitourinary/Anorectal:     Comments: Voiding independently          Diagnostic Studies        Lab Results: I have reviewed the following results: CBC/BMP:   No new results in last 24 hours.        Medications:  Scheduled PRN   amLODIPine, 10 mg, Daily   And  lisinopril, 40 mg, Daily  aspirin, 81 mg, Daily  carvedilol, 25 mg, BID With Meals  clopidogrel, 75 mg, Daily  insulin glargine, 35 Units, HS  insulin lispro, 2-12 Units,  HS  insulin lispro, 2-12 Units, TID AC  nicotine, 1 patch, Q24H  pantoprazole, 40 mg, Early Morning  pravastatin, 20 mg, Daily With Dinner          Continuous    niCARdipine, 1-15 mg/hr, Last Rate: 6 mg/hr (09/13/24 0102)         Labs:   CBC    Recent Labs     09/12/24 0437   WBC 8.96   HGB 17.9*   HCT 52.8*        BMP    Recent Labs     09/12/24 0437   SODIUM 136   K 3.2*      CO2 24   AGAP 9   BUN 12   CREATININE 0.64   CALCIUM 8.6       Coags    No recent results     Additional Electrolytes  Recent Labs     09/12/24  0437   MG 1.8*          Blood Gas    No recent results  No recent results LFTs  No recent results    Infectious  No recent results  Glucose  Recent Labs     09/12/24 0437   GLUC 141*

## 2024-09-13 NOTE — ARC ADMISSION
[facility]  spoke with patients mother via phone, Introduced self, explained role, reviewed [facility] program, services offered, acute rehab criteria, review of referral by [facility] Medical Director, insurance authorization process, three [facility] locations and anticipated rehab length of stay. All questions were answered. Patient's mother's  first choice is [facility],  second choice [facility]. Patient's mother was made aware [facility] Reviewer will communicate with their Care Manager who will keep patient updated on referral status.

## 2024-09-13 NOTE — PLAN OF CARE
Problem: PAIN - ADULT  Goal: Verbalizes/displays adequate comfort level or baseline comfort level  Description: Interventions:  - Encourage patient to monitor pain and request assistance  - Assess pain using appropriate pain scale  - Administer analgesics based on type and severity of pain and evaluate response  - Implement non-pharmacological measures as appropriate and evaluate response  - Consider cultural and social influences on pain and pain management  - Notify physician/advanced practitioner if interventions unsuccessful or patient reports new pain  Outcome: Progressing     Problem: INFECTION - ADULT  Goal: Absence or prevention of progression during hospitalization  Description: INTERVENTIONS:  - Assess and monitor for signs and symptoms of infection  - Monitor lab/diagnostic results  - Monitor all insertion sites, i.e. indwelling lines, tubes, and drains  - Monitor endotracheal if appropriate and nasal secretions for changes in amount and color  - Brush appropriate cooling/warming therapies per order  - Administer medications as ordered  - Instruct and encourage patient and family to use good hand hygiene technique  - Identify and instruct in appropriate isolation precautions for identified infection/condition  Outcome: Progressing  Goal: Absence of fever/infection during neutropenic period  Description: INTERVENTIONS:  - Monitor WBC    Outcome: Progressing     Problem: Neurological Deficit  Goal: Neurological status is stable or improving  Description: Interventions:  - Monitor and assess patient's level of consciousness, motor function, sensory function, and level of assistance needed for ADLs.   - Monitor and report changes from baseline. Collaborate with interdisciplinary team to initiate plan and implement interventions as ordered.   - Provide and maintain a safe environment.  - Consider seizure precautions.  - Consider fall precautions.  - Consider aspiration precautions.  - Consider bleeding  precautions.  Outcome: Progressing     Problem: Nutrition  Goal: Nutrition/Hydration status is improving  Description: Monitor and assess patient's nutrition/hydration status for malnutrition (ex- brittle hair, bruises, dry skin, pale skin and conjunctiva, muscle wasting, smooth red tongue, and disorientation). Collaborate with interdisciplinary team and initiate plan and interventions as ordered.  Monitor patient's weight and dietary intake as ordered or per policy. Utilize nutrition screening tool and intervene per policy. Determine patient's food preferences and provide high-protein, high-caloric foods as appropriate.     - Assist patient with eating.  - Allow adequate time for meals.  - Encourage patient to take dietary supplement as ordered.  - Collaborate with clinical nutritionist.  - Include patient/family/caregiver in decisions related to nutrition.  Outcome: Progressing

## 2024-09-13 NOTE — SPEECH THERAPY NOTE
"Speech Language/Pathology    Speech/Language Pathology Progress Note    Patient Name: Wilder Vargas  Today's Date: 9/13/2024     Problem List  Principal Problem:    Stroke-like symptom  Active Problems:    Type 2 diabetes mellitus with hyperglycemia (HCC)    Hypertensive emergency    Mixed hyperlipidemia    Tobacco use       Past Medical History  Past Medical History:   Diagnosis Date    CVA (cerebral vascular accident) (HCC)     Diabetes mellitus (HCC)     Hypertension         Past Surgical History  Past Surgical History:   Procedure Laterality Date    NO PAST SURGERIES         Updated History:  Last seen by SLP earlier this AM 9/13 for swallowing re-evaluation    Updated Imaging:  No new imaging.     Subjective:  Pt asleep upon arrival, agreeable to speech/language treatment, OOB in chair. Pt reports feeling as though his speech is \"about 50%\" comparable to his baseline.     Objective:  Given word length verbal/visual stimuli, pt will utilize strategies (ie: over-articulation, slower speech production, and utilizing an appropriate speaking volume) to increase speech intelligibility to to 70% across 3 consecutive therapy sessions at the acute care level.   -introduced strategies to pt this date, will continue education though pt required multiple verbal cues for use of over articulation, slower rate of speech and increased vocal volume to aid in increased speech intelligibility.      Given verbal/visual stimuli, pt will orally read or repeat mono through multi-syllabic words containing fricatives with 70% accuracy across 3 consecutive therapy sessions at the acute care level. (Revised: Given verbal/visual stimuli, pt will orally read or repeat mono through multi-syllabic words containing fricatives with 90% accuracy across 3 consecutive therapy sessions at the acute care level.)  -9/10 (90%) independently, increased to 10/10 (100%) given verbal cues for monosyllabic words   -7/10 (70%) independently, increased to " 10/10 (100%) given verbal cues for multi-syllabic words      Given verbal/visual stimuli, pt will orally read or repeat mono through multi-syllabic words containing affricates with 70% accuracy across 3 consecutive therapy sessions at the acute care level. (Revised: Given verbal/visual stimuli, pt will orally read or repeat mono through multi-syllabic words containing affricates with 90% accuracy across 3 consecutive therapy sessions at the acute care level.)  -10/14 (71%) independently, increased to 12/14 (85.7%) given verbal cues for monosyllabic words  -10/14 (71%) independently, increased to 14/14 (100%) given verbal cues for multi-syllabic words     Given verbal/visual stimuli, pt will orally read or repeat mono through multi-syllabic words containing glides with 70% accuracy across 3 consecutive therapy sessions at the acute care level.   -9/10 (90%) independently, increased to 10/10 (100%) given verbal cues for monosyllabic words   -7/10 (70%) independently, increased to 10/10 (100%0 given verbal cues for multi-syllabic words      Patient will complete daily oral motor exercise to increase labial strength, range of motion and coordination with min cues to 90% effectiveness to strengthen oral musculature and aid in increasing speech intelligibility   -SLP introduced oral motor exercises to pt, provided handout with explanations on completion. SLP demonstrated exercises to the pt in order to aid in understanding, continue to work on during further tx sessions.     Pt will utilize incentive spirometer to aid in breath support increasing vowel prolongation to 6 seconds across one tx session at the acute care level.   -Pt utilized the incentive spirometer x10 reaching 875 each time, vowel prolongation of 4.5 seconds this date.     Assessment:  Pt presents w/ mild-mod dysarthria characterized by R-sided weakness w/ imprecise articulation of fricatives, affricates, and glides at the single, multi-syllablic and  sentence level production.      Plan/Recommendations:  Continue use of oral motor exercises  Encourage use of over-articulation, slowed rate of speech, and increased vocal volume  Continue use of incentive spirometer   ST to f/u as able and appropriate for speech/language as well as dysphagia tx

## 2024-09-13 NOTE — ASSESSMENT & PLAN NOTE
Outpatient regimen: 10-40mg amlodipine-benazepril, 25mg carvedilol, 4mg doxazosin  History on medication non-compliance  Presented to ED with -220s and started on Nicardipine gtt  2020 Renal US neg      Plan  9/11 home coreg restarted  9/12 norvasc, lisinopril restarted  9/13 Cardura started  9/14 Coreg started  May consider repeat renal US, follow up metanephrines

## 2024-09-13 NOTE — ASSESSMENT & PLAN NOTE
Outpatient regimen: Jaurdiance and 30U Lantus    Plan  SSI achs for goal glucose 140-180 -- increase to alg 4 for improved glycemic control   Cont Lantus HS, but increase to 35  Hypoglycemia protocol  Check Hgb A1C

## 2024-09-13 NOTE — ASSESSMENT & PLAN NOTE
History of past CVAs (May and June 2020) with history of medication noncompliance.  Outpatient regimen: Plavix and pravastatin  NIHSS 7 (right facial weakness, RLE weakness, RUE ataxia, and moderate dysarthria )   9/10 CTH: Unremarkable for acute intracranial abnormalities. 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  TNK at 1346 ->improvement of symptoms with slight facial droop and 4/5 strength in RUE/RLL  At 1500 while in ED, patient's symptoms returned with severe right facial droop, garbled speech and 2/5 strength.  Repeat CTH pending read but no obvious bleed noted  Dr. Roberts notified who advised laying patient flat and starting IVF to optimize cerebral perfusion  9/10 Repeat CTH -- neg   9/10 MRI:  Acute infarct involving left thalamocapsular region. There is minimal FLAIR hyperintensity/edema without mass effect or hemorrhagic transformation. Chronic lacunar infarcts in the left thalamus, right corona radiata and right basal ganglia. Focus of old microhemorrhage in the right temporal lobe, likely on the basis of chronic hypertension. Right maxillary sinusitis.    Plan  Stroke Pathway  Cont statin, asa, plavix   CTH 24hr post TNK -- neg   PT/OT/Speech  Serial Neuro checks  Continue Nicardipine gtt for goal normotension   Neurology following

## 2024-09-13 NOTE — ASSESSMENT & PLAN NOTE
History of past CVAs (May and June 2020) with history of medication noncompliance.  Outpatient regimen: Plavix and pravastatin  NIHSS 7 (right facial weakness, RLE weakness, RUE ataxia, and moderate dysarthria )   9/10 CTH: Unremarkable for acute intracranial abnormalities. 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  TNK at 1346 ->improvement of symptoms with slight facial droop and 4/5 strength in RUE/RLL  At 1500 while in ED, patient's symptoms returned with severe right facial droop, garbled speech and 2/5 strength.  Repeat CTH pending read but no obvious bleed noted  Dr. Roberts notified who advised laying patient flat and starting IVF to optimize cerebral perfusion  9/10 Repeat CTH -- neg   9/10 MRI:  Acute infarct involving left thalamocapsular region. There is minimal FLAIR hyperintensity/edema without mass effect or hemorrhagic transformation. Chronic lacunar infarcts in the left thalamus, right corona radiata and right basal ganglia. Focus of old microhemorrhage in the right temporal lobe, likely on the basis of chronic hypertension. Right maxillary sinusitis.     Plan  Stroke Pathway  Cont statin, asa, plavix   CTH 24hr post TNK -- neg   PT/OT/Speech  Serial Neuro checks  Neurology following

## 2024-09-13 NOTE — QUICK NOTE
Critical Care Transfer Note:     [  ] 59 YOM with PMHx of HTN, previous CVA presenting to [facility] ED with stroke like symptoms, right hemiplegia. S/p TNK on admission. Noted to have new left thalamocapsular infarct  [  ]With hypertensive emergency, s/p cardene infusion. Continuing Amlodipine, Lisinopril, Coreg, Cardura    Pending:  [  ] Consider transition to clonidine if needed, s/p 1 dose prior to cardura  [  ] Urine metanephrines pending  [  ] With polycythemia, trending cell counts. May benefit from hem consult    Spoke with Dr. Sheeba Lofton regarding transfer to [facility] as Med Surg Tele    KRYSTAL Taylor

## 2024-09-13 NOTE — ASSESSMENT & PLAN NOTE
Lab Results   Component Value Date    HGBA1C 9.2 (H) 09/10/2024       Recent Labs     09/13/24  1657 09/13/24  2116 09/14/24  0805 09/14/24  1122   POCGLU 254* 165* 114 202*     Outpatient regimen: Jaurdiance and 30U Lantus     Plan  SSI achs for goal glucose 140-180 Alg 4  Cont Lantus HS  Hypoglycemia protocol  Check Hgb A1C

## 2024-09-13 NOTE — PLAN OF CARE
Problem: Potential for Falls  Goal: Patient will remain free of falls  Description: INTERVENTIONS:  - Educate patient/family on patient safety including physical limitations  - Instruct patient to call for assistance with activity   - Consult OT/PT to assist with strengthening/mobility   - Keep Call bell within reach  - Keep bed low and locked with side rails adjusted as appropriate  - Keep care items and personal belongings within reach  - Initiate and maintain comfort rounds  - Make Fall Risk Sign visible to staff  - Apply yellow socks and bracelet for high fall risk patients  - Consider moving patient to room near nurses station  Outcome: Progressing     Problem: PAIN - ADULT  Goal: Verbalizes/displays adequate comfort level or baseline comfort level  Description: Interventions:  - Encourage patient to monitor pain and request assistance  - Assess pain using appropriate pain scale  - Administer analgesics based on type and severity of pain and evaluate response  - Implement non-pharmacological measures as appropriate and evaluate response  - Consider cultural and social influences on pain and pain management  - Notify physician/advanced practitioner if interventions unsuccessful or patient reports new pain  Outcome: Progressing     Problem: INFECTION - ADULT  Goal: Absence or prevention of progression during hospitalization  Description: INTERVENTIONS:  - Assess and monitor for signs and symptoms of infection  - Monitor lab/diagnostic results  - Monitor all insertion sites, i.e. indwelling lines, tubes, and drains  - Monitor endotracheal if appropriate and nasal secretions for changes in amount and color  - [place] appropriate cooling/warming therapies per order  - Administer medications as ordered  - Instruct and encourage patient and family to use good hand hygiene technique  - Identify and instruct in appropriate isolation precautions for identified infection/condition  Outcome: Progressing  Goal: Absence  of fever/infection during neutropenic period  Description: INTERVENTIONS:  - Monitor WBC    Outcome: Progressing     Problem: SAFETY ADULT  Goal: Patient will remain free of falls  Description: INTERVENTIONS:  - Educate patient/family on patient safety including physical limitations  - Instruct patient to call for assistance with activity   - Consult OT/PT to assist with strengthening/mobility   - Keep Call bell within reach  - Keep bed low and locked with side rails adjusted as appropriate  - Keep care items and personal belongings within reach  - Initiate and maintain comfort rounds  - Make Fall Risk Sign visible to staff  - Apply yellow socks and bracelet for high fall risk patients  - Consider moving patient to room near nurses station  Outcome: Progressing  Goal: Maintain or return to baseline ADL function  Description: INTERVENTIONS:  -  Assess patient's ability to carry out ADLs; assess patient's baseline for ADL function and identify physical deficits which impact ability to perform ADLs (bathing, care of mouth/teeth, toileting, grooming, dressing, etc.)  - Assess/evaluate cause of self-care deficits   - Assess range of motion  - Assess patient's mobility; develop plan if impaired  - Assess patient's need for assistive devices and provide as appropriate  - Encourage maximum independence but intervene and supervise when necessary  - Involve family in performance of ADLs  - Assess for home care needs following discharge   - Consider OT consult to assist with ADL evaluation and planning for discharge  - Provide patient education as appropriate  Outcome: Progressing  Goal: Maintains/Returns to pre admission functional level  Description: INTERVENTIONS:  - Perform AM-PAC 6 Click Basic Mobility/ Daily Activity assessment daily.  - Set and communicate daily mobility goal to care team and patient/family/caregiver.   - Collaborate with rehabilitation services on mobility goals if consulted  - Out of bed for  toileting  - Record patient progress and toleration of activity level   Outcome: Progressing     Problem: DISCHARGE PLANNING  Goal: Discharge to home or other facility with appropriate resources  Description: INTERVENTIONS:  - Identify barriers to discharge w/patient and caregiver  - Arrange for needed discharge resources and transportation as appropriate  - Identify discharge learning needs (meds, wound care, etc.)  - Arrange for interpretive services to assist at discharge as needed  - Refer to Case Management Department for coordinating discharge planning if the patient needs post-hospital services based on physician/advanced practitioner order or complex needs related to functional status, cognitive ability, or social support system  Outcome: Progressing     Problem: CARDIOVASCULAR - ADULT  Goal: Maintains optimal cardiac output and hemodynamic stability  Description: INTERVENTIONS:  - Monitor I/O, vital signs and rhythm  - Monitor for S/S and trends of decreased cardiac output  - Administer and titrate ordered vasoactive medications to optimize hemodynamic stability  - Assess quality of pulses, skin color and temperature  - Assess for signs of decreased coronary artery perfusion  - Instruct patient to report change in severity of symptoms  Outcome: Progressing  Goal: Absence of cardiac dysrhythmias or at baseline rhythm  Description: INTERVENTIONS:  - Continuous cardiac monitoring, vital signs, obtain 12 lead EKG if ordered  - Administer antiarrhythmic and heart rate control medications as ordered  - Monitor electrolytes and administer replacement therapy as ordered  Outcome: Progressing     Problem: Neurological Deficit  Goal: Neurological status is stable or improving  Description: Interventions:  - Monitor and assess patient's level of consciousness, motor function, sensory function, and level of assistance needed for ADLs.   - Monitor and report changes from baseline. Collaborate with interdisciplinary team  to initiate plan and implement interventions as ordered.   - Provide and maintain a safe environment.  - Consider seizure precautions.  - Consider fall precautions.  - Consider aspiration precautions.  - Consider bleeding precautions.  Outcome: Progressing     Problem: Activity Intolerance/Impaired Mobility  Goal: Mobility/activity is maintained at optimum level for patient  Description: Interventions:  - Assess and monitor patient  barriers to mobility and need for assistive/adaptive devices.  - Assess patient's emotional response to limitations.  - Collaborate with interdisciplinary team and initiate plans and interventions as ordered.  - Encourage independent activity per ability.  - Maintain proper body alignment.  - Perform active/passive rom as tolerated/ordered.  - Plan activities to conserve energy.  - Turn patient as appropriate  Outcome: Progressing     Problem: Communication Impairment  Goal: Ability to express needs and understand communication  Description: Assess patient's communication skills and ability to understand information.  Patient will demonstrate use of effective communication techniques, alternative methods of communication and understanding even if not able to speak.     - Encourage communication and provide alternate methods of communication as needed.  - Collaborate with case management/ for discharge needs.  - Include patient/family/caregiver in decisions related to communication.  Outcome: Progressing     Problem: Potential for Aspiration  Goal: Non-ventilated patient's risk of aspiration is minimized  Description: Assess and monitor vital signs, respiratory status, and labs (WBC).  Monitor for signs of aspiration (tachypnea, cough, rales, wheezing, cyanosis, fever).    - Assess and monitor patient's ability to swallow.  - Place patient up in chair to eat if possible.  - HOB up at 90 degrees to eat if unable to get patient up into chair.  - Supervise patient during oral  intake.   - Instruct patient/ family to take small bites.  - Instruct patient/ family to take small single sips when taking liquids.  - Follow patient-specific strategies generated by speech pathologist.  Outcome: Progressing  Goal: Ventilated patient's risk of aspiration is minimized  Description: Assess and monitor vital signs, respiratory status, airway cuff pressure, and labs (WBC).  Monitor for signs of aspiration (tachypnea, cough, rales, wheezing, cyanosis, fever).    - Elevate head of bed 30 degrees if patient has tube feeding.  - Monitor tube feeding.  Outcome: Progressing     Problem: Nutrition  Goal: Nutrition/Hydration status is improving  Description: Monitor and assess patient's nutrition/hydration status for malnutrition (ex- brittle hair, bruises, dry skin, pale skin and conjunctiva, muscle wasting, smooth red tongue, and disorientation). Collaborate with interdisciplinary team and initiate plan and interventions as ordered.  Monitor patient's weight and dietary intake as ordered or per policy. Utilize nutrition screening tool and intervene per policy. Determine patient's food preferences and provide high-protein, high-caloric foods as appropriate.     - Assist patient with eating.  - Allow adequate time for meals.  - Encourage patient to take dietary supplement as ordered.  - Collaborate with clinical nutritionist.  - Include patient/family/caregiver in decisions related to nutrition.  Outcome: Progressing     Problem: Prexisting or High Potential for Compromised Skin Integrity  Goal: Skin integrity is maintained or improved  Description: INTERVENTIONS:  - Identify patients at risk for skin breakdown  - Assess and monitor skin integrity  - Assess and monitor nutrition and hydration status  - Monitor labs   - Assess for incontinence   - Turn and reposition patient  - Assist with mobility/ambulation  - Relieve pressure over bony prominences  - Avoid friction and shearing  - Provide appropriate hygiene  as needed including keeping skin clean and dry  - Evaluate need for skin moisturizer/barrier cream  - Collaborate with interdisciplinary team   - Patient/family teaching  - Consider wound care consult   Outcome: Progressing

## 2024-09-13 NOTE — ASSESSMENT & PLAN NOTE
Outpatient regimen: 10-40mg amlodipine-benazepril, 25mg carvedilol, 4mg doxazosin  History on medication non-compliance  Presented to ED with -220s and started on Nicardipine gtt  2020 Renal US neg     Plan  9/11 home coreg restarted  9/12 norvasc, lisinopril restarted  Continue Nicardipine gtt with goal normotension   Trial of hydralazine 20 x1 IV overnight   May initiate scheduled hydralazine   May consider repeat renal US

## 2024-09-14 LAB
ANION GAP SERPL CALCULATED.3IONS-SCNC: 7 MMOL/L (ref 4–13)
BASOPHILS # BLD AUTO: 0.05 THOUSANDS/ΜL (ref 0–0.1)
BASOPHILS NFR BLD AUTO: 1 % (ref 0–1)
BUN SERPL-MCNC: 17 MG/DL (ref 5–25)
CALCIUM SERPL-MCNC: 8.4 MG/DL (ref 8.4–10.2)
CHLORIDE SERPL-SCNC: 105 MMOL/L (ref 96–108)
CO2 SERPL-SCNC: 25 MMOL/L (ref 21–32)
CREAT SERPL-MCNC: 0.75 MG/DL (ref 0.6–1.3)
EOSINOPHIL # BLD AUTO: 0.13 THOUSAND/ΜL (ref 0–0.61)
EOSINOPHIL NFR BLD AUTO: 2 % (ref 0–6)
ERYTHROCYTE [DISTWIDTH] IN BLOOD BY AUTOMATED COUNT: 12.8 % (ref 11.6–15.1)
GFR SERPL CREATININE-BSD FRML MDRD: 100 ML/MIN/1.73SQ M
GLUCOSE SERPL-MCNC: 114 MG/DL (ref 65–140)
GLUCOSE SERPL-MCNC: 124 MG/DL (ref 65–140)
GLUCOSE SERPL-MCNC: 175 MG/DL (ref 65–140)
GLUCOSE SERPL-MCNC: 202 MG/DL (ref 65–140)
GLUCOSE SERPL-MCNC: 98 MG/DL (ref 65–140)
HCT VFR BLD AUTO: 48.5 % (ref 36.5–49.3)
HGB BLD-MCNC: 16.3 G/DL (ref 12–17)
IMM GRANULOCYTES # BLD AUTO: 0.03 THOUSAND/UL (ref 0–0.2)
IMM GRANULOCYTES NFR BLD AUTO: 0 % (ref 0–2)
LYMPHOCYTES # BLD AUTO: 1.51 THOUSANDS/ΜL (ref 0.6–4.47)
LYMPHOCYTES NFR BLD AUTO: 20 % (ref 14–44)
MCH RBC QN AUTO: 28.2 PG (ref 26.8–34.3)
MCHC RBC AUTO-ENTMCNC: 33.6 G/DL (ref 31.4–37.4)
MCV RBC AUTO: 84 FL (ref 82–98)
MONOCYTES # BLD AUTO: 0.83 THOUSAND/ΜL (ref 0.17–1.22)
MONOCYTES NFR BLD AUTO: 11 % (ref 4–12)
NEUTROPHILS # BLD AUTO: 4.96 THOUSANDS/ΜL (ref 1.85–7.62)
NEUTS SEG NFR BLD AUTO: 66 % (ref 43–75)
NRBC BLD AUTO-RTO: 0 /100 WBCS
PHOSPHATE SERPL-MCNC: 3.8 MG/DL (ref 2.7–4.5)
PLATELET # BLD AUTO: 177 THOUSANDS/UL (ref 149–390)
PMV BLD AUTO: 11.2 FL (ref 8.9–12.7)
POTASSIUM SERPL-SCNC: 3.3 MMOL/L (ref 3.5–5.3)
RBC # BLD AUTO: 5.78 MILLION/UL (ref 3.88–5.62)
SODIUM SERPL-SCNC: 137 MMOL/L (ref 135–147)
WBC # BLD AUTO: 7.51 THOUSAND/UL (ref 4.31–10.16)

## 2024-09-14 PROCEDURE — 84100 ASSAY OF PHOSPHORUS: CPT | Performed by: INTERNAL MEDICINE

## 2024-09-14 PROCEDURE — 83835 ASSAY OF METANEPHRINES: CPT | Performed by: INTERNAL MEDICINE

## 2024-09-14 PROCEDURE — 85025 COMPLETE CBC W/AUTO DIFF WBC: CPT | Performed by: INTERNAL MEDICINE

## 2024-09-14 PROCEDURE — 80048 BASIC METABOLIC PNL TOTAL CA: CPT | Performed by: INTERNAL MEDICINE

## 2024-09-14 PROCEDURE — 82948 REAGENT STRIP/BLOOD GLUCOSE: CPT

## 2024-09-14 PROCEDURE — 99232 SBSQ HOSP IP/OBS MODERATE 35: CPT

## 2024-09-14 PROCEDURE — 97535 SELF CARE MNGMENT TRAINING: CPT

## 2024-09-14 RX ORDER — POTASSIUM CHLORIDE 1500 MG/1
40 TABLET, EXTENDED RELEASE ORAL ONCE
Status: COMPLETED | OUTPATIENT
Start: 2024-09-14 | End: 2024-09-14

## 2024-09-14 RX ADMIN — ASPIRIN 81 MG: 81 TABLET, COATED ORAL at 09:26

## 2024-09-14 RX ADMIN — LABETALOL HYDROCHLORIDE 10 MG: 5 INJECTION, SOLUTION INTRAVENOUS at 20:32

## 2024-09-14 RX ADMIN — AMLODIPINE BESYLATE 10 MG: 5 TABLET ORAL at 09:26

## 2024-09-14 RX ADMIN — PANTOPRAZOLE SODIUM 40 MG: 40 TABLET, DELAYED RELEASE ORAL at 05:16

## 2024-09-14 RX ADMIN — INSULIN GLARGINE 35 UNITS: 100 INJECTION, SOLUTION SUBCUTANEOUS at 21:22

## 2024-09-14 RX ADMIN — PRAVASTATIN SODIUM 20 MG: 20 TABLET ORAL at 17:33

## 2024-09-14 RX ADMIN — NICOTINE 1 PATCH: 21 PATCH, EXTENDED RELEASE TRANSDERMAL at 15:34

## 2024-09-14 RX ADMIN — LISINOPRIL 40 MG: 20 TABLET ORAL at 09:26

## 2024-09-14 RX ADMIN — POTASSIUM CHLORIDE 40 MEQ: 1500 TABLET, EXTENDED RELEASE ORAL at 12:32

## 2024-09-14 RX ADMIN — CLOPIDOGREL BISULFATE 75 MG: 75 TABLET ORAL at 09:26

## 2024-09-14 RX ADMIN — ENOXAPARIN SODIUM 40 MG: 40 INJECTION SUBCUTANEOUS at 09:26

## 2024-09-14 RX ADMIN — DOXAZOSIN 4 MG: 1 TABLET ORAL at 09:25

## 2024-09-14 RX ADMIN — INSULIN LISPRO 5 UNITS: 100 INJECTION, SOLUTION INTRAVENOUS; SUBCUTANEOUS at 21:20

## 2024-09-14 RX ADMIN — INSULIN LISPRO 10 UNITS: 100 INJECTION, SOLUTION INTRAVENOUS; SUBCUTANEOUS at 12:32

## 2024-09-14 RX ADMIN — CARVEDILOL 12.5 MG: 12.5 TABLET, FILM COATED ORAL at 17:33

## 2024-09-14 RX ADMIN — CARVEDILOL 12.5 MG: 12.5 TABLET, FILM COATED ORAL at 09:26

## 2024-09-14 RX ADMIN — HYDRALAZINE HYDROCHLORIDE 10 MG: 20 INJECTION INTRAMUSCULAR; INTRAVENOUS at 15:33

## 2024-09-14 NOTE — PROGRESS NOTES
Progress Note - Hospitalist   Name: Wilder Vargas 59 y.o. male I MRN: [id number]  Unit/Bed#: -[facility] I Date of Admission: 9/10/2024   Date of Service: 9/14/2024 I Hospital Day: 4    Assessment & Plan  Stroke-like symptom  History of past CVAs (May and June 2020) with history of medication noncompliance.  Outpatient regimen: Plavix and pravastatin  NIHSS 7 (right facial weakness, RLE weakness, RUE ataxia, and moderate dysarthria )   9/10 CTH: Unremarkable for acute intracranial abnormalities. 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  TNK at 1346 ->improvement of symptoms with slight facial droop and 4/5 strength in RUE/RLL  At 1500 while in ED, patient's symptoms returned with severe right facial droop, garbled speech and 2/5 strength.  Repeat CTH pending read but no obvious bleed noted  Dr. Roberts notified who advised laying patient flat and starting IVF to optimize cerebral perfusion  9/10 Repeat CTH -- neg   9/10 MRI:  Acute infarct involving left thalamocapsular region. There is minimal FLAIR hyperintensity/edema without mass effect or hemorrhagic transformation. Chronic lacunar infarcts in the left thalamus, right corona radiata and right basal ganglia. Focus of old microhemorrhage in the right temporal lobe, likely on the basis of chronic hypertension. Right maxillary sinusitis.     Plan  Stroke Pathway  Cont statin, asa, plavix   CTH 24hr post TNK -- neg   PT/OT/Speech  Serial Neuro checks  Neurology following  Type 2 diabetes mellitus with hyperglycemia (HCC)  Lab Results   Component Value Date    HGBA1C 9.2 (H) 09/10/2024       Recent Labs     09/13/24  1657 09/13/24  2116 09/14/24  0805 09/14/24  1122   POCGLU 254* 165* 114 202*     Outpatient regimen: Jaurdiance and 30U Lantus     Plan  SSI achs for  goal glucose 140-180 Alg 4  Cont Lantus HS  Hypoglycemia protocol  Check Hgb A1C  Hypertensive emergency  Outpatient regimen: 10-40mg amlodipine-benazepril, 25mg carvedilol, 4mg doxazosin  History on medication non-compliance  Presented to ED with -220s and started on Nicardipine gtt   Renal US neg      Plan   home coreg restarted   norvasc, lisinopril restarted   Cardura started   Coreg started  May consider repeat renal US, follow up metanephrines  Mixed hyperlipidemia  Outpatient regimen: 20 pravastatin  History of medication non-compliance  Tobacco use  Currently smokes 1-1.5 packs daily ->previously smoking >2 packs/daily  46 year history     Plan  Encourage smoke cessation  Nicotine patch    VTE Pharmacologic Prophylaxis:   Moderate Risk (Score 3-4) - Pharmacological DVT Prophylaxis Ordered: enoxaparin (Lovenox).    Mobility:   Basic Mobility Inpatient Raw Score: 12  JH-HLM Goal: 4: Move to chair/commode  JH-HLM Achieved: 4: Move to chair/commode  JH-HLM Goal achieved. Continue to encourage appropriate mobility.    Patient Centered Rounds: I performed bedside rounds with nursing staff today.   Discussions with Specialists or Other Care Team Provider: N/A    Education and Discussions with Family / Patient: Updated  (daughter) at bedside.    Current Length of Stay: 4 day(s)  Current Patient Status: Inpatient   Certification Statement: The patient will continue to require additional inpatient hospital stay due to HTN  Discharge Plan: Anticipate discharge in 24-48 hrs to rehab facility.    Code Status: Level 1 - Full Code    Subjective   Patient has no complaints.    Objective     Vitals:   Temp (24hrs), Av.9 °F (36.6 °C), Min:97.6 °F (36.4 °C), Max:98.1 °F (36.7 °C)    Temp:  [97.6 °F (36.4 °C)-98.1 °F (36.7 °C)] 97.9 °F (36.6 °C)  HR:  [52-75] 66  Resp:  [16-26] 16  BP: (125-165)/(76-97) 165/92  SpO2:  [94 %-98 %] 96 %  Body mass index is 26.82 kg/m².     Input and  Output Summary (last 24 hours):     Intake/Output Summary (Last 24 hours) at 9/14/2024 1154  Last data filed at 9/14/2024 1129  Gross per 24 hour   Intake 2617.08 ml   Output 1625 ml   Net 992.08 ml       Physical Exam  Vitals reviewed.   Constitutional:       Appearance: Normal appearance.   HENT:      Head: Normocephalic and atraumatic.      Mouth/Throat:      Mouth: Mucous membranes are moist.      Pharynx: Oropharynx is clear.   Cardiovascular:      Rate and Rhythm: Normal rate and regular rhythm.   Pulmonary:      Effort: Pulmonary effort is normal.      Breath sounds: Normal breath sounds.   Abdominal:      General: Abdomen is flat. Bowel sounds are normal. There is no distension.   Musculoskeletal:      Right lower leg: No edema.      Left lower leg: No edema.   Skin:     General: Skin is warm and dry.   Neurological:      Mental Status: He is alert. Mental status is at baseline.      Comments: RUE and RLE 0/5   Psychiatric:         Mood and Affect: Mood normal.         Behavior: Behavior normal.          Lines/Drains:  Lines/Drains/Airways       Active Status       None                      Telemetry:  Telemetry Orders (From admission, onward)               24 Hour Telemetry Monitoring  Continuous x 24 Hours (Telem)        Expiring   Question:  Reason for 24 Hour Telemetry  Answer:  TIA/Suspected CVA/ Confirmed CVA                     Telemetry Reviewed: Sinus Bradycardia  Indication for Continued Telemetry Use: Acute CVA               Lab Results: I have reviewed the following results:    Results from last 7 days   Lab Units 09/14/24  0213   WBC Thousand/uL 7.51   HEMOGLOBIN g/dL 16.3   HEMATOCRIT % 48.5   PLATELETS Thousands/uL 177   SEGS PCT % 66   LYMPHO PCT % 20   MONO PCT % 11   EOS PCT % 2     Results from last 7 days   Lab Units 09/14/24  0213 09/12/24  0437 09/10/24  1247   SODIUM mmol/L 137   < > 137   POTASSIUM mmol/L 3.3*   < > 3.5   CHLORIDE mmol/L 105   < > 102   CO2 mmol/L 25   < > 28   BUN  mg/dL 17   < > 15   CREATININE mg/dL 0.75   < > 0.81   ANION GAP mmol/L 7   < > 7   CALCIUM mg/dL 8.4   < > 9.2   ALBUMIN g/dL  --   --  4.3   TOTAL BILIRUBIN mg/dL  --   --  0.81   ALK PHOS U/L  --   --  83   ALT U/L  --   --  10   AST U/L  --   --  11*   GLUCOSE RANDOM mg/dL 98   < > 209*    < > = values in this interval not displayed.     Results from last 7 days   Lab Units 09/10/24  1320   INR  1.05     Results from last 7 days   Lab Units 09/14/24  1122 09/14/24  0805 09/13/24  2116 09/13/24  1657 09/13/24  1533 09/13/24  1152 09/13/24  0728 09/12/24  2103 09/12/24  1531 09/12/24  1116 09/12/24  0720 09/11/24  2101   POC GLUCOSE mg/dl 202* 114 165* 254* 309* 217* 140 225* 223* 234* 172* 217*     Results from last 7 days   Lab Units 09/10/24  1247   HEMOGLOBIN A1C % 9.2*           Recent Cultures (last 7 days):         Imaging Review: No pertinent imaging studies reviewed.  Other Studies: No additional pertinent studies reviewed.    Last 24 Hours Medication List:     Current Facility-Administered Medications:     amLODIPine (NORVASC) tablet 10 mg, Daily **AND** lisinopril (ZESTRIL) tablet 40 mg, Daily    aspirin (ECOTRIN LOW STRENGTH) EC tablet 81 mg, Daily    carvedilol (COREG) tablet 12.5 mg, BID With Meals    clopidogrel (PLAVIX) tablet 75 mg, Daily    doxazosin (CARDURA) tablet 4 mg, Daily    enoxaparin (LOVENOX) subcutaneous injection 40 mg, Q24H JASON    hydrALAZINE (APRESOLINE) injection 10 mg, Q6H PRN    insulin glargine (LANTUS) subcutaneous injection 35 Units 0.35 mL, HS    insulin lispro (HumALOG/ADMELOG) 100 units/mL subcutaneous injection 5-25 Units, 4x Daily (AC & HS) **AND** Fingerstick Glucose (POCT), 4x Daily AC and at bedtime    labetalol (NORMODYNE) injection 10 mg, Q6H PRN    nicotine (NICODERM CQ) 21 mg/24 hr TD 24 hr patch 1 patch, Q24H    pantoprazole (PROTONIX) EC tablet 40 mg, Early Morning    pravastatin (PRAVACHOL) tablet 20 mg, Daily With Dinner    Administrative Statements   Today,  Patient Was Seen By: Dany Michaels MD  I have spent a total time of 45 minutes in caring for this patient on the day of the visit/encounter including Counseling / Coordination of care, Documenting in the medical record, Reviewing / ordering tests, medicine, procedures  , and Obtaining or reviewing history  .    **Please Note: This note may have been constructed using a voice recognition system.**

## 2024-09-14 NOTE — PLAN OF CARE
Problem: OCCUPATIONAL THERAPY ADULT  Goal: Performs self-care activities at highest level of function for planned discharge setting.  See evaluation for individualized goals.  Description: Treatment Interventions: ADL retraining, Functional transfer training, UE strengthening/ROM, Endurance training, Patient/family training, Neuromuscular reeducation, Compensatory technique education, Fine motor coordination activities, Continued evaluation          See flowsheet documentation for full assessment, interventions and recommendations.   Outcome: Progressing  Note: Limitation: Decreased ADL status, Decreased UE ROM, Decreased UE strength, Decreased high-level ADLs, Decreased self-care trans, Non-func R UE  Prognosis: Fair  Assessment: Pt seen for OT tx session with focus on functional balance, functional mobility, ADL status, and transfer safety. Patient agreeable to OT treatment session. Pt received  on commode . Pt presents modAx2 sit<>stand, able to maintain in stance with use of sit to stand grab bars for ~2 min, DEP toileting, DEP SPT with use of sit to stand, modAx2 sit>supine. Pt edu on proper positioning of RUE to prevent sublux, verbalized understanding and able to use LUE to assist in proper positioning  Patient continues to be functioning below baseline level, occupational performance remains limited secondary to factors listed above, and pt at increased risk for falls and injury.  The patient's raw score on the AM-PAC Daily Activity inpatient short form is 11, standardized score is 29.04, less than 39.4. Patients at this level are likely to benefit from DC to post-acute rehabilitation services. Please refer to the recommendation of the Occupational Therapist for safe DC planning.  From OT standpoint, recommendation at time of D/C would be DC with Level I - Maximum Rehab Resource Intensity resources. Patient to benefit from continued Occupational Therapy treatment while in the hospital to address deficits  as defined above and maximize level of functional independence with ADLs and functional mobility. Pt left supine in bed with call bell in reach, tray table in reach, needs met, + alarm activated, RN informed.     Rehab Resource Intensity Level, OT: I (Maximum Resource Intensity)

## 2024-09-14 NOTE — OCCUPATIONAL THERAPY NOTE
"  Occupational Therapy Progress Note     Patient Name: Wilder Vargas  Today's Date: 9/14/2024  Problem List  Principal Problem:    Stroke-like symptom  Active Problems:    Type 2 diabetes mellitus with hyperglycemia (HCC)    Hypertensive emergency    Mixed hyperlipidemia    Tobacco use         09/14/24 0850   OT Last Visit   OT Visit Date 09/14/24   Note Type   Note Type Treatment   Pain Assessment   Pain Assessment Tool 0-10   Pain Score No Pain   Restrictions/Precautions   Weight Bearing Precautions Per Order No   Other Precautions Cognitive;Chair Alarm;Bed Alarm;Fall Risk   ADL   Toileting Assistance  1  Total Assistance   Toileting Deficit Supervison/safety;Perineal hygiene;Bedside commode;Increased time to complete;Steadying;Verbal cueing   Bed Mobility   Sit to Supine 3  Moderate assistance   Additional items Assist x 2;Increased time required;Verbal cues;LE management;Bedrails;HOB elevated   Transfers   Stand pivot 1  Dependent   Additional items Other  (sit to stand utilized to pivot from commode to bed)   Toilet transfer 3  Moderate assistance   Additional items Assist x 2;Increased time required;Verbal cues;Commode   Splinting   Splinting Comments educated on proper positioning of RUE to have supported at all times for prevention of sublux   Subjective   Subjective \"I want to get back to bed\"   Cognition   Overall Cognitive Status WFL   Arousal/Participation Cooperative;Alert   Attention Within functional limits   Orientation Level Oriented X4   Memory Within functional limits   Following Commands Follows all commands and directions without difficulty   Comments dysarthria, difficult to understand   Activity Tolerance   Activity Tolerance Patient tolerated treatment well;Patient limited by fatigue   Medical Staff Made Aware Tech Ambika in room, RN also aware   Assessment   Assessment Pt seen for OT tx session with focus on functional balance, functional mobility, ADL status, and transfer safety. Patient " agreeable to OT treatment session. Pt received  on commode . Pt presents modAx2 sit<>stand, able to maintain in stance with use of sit to stand grab bars for ~2 min, DEP toileting, DEP SPT with use of sit to stand, modAx2 sit>supine. Pt edu on proper positioning of RUE to prevent sublux, verbalized understanding and able to use LUE to assist in proper positioning  Patient continues to be functioning below baseline level, occupational performance remains limited secondary to factors listed above, and pt at increased risk for falls and injury.  The patient's raw score on the AM-PAC Daily Activity inpatient short form is 11, standardized score is 29.04, less than 39.4. Patients at this level are likely to benefit from DC to post-acute rehabilitation services. Please refer to the recommendation of the Occupational Therapist for safe DC planning.  From OT standpoint, recommendation at time of D/C would be DC with Level I - Maximum Rehab Resource Intensity resources. Patient to benefit from continued Occupational Therapy treatment while in the hospital to address deficits as defined above and maximize level of functional independence with ADLs and functional mobility. Pt left supine in bed with call bell in reach, tray table in reach, needs met, + alarm activated, RN informed.   Discharge Recommendation   Rehab Resource Intensity Level, OT I (Maximum Resource Intensity)   AM-PAC Daily Activity Inpatient   Lower Body Dressing 2   Bathing 2   Toileting 1   Upper Body Dressing 2   Grooming 2   Eating 2   Daily Activity Raw Score 11   Daily Activity Standardized Score (Calc for Raw Score >=11) 29.04   AM-PAC Applied Cognition Inpatient   Following a Speech/Presentation 4   Understanding Ordinary Conversation 4   Taking Medications 3   Remembering Where Things Are Placed or Put Away 3   Remembering List of 4-5 Errands 3   Taking Care of Complicated Tasks 3   Applied Cognition Raw Score 20   Applied Cognition Standardized Score  41.76   End of Consult   Education Provided Yes   Patient Position at End of Consult Supine;Bed/Chair alarm activated;All needs within reach   Nurse Communication Nurse aware of consult        Solo Ortega OT 09/14/24 9:15 AM

## 2024-09-14 NOTE — PLAN OF CARE
Problem: DISCHARGE PLANNING  Goal: Discharge to home or other facility with appropriate resources  Description: INTERVENTIONS:  - Identify barriers to discharge w/patient and caregiver  - Arrange for needed discharge resources and transportation as appropriate  - Identify discharge learning needs (meds, wound care, etc.)  - Arrange for interpretive services to assist at discharge as needed  - Refer to Case Management Department for coordinating discharge planning if the patient needs post-hospital services based on physician/advanced practitioner order or complex needs related to functional status, cognitive ability, or social support system  Outcome: Progressing     Problem: CARDIOVASCULAR - ADULT  Goal: Maintains optimal cardiac output and hemodynamic stability  Description: INTERVENTIONS:  - Monitor I/O, vital signs and rhythm  - Monitor for S/S and trends of decreased cardiac output  - Administer and titrate ordered vasoactive medications to optimize hemodynamic stability  - Assess quality of pulses, skin color and temperature  - Assess for signs of decreased coronary artery perfusion  - Instruct patient to report change in severity of symptoms  Outcome: Progressing  Goal: Absence of cardiac dysrhythmias or at baseline rhythm  Description: INTERVENTIONS:  - Continuous cardiac monitoring, vital signs, obtain 12 lead EKG if ordered  - Administer antiarrhythmic and heart rate control medications as ordered  - Monitor electrolytes and administer replacement therapy as ordered  Outcome: Progressing     Problem: Potential for Falls  Goal: Patient will remain free of falls  Description: INTERVENTIONS:  - Educate patient/family on patient safety including physical limitations  - Instruct patient to call for assistance with activity   - Consult OT/PT to assist with strengthening/mobility   - Keep Call bell within reach  - Keep bed low and locked with side rails adjusted as appropriate  - Keep care items and personal  belongings within reach  - Initiate and maintain comfort rounds  - Make Fall Risk Sign visible to staff  - Offer Toileting every 2 Hours, in advance of need  - Initiate/Maintain alarm  - Obtain necessary fall risk management equipment:   - Apply yellow socks and bracelet for high fall risk patients  - Consider moving patient to room near nurses station  Outcome: Progressing     Problem: INFECTION - ADULT  Goal: Absence or prevention of progression during hospitalization  Description: INTERVENTIONS:  - Assess and monitor for signs and symptoms of infection  - Monitor lab/diagnostic results  - Monitor all insertion sites, i.e. indwelling lines, tubes, and drains  - Monitor endotracheal if appropriate and nasal secretions for changes in amount and color  - [place] appropriate cooling/warming therapies per order  - Administer medications as ordered  - Instruct and encourage patient and family to use good hand hygiene technique  - Identify and instruct in appropriate isolation precautions for identified infection/condition  Outcome: Progressing  Goal: Absence of fever/infection during neutropenic period  Description: INTERVENTIONS:  - Monitor WBC    Outcome: Progressing

## 2024-09-14 NOTE — PLAN OF CARE
Problem: Potential for Falls  Goal: Patient will remain free of falls  Description: INTERVENTIONS:  - Educate patient/family on patient safety including physical limitations  - Instruct patient to call for assistance with activity   - Consult OT/PT to assist with strengthening/mobility   - Keep Call bell within reach  - Keep bed low and locked with side rails adjusted as appropriate  - Keep care items and personal belongings within reach  - Initiate and maintain comfort rounds  - Make Fall Risk Sign visible to staff  - Offer Toileting every 2 Hours, in advance of need  - Initiate/Maintain bed alarm  - Obtain necessary fall risk management equipment: socks  - Apply yellow socks and bracelet for high fall risk patients  - Consider moving patient to room near nurses station  Outcome: Progressing

## 2024-09-15 LAB
ANION GAP SERPL CALCULATED.3IONS-SCNC: 7 MMOL/L (ref 4–13)
BUN SERPL-MCNC: 14 MG/DL (ref 5–25)
CALCIUM SERPL-MCNC: 8.6 MG/DL (ref 8.4–10.2)
CHLORIDE SERPL-SCNC: 106 MMOL/L (ref 96–108)
CO2 SERPL-SCNC: 26 MMOL/L (ref 21–32)
CREAT SERPL-MCNC: 0.66 MG/DL (ref 0.6–1.3)
ERYTHROCYTE [DISTWIDTH] IN BLOOD BY AUTOMATED COUNT: 12.9 % (ref 11.6–15.1)
EST. AVERAGE GLUCOSE BLD GHB EST-MCNC: 212 MG/DL
GFR SERPL CREATININE-BSD FRML MDRD: 105 ML/MIN/1.73SQ M
GLUCOSE SERPL-MCNC: 118 MG/DL (ref 65–140)
GLUCOSE SERPL-MCNC: 157 MG/DL (ref 65–140)
GLUCOSE SERPL-MCNC: 224 MG/DL (ref 65–140)
GLUCOSE SERPL-MCNC: 240 MG/DL (ref 65–140)
GLUCOSE SERPL-MCNC: 98 MG/DL (ref 65–140)
HBA1C MFR BLD: 9 %
HCT VFR BLD AUTO: 51.3 % (ref 36.5–49.3)
HGB BLD-MCNC: 17 G/DL (ref 12–17)
MCH RBC QN AUTO: 28.2 PG (ref 26.8–34.3)
MCHC RBC AUTO-ENTMCNC: 33.1 G/DL (ref 31.4–37.4)
MCV RBC AUTO: 85 FL (ref 82–98)
PLATELET # BLD AUTO: 159 THOUSANDS/UL (ref 149–390)
PMV BLD AUTO: 10.4 FL (ref 8.9–12.7)
POTASSIUM SERPL-SCNC: 3.6 MMOL/L (ref 3.5–5.3)
RBC # BLD AUTO: 6.02 MILLION/UL (ref 3.88–5.62)
SODIUM SERPL-SCNC: 139 MMOL/L (ref 135–147)
WBC # BLD AUTO: 6.3 THOUSAND/UL (ref 4.31–10.16)

## 2024-09-15 PROCEDURE — 85027 COMPLETE CBC AUTOMATED: CPT

## 2024-09-15 PROCEDURE — 82948 REAGENT STRIP/BLOOD GLUCOSE: CPT

## 2024-09-15 PROCEDURE — 83036 HEMOGLOBIN GLYCOSYLATED A1C: CPT

## 2024-09-15 PROCEDURE — 99222 1ST HOSP IP/OBS MODERATE 55: CPT | Performed by: INTERNAL MEDICINE

## 2024-09-15 PROCEDURE — 99232 SBSQ HOSP IP/OBS MODERATE 35: CPT

## 2024-09-15 PROCEDURE — 80048 BASIC METABOLIC PNL TOTAL CA: CPT

## 2024-09-15 RX ORDER — LOSARTAN POTASSIUM 50 MG/1
100 TABLET ORAL DAILY
Status: DISCONTINUED | OUTPATIENT
Start: 2024-09-16 | End: 2024-09-20 | Stop reason: HOSPADM

## 2024-09-15 RX ORDER — SPIRONOLACTONE 25 MG/1
25 TABLET ORAL DAILY
Status: DISCONTINUED | OUTPATIENT
Start: 2024-09-15 | End: 2024-09-20 | Stop reason: HOSPADM

## 2024-09-15 RX ORDER — HYDROCHLOROTHIAZIDE 25 MG/1
25 TABLET ORAL DAILY
Status: DISCONTINUED | OUTPATIENT
Start: 2024-09-16 | End: 2024-09-20 | Stop reason: HOSPADM

## 2024-09-15 RX ORDER — ATORVASTATIN CALCIUM 40 MG/1
40 TABLET, FILM COATED ORAL
Status: DISCONTINUED | OUTPATIENT
Start: 2024-09-15 | End: 2024-09-16

## 2024-09-15 RX ORDER — CARVEDILOL 12.5 MG/1
25 TABLET ORAL 2 TIMES DAILY WITH MEALS
Status: DISCONTINUED | OUTPATIENT
Start: 2024-09-15 | End: 2024-09-15

## 2024-09-15 RX ORDER — CARVEDILOL 12.5 MG/1
25 TABLET ORAL 2 TIMES DAILY WITH MEALS
Status: DISCONTINUED | OUTPATIENT
Start: 2024-09-15 | End: 2024-09-20 | Stop reason: HOSPADM

## 2024-09-15 RX ADMIN — CARVEDILOL 25 MG: 12.5 TABLET, FILM COATED ORAL at 09:47

## 2024-09-15 RX ADMIN — ASPIRIN 81 MG: 81 TABLET, COATED ORAL at 08:09

## 2024-09-15 RX ADMIN — CARVEDILOL 25 MG: 12.5 TABLET, FILM COATED ORAL at 15:34

## 2024-09-15 RX ADMIN — DOXAZOSIN 4 MG: 1 TABLET ORAL at 08:08

## 2024-09-15 RX ADMIN — INSULIN LISPRO 10 UNITS: 100 INJECTION, SOLUTION INTRAVENOUS; SUBCUTANEOUS at 12:56

## 2024-09-15 RX ADMIN — ENOXAPARIN SODIUM 40 MG: 40 INJECTION SUBCUTANEOUS at 08:10

## 2024-09-15 RX ADMIN — AMLODIPINE BESYLATE 10 MG: 5 TABLET ORAL at 08:09

## 2024-09-15 RX ADMIN — PANTOPRAZOLE SODIUM 40 MG: 40 TABLET, DELAYED RELEASE ORAL at 06:19

## 2024-09-15 RX ADMIN — SPIRONOLACTONE 25 MG: 25 TABLET ORAL at 15:33

## 2024-09-15 RX ADMIN — NICOTINE 1 PATCH: 21 PATCH, EXTENDED RELEASE TRANSDERMAL at 15:33

## 2024-09-15 RX ADMIN — INSULIN LISPRO 10 UNITS: 100 INJECTION, SOLUTION INTRAVENOUS; SUBCUTANEOUS at 22:26

## 2024-09-15 RX ADMIN — INSULIN GLARGINE 35 UNITS: 100 INJECTION, SOLUTION SUBCUTANEOUS at 22:25

## 2024-09-15 RX ADMIN — ATORVASTATIN CALCIUM 40 MG: 40 TABLET, FILM COATED ORAL at 15:34

## 2024-09-15 RX ADMIN — HYDRALAZINE HYDROCHLORIDE 10 MG: 20 INJECTION INTRAMUSCULAR; INTRAVENOUS at 08:04

## 2024-09-15 RX ADMIN — INSULIN LISPRO 5 UNITS: 100 INJECTION, SOLUTION INTRAVENOUS; SUBCUTANEOUS at 17:19

## 2024-09-15 RX ADMIN — LISINOPRIL 40 MG: 20 TABLET ORAL at 08:09

## 2024-09-15 RX ADMIN — CLOPIDOGREL BISULFATE 75 MG: 75 TABLET ORAL at 08:08

## 2024-09-15 NOTE — ASSESSMENT & PLAN NOTE
Outpatient regimen: 10-40mg amlodipine-benazepril, 25mg carvedilol, 4mg doxazosin  History on medication non-compliance  Presented to ED with -220s and started on Nicardipine gtt 2020 Renal US neg      Plan  9/12 norvasc 10 mg + lisinopril 40 mg restarted  9/13 Cardura 4 mg daily started  9/14 Coreg started  9/15 coreg increased to 25 mg BID. Will consult cardiology given persistent HTN with SBP 170s-200s today  May consider repeat renal US, follow up metanephrines

## 2024-09-15 NOTE — ASSESSMENT & PLAN NOTE
Outpatient regimen: 20 pravastatin  Given CVA will advance to lipitor 40 mg daily   History of medication non-compliance

## 2024-09-15 NOTE — ASSESSMENT & PLAN NOTE
Lab Results   Component Value Date    HGBA1C 9.2 (H) 09/10/2024       Recent Labs     09/14/24  1615 09/14/24  2107 09/15/24  0733 09/15/24  1111   POCGLU 124 175* 118 224*     Outpatient regimen: Jaurdiance and 30U Lantus     Plan  SSI achs for goal glucose 140-180 Alg 4  Cont Lantus HS  Hypoglycemia protocol

## 2024-09-15 NOTE — PLAN OF CARE
Problem: Potential for Falls  Goal: Patient will remain free of falls  Description: INTERVENTIONS:  - Educate patient/family on patient safety including physical limitations  - Instruct patient to call for assistance with activity   - Consult OT/PT to assist with strengthening/mobility   - Keep Call bell within reach  - Keep bed low and locked with side rails adjusted as appropriate  - Keep care items and personal belongings within reach  - Initiate and maintain comfort rounds  - Make Fall Risk Sign visible to staff  - Offer Toileting every 2 Hours, in advance of need  - Initiate/Maintain bed alarm  - Obtain necessary fall risk management equipment: socks  - Apply yellow socks and bracelet for high fall risk patients  - Consider moving patient to room near nurses station  Outcome: Progressing     Problem: PAIN - ADULT  Goal: Verbalizes/displays adequate comfort level or baseline comfort level  Description: Interventions:  - Encourage patient to monitor pain and request assistance  - Assess pain using appropriate pain scale  - Administer analgesics based on type and severity of pain and evaluate response  - Implement non-pharmacological measures as appropriate and evaluate response  - Consider cultural and social influences on pain and pain management  - Notify physician/advanced practitioner if interventions unsuccessful or patient reports new pain  Outcome: Progressing     Problem: INFECTION - ADULT  Goal: Absence or prevention of progression during hospitalization  Description: INTERVENTIONS:  - Assess and monitor for signs and symptoms of infection  - Monitor lab/diagnostic results  - Monitor all insertion sites, i.e. indwelling lines, tubes, and drains  - Monitor endotracheal if appropriate and nasal secretions for changes in amount and color  - [place] appropriate cooling/warming therapies per order  - Administer medications as ordered  - Instruct and encourage patient and family to use good hand hygiene  technique  - Identify and instruct in appropriate isolation precautions for identified infection/condition  Outcome: Progressing  Goal: Absence of fever/infection during neutropenic period  Description: INTERVENTIONS:  - Monitor WBC    Outcome: Progressing     Problem: SAFETY ADULT  Goal: Patient will remain free of falls  Description: INTERVENTIONS:  - Educate patient/family on patient safety including physical limitations  - Instruct patient to call for assistance with activity   - Consult OT/PT to assist with strengthening/mobility   - Keep Call bell within reach  - Keep bed low and locked with side rails adjusted as appropriate  - Keep care items and personal belongings within reach  - Initiate and maintain comfort rounds  - Make Fall Risk Sign visible to staff  - Offer Toileting every 2 Hours, in advance of need  - Initiate/Maintain bed alarm  - Obtain necessary fall risk management equipment: socks  - Apply yellow socks and bracelet for high fall risk patients  - Consider moving patient to room near nurses station  Outcome: Progressing  Goal: Maintain or return to baseline ADL function  Description: INTERVENTIONS:  -  Assess patient's ability to carry out ADLs; assess patient's baseline for ADL function and identify physical deficits which impact ability to perform ADLs (bathing, care of mouth/teeth, toileting, grooming, dressing, etc.)  - Assess/evaluate cause of self-care deficits   - Assess range of motion  - Assess patient's mobility; develop plan if impaired  - Assess patient's need for assistive devices and provide as appropriate  - Encourage maximum independence but intervene and supervise when necessary  - Involve family in performance of ADLs  - Assess for home care needs following discharge   - Consider OT consult to assist with ADL evaluation and planning for discharge  - Provide patient education as appropriate  Outcome: Progressing  Goal: Maintains/Returns to pre admission functional  level  Description: INTERVENTIONS:  - Perform AM-PAC 6 Click Basic Mobility/ Daily Activity assessment daily.  - Set and communicate daily mobility goal to care team and patient/family/caregiver.   - Collaborate with rehabilitation services on mobility goals if consulted  - Perform Range of Motion 2 times a day.  - Reposition patient every 2 hours.  - Dangle patient 2 times a day  - Stand patient 2 times a day  - Ambulate patient 2 times a day  - Out of bed to chair 2 times a day   - Out of bed for meals 2 times a day  - Out of bed for toileting  - Record patient progress and toleration of activity level   Outcome: Progressing     Problem: DISCHARGE PLANNING  Goal: Discharge to home or other facility with appropriate resources  Description: INTERVENTIONS:  - Identify barriers to discharge w/patient and caregiver  - Arrange for needed discharge resources and transportation as appropriate  - Identify discharge learning needs (meds, wound care, etc.)  - Arrange for interpretive services to assist at discharge as needed  - Refer to Case Management Department for coordinating discharge planning if the patient needs post-hospital services based on physician/advanced practitioner order or complex needs related to functional status, cognitive ability, or social support system  Outcome: Progressing     Problem: CARDIOVASCULAR - ADULT  Goal: Maintains optimal cardiac output and hemodynamic stability  Description: INTERVENTIONS:  - Monitor I/O, vital signs and rhythm  - Monitor for S/S and trends of decreased cardiac output  - Administer and titrate ordered vasoactive medications to optimize hemodynamic stability  - Assess quality of pulses, skin color and temperature  - Assess for signs of decreased coronary artery perfusion  - Instruct patient to report change in severity of symptoms  Outcome: Progressing  Goal: Absence of cardiac dysrhythmias or at baseline rhythm  Description: INTERVENTIONS:  - Continuous cardiac  monitoring, vital signs, obtain 12 lead EKG if ordered  - Administer antiarrhythmic and heart rate control medications as ordered  - Monitor electrolytes and administer replacement therapy as ordered  Outcome: Progressing     Problem: Neurological Deficit  Goal: Neurological status is stable or improving  Description: Interventions:  - Monitor and assess patient's level of consciousness, motor function, sensory function, and level of assistance needed for ADLs.   - Monitor and report changes from baseline. Collaborate with interdisciplinary team to initiate plan and implement interventions as ordered.   - Provide and maintain a safe environment.  - Consider seizure precautions.  - Consider fall precautions.  - Consider aspiration precautions.  - Consider bleeding precautions.  Outcome: Progressing     Problem: Activity Intolerance/Impaired Mobility  Goal: Mobility/activity is maintained at optimum level for patient  Description: Interventions:  - Assess and monitor patient  barriers to mobility and need for assistive/adaptive devices.  - Assess patient's emotional response to limitations.  - Collaborate with interdisciplinary team and initiate plans and interventions as ordered.  - Encourage independent activity per ability.  - Maintain proper body alignment.  - Perform active/passive rom as tolerated/ordered.  - Plan activities to conserve energy.  - Turn patient as appropriate  Outcome: Progressing     Problem: Communication Impairment  Goal: Ability to express needs and understand communication  Description: Assess patient's communication skills and ability to understand information.  Patient will demonstrate use of effective communication techniques, alternative methods of communication and understanding even if not able to speak.     - Encourage communication and provide alternate methods of communication as needed.  - Collaborate with case management/ for discharge needs.  - Include  patient/family/caregiver in decisions related to communication.  Outcome: Progressing     Problem: Potential for Aspiration  Goal: Non-ventilated patient's risk of aspiration is minimized  Description: Assess and monitor vital signs, respiratory status, and labs (WBC).  Monitor for signs of aspiration (tachypnea, cough, rales, wheezing, cyanosis, fever).    - Assess and monitor patient's ability to swallow.  - Place patient up in chair to eat if possible.  - HOB up at 90 degrees to eat if unable to get patient up into chair.  - Supervise patient during oral intake.   - Instruct patient/ family to take small bites.  - Instruct patient/ family to take small single sips when taking liquids.  - Follow patient-specific strategies generated by speech pathologist.  Outcome: Progressing  Goal: Ventilated patient's risk of aspiration is minimized  Description: Assess and monitor vital signs, respiratory status, airway cuff pressure, and labs (WBC).  Monitor for signs of aspiration (tachypnea, cough, rales, wheezing, cyanosis, fever).    - Elevate head of bed 30 degrees if patient has tube feeding.  - Monitor tube feeding.  Outcome: Progressing     Problem: Nutrition  Goal: Nutrition/Hydration status is improving  Description: Monitor and assess patient's nutrition/hydration status for malnutrition (ex- brittle hair, bruises, dry skin, pale skin and conjunctiva, muscle wasting, smooth red tongue, and disorientation). Collaborate with interdisciplinary team and initiate plan and interventions as ordered.  Monitor patient's weight and dietary intake as ordered or per policy. Utilize nutrition screening tool and intervene per policy. Determine patient's food preferences and provide high-protein, high-caloric foods as appropriate.     - Assist patient with eating.  - Allow adequate time for meals.  - Encourage patient to take dietary supplement as ordered.  - Collaborate with clinical nutritionist.  - Include  patient/family/caregiver in decisions related to nutrition.  Outcome: Progressing     Problem: Prexisting or High Potential for Compromised Skin Integrity  Goal: Skin integrity is maintained or improved  Description: INTERVENTIONS:  - Identify patients at risk for skin breakdown  - Assess and monitor skin integrity  - Assess and monitor nutrition and hydration status  - Monitor labs   - Assess for incontinence   - Turn and reposition patient  - Assist with mobility/ambulation  - Relieve pressure over bony prominences  - Avoid friction and shearing  - Provide appropriate hygiene as needed including keeping skin clean and dry  - Evaluate need for skin moisturizer/barrier cream  - Collaborate with interdisciplinary team   - Patient/family teaching  - Consider wound care consult   Outcome: Progressing

## 2024-09-15 NOTE — PROGRESS NOTES
Progress Note - Hospitalist   Name: Wilder Vargas 59 y.o. male I MRN: [id number]  Unit/Bed#: -01 I Date of Admission: 9/10/2024   Date of Service: 9/15/2024 I Hospital Day: 5    Assessment & Plan  Stroke-like symptom  History of past CVAs (May and June 2020) with history of medication noncompliance.  Outpatient regimen: Plavix and pravastatin  NIHSS 7 (right facial weakness, RLE weakness, RUE ataxia, and moderate dysarthria )   9/10 CTH: Unremarkable for acute intracranial abnormalities. 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  TNK at 1346 ->improvement of symptoms with slight facial droop and 4/5 strength in RUE/RLL  At 1500 while in ED, patient's symptoms returned with severe right facial droop, garbled speech and 2/5 strength.  Repeat CTH pending read but no obvious bleed noted  Dr. Roberts notified who advised laying patient flat and starting IVF to optimize cerebral perfusion  9/10 Repeat CTH -- neg   9/10 MRI:  Acute infarct involving left thalamocapsular region. There is minimal FLAIR hyperintensity/edema without mass effect or hemorrhagic transformation. Chronic lacunar infarcts in the left thalamus, right corona radiata and right basal ganglia. Focus of old microhemorrhage in the right temporal lobe, likely on the basis of chronic hypertension. Right maxillary sinusitis.     Plan  Stroke Pathway  Cont statin, asa, plavix   CTH 24hr post TNK -- neg   PT/OT/Speech --> plan to DC to rehab  Serial Neuro checks  Neurology following  Type 2 diabetes mellitus with hyperglycemia (HCC)  Lab Results   Component Value Date    HGBA1C 9.2 (H) 09/10/2024       Recent Labs     09/14/24  1615 09/14/24  2107 09/15/24  0733 09/15/24  1111   POCGLU 124 175* 118 224*     Outpatient regimen: Jaurdiance and 30U  Lantus     Plan  SSI achs for goal glucose 140-180 Alg 4  Cont Lantus HS  Hypoglycemia protocol  Hypertensive emergency  Outpatient regimen: 10-40mg amlodipine-benazepril, 25mg carvedilol, 4mg doxazosin  History on medication non-compliance  Presented to ED with -220s and started on Nicardipine gtt  2020 Renal US neg      Plan  9/12 norvasc 10 mg + lisinopril 40 mg restarted  9/13 Cardura 4 mg daily started  9/14 Coreg started  9/15 coreg increased to 25 mg BID. Will consult cardiology given persistent HTN with SBP 170s-200s today  May consider repeat renal US, follow up metanephrines  Mixed hyperlipidemia  Outpatient regimen: 20 pravastatin  Given CVA will advance to lipitor 40 mg daily   History of medication non-compliance  Tobacco use  Currently smokes 1-1.5 packs daily ->previously smoking >2 packs/daily  46 year history     Plan  Encourage smoke cessation  Nicotine patch    VTE Pharmacologic Prophylaxis:   Moderate Risk (Score 3-4) - Pharmacological DVT Prophylaxis Ordered: enoxaparin (Lovenox).    Mobility:   Basic Mobility Inpatient Raw Score: 11  JH-HLM Goal: 4: Move to chair/commode  JH-HLM Achieved: 2: Bed activities/Dependent transfer  JH-HLM Goal NOT achieved. Continue with multidisciplinary rounding and encourage appropriate mobility to improve upon JH-HLM goals.    Patient Centered Rounds: I performed bedside rounds with nursing staff today.   Discussions with Specialists or Other Care Team Provider: none    Education and Discussions with Family / Patient: Updated  (mother) via phone.    Current Length of Stay: 5 day(s)  Current Patient Status: Inpatient   Certification Statement: The patient will continue to require additional inpatient hospital stay due to HTN  Discharge Plan: Anticipate discharge in 24-48 hrs to rehab facility.    Code Status: Level 1 - Full Code    Subjective   Patient denies any new HA/complaints, R sided weakness persists. Eating well.     Objective      Vitals:   Temp (24hrs), Av.7 °F (36.5 °C), Min:97.5 °F (36.4 °C), Max:97.9 °F (36.6 °C)    Temp:  [97.5 °F (36.4 °C)-97.9 °F (36.6 °C)] 97.5 °F (36.4 °C)  HR:  [56-74] 73  Resp:  [14-16] 16  BP: (172-214)/() 178/105  SpO2:  [96 %-99 %] 96 %  Body mass index is 26.82 kg/m².     Input and Output Summary (last 24 hours):     Intake/Output Summary (Last 24 hours) at 9/15/2024 1202  Last data filed at 9/15/2024 1106  Gross per 24 hour   Intake 1050 ml   Output 3305 ml   Net -2255 ml       Physical Exam  Vitals and nursing note reviewed.   Constitutional:       General: He is not in acute distress.     Appearance: He is ill-appearing.   HENT:      Head: Normocephalic and atraumatic.   Eyes:      General:         Right eye: No discharge.         Left eye: No discharge.      Extraocular Movements: Extraocular movements intact.      Conjunctiva/sclera: Conjunctivae normal.   Cardiovascular:      Rate and Rhythm: Normal rate and regular rhythm.      Heart sounds: Normal heart sounds. No murmur heard.  Pulmonary:      Effort: Pulmonary effort is normal. No respiratory distress.      Breath sounds: Normal breath sounds. No wheezing, rhonchi or rales.   Abdominal:      General: Bowel sounds are normal.      Palpations: Abdomen is soft.      Tenderness: There is no abdominal tenderness. There is no guarding.   Musculoskeletal:      Right lower leg: No edema.      Left lower leg: No edema.   Skin:     General: Skin is warm and dry.   Neurological:      Mental Status: He is alert and oriented to person, place, and time.      Motor: Weakness present.      Comments: Right sided facial droop with flaccidity of right upper and lower extremity.  Motor strength intact to left upper and lower extremity.   Psychiatric:         Mood and Affect: Mood normal.         Behavior: Behavior normal.          Lines/Drains:  Lines/Drains/Airways       Active Status       None                      Telemetry:  Telemetry Orders (From  admission, onward)               24 Hour Telemetry Monitoring  Continuous x 24 Hours (Telem)        Question:  Reason for 24 Hour Telemetry  Answer:  TIA/Suspected CVA/ Confirmed CVA                     Telemetry Reviewed: Normal Sinus Rhythm  Indication for Continued Telemetry Use: Acute CVA               Lab Results: I have reviewed the following results: CBC/BMP:   .     09/15/24  0507   WBC 6.30   HGB 17.0   HCT 51.3*      SODIUM 139   K 3.6      CO2 26   BUN 14   CREATININE 0.66   GLUC 98    , Creatinine Clearance: Estimated Creatinine Clearance: 116.6 mL/min (by C-G formula based on SCr of 0.66 mg/dL).   Results from last 7 days   Lab Units 09/15/24  0507 09/14/24  0213   WBC Thousand/uL 6.30 7.51   HEMOGLOBIN g/dL 17.0 16.3   HEMATOCRIT % 51.3* 48.5   PLATELETS Thousands/uL 159 177   SEGS PCT %  --  66   LYMPHO PCT %  --  20   MONO PCT %  --  11   EOS PCT %  --  2     Results from last 7 days   Lab Units 09/15/24  0507 09/12/24  0437 09/10/24  1247   SODIUM mmol/L 139   < > 137   POTASSIUM mmol/L 3.6   < > 3.5   CHLORIDE mmol/L 106   < > 102   CO2 mmol/L 26   < > 28   BUN mg/dL 14   < > 15   CREATININE mg/dL 0.66   < > 0.81   ANION GAP mmol/L 7   < > 7   CALCIUM mg/dL 8.6   < > 9.2   ALBUMIN g/dL  --   --  4.3   TOTAL BILIRUBIN mg/dL  --   --  0.81   ALK PHOS U/L  --   --  83   ALT U/L  --   --  10   AST U/L  --   --  11*   GLUCOSE RANDOM mg/dL 98   < > 209*    < > = values in this interval not displayed.     Results from last 7 days   Lab Units 09/10/24  1320   INR  1.05     Results from last 7 days   Lab Units 09/15/24  1111 09/15/24  0733 09/14/24  2107 09/14/24  1615 09/14/24  1122 09/14/24  0805 09/13/24  2116 09/13/24  1657 09/13/24  1533 09/13/24  1152 09/13/24  0728 09/12/24  2103   POC GLUCOSE mg/dl 224* 118 175* 124 202* 114 165* 254* 309* 217* 140 225*     Results from last 7 days   Lab Units 09/10/24  1247   HEMOGLOBIN A1C % 9.2*           Recent Cultures (last 7 days):          Imaging Review: Reviewed radiology reports from this admission including: CT chest and MRI brain.  Other Studies: No additional pertinent studies reviewed.    Last 24 Hours Medication List:     Current Facility-Administered Medications:     amLODIPine (NORVASC) tablet 10 mg, Daily **AND** lisinopril (ZESTRIL) tablet 40 mg, Daily    aspirin (ECOTRIN LOW STRENGTH) EC tablet 81 mg, Daily    atorvastatin (LIPITOR) tablet 40 mg, Daily With Dinner    carvedilol (COREG) tablet 25 mg, BID With Meals    clopidogrel (PLAVIX) tablet 75 mg, Daily    doxazosin (CARDURA) tablet 4 mg, Daily    enoxaparin (LOVENOX) subcutaneous injection 40 mg, Q24H JASON    hydrALAZINE (APRESOLINE) injection 10 mg, Q6H PRN    insulin glargine (LANTUS) subcutaneous injection 35 Units 0.35 mL, HS    insulin lispro (HumALOG/ADMELOG) 100 units/mL subcutaneous injection 5-25 Units, 4x Daily (AC & HS) **AND** Fingerstick Glucose (POCT), 4x Daily AC and at bedtime    labetalol (NORMODYNE) injection 10 mg, Q6H PRN    nicotine (NICODERM CQ) 21 mg/24 hr TD 24 hr patch 1 patch, Q24H    pantoprazole (PROTONIX) EC tablet 40 mg, Early Morning    Administrative Statements   Today, Patient Was Seen By: Allison Seay PA-C  I have spent a total time of 35 minutes in caring for this patient on the day of the visit/encounter including Patient and family education, Counseling / Coordination of care, Documenting in the medical record, Reviewing / ordering tests, medicine, procedures  , Obtaining or reviewing history  , and Communicating with other healthcare professionals .    **Please Note: This note may have been constructed using a voice recognition system.**

## 2024-09-15 NOTE — CONSULTS
Consultation - Cardiology   Wilder Vargas 59 y.o. male MRN: [id number]  Unit/Bed#: -01 Encounter: [id number]        Inpatient consult to Cardiology  Consult performed by: Dakota Valdez MD  Consult ordered by: Allison Seay PA-C            Assessment/Plan   Principal Problem:    Stroke-like symptom  Active Problems:    Type 2 diabetes mellitus with hyperglycemia (HCC)    Hypertensive emergency    Mixed hyperlipidemia    Tobacco use    Severe stage II hypertension with hypertensive emergency presenting as acute CVA: Recent blood pressures reviewed.  Lowest recorded systolic blood pressure in the last 24 hours is 172 mmHg.  Highest blood pressure is 214 mmHg.  His heart rate stays between 59-72 bpm.  Potassium is 3.6.  Creatinine is normal.  Currently on amlodipine 10 mg a day, carvedilol 25 mg twice a day, doxazosin 4 mg daily, lisinopril 40 mg daily and hydralazine injections as needed.  He is not on a diuretic.  I would recommend starting spironolactone 25 mg daily.  Discontinue lisinopril.  Replace that with losartan//25 mg daily.  Follow-up BMP in 3 to 5 days.  Continue with secondary hypertension workup.    Recent CVA: Still has significant right hemiparesis.  Plans are for cardiac rehab.  Continue short-term dual antiplatelet therapy and high intensity statins.  Previous transesophageal echo in 6/20 showed no PFO or ASD right-to-left shunting.  It did reveal LVH.  Repeat echo has been requested by primary team.    Plan of care reviewed with the primary team.  Cardiology will follow-up and review his echocardiogram as well.    Plan of care reviewed with the patient and primary team.    Physician Requesting Consult: Gerson Kaplan MD    Reason for Consult / Principal Problem: Hypertension      HPI: Wilder Vargas is a 59 y.o. year old male who was admitted several days ago with sudden onset of right shoulder weakness and right upper and lower extremity weakness with dysarthria and found  to have a fairly significant stroke that left him with dense right hemiparesis.  On presentation his blood pressure was markedly elevated.  His MRI on 9/10/2024 showed acute infarct of the left palate no capsular region with a focus of old microhemorrhage in the right temporal lobe related to chronic hypertension.  He received thrombolytic therapy.  He reports that he has not had significant improvement in his weakness.  He still has some dysarthria.  He has had significantly elevated blood pressures since admission that have been difficult to control.  Initially was placed on his home dose of carvedilol and later on amlodipine, lisinopril, Cardura and Coreg also were added.  Secondary hypertension workup is in progress.  He is a recent smoker.  He has a history of dyslipidemia.  He remains limited in his history due to difficulty in speaking.  He denies any other complaints except for difficulty in speaking and sided weakness.  He has no headache or dizziness.  No blurred vision.    I reviewed his recent blood pressures: Over the last 24 hours his blood pressures range anywhere between 172-214 mmHg systolic.      Review of Systems   Constitutional:  Positive for fatigue.   HENT: Negative.     Eyes: Negative.    Respiratory:  Negative for shortness of breath.    Cardiovascular:  Negative for chest pain, palpitations and leg swelling.   Gastrointestinal: Negative.    Endocrine: Negative.    Musculoskeletal:  Positive for gait problem.   Skin: Negative.    Neurological:  Positive for speech difficulty and weakness. Negative for syncope.   Hematological: Negative.    Psychiatric/Behavioral:  Positive for sleep disturbance.    All other systems reviewed and are negative.       Historical Information   Past Medical History:   Diagnosis Date    CVA (cerebral vascular accident) (HCC)     Diabetes mellitus (HCC)     Hypertension      Past Surgical History:   Procedure Laterality Date    NO PAST SURGERIES       Social  History     Substance and Sexual Activity   Alcohol Use Not Currently     Social History     Substance and Sexual Activity   Drug Use Yes    Types: Marijuana    Comment: few days     Social History     Tobacco Use   Smoking Status Every Day    Current packs/day: 1.00    Average packs/day: 1 pack/day for 42.7 years (42.7 ttl pk-yrs)    Types: Cigarettes    Start date: 1982   Smokeless Tobacco Never     Family History   Problem Relation Age of Onset    Diabetes Mother     Heart failure Father     Substance Abuse Neg Hx     Mental illness Neg Hx        Allergies:  Allergies   Allergen Reactions    Metformin Diarrhea       Medications:     Current Facility-Administered Medications:     amLODIPine (NORVASC) tablet 10 mg, 10 mg, Oral, Daily, 10 mg at 09/15/24 0809 **AND** lisinopril (ZESTRIL) tablet 40 mg, 40 mg, Oral, Daily, Sheeba Lofton MD, 40 mg at 09/15/24 0809    aspirin (ECOTRIN LOW STRENGTH) EC tablet 81 mg, 81 mg, Oral, Daily, Sheeba Lofton MD, 81 mg at 09/15/24 0809    atorvastatin (LIPITOR) tablet 40 mg, 40 mg, Oral, Daily With Dinner, Allison Seay PA-C    carvedilol (COREG) tablet 25 mg, 25 mg, Oral, BID With Meals, Allison Seay PA-C, 25 mg at 09/15/24 0947    clopidogrel (PLAVIX) tablet 75 mg, 75 mg, Oral, Daily, Sheeba oLfton MD, 75 mg at 09/15/24 0808    doxazosin (CARDURA) tablet 4 mg, 4 mg, Oral, Daily, Sheeba Lofton MD, 4 mg at 09/15/24 0808    enoxaparin (LOVENOX) subcutaneous injection 40 mg, 40 mg, Subcutaneous, Q24H JASON, Sheeba Lofton MD, 40 mg at 09/15/24 0810    hydrALAZINE (APRESOLINE) injection 10 mg, 10 mg, Intravenous, Q6H PRN, Sheeba Lofton MD, 10 mg at 09/15/24 0804    insulin glargine (LANTUS) subcutaneous injection 35 Units 0.35 mL, 35 Units, Subcutaneous, HS, Sheeba Lofton MD, 35 Units at 09/14/24 2122    insulin lispro (HumALOG/ADMELOG) 100 units/mL subcutaneous injection 5-25 Units, 5-25 Units, Subcutaneous, 4x  Daily (AC & HS), 5 Units at 24 **AND** Fingerstick Glucose (POCT), , , 4x Daily AC and at bedtime, Sheeba Lofton MD    labetalol (NORMODYNE) injection 10 mg, 10 mg, Intravenous, Q6H PRN, Sheeba Lofton MD, 10 mg at 24    nicotine (NICODERM CQ) 21 mg/24 hr TD 24 hr patch 1 patch, 1 patch, Transdermal, Q24H, Sheeba Lofton MD, 1 patch at 24 1534    pantoprazole (PROTONIX) EC tablet 40 mg, 40 mg, Oral, Early Morning, Sheeba Lofton MD, 40 mg at 09/15/24 0619     Objective:   Vitals:   Vitals:    09/15/24 1151   BP: (!) 178/105   Pulse: 73   Resp:    Temp:    SpO2: 96%     Body mass index is 26.82 kg/m².  Orthostatic Blood Pressures      Flowsheet Row Most Recent Value   Blood Pressure 178/105 filed at 09/15/2024 1151   Patient Position - Orthostatic VS Lying filed at 09/15/2024 0738          Systolic (24hrs), Av , Min:172 , Max:214     Diastolic (24hrs), Av, Min:84, Max:116      Intake/Output Summary (Last 24 hours) at 9/15/2024 1156  Last data filed at 9/15/2024 1106  Gross per 24 hour   Intake 1050 ml   Output 3305 ml   Net -2255 ml     Weight (last 2 days)       Date/Time Weight    24 0506 80 (176.37)          Invasive Devices       Peripheral Intravenous Line  Duration             Peripheral IV 24 Right Forearm 2 days    Peripheral IV 24 Left;Ventral (anterior) Forearm 2 days                    Physical Exam  Vitals reviewed.   Constitutional:       General: He is not in acute distress.  HENT:      Head: Normocephalic.      Comments: Facial weakness  Cardiovascular:      Rate and Rhythm: Normal rate and regular rhythm.      Heart sounds: No murmur heard.  Pulmonary:      Breath sounds: No wheezing or rales.   Abdominal:      Tenderness: There is no abdominal tenderness.   Musculoskeletal:         General: No swelling.   Skin:     General: Skin is warm.   Neurological:      Mental Status: He is alert.      Motor: Weakness present.       "Comments: Severe right hemiparesis with flattening of right nasolabial fold and right-sided facial weakness.  Dysarthria noted.   Psychiatric:         Mood and Affect: Mood normal.         Labs:     Lab Results   Component Value Date    SODIUM 139 09/15/2024    K 3.6 09/15/2024     09/15/2024    CO2 26 09/15/2024    BUN 14 09/15/2024    CREATININE 0.66 09/15/2024    GLUC 98 09/15/2024    CALCIUM 8.6 09/15/2024     Lab Results   Component Value Date    WBC 6.30 09/15/2024    RBC 6.02 (H) 09/15/2024    HGB 17.0 09/15/2024    HCT 51.3 (H) 09/15/2024    MCV 85 09/15/2024    MCH 28.2 09/15/2024    RDW 12.9 09/15/2024     09/15/2024     Results from last 7 days   Lab Units 09/10/24  1320   PTT seconds 20*   INR  1.05     Lab Results   Component Value Date    LDLCALC 108 (H) 09/10/2024     Lab Results   Component Value Date    IGV1XLAASVCS 1.169 09/10/2024    TSH 0.649 02/09/2024         No results for input(s): \"NTBNP\" in the last 72 hours.       Imaging & Testing   Cardiac testing:     EKG and telemetry reviewed personally if available      Imaging:   I have personally reviewed pertinent imaging studies in PACS.    Dakota Valdez MD, [facility]      Portions of the record may have been created with voice recognition software.  Occasional wrong word or \"sound a like\" substitutions may have occurred due to the inherent limitations of voice recognition software.  Please read the chart carefully and recognize, using context, where substitutions have occurred. Please call the author of the note for any clarifications.    "

## 2024-09-15 NOTE — ASSESSMENT & PLAN NOTE
History of past CVAs (May and June 2020) with history of medication noncompliance.  Outpatient regimen: Plavix and pravastatin  NIHSS 7 (right facial weakness, RLE weakness, RUE ataxia, and moderate dysarthria )   9/10 CTH: Unremarkable for acute intracranial abnormalities. 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  TNK at 1346 ->improvement of symptoms with slight facial droop and 4/5 strength in RUE/RLL  At 1500 while in ED, patient's symptoms returned with severe right facial droop, garbled speech and 2/5 strength.  Repeat CTH pending read but no obvious bleed noted  Dr. Roberts notified who advised laying patient flat and starting IVF to optimize cerebral perfusion  9/10 Repeat CTH -- neg   9/10 MRI:  Acute infarct involving left thalamocapsular region. There is minimal FLAIR hyperintensity/edema without mass effect or hemorrhagic transformation. Chronic lacunar infarcts in the left thalamus, right corona radiata and right basal ganglia. Focus of old microhemorrhage in the right temporal lobe, likely on the basis of chronic hypertension. Right maxillary sinusitis.     Plan  Stroke Pathway  Cont statin, asa, plavix   CTH 24hr post TNK -- neg   PT/OT/Speech --> plan to DC to rehab  Serial Neuro checks  Neurology following

## 2024-09-15 NOTE — PLAN OF CARE
Problem: Potential for Falls  Goal: Patient will remain free of falls  Description: INTERVENTIONS:  - Educate patient/family on patient safety including physical limitations  - Instruct patient to call for assistance with activity   - Consult OT/PT to assist with strengthening/mobility   - Keep Call bell within reach  - Keep bed low and locked with side rails adjusted as appropriate  - Keep care items and personal belongings within reach  - Initiate and maintain comfort rounds  - Make Fall Risk Sign visible to staff  - Offer Toileting every 2 Hours, in advance of need  - Initiate/Maintain bed alarm  - Obtain necessary fall risk management equipment: socks  - Apply yellow socks and bracelet for high fall risk patients  - Consider moving patient to room near nurses station  Outcome: Progressing     Problem: PAIN - ADULT  Goal: Verbalizes/displays adequate comfort level or baseline comfort level  Description: Interventions:  - Encourage patient to monitor pain and request assistance  - Assess pain using appropriate pain scale  - Administer analgesics based on type and severity of pain and evaluate response  - Implement non-pharmacological measures as appropriate and evaluate response  - Consider cultural and social influences on pain and pain management  - Notify physician/advanced practitioner if interventions unsuccessful or patient reports new pain  Outcome: Progressing     Problem: INFECTION - ADULT  Goal: Absence or prevention of progression during hospitalization  Description: INTERVENTIONS:  - Assess and monitor for signs and symptoms of infection  - Monitor lab/diagnostic results  - Monitor all insertion sites, i.e. indwelling lines, tubes, and drains  - Monitor endotracheal if appropriate and nasal secretions for changes in amount and color  - [place] appropriate cooling/warming therapies per order  - Administer medications as ordered  - Instruct and encourage patient and family to use good hand hygiene  technique  - Identify and instruct in appropriate isolation precautions for identified infection/condition  Outcome: Progressing     Problem: INFECTION - ADULT  Goal: Absence of fever/infection during neutropenic period  Description: INTERVENTIONS:  - Monitor WBC    Outcome: Progressing     Problem: Neurological Deficit  Goal: Neurological status is stable or improving  Description: Interventions:  - Monitor and assess patient's level of consciousness, motor function, sensory function, and level of assistance needed for ADLs.   - Monitor and report changes from baseline. Collaborate with interdisciplinary team to initiate plan and implement interventions as ordered.   - Provide and maintain a safe environment.  - Consider seizure precautions.  - Consider fall precautions.  - Consider aspiration precautions.  - Consider bleeding precautions.  Outcome: Progressing

## 2024-09-16 LAB
ANION GAP SERPL CALCULATED.3IONS-SCNC: 6 MMOL/L (ref 4–13)
BUN SERPL-MCNC: 19 MG/DL (ref 5–25)
CALCIUM SERPL-MCNC: 8.8 MG/DL (ref 8.4–10.2)
CHLORIDE SERPL-SCNC: 106 MMOL/L (ref 96–108)
CO2 SERPL-SCNC: 27 MMOL/L (ref 21–32)
CREAT SERPL-MCNC: 0.7 MG/DL (ref 0.6–1.3)
GFR SERPL CREATININE-BSD FRML MDRD: 103 ML/MIN/1.73SQ M
GLUCOSE SERPL-MCNC: 169 MG/DL (ref 65–140)
GLUCOSE SERPL-MCNC: 178 MG/DL (ref 65–140)
GLUCOSE SERPL-MCNC: 179 MG/DL (ref 65–140)
GLUCOSE SERPL-MCNC: 232 MG/DL (ref 65–140)
GLUCOSE SERPL-MCNC: 237 MG/DL (ref 65–140)
POTASSIUM SERPL-SCNC: 3.8 MMOL/L (ref 3.5–5.3)
SODIUM SERPL-SCNC: 139 MMOL/L (ref 135–147)

## 2024-09-16 PROCEDURE — 99232 SBSQ HOSP IP/OBS MODERATE 35: CPT | Performed by: INTERNAL MEDICINE

## 2024-09-16 PROCEDURE — 82948 REAGENT STRIP/BLOOD GLUCOSE: CPT

## 2024-09-16 PROCEDURE — 80048 BASIC METABOLIC PNL TOTAL CA: CPT

## 2024-09-16 PROCEDURE — 99232 SBSQ HOSP IP/OBS MODERATE 35: CPT

## 2024-09-16 RX ORDER — HYDRALAZINE HYDROCHLORIDE 25 MG/1
25 TABLET, FILM COATED ORAL EVERY 8 HOURS SCHEDULED
Status: DISCONTINUED | OUTPATIENT
Start: 2024-09-16 | End: 2024-09-20 | Stop reason: HOSPADM

## 2024-09-16 RX ORDER — PRAVASTATIN SODIUM 20 MG
20 TABLET ORAL
Status: DISCONTINUED | OUTPATIENT
Start: 2024-09-16 | End: 2024-09-20 | Stop reason: HOSPADM

## 2024-09-16 RX ADMIN — ASPIRIN 81 MG: 81 TABLET, COATED ORAL at 08:52

## 2024-09-16 RX ADMIN — HYDROCHLOROTHIAZIDE 25 MG: 25 TABLET ORAL at 08:52

## 2024-09-16 RX ADMIN — HYDRALAZINE HYDROCHLORIDE 25 MG: 25 TABLET ORAL at 17:47

## 2024-09-16 RX ADMIN — INSULIN GLARGINE 35 UNITS: 100 INJECTION, SOLUTION SUBCUTANEOUS at 21:37

## 2024-09-16 RX ADMIN — CARVEDILOL 25 MG: 12.5 TABLET, FILM COATED ORAL at 08:55

## 2024-09-16 RX ADMIN — HYDRALAZINE HYDROCHLORIDE 25 MG: 25 TABLET ORAL at 21:37

## 2024-09-16 RX ADMIN — INSULIN LISPRO 10 UNITS: 100 INJECTION, SOLUTION INTRAVENOUS; SUBCUTANEOUS at 21:38

## 2024-09-16 RX ADMIN — INSULIN LISPRO 10 UNITS: 100 INJECTION, SOLUTION INTRAVENOUS; SUBCUTANEOUS at 12:54

## 2024-09-16 RX ADMIN — INSULIN LISPRO 5 UNITS: 100 INJECTION, SOLUTION INTRAVENOUS; SUBCUTANEOUS at 17:50

## 2024-09-16 RX ADMIN — HYDRALAZINE HYDROCHLORIDE 10 MG: 20 INJECTION INTRAMUSCULAR; INTRAVENOUS at 07:22

## 2024-09-16 RX ADMIN — INSULIN LISPRO 5 UNITS: 100 INJECTION, SOLUTION INTRAVENOUS; SUBCUTANEOUS at 08:53

## 2024-09-16 RX ADMIN — CARVEDILOL 25 MG: 12.5 TABLET, FILM COATED ORAL at 17:46

## 2024-09-16 RX ADMIN — AMLODIPINE BESYLATE 10 MG: 5 TABLET ORAL at 08:51

## 2024-09-16 RX ADMIN — SPIRONOLACTONE 25 MG: 25 TABLET ORAL at 08:52

## 2024-09-16 RX ADMIN — PRAVASTATIN SODIUM 20 MG: 20 TABLET ORAL at 17:46

## 2024-09-16 RX ADMIN — CLOPIDOGREL BISULFATE 75 MG: 75 TABLET ORAL at 08:51

## 2024-09-16 RX ADMIN — LOSARTAN POTASSIUM 100 MG: 50 TABLET, FILM COATED ORAL at 08:52

## 2024-09-16 RX ADMIN — NICOTINE 1 PATCH: 21 PATCH, EXTENDED RELEASE TRANSDERMAL at 17:47

## 2024-09-16 RX ADMIN — ENOXAPARIN SODIUM 40 MG: 40 INJECTION SUBCUTANEOUS at 08:52

## 2024-09-16 RX ADMIN — DOXAZOSIN 4 MG: 1 TABLET ORAL at 08:52

## 2024-09-16 RX ADMIN — PANTOPRAZOLE SODIUM 40 MG: 40 TABLET, DELAYED RELEASE ORAL at 04:49

## 2024-09-16 NOTE — PLAN OF CARE
Problem: Potential for Falls  Goal: Patient will remain free of falls  Description: INTERVENTIONS:  - Educate patient/family on patient safety including physical limitations  - Instruct patient to call for assistance with activity   - Consult OT/PT to assist with strengthening/mobility   - Keep Call bell within reach  - Keep bed low and locked with side rails adjusted as appropriate  - Keep care items and personal belongings within reach  - Initiate and maintain comfort rounds  - Make Fall Risk Sign visible to staff  - Offer Toileting every 2 Hours, in advance of need  - Initiate/Maintain bed alarm  - Obtain necessary fall risk management equipment: socks  - Apply yellow socks and bracelet for high fall risk patients  - Consider moving patient to room near nurses station  Outcome: Progressing     Problem: PAIN - ADULT  Goal: Verbalizes/displays adequate comfort level or baseline comfort level  Description: Interventions:  - Encourage patient to monitor pain and request assistance  - Assess pain using appropriate pain scale  - Administer analgesics based on type and severity of pain and evaluate response  - Implement non-pharmacological measures as appropriate and evaluate response  - Consider cultural and social influences on pain and pain management  - Notify physician/advanced practitioner if interventions unsuccessful or patient reports new pain  Outcome: Progressing     Problem: INFECTION - ADULT  Goal: Absence or prevention of progression during hospitalization  Description: INTERVENTIONS:  - Assess and monitor for signs and symptoms of infection  - Monitor lab/diagnostic results  - Monitor all insertion sites, i.e. indwelling lines, tubes, and drains  - Monitor endotracheal if appropriate and nasal secretions for changes in amount and color  - [place] appropriate cooling/warming therapies per order  - Administer medications as ordered  - Instruct and encourage patient and family to use good hand hygiene  technique  - Identify and instruct in appropriate isolation precautions for identified infection/condition  Outcome: Progressing  Goal: Absence of fever/infection during neutropenic period  Description: INTERVENTIONS:  - Monitor WBC    Outcome: Progressing     Problem: SAFETY ADULT  Goal: Patient will remain free of falls  Description: INTERVENTIONS:  - Educate patient/family on patient safety including physical limitations  - Instruct patient to call for assistance with activity   - Consult OT/PT to assist with strengthening/mobility   - Keep Call bell within reach  - Keep bed low and locked with side rails adjusted as appropriate  - Keep care items and personal belongings within reach  - Initiate and maintain comfort rounds  - Make Fall Risk Sign visible to staff  - Offer Toileting every 2 Hours, in advance of need  - Initiate/Maintain bed alarm  - Obtain necessary fall risk management equipment: socks  - Apply yellow socks and bracelet for high fall risk patients  - Consider moving patient to room near nurses station  Outcome: Progressing  Goal: Maintain or return to baseline ADL function  Description: INTERVENTIONS:  -  Assess patient's ability to carry out ADLs; assess patient's baseline for ADL function and identify physical deficits which impact ability to perform ADLs (bathing, care of mouth/teeth, toileting, grooming, dressing, etc.)  - Assess/evaluate cause of self-care deficits   - Assess range of motion  - Assess patient's mobility; develop plan if impaired  - Assess patient's need for assistive devices and provide as appropriate  - Encourage maximum independence but intervene and supervise when necessary  - Involve family in performance of ADLs  - Assess for home care needs following discharge   - Consider OT consult to assist with ADL evaluation and planning for discharge  - Provide patient education as appropriate  Outcome: Progressing  Goal: Maintains/Returns to pre admission functional  level  Description: INTERVENTIONS:  - Perform AM-PAC 6 Click Basic Mobility/ Daily Activity assessment daily.  - Set and communicate daily mobility goal to care team and patient/family/caregiver.   - Collaborate with rehabilitation services on mobility goals if consulted  - Perform Range of Motion 2 times a day.  - Reposition patient every 2 hours.  - Dangle patient 2 times a day  - Stand patient 2 times a day  - Ambulate patient 2 times a day  - Out of bed to chair 2 times a day   - Out of bed for meals 2 times a day  - Out of bed for toileting  - Record patient progress and toleration of activity level   Outcome: Progressing     Problem: DISCHARGE PLANNING  Goal: Discharge to home or other facility with appropriate resources  Description: INTERVENTIONS:  - Identify barriers to discharge w/patient and caregiver  - Arrange for needed discharge resources and transportation as appropriate  - Identify discharge learning needs (meds, wound care, etc.)  - Arrange for interpretive services to assist at discharge as needed  - Refer to Case Management Department for coordinating discharge planning if the patient needs post-hospital services based on physician/advanced practitioner order or complex needs related to functional status, cognitive ability, or social support system  Outcome: Progressing     Problem: CARDIOVASCULAR - ADULT  Goal: Maintains optimal cardiac output and hemodynamic stability  Description: INTERVENTIONS:  - Monitor I/O, vital signs and rhythm  - Monitor for S/S and trends of decreased cardiac output  - Administer and titrate ordered vasoactive medications to optimize hemodynamic stability  - Assess quality of pulses, skin color and temperature  - Assess for signs of decreased coronary artery perfusion  - Instruct patient to report change in severity of symptoms  Outcome: Progressing  Goal: Absence of cardiac dysrhythmias or at baseline rhythm  Description: INTERVENTIONS:  - Continuous cardiac  monitoring, vital signs, obtain 12 lead EKG if ordered  - Administer antiarrhythmic and heart rate control medications as ordered  - Monitor electrolytes and administer replacement therapy as ordered  Outcome: Progressing     Problem: Neurological Deficit  Goal: Neurological status is stable or improving  Description: Interventions:  - Monitor and assess patient's level of consciousness, motor function, sensory function, and level of assistance needed for ADLs.   - Monitor and report changes from baseline. Collaborate with interdisciplinary team to initiate plan and implement interventions as ordered.   - Provide and maintain a safe environment.  - Consider seizure precautions.  - Consider fall precautions.  - Consider aspiration precautions.  - Consider bleeding precautions.  Outcome: Progressing     Problem: Activity Intolerance/Impaired Mobility  Goal: Mobility/activity is maintained at optimum level for patient  Description: Interventions:  - Assess and monitor patient  barriers to mobility and need for assistive/adaptive devices.  - Assess patient's emotional response to limitations.  - Collaborate with interdisciplinary team and initiate plans and interventions as ordered.  - Encourage independent activity per ability.  - Maintain proper body alignment.  - Perform active/passive rom as tolerated/ordered.  - Plan activities to conserve energy.  - Turn patient as appropriate  Outcome: Progressing     Problem: Communication Impairment  Goal: Ability to express needs and understand communication  Description: Assess patient's communication skills and ability to understand information.  Patient will demonstrate use of effective communication techniques, alternative methods of communication and understanding even if not able to speak.     - Encourage communication and provide alternate methods of communication as needed.  - Collaborate with case management/ for discharge needs.  - Include  patient/family/caregiver in decisions related to communication.  Outcome: Progressing     Problem: Potential for Aspiration  Goal: Non-ventilated patient's risk of aspiration is minimized  Description: Assess and monitor vital signs, respiratory status, and labs (WBC).  Monitor for signs of aspiration (tachypnea, cough, rales, wheezing, cyanosis, fever).    - Assess and monitor patient's ability to swallow.  - Place patient up in chair to eat if possible.  - HOB up at 90 degrees to eat if unable to get patient up into chair.  - Supervise patient during oral intake.   - Instruct patient/ family to take small bites.  - Instruct patient/ family to take small single sips when taking liquids.  - Follow patient-specific strategies generated by speech pathologist.  Outcome: Progressing  Goal: Ventilated patient's risk of aspiration is minimized  Description: Assess and monitor vital signs, respiratory status, airway cuff pressure, and labs (WBC).  Monitor for signs of aspiration (tachypnea, cough, rales, wheezing, cyanosis, fever).    - Elevate head of bed 30 degrees if patient has tube feeding.  - Monitor tube feeding.  Outcome: Progressing     Problem: Nutrition  Goal: Nutrition/Hydration status is improving  Description: Monitor and assess patient's nutrition/hydration status for malnutrition (ex- brittle hair, bruises, dry skin, pale skin and conjunctiva, muscle wasting, smooth red tongue, and disorientation). Collaborate with interdisciplinary team and initiate plan and interventions as ordered.  Monitor patient's weight and dietary intake as ordered or per policy. Utilize nutrition screening tool and intervene per policy. Determine patient's food preferences and provide high-protein, high-caloric foods as appropriate.     - Assist patient with eating.  - Allow adequate time for meals.  - Encourage patient to take dietary supplement as ordered.  - Collaborate with clinical nutritionist.  - Include  patient/family/caregiver in decisions related to nutrition.  Outcome: Progressing     Problem: Prexisting or High Potential for Compromised Skin Integrity  Goal: Skin integrity is maintained or improved  Description: INTERVENTIONS:  - Identify patients at risk for skin breakdown  - Assess and monitor skin integrity  - Assess and monitor nutrition and hydration status  - Monitor labs   - Assess for incontinence   - Turn and reposition patient  - Assist with mobility/ambulation  - Relieve pressure over bony prominences  - Avoid friction and shearing  - Provide appropriate hygiene as needed including keeping skin clean and dry  - Evaluate need for skin moisturizer/barrier cream  - Collaborate with interdisciplinary team   - Patient/family teaching  - Consider wound care consult   Outcome: Progressing

## 2024-09-16 NOTE — PROGRESS NOTES
"Progress Note - Cardiology   Name: Wilder Vargas 59 y.o. male I MRN: [id number]  Unit/Bed#: -01 I Date of Admission: 9/10/2024   Date of Service: 9/16/2024 I Hospital Day: 6       Cardiology Progress Note - Wilder Vargas 59 y.o. male MRN: [id number]    Unit/Bed#: -01 Encounter: [id number]      Assessment:  Principal Problem:    Stroke-like symptom  Active Problems:    Type 2 diabetes mellitus with hyperglycemia (HCC)    Hypertensive emergency    Mixed hyperlipidemia    Tobacco use      Plan:    Acute CVA - Presneted with right facial droop, and right-sided hemiparesis along with dysarthria, and is found to have an acute infarct involving the left thalamocapsular region.  Treatment per primary team and neurology.  Allergy feels that this was more hypertensive induced and had a low suspicion for an embolic etiology.    Hypertensive emergency - His blood pressure was significantly elevated in the setting of his CVA.  He also has a history of medication noncompliance.  He is back on his regimen which includes losartan/HCTZ, carvedilol, amlodipine and spironolactone.  With his blood pressure persistently elevated I am going to add hydralazine 25 mg 3 times a day.  It is recommended that he remains compliant and also abstains from smoking.    Subjective:   Patient seen and examined.  No significant events overnight.  Patient remains hypertensive but is slowly improving with the titration of hypertensive therapy.  Denies any cardiovascular symptoms.  Still has residual right-sided deficits from his CVA.    Objective:     Vitals: Blood pressure 166/91, pulse 73, temperature 97.9 °F (36.6 °C), resp. rate 15, height 5' 8\" (1.727 m), weight 80 kg (176 lb 5.9 oz), SpO2 97%., Body mass index is 26.82 kg/m².,   Orthostatic Blood Pressures      Flowsheet Row Most Recent Value   Blood Pressure 166/91 filed at 09/16/2024 1457   Patient Position - Orthostatic VS Lying filed at 09/15/2024 0738              Intake/Output " Summary (Last 24 hours) at 9/16/2024 1531  Last data filed at 9/16/2024 1524  Gross per 24 hour   Intake 982 ml   Output 2570 ml   Net -1588 ml       Physical Exam:    GEN: Wilder Vargas appears well, alert and oriented x 3, pleasant and cooperative   HEENT: pupils equal, round, and reactive to light; extraocular muscles intact  NECK: supple, no carotid bruits   HEART: regular rhythm, normal S1 and S2, no significant murmurs, clicks, gallops or rubs   LUNGS: clear to auscultation bilaterally; no wheezes, rales, or rhonchi   ABDOMEN: normal bowel sounds, soft, no tenderness, no distention  EXTREMITIES: peripheral pulses normal; no clubbing, cyanosis, or significant edema  NEURO: Right facial droop and right hemiparesis noted.  SKIN: normal without suspicious lesions on exposed skin    Medications:      Current Facility-Administered Medications:     amLODIPine (NORVASC) tablet 10 mg, 10 mg, Oral, Daily, 10 mg at 09/16/24 0851 **AND** [DISCONTINUED] lisinopril (ZESTRIL) tablet 40 mg, 40 mg, Oral, Daily, Sheeba Lofton MD, 40 mg at 09/15/24 0809    aspirin (ECOTRIN LOW STRENGTH) EC tablet 81 mg, 81 mg, Oral, Daily, Sheeba Lofton MD, 81 mg at 09/16/24 0852    carvedilol (COREG) tablet 25 mg, 25 mg, Oral, BID With Meals, Allison Seay PA-C, 25 mg at 09/16/24 0855    clopidogrel (PLAVIX) tablet 75 mg, 75 mg, Oral, Daily, Sheeba Lofton MD, 75 mg at 09/16/24 0851    doxazosin (CARDURA) tablet 4 mg, 4 mg, Oral, Daily, Sheeba Lofton MD, 4 mg at 09/16/24 0852    enoxaparin (LOVENOX) subcutaneous injection 40 mg, 40 mg, Subcutaneous, Q24H JASON, Sheeba Lofton MD, 40 mg at 09/16/24 0852    hydrALAZINE (APRESOLINE) injection 10 mg, 10 mg, Intravenous, Q6H PRN, Sheeba Lofton MD, 10 mg at 09/16/24 0722    losartan (COZAAR) tablet 100 mg, 100 mg, Oral, Daily, 100 mg at 09/16/24 0852 **AND** hydroCHLOROthiazide tablet 25 mg, 25 mg, Oral, Daily, Dakota Valdez MD, 25 mg at 09/16/24  0852    insulin glargine (LANTUS) subcutaneous injection 35 Units 0.35 mL, 35 Units, Subcutaneous, HS, Sheeba Lofton MD, 35 Units at 09/15/24 2225    insulin lispro (HumALOG/ADMELOG) 100 units/mL subcutaneous injection 5-25 Units, 5-25 Units, Subcutaneous, 4x Daily (AC & HS), 10 Units at 09/16/24 1254 **AND** Fingerstick Glucose (POCT), , , 4x Daily AC and at bedtime, Sheeba Lofton MD    labetalol (NORMODYNE) injection 10 mg, 10 mg, Intravenous, Q6H PRN, Sheeba Lofton MD, 10 mg at 09/14/24 2032    nicotine (NICODERM CQ) 21 mg/24 hr TD 24 hr patch 1 patch, 1 patch, Transdermal, Q24H, Sheeba Lofton MD, 1 patch at 09/15/24 1533    pantoprazole (PROTONIX) EC tablet 40 mg, 40 mg, Oral, Early Morning, Sheeba Lofton MD, 40 mg at 09/16/24 0449    pravastatin (PRAVACHOL) tablet 20 mg, 20 mg, Oral, Daily With Dinner, Allison Seay PA-C    spironolactone (ALDACTONE) tablet 25 mg, 25 mg, Oral, Daily, Dakota Valdez MD, 25 mg at 09/16/24 0852     Labs & Results:        Results from last 7 days   Lab Units 09/15/24  0507 09/14/24  0213 09/13/24  0444   WBC Thousand/uL 6.30 7.51 10.74*   HEMOGLOBIN g/dL 17.0 16.3 18.1*   HEMATOCRIT % 51.3* 48.5 53.5*   PLATELETS Thousands/uL 159 177 197     Results from last 7 days   Lab Units 09/10/24  1247   TRIGLYCERIDES mg/dL 88   HDL mg/dL 46     Results from last 7 days   Lab Units 09/16/24  0452 09/15/24  0507 09/14/24  0213 09/12/24  0437 09/10/24  1247   POTASSIUM mmol/L 3.8 3.6 3.3*   < > 3.5   CHLORIDE mmol/L 106 106 105   < > 102   CO2 mmol/L 27 26 25   < > 28   BUN mg/dL 19 14 17   < > 15   CREATININE mg/dL 0.70 0.66 0.75   < > 0.81   CALCIUM mg/dL 8.8 8.6 8.4   < > 9.2   ALK PHOS U/L  --   --   --   --  83   ALT U/L  --   --   --   --  10   AST U/L  --   --   --   --  11*    < > = values in this interval not displayed.     Results from last 7 days   Lab Units 09/10/24  1320   INR  1.05   PTT seconds 20*     Results from last 7 days   Lab  Units 09/13/24  0444 09/12/24  0437   MAGNESIUM mg/dL 1.9 1.8*         EKG personally reviewed by Glynn Oneal MD. sinus rhythm with PACs and left ventricular hypertrophy.    ECHO:    Left Ventricle: Left ventricular cavity size is normal. Wall thickness is moderately increased. The left ventricular ejection fraction is 75% by visual estimation. Systolic function is hyperdynamic.    Aortic Valve: There is aortic valve sclerosis.    Counseling / Coordination of Care  Total floor / unit time spent today 25 minutes.  Greater than 50% of total time was spent with the patient and / or family counseling and / or coordination of care.

## 2024-09-16 NOTE — ASSESSMENT & PLAN NOTE
Outpatient regimen: 10-40mg amlodipine-benazepril, 25mg carvedilol, 4mg doxazosin  History on medication non-compliance  Presented to ED with -220s  Initially in ICU on nicardipine gtt (Dc'd 9/13)  2020 Renal US neg  Metanephrines ordered   Cardiology consulted  Continue amlodipine 10 mg daily   Lisinopril switched to losartan-HCTZ 100-25 mg  Spironolactone 50 mg daily added  Cardura 4 mg added  SBP still elevated in 200s this am but improved to 160s after morning meds

## 2024-09-16 NOTE — ASSESSMENT & PLAN NOTE
Currently smokes 1-1.5 packs daily ->previously smoking >2 packs/daily  46 year history  NRT, encourage cessation

## 2024-09-16 NOTE — ASSESSMENT & PLAN NOTE
Presented with R facial weakness, RUE/RLE weakness/ataxia, dysarthria (NIHSS 7)  History of past CVAs (May and June 2020) with history of medication noncompliance.  Outpatient regimen: Plavix and pravastatin  Initial CTH and CTA without evidence of CVA  Patient received TNK 1346on 9/10 in ED with initial improvement in sx  Unfortunately patient's sx returned around 1500 in ED with severe R facial droop and loss of strength in RUE/RLE  Repeat CTH without evidence of hemorrhage   9/10 MRI:  Acute infarct involving left thalamocapsular region. There is minimal FLAIR hyperintensity/edema without mass effect or hemorrhagic transformation. Chronic lacunar infarcts in the left thalamus, right corona radiata and right basal ganglia. Focus of old microhemorrhage in the right temporal lobe, likely on the basis of chronic hypertension.  9/11 CTH with evolving CVA     Plan  Echo reviewed --> limited study but no evidence embolic source. Discussed with neuro, no need for repeat or outpatient cardiac monitor as low suspicion for embolic etiology.   Continue ASA + plavix x 21 days followed by plavix monotherapy  Pravastatin  PT/OT/Speech --> plan to DC to rehab  Will need outpatient neurology follow up

## 2024-09-16 NOTE — ASSESSMENT & PLAN NOTE
Lab Results   Component Value Date    HGBA1C 9.0 (H) 09/15/2024       Recent Labs     09/15/24  1623 09/15/24  2052 09/16/24  0716 09/16/24  1138   POCGLU 157* 240* 169* 237*     Outpatient regimen: Jaurdiance and 30U Lantus     Plan  SSI achs for goal glucose 140-180 Alg 4  Cont Lantus HS  Hypoglycemia protocol

## 2024-09-16 NOTE — PROGRESS NOTES
Progress Note - Hospitalist   Name: Wilder Vargas 59 y.o. male I MRN: [id number]  Unit/Bed#: -01 I Date of Admission: 9/10/2024   Date of Service: 9/16/2024 I Hospital Day: 6    Assessment & Plan  Stroke-like symptom  Presented with R facial weakness, RUE/RLE weakness/ataxia, dysarthria (NIHSS 7)  History of past CVAs (May and June 2020) with history of medication noncompliance.  Outpatient regimen: Plavix and pravastatin  Initial CTH and CTA without evidence of CVA  Patient received TNK 1346on 9/10 in ED with initial improvement in sx  Unfortunately patient's sx returned around 1500 in ED with severe R facial droop and loss of strength in RUE/RLE  Repeat CTH without evidence of hemorrhage   9/10 MRI:  Acute infarct involving left thalamocapsular region. There is minimal FLAIR hyperintensity/edema without mass effect or hemorrhagic transformation. Chronic lacunar infarcts in the left thalamus, right corona radiata and right basal ganglia. Focus of old microhemorrhage in the right temporal lobe, likely on the basis of chronic hypertension.  9/11 CTH with evolving CVA     Plan  Echo reviewed --> limited study but no evidence embolic source. Discussed with neuro, no need for repeat or outpatient cardiac monitor as low suspicion for embolic etiology.   Continue ASA + plavix x 21 days followed by plavix monotherapy  Pravastatin  PT/OT/Speech --> plan to DC to rehab  Will need outpatient neurology follow up   Type 2 diabetes mellitus with hyperglycemia (HCC)  Lab Results   Component Value Date    HGBA1C 9.0 (H) 09/15/2024       Recent Labs     09/15/24  1623 09/15/24  2052 09/16/24  0716 09/16/24  1138   POCGLU 157* 240* 169* 237*     Outpatient regimen: Jaurdiance and 30U Lantus     Plan  SSI achs for goal glucose 140-180 Alg 4  Cont Lantus HS  Hypoglycemia protocol  Hypertensive emergency  Outpatient regimen: 10-40mg amlodipine-benazepril, 25mg carvedilol, 4mg doxazosin  History on medication  non-compliance  Presented to ED with -220s  Initially in ICU on nicardipine gtt (Dc'd )   Renal US neg  Metanephrines ordered   Cardiology consulted  Continue amlodipine 10 mg daily   Lisinopril switched to losartan-HCTZ 100-25 mg  Spironolactone 50 mg daily added  Cardura 4 mg added  SBP still elevated in 200s this am but improved to 160s after morning meds   Mixed hyperlipidemia  Continue pravastatin  Previously did not tolerate lipitor  History of medication non-compliance  Tobacco use  Currently smokes 1-1.5 packs daily ->previously smoking >2 packs/daily  46 year history  NRT, encourage cessation    VTE Pharmacologic Prophylaxis:   Moderate Risk (Score 3-4) - Pharmacological DVT Prophylaxis Ordered: enoxaparin (Lovenox).    Mobility:   Basic Mobility Inpatient Raw Score: 11  JH-HLM Goal: 4: Move to chair/commode  JH-HLM Achieved: 2: Bed activities/Dependent transfer  JH-HLM Goal NOT achieved. Continue with multidisciplinary rounding and encourage appropriate mobility to improve upon JH-HLM goals.    Patient Centered Rounds: I performed bedside rounds with nursing staff today.   Discussions with Specialists or Other Care Team Provider: Neuro    Education and Discussions with Family / Patient: Attempted to update  (mother) via phone. Left voicemail.     Current Length of Stay: 6 day(s)  Current Patient Status: Inpatient   Certification Statement: The patient will continue to require additional inpatient hospital stay due to SBP control and rehab planning   Discharge Plan: Anticipate discharge in 24-48 hrs to rehab facility.    Code Status: Level 1 - Full Code    Subjective   Patient denies any new complaints    Objective     Vitals:   Temp (24hrs), Av.3 °F (36.8 °C), Min:97.5 °F (36.4 °C), Max:98.9 °F (37.2 °C)    Temp:  [97.5 °F (36.4 °C)-98.9 °F (37.2 °C)] 98.4 °F (36.9 °C)  HR:  [51-82] 79  Resp:  [14-20] 20  BP: (165-207)/() 165/97  SpO2:  [93 %-99 %] 97 %  Body mass  index is 26.82 kg/m².     Input and Output Summary (last 24 hours):     Intake/Output Summary (Last 24 hours) at 9/16/2024 1143  Last data filed at 9/16/2024 1139  Gross per 24 hour   Intake 640 ml   Output 3095 ml   Net -2455 ml       Physical Exam  Vitals and nursing note reviewed.   Constitutional:       General: He is not in acute distress.     Appearance: He is ill-appearing.   HENT:      Head: Normocephalic and atraumatic.   Eyes:      General:         Right eye: No discharge.         Left eye: No discharge.      Extraocular Movements: Extraocular movements intact.      Conjunctiva/sclera: Conjunctivae normal.   Cardiovascular:      Rate and Rhythm: Normal rate and regular rhythm.      Heart sounds: Normal heart sounds. No murmur heard.  Pulmonary:      Effort: Pulmonary effort is normal. No respiratory distress.      Breath sounds: Normal breath sounds. No wheezing, rhonchi or rales.   Abdominal:      General: Bowel sounds are normal.      Palpations: Abdomen is soft.      Tenderness: There is no abdominal tenderness. There is no guarding.   Musculoskeletal:      Right lower leg: No edema.      Left lower leg: No edema.   Skin:     General: Skin is warm and dry.   Neurological:      Mental Status: He is alert and oriented to person, place, and time.      Motor: Weakness present.      Comments: R facial droop and flaccidity of RUE and RLE   Psychiatric:         Mood and Affect: Mood normal.         Behavior: Behavior normal.          Lines/Drains:  Lines/Drains/Airways       Active Status       None                            Lab Results: I have reviewed the following results: CBC/BMP:   .     09/16/24  0452   SODIUM 139   K 3.8      CO2 27   BUN 19   CREATININE 0.70   GLUC 178*       Results from last 7 days   Lab Units 09/15/24  0507 09/14/24  0213   WBC Thousand/uL 6.30 7.51   HEMOGLOBIN g/dL 17.0 16.3   HEMATOCRIT % 51.3* 48.5   PLATELETS Thousands/uL 159 177   SEGS PCT %  --  66   LYMPHO PCT %  --   20   MONO PCT %  --  11   EOS PCT %  --  2     Results from last 7 days   Lab Units 09/16/24  0452 09/12/24  0437 09/10/24  1247   SODIUM mmol/L 139   < > 137   POTASSIUM mmol/L 3.8   < > 3.5   CHLORIDE mmol/L 106   < > 102   CO2 mmol/L 27   < > 28   BUN mg/dL 19   < > 15   CREATININE mg/dL 0.70   < > 0.81   ANION GAP mmol/L 6   < > 7   CALCIUM mg/dL 8.8   < > 9.2   ALBUMIN g/dL  --   --  4.3   TOTAL BILIRUBIN mg/dL  --   --  0.81   ALK PHOS U/L  --   --  83   ALT U/L  --   --  10   AST U/L  --   --  11*   GLUCOSE RANDOM mg/dL 178*   < > 209*    < > = values in this interval not displayed.     Results from last 7 days   Lab Units 09/10/24  1320   INR  1.05     Results from last 7 days   Lab Units 09/16/24  1138 09/16/24  0716 09/15/24  2052 09/15/24  1623 09/15/24  1111 09/15/24  0733 09/14/24  2107 09/14/24  1615 09/14/24  1122 09/14/24  0805 09/13/24  2116 09/13/24  1657   POC GLUCOSE mg/dl 237* 169* 240* 157* 224* 118 175* 124 202* 114 165* 254*     Results from last 7 days   Lab Units 09/15/24  0507 09/10/24  1247   HEMOGLOBIN A1C % 9.0* 9.2*           Recent Cultures (last 7 days):         Imaging Review: No pertinent imaging studies reviewed.  Other Studies: No additional pertinent studies reviewed.    Last 24 Hours Medication List:     Current Facility-Administered Medications:     amLODIPine (NORVASC) tablet 10 mg, Daily **AND** [DISCONTINUED] lisinopril (ZESTRIL) tablet 40 mg, Daily    aspirin (ECOTRIN LOW STRENGTH) EC tablet 81 mg, Daily    carvedilol (COREG) tablet 25 mg, BID With Meals    clopidogrel (PLAVIX) tablet 75 mg, Daily    doxazosin (CARDURA) tablet 4 mg, Daily    enoxaparin (LOVENOX) subcutaneous injection 40 mg, Q24H JASON    hydrALAZINE (APRESOLINE) injection 10 mg, Q6H PRN    losartan (COZAAR) tablet 100 mg, Daily **AND** hydroCHLOROthiazide tablet 25 mg, Daily    insulin glargine (LANTUS) subcutaneous injection 35 Units 0.35 mL, HS    insulin lispro (HumALOG/ADMELOG) 100 units/mL subcutaneous  injection 5-25 Units, 4x Daily (AC & HS) **AND** Fingerstick Glucose (POCT), 4x Daily AC and at bedtime    labetalol (NORMODYNE) injection 10 mg, Q6H PRN    nicotine (NICODERM CQ) 21 mg/24 hr TD 24 hr patch 1 patch, Q24H    pantoprazole (PROTONIX) EC tablet 40 mg, Early Morning    pravastatin (PRAVACHOL) tablet 20 mg, Daily With Dinner    spironolactone (ALDACTONE) tablet 25 mg, Daily    Administrative Statements   Today, Patient Was Seen By: Allison Seay PA-C  I have spent a total time of 35 minutes in caring for this patient on the day of the visit/encounter including Patient and family education, Counseling / Coordination of care, Documenting in the medical record, Reviewing / ordering tests, medicine, procedures  , and Communicating with other healthcare professionals .    **Please Note: This note may have been constructed using a voice recognition system.**

## 2024-09-16 NOTE — PLAN OF CARE
Problem: Potential for Falls  Goal: Patient will remain free of falls  Description: INTERVENTIONS:  - Educate patient/family on patient safety including physical limitations  - Instruct patient to call for assistance with activity   - Consult OT/PT to assist with strengthening/mobility   - Keep Call bell within reach  - Keep bed low and locked with side rails adjusted as appropriate  - Keep care items and personal belongings within reach  - Initiate and maintain comfort rounds  - Make Fall Risk Sign visible to staff  - Offer Toileting every 3 Hours, in advance of need  - Initiate/Maintain bed alarm  - Obtain necessary fall risk management equipment: bed rails  - Apply yellow socks and bracelet for high fall risk patients  - Consider moving patient to room near nurses station  Outcome: Progressing     Problem: PAIN - ADULT  Goal: Verbalizes/displays adequate comfort level or baseline comfort level  Description: Interventions:  - Encourage patient to monitor pain and request assistance  - Assess pain using appropriate pain scale  - Administer analgesics based on type and severity of pain and evaluate response  - Implement non-pharmacological measures as appropriate and evaluate response  - Consider cultural and social influences on pain and pain management  - Notify physician/advanced practitioner if interventions unsuccessful or patient reports new pain  Outcome: Progressing     Problem: INFECTION - ADULT  Goal: Absence or prevention of progression during hospitalization  Description: INTERVENTIONS:  - Assess and monitor for signs and symptoms of infection  - Monitor lab/diagnostic results  - Monitor all insertion sites, i.e. indwelling lines, tubes, and drains  - Monitor endotracheal if appropriate and nasal secretions for changes in amount and color  - [place] appropriate cooling/warming therapies per order  - Administer medications as ordered  - Instruct and encourage patient and family to use good hand  hygiene technique  - Identify and instruct in appropriate isolation precautions for identified infection/condition  Outcome: Progressing  Goal: Absence of fever/infection during neutropenic period  Description: INTERVENTIONS:  - Monitor WBC    Outcome: Progressing     Problem: SAFETY ADULT  Goal: Patient will remain free of falls  Description: INTERVENTIONS:  - Educate patient/family on patient safety including physical limitations  - Instruct patient to call for assistance with activity   - Consult OT/PT to assist with strengthening/mobility   - Keep Call bell within reach  - Keep bed low and locked with side rails adjusted as appropriate  - Keep care items and personal belongings within reach  - Initiate and maintain comfort rounds  - Make Fall Risk Sign visible to staff  - Apply yellow socks and bracelet for high fall risk patients  - Consider moving patient to room near nurses station  Outcome: Progressing  Goal: Maintain or return to baseline ADL function  Description: INTERVENTIONS:  -  Assess patient's ability to carry out ADLs; assess patient's baseline for ADL function and identify physical deficits which impact ability to perform ADLs (bathing, care of mouth/teeth, toileting, grooming, dressing, etc.)  - Assess/evaluate cause of self-care deficits   - Assess range of motion  - Assess patient's mobility; develop plan if impaired  - Assess patient's need for assistive devices and provide as appropriate  - Encourage maximum independence but intervene and supervise when necessary  - Involve family in performance of ADLs  - Assess for home care needs following discharge   - Consider OT consult to assist with ADL evaluation and planning for discharge  - Provide patient education as appropriate  Outcome: Progressing  Goal: Maintains/Returns to pre admission functional level  Description: INTERVENTIONS:  - Perform AM-PAC 6 Click Basic Mobility/ Daily Activity assessment daily.  - Set and communicate daily mobility  goal to care team and patient/family/caregiver.   - Collaborate with rehabilitation services on mobility goals if consulted  - Perform Range of Motion 3 times a day.  - Reposition patient every 3 hours.  - Dangle patient 3 times a day  - Stand patient 3 times a day  - Ambulate patient 3 times a day  - Out of bed to chair 3 times a day   - Out of bed for meals 3 times a day  - Out of bed for toileting  - Record patient progress and toleration of activity level   Outcome: Progressing     Problem: DISCHARGE PLANNING  Goal: Discharge to home or other facility with appropriate resources  Description: INTERVENTIONS:  - Identify barriers to discharge w/patient and caregiver  - Arrange for needed discharge resources and transportation as appropriate  - Identify discharge learning needs (meds, wound care, etc.)  - Arrange for interpretive services to assist at discharge as needed  - Refer to Case Management Department for coordinating discharge planning if the patient needs post-hospital services based on physician/advanced practitioner order or complex needs related to functional status, cognitive ability, or social support system  Outcome: Progressing     Problem: CARDIOVASCULAR - ADULT  Goal: Maintains optimal cardiac output and hemodynamic stability  Description: INTERVENTIONS:  - Monitor I/O, vital signs and rhythm  - Monitor for S/S and trends of decreased cardiac output  - Administer and titrate ordered vasoactive medications to optimize hemodynamic stability  - Assess quality of pulses, skin color and temperature  - Assess for signs of decreased coronary artery perfusion  - Instruct patient to report change in severity of symptoms  Outcome: Progressing  Goal: Absence of cardiac dysrhythmias or at baseline rhythm  Description: INTERVENTIONS:  - Continuous cardiac monitoring, vital signs, obtain 12 lead EKG if ordered  - Administer antiarrhythmic and heart rate control medications as ordered  - Monitor electrolytes and  administer replacement therapy as ordered  Outcome: Progressing     Problem: Neurological Deficit  Goal: Neurological status is stable or improving  Description: Interventions:  - Monitor and assess patient's level of consciousness, motor function, sensory function, and level of assistance needed for ADLs.   - Monitor and report changes from baseline. Collaborate with interdisciplinary team to initiate plan and implement interventions as ordered.   - Provide and maintain a safe environment.  - Consider seizure precautions.  - Consider fall precautions.  - Consider aspiration precautions.  - Consider bleeding precautions.  Outcome: Progressing     Problem: Activity Intolerance/Impaired Mobility  Goal: Mobility/activity is maintained at optimum level for patient  Description: Interventions:  - Assess and monitor patient  barriers to mobility and need for assistive/adaptive devices.  - Assess patient's emotional response to limitations.  - Collaborate with interdisciplinary team and initiate plans and interventions as ordered.  - Encourage independent activity per ability.  - Maintain proper body alignment.  - Perform active/passive rom as tolerated/ordered.  - Plan activities to conserve energy.  - Turn patient as appropriate  Outcome: Progressing     Problem: Communication Impairment  Goal: Ability to express needs and understand communication  Description: Assess patient's communication skills and ability to understand information.  Patient will demonstrate use of effective communication techniques, alternative methods of communication and understanding even if not able to speak.     - Encourage communication and provide alternate methods of communication as needed.  - Collaborate with case management/ for discharge needs.  - Include patient/family/caregiver in decisions related to communication.  Outcome: Progressing     Problem: Potential for Aspiration  Goal: Non-ventilated patient's risk of  aspiration is minimized  Description: Assess and monitor vital signs, respiratory status, and labs (WBC).  Monitor for signs of aspiration (tachypnea, cough, rales, wheezing, cyanosis, fever).    - Assess and monitor patient's ability to swallow.  - Place patient up in chair to eat if possible.  - HOB up at 90 degrees to eat if unable to get patient up into chair.  - Supervise patient during oral intake.   - Instruct patient/ family to take small bites.  - Instruct patient/ family to take small single sips when taking liquids.  - Follow patient-specific strategies generated by speech pathologist.  Outcome: Progressing  Goal: Ventilated patient's risk of aspiration is minimized  Description: Assess and monitor vital signs, respiratory status, airway cuff pressure, and labs (WBC).  Monitor for signs of aspiration (tachypnea, cough, rales, wheezing, cyanosis, fever).    - Elevate head of bed 30 degrees if patient has tube feeding.  - Monitor tube feeding.  Outcome: Progressing     Problem: Nutrition  Goal: Nutrition/Hydration status is improving  Description: Monitor and assess patient's nutrition/hydration status for malnutrition (ex- brittle hair, bruises, dry skin, pale skin and conjunctiva, muscle wasting, smooth red tongue, and disorientation). Collaborate with interdisciplinary team and initiate plan and interventions as ordered.  Monitor patient's weight and dietary intake as ordered or per policy. Utilize nutrition screening tool and intervene per policy. Determine patient's food preferences and provide high-protein, high-caloric foods as appropriate.     - Assist patient with eating.  - Allow adequate time for meals.  - Encourage patient to take dietary supplement as ordered.  - Collaborate with clinical nutritionist.  - Include patient/family/caregiver in decisions related to nutrition.  Outcome: Progressing     Problem: Prexisting or High Potential for Compromised Skin Integrity  Goal: Skin integrity is  maintained or improved  Description: INTERVENTIONS:  - Identify patients at risk for skin breakdown  - Assess and monitor skin integrity  - Assess and monitor nutrition and hydration status  - Monitor labs   - Assess for incontinence   - Turn and reposition patient  - Assist with mobility/ambulation  - Relieve pressure over bony prominences  - Avoid friction and shearing  - Provide appropriate hygiene as needed including keeping skin clean and dry  - Evaluate need for skin moisturizer/barrier cream  - Collaborate with interdisciplinary team   - Patient/family teaching  - Consider wound care consult   Outcome: Progressing

## 2024-09-17 LAB
GLUCOSE SERPL-MCNC: 153 MG/DL (ref 65–140)
GLUCOSE SERPL-MCNC: 179 MG/DL (ref 65–140)
GLUCOSE SERPL-MCNC: 266 MG/DL (ref 65–140)
GLUCOSE SERPL-MCNC: 287 MG/DL (ref 65–140)

## 2024-09-17 PROCEDURE — 97530 THERAPEUTIC ACTIVITIES: CPT

## 2024-09-17 PROCEDURE — 99232 SBSQ HOSP IP/OBS MODERATE 35: CPT | Performed by: INTERNAL MEDICINE

## 2024-09-17 PROCEDURE — 82948 REAGENT STRIP/BLOOD GLUCOSE: CPT

## 2024-09-17 PROCEDURE — 97535 SELF CARE MNGMENT TRAINING: CPT

## 2024-09-17 PROCEDURE — 99232 SBSQ HOSP IP/OBS MODERATE 35: CPT

## 2024-09-17 RX ORDER — POLYETHYLENE GLYCOL 3350 17 G/17G
17 POWDER, FOR SOLUTION ORAL DAILY
Status: DISCONTINUED | OUTPATIENT
Start: 2024-09-17 | End: 2024-09-20 | Stop reason: HOSPADM

## 2024-09-17 RX ORDER — DOCUSATE SODIUM 100 MG/1
100 CAPSULE, LIQUID FILLED ORAL 2 TIMES DAILY
Status: DISCONTINUED | OUTPATIENT
Start: 2024-09-17 | End: 2024-09-20 | Stop reason: HOSPADM

## 2024-09-17 RX ORDER — SENNOSIDES 8.6 MG
2 TABLET ORAL
Status: DISCONTINUED | OUTPATIENT
Start: 2024-09-17 | End: 2024-09-18

## 2024-09-17 RX ADMIN — INSULIN LISPRO 15 UNITS: 100 INJECTION, SOLUTION INTRAVENOUS; SUBCUTANEOUS at 11:34

## 2024-09-17 RX ADMIN — PANTOPRAZOLE SODIUM 40 MG: 40 TABLET, DELAYED RELEASE ORAL at 05:27

## 2024-09-17 RX ADMIN — INSULIN GLARGINE 35 UNITS: 100 INJECTION, SOLUTION SUBCUTANEOUS at 21:05

## 2024-09-17 RX ADMIN — CLOPIDOGREL BISULFATE 75 MG: 75 TABLET ORAL at 08:43

## 2024-09-17 RX ADMIN — LOSARTAN POTASSIUM 100 MG: 50 TABLET, FILM COATED ORAL at 08:42

## 2024-09-17 RX ADMIN — INSULIN LISPRO 15 UNITS: 100 INJECTION, SOLUTION INTRAVENOUS; SUBCUTANEOUS at 21:05

## 2024-09-17 RX ADMIN — HYDRALAZINE HYDROCHLORIDE 25 MG: 25 TABLET ORAL at 05:27

## 2024-09-17 RX ADMIN — INSULIN LISPRO 5 UNITS: 100 INJECTION, SOLUTION INTRAVENOUS; SUBCUTANEOUS at 18:40

## 2024-09-17 RX ADMIN — ASPIRIN 81 MG: 81 TABLET, COATED ORAL at 08:42

## 2024-09-17 RX ADMIN — AMLODIPINE BESYLATE 10 MG: 5 TABLET ORAL at 08:42

## 2024-09-17 RX ADMIN — NICOTINE 1 PATCH: 21 PATCH, EXTENDED RELEASE TRANSDERMAL at 18:40

## 2024-09-17 RX ADMIN — PRAVASTATIN SODIUM 20 MG: 20 TABLET ORAL at 18:40

## 2024-09-17 RX ADMIN — POLYETHYLENE GLYCOL 3350 17 G: 17 POWDER, FOR SOLUTION ORAL at 13:53

## 2024-09-17 RX ADMIN — INSULIN LISPRO 5 UNITS: 100 INJECTION, SOLUTION INTRAVENOUS; SUBCUTANEOUS at 08:43

## 2024-09-17 RX ADMIN — DOXAZOSIN 4 MG: 1 TABLET ORAL at 08:42

## 2024-09-17 RX ADMIN — ENOXAPARIN SODIUM 40 MG: 40 INJECTION SUBCUTANEOUS at 08:42

## 2024-09-17 RX ADMIN — HYDRALAZINE HYDROCHLORIDE 25 MG: 25 TABLET ORAL at 13:53

## 2024-09-17 RX ADMIN — SPIRONOLACTONE 25 MG: 25 TABLET ORAL at 08:42

## 2024-09-17 RX ADMIN — HYDROCHLOROTHIAZIDE 25 MG: 25 TABLET ORAL at 08:42

## 2024-09-17 RX ADMIN — HYDRALAZINE HYDROCHLORIDE 25 MG: 25 TABLET ORAL at 21:05

## 2024-09-17 RX ADMIN — CARVEDILOL 25 MG: 12.5 TABLET, FILM COATED ORAL at 08:42

## 2024-09-17 RX ADMIN — CARVEDILOL 25 MG: 12.5 TABLET, FILM COATED ORAL at 18:39

## 2024-09-17 RX ADMIN — SENNOSIDES 17.2 MG: 8.6 TABLET, FILM COATED ORAL at 21:05

## 2024-09-17 RX ADMIN — DOCUSATE SODIUM 100 MG: 100 CAPSULE, LIQUID FILLED ORAL at 13:53

## 2024-09-17 NOTE — ASSESSMENT & PLAN NOTE
Presented with R facial weakness, RUE/RLE weakness/ataxia, dysarthria (NIHSS 7)  History of past CVAs (May and June 2020) with history of medication noncompliance.  Outpatient regimen: Plavix and pravastatin  Initial CTH and CTA without evidence of CVA  Patient received TNK 1346on 9/10 in ED with initial improvement in sx  Unfortunately patient's sx returned around 1500 in ED with severe R facial droop and loss of strength in RUE/RLE  Repeat CTH without evidence of hemorrhage   9/10 MRI:  Acute infarct involving left thalamocapsular region. There is minimal FLAIR hyperintensity/edema without mass effect or hemorrhagic transformation. Chronic lacunar infarcts in the left thalamus, right corona radiata and right basal ganglia. Focus of old microhemorrhage in the right temporal lobe, likely on the basis of chronic hypertension.  9/11 CTH with evolving CVA     Plan  Echo reviewed --> limited study but no evidence embolic source. Discussed with neuro, no need for repeat or outpatient cardiac monitor as low suspicion for embolic etiology.   Continue ASA + plavix x 21 days followed by plavix monotherapy  Pravastatin  PT/OT/Speech --> plan to DC to rehab, CM is following for placement   Will need outpatient neurology follow up

## 2024-09-17 NOTE — PROGRESS NOTES
Progress Note - Hospitalist   Name: Wilder Vargas 59 y.o. male I MRN: [id number]  Unit/Bed#: -01 I Date of Admission: 9/10/2024   Date of Service: 9/17/2024 I Hospital Day: 7    Assessment & Plan  Stroke-like symptom  Presented with R facial weakness, RUE/RLE weakness/ataxia, dysarthria (NIHSS 7)  History of past CVAs (May and June 2020) with history of medication noncompliance.  Outpatient regimen: Plavix and pravastatin  Initial CTH and CTA without evidence of CVA  Patient received TNK 1346on 9/10 in ED with initial improvement in sx  Unfortunately patient's sx returned around 1500 in ED with severe R facial droop and loss of strength in RUE/RLE  Repeat CTH without evidence of hemorrhage   9/10 MRI:  Acute infarct involving left thalamocapsular region. There is minimal FLAIR hyperintensity/edema without mass effect or hemorrhagic transformation. Chronic lacunar infarcts in the left thalamus, right corona radiata and right basal ganglia. Focus of old microhemorrhage in the right temporal lobe, likely on the basis of chronic hypertension.  9/11 CTH with evolving CVA     Plan  Echo reviewed --> limited study but no evidence embolic source. Discussed with neuro, no need for repeat or outpatient cardiac monitor as low suspicion for embolic etiology.   Continue ASA + plavix x 21 days followed by plavix monotherapy  Pravastatin  PT/OT/Speech --> plan to DC to rehab, CM is following for placement   Will need outpatient neurology follow up   Type 2 diabetes mellitus with hyperglycemia (HCC)  Lab Results   Component Value Date    HGBA1C 9.0 (H) 09/15/2024       Recent Labs     09/16/24  1628 09/16/24  2101 09/17/24  0806 09/17/24  1121   POCGLU 179* 232* 153* 266*     Outpatient regimen: Jaurdiance and 30U Lantus     Plan  SSI achs for goal glucose 140-180 Alg 4  Cont Lantus HS  Hypoglycemia protocol  Hypertensive emergency  Outpatient regimen: 10-40mg amlodipine-benazepril, 25mg carvedilol, 4mg doxazosin  History on  medication non-compliance  Presented to ED with -220s  Initially in ICU on nicardipine gtt (Dc'd )   Renal US neg  Metanephrines ordered   Cardiology consulted  Continue amlodipine 10 mg daily   Lisinopril switched to losartan-HCTZ 100-25 mg  Spironolactone 50 mg daily added  Cardura 4 mg added  Hydralazine 25 mg TID added   SBP is slowly improving, 150s to 160s today  Mixed hyperlipidemia  Continue pravastatin  Previously did not tolerate lipitor  History of medication non-compliance  Tobacco use  Currently smokes 1-1.5 packs daily ->previously smoking >2 packs/daily  46 year history  NRT, encourage cessation    VTE Pharmacologic Prophylaxis:   Moderate Risk (Score 3-4) - Pharmacological DVT Prophylaxis Ordered: enoxaparin (Lovenox).    Mobility:   Basic Mobility Inpatient Raw Score: 10  -HLM Goal: 4: Move to chair/commode  JH-HLM Achieved: 5: Stand (1 or more minutes)  JH-HLM Goal achieved. Continue to encourage appropriate mobility.    Patient Centered Rounds: I performed bedside rounds with nursing staff today.   Discussions with Specialists or Other Care Team Provider: Cardiology     Education and Discussions with Family / Patient: Updated  (mother) via phone.    Current Length of Stay: 7 day(s)  Current Patient Status: Inpatient   Certification Statement: The patient will continue to require additional inpatient hospital stay due to placement  Discharge Plan: Anticipate discharge in 24-48 hrs to rehab facility.    Code Status: Level 1 - Full Code    Subjective   Patient denies any new complaints today.  Blood pressure slowly improving.  No BM for 3 days.    Objective     Vitals:   Temp (24hrs), Av.8 °F (36.6 °C), Min:97.6 °F (36.4 °C), Max:97.9 °F (36.6 °C)    Temp:  [97.6 °F (36.4 °C)-97.9 °F (36.6 °C)] 97.6 °F (36.4 °C)  HR:  [60-73] 62  Resp:  [15-24] 24  BP: (150-179)/(68-99) 159/99  SpO2:  [90 %-97 %] 97 %  Body mass index is 26.82 kg/m².     Input and Output Summary  (last 24 hours):     Intake/Output Summary (Last 24 hours) at 9/17/2024 1327  Last data filed at 9/17/2024 0845  Gross per 24 hour   Intake 1122 ml   Output 2130 ml   Net -1008 ml       Physical Exam  Vitals and nursing note reviewed.   Constitutional:       General: He is not in acute distress.     Appearance: He is ill-appearing.   HENT:      Head: Normocephalic and atraumatic.   Eyes:      General:         Right eye: No discharge.         Left eye: No discharge.      Extraocular Movements: Extraocular movements intact.      Conjunctiva/sclera: Conjunctivae normal.   Cardiovascular:      Rate and Rhythm: Normal rate and regular rhythm.      Heart sounds: Normal heart sounds. No murmur heard.  Pulmonary:      Effort: Pulmonary effort is normal. No respiratory distress.      Breath sounds: Normal breath sounds. No wheezing, rhonchi or rales.   Abdominal:      General: Bowel sounds are normal.      Palpations: Abdomen is soft.      Tenderness: There is no abdominal tenderness. There is no guarding.   Musculoskeletal:      Right lower leg: No edema.      Left lower leg: No edema.   Skin:     General: Skin is warm and dry.   Neurological:      Mental Status: He is alert and oriented to person, place, and time. Mental status is at baseline.      Motor: Weakness present.   Psychiatric:         Mood and Affect: Mood normal.         Behavior: Behavior normal.          Lines/Drains:  Lines/Drains/Airways       Active Status       None                            Lab Results: I have reviewed the following results:  No new results in last 24 hours.    Results from last 7 days   Lab Units 09/15/24  0507 09/14/24  0213   WBC Thousand/uL 6.30 7.51   HEMOGLOBIN g/dL 17.0 16.3   HEMATOCRIT % 51.3* 48.5   PLATELETS Thousands/uL 159 177   SEGS PCT %  --  66   LYMPHO PCT %  --  20   MONO PCT %  --  11   EOS PCT %  --  2     Results from last 7 days   Lab Units 09/16/24  0452   SODIUM mmol/L 139   POTASSIUM mmol/L 3.8   CHLORIDE mmol/L  106   CO2 mmol/L 27   BUN mg/dL 19   CREATININE mg/dL 0.70   ANION GAP mmol/L 6   CALCIUM mg/dL 8.8   GLUCOSE RANDOM mg/dL 178*         Results from last 7 days   Lab Units 09/17/24  1121 09/17/24  0806 09/16/24  2101 09/16/24  1628 09/16/24  1138 09/16/24  0716 09/15/24  2052 09/15/24  1623 09/15/24  1111 09/15/24  0733 09/14/24  2107 09/14/24  1615   POC GLUCOSE mg/dl 266* 153* 232* 179* 237* 169* 240* 157* 224* 118 175* 124     Results from last 7 days   Lab Units 09/15/24  0507   HEMOGLOBIN A1C % 9.0*           Recent Cultures (last 7 days):         Imaging Review: No pertinent imaging studies reviewed.  Other Studies: No additional pertinent studies reviewed.    Last 24 Hours Medication List:     Current Facility-Administered Medications:     amLODIPine (NORVASC) tablet 10 mg, Daily **AND** [DISCONTINUED] lisinopril (ZESTRIL) tablet 40 mg, Daily    aspirin (ECOTRIN LOW STRENGTH) EC tablet 81 mg, Daily    carvedilol (COREG) tablet 25 mg, BID With Meals    clopidogrel (PLAVIX) tablet 75 mg, Daily    doxazosin (CARDURA) tablet 4 mg, Daily    enoxaparin (LOVENOX) subcutaneous injection 40 mg, Q24H JASON    hydrALAZINE (APRESOLINE) injection 10 mg, Q6H PRN    hydrALAZINE (APRESOLINE) tablet 25 mg, Q8H JASON    losartan (COZAAR) tablet 100 mg, Daily **AND** hydroCHLOROthiazide tablet 25 mg, Daily    insulin glargine (LANTUS) subcutaneous injection 35 Units 0.35 mL, HS    insulin lispro (HumALOG/ADMELOG) 100 units/mL subcutaneous injection 5-25 Units, 4x Daily (AC & HS) **AND** Fingerstick Glucose (POCT), 4x Daily AC and at bedtime    labetalol (NORMODYNE) injection 10 mg, Q6H PRN    nicotine (NICODERM CQ) 21 mg/24 hr TD 24 hr patch 1 patch, Q24H    pantoprazole (PROTONIX) EC tablet 40 mg, Early Morning    pravastatin (PRAVACHOL) tablet 20 mg, Daily With Dinner    spironolactone (ALDACTONE) tablet 25 mg, Daily    Administrative Statements   Today, Patient Was Seen By: Allison Seay PA-C  I have spent a total  time of 35 minutes in caring for this patient on the day of the visit/encounter including Diagnostic results, Documenting in the medical record, Reviewing / ordering tests, medicine, procedures  , and Communicating with other healthcare professionals .    **Please Note: This note may have been constructed using a voice recognition system.**

## 2024-09-17 NOTE — ASSESSMENT & PLAN NOTE
Outpatient regimen: 10-40mg amlodipine-benazepril, 25mg carvedilol, 4mg doxazosin  History on medication non-compliance  Presented to ED with -220s  Initially in ICU on nicardipine gtt (Dc'd 9/13)  2020 Renal US neg  Metanephrines ordered   Cardiology consulted  Continue amlodipine 10 mg daily   Lisinopril switched to losartan-HCTZ 100-25 mg  Spironolactone 50 mg daily added  Cardura 4 mg added  Hydralazine 25 mg TID added   SBP is slowly improving, 150s to 160s today

## 2024-09-17 NOTE — PLAN OF CARE
Problem: PAIN - ADULT  Goal: Verbalizes/displays adequate comfort level or baseline comfort level  Description: Interventions:  - Encourage patient to monitor pain and request assistance  - Assess pain using appropriate pain scale  - Administer analgesics based on type and severity of pain and evaluate response  - Implement non-pharmacological measures as appropriate and evaluate response  - Consider cultural and social influences on pain and pain management  - Notify physician/advanced practitioner if interventions unsuccessful or patient reports new pain  Outcome: Progressing     Problem: INFECTION - ADULT  Goal: Absence or prevention of progression during hospitalization  Description: INTERVENTIONS:  - Assess and monitor for signs and symptoms of infection  - Monitor lab/diagnostic results  - Monitor all insertion sites, i.e. indwelling lines, tubes, and drains  - Monitor endotracheal if appropriate and nasal secretions for changes in amount and color  - [place] appropriate cooling/warming therapies per order  - Administer medications as ordered  - Instruct and encourage patient and family to use good hand hygiene technique  - Identify and instruct in appropriate isolation precautions for identified infection/condition  Outcome: Progressing  Goal: Absence of fever/infection during neutropenic period  Description: INTERVENTIONS:  - Monitor WBC    Outcome: Progressing     Problem: SAFETY ADULT  Goal: Maintain or return to baseline ADL function  Description: INTERVENTIONS:  -  Assess patient's ability to carry out ADLs; assess patient's baseline for ADL function and identify physical deficits which impact ability to perform ADLs (bathing, care of mouth/teeth, toileting, grooming, dressing, etc.)  - Assess/evaluate cause of self-care deficits   - Assess range of motion  - Assess patient's mobility; develop plan if impaired  - Assess patient's need for assistive devices and provide as appropriate  - Encourage  maximum independence but intervene and supervise when necessary  - Involve family in performance of ADLs  - Assess for home care needs following discharge   - Consider OT consult to assist with ADL evaluation and planning for discharge  - Provide patient education as appropriate  Outcome: Progressing     Problem: DISCHARGE PLANNING  Goal: Discharge to home or other facility with appropriate resources  Description: INTERVENTIONS:  - Identify barriers to discharge w/patient and caregiver  - Arrange for needed discharge resources and transportation as appropriate  - Identify discharge learning needs (meds, wound care, etc.)  - Arrange for interpretive services to assist at discharge as needed  - Refer to Case Management Department for coordinating discharge planning if the patient needs post-hospital services based on physician/advanced practitioner order or complex needs related to functional status, cognitive ability, or social support system  Outcome: Progressing     Problem: CARDIOVASCULAR - ADULT  Goal: Maintains optimal cardiac output and hemodynamic stability  Description: INTERVENTIONS:  - Monitor I/O, vital signs and rhythm  - Monitor for S/S and trends of decreased cardiac output  - Administer and titrate ordered vasoactive medications to optimize hemodynamic stability  - Assess quality of pulses, skin color and temperature  - Assess for signs of decreased coronary artery perfusion  - Instruct patient to report change in severity of symptoms  Outcome: Progressing  Goal: Absence of cardiac dysrhythmias or at baseline rhythm  Description: INTERVENTIONS:  - Continuous cardiac monitoring, vital signs, obtain 12 lead EKG if ordered  - Administer antiarrhythmic and heart rate control medications as ordered  - Monitor electrolytes and administer replacement therapy as ordered  Outcome: Progressing     Problem: Neurological Deficit  Goal: Neurological status is stable or improving  Description: Interventions:  -  Monitor and assess patient's level of consciousness, motor function, sensory function, and level of assistance needed for ADLs.   - Monitor and report changes from baseline. Collaborate with interdisciplinary team to initiate plan and implement interventions as ordered.   - Provide and maintain a safe environment.  - Consider seizure precautions.  - Consider fall precautions.  - Consider aspiration precautions.  - Consider bleeding precautions.  Outcome: Progressing     Problem: Activity Intolerance/Impaired Mobility  Goal: Mobility/activity is maintained at optimum level for patient  Description: Interventions:  - Assess and monitor patient  barriers to mobility and need for assistive/adaptive devices.  - Assess patient's emotional response to limitations.  - Collaborate with interdisciplinary team and initiate plans and interventions as ordered.  - Encourage independent activity per ability.  - Maintain proper body alignment.  - Perform active/passive rom as tolerated/ordered.  - Plan activities to conserve energy.  - Turn patient as appropriate  Outcome: Progressing     Problem: Communication Impairment  Goal: Ability to express needs and understand communication  Description: Assess patient's communication skills and ability to understand information.  Patient will demonstrate use of effective communication techniques, alternative methods of communication and understanding even if not able to speak.     - Encourage communication and provide alternate methods of communication as needed.  - Collaborate with case management/ for discharge needs.  - Include patient/family/caregiver in decisions related to communication.  Outcome: Progressing     Problem: Potential for Aspiration  Goal: Non-ventilated patient's risk of aspiration is minimized  Description: Assess and monitor vital signs, respiratory status, and labs (WBC).  Monitor for signs of aspiration (tachypnea, cough, rales, wheezing, cyanosis,  fever).    - Assess and monitor patient's ability to swallow.  - Place patient up in chair to eat if possible.  - HOB up at 90 degrees to eat if unable to get patient up into chair.  - Supervise patient during oral intake.   - Instruct patient/ family to take small bites.  - Instruct patient/ family to take small single sips when taking liquids.  - Follow patient-specific strategies generated by speech pathologist.  Outcome: Progressing  Goal: Ventilated patient's risk of aspiration is minimized  Description: Assess and monitor vital signs, respiratory status, airway cuff pressure, and labs (WBC).  Monitor for signs of aspiration (tachypnea, cough, rales, wheezing, cyanosis, fever).    - Elevate head of bed 30 degrees if patient has tube feeding.  - Monitor tube feeding.  Outcome: Progressing     Problem: Nutrition  Goal: Nutrition/Hydration status is improving  Description: Monitor and assess patient's nutrition/hydration status for malnutrition (ex- brittle hair, bruises, dry skin, pale skin and conjunctiva, muscle wasting, smooth red tongue, and disorientation). Collaborate with interdisciplinary team and initiate plan and interventions as ordered.  Monitor patient's weight and dietary intake as ordered or per policy. Utilize nutrition screening tool and intervene per policy. Determine patient's food preferences and provide high-protein, high-caloric foods as appropriate.     - Assist patient with eating.  - Allow adequate time for meals.  - Encourage patient to take dietary supplement as ordered.  - Collaborate with clinical nutritionist.  - Include patient/family/caregiver in decisions related to nutrition.  Outcome: Progressing     Problem: Prexisting or High Potential for Compromised Skin Integrity  Goal: Skin integrity is maintained or improved  Description: INTERVENTIONS:  - Identify patients at risk for skin breakdown  - Assess and monitor skin integrity  - Assess and monitor nutrition and hydration  status  - Monitor labs   - Assess for incontinence   - Turn and reposition patient  - Assist with mobility/ambulation  - Relieve pressure over bony prominences  - Avoid friction and shearing  - Provide appropriate hygiene as needed including keeping skin clean and dry  - Evaluate need for skin moisturizer/barrier cream  - Collaborate with interdisciplinary team   - Patient/family teaching  - Consider wound care consult   Outcome: Progressing

## 2024-09-17 NOTE — PROGRESS NOTES
"Progress Note -     Name: Wilder Vargas 59 y.o. male I MRN: [id number]  Unit/Bed#: -01 I Date of Admission: 9/10/2024   Date of Service: 9/17/2024 I Hospital Day: 7     Intended introductory visit with pt \"Wilder\" who is resting comfortably at this time. Interfaith blessing offered outside of pt room. Available to follow upon request.     Thank you!         09/17/24 1100   Clinical Encounter Type   Visited With Patient not available   Routine Visit Introduction         "

## 2024-09-17 NOTE — PHYSICAL THERAPY NOTE
PHYSICAL THERAPY TREATMENT NOTE    Patient Name: Wilder Vargas  Today's Date: 9/17/2024 09/17/24 0943   PT Last Visit   PT Visit Date 09/17/24   Note Type   Note Type Treatment for insurance authorization   Pain Assessment   Pain Assessment Tool 0-10   Pain Score No Pain   Restrictions/Precautions   Weight Bearing Precautions Per Order No   Other Precautions Chair Alarm;Bed Alarm;Limb alert;Fall Risk   General   Chart Reviewed Yes   Family/Caregiver Present No   Cognition   Overall Cognitive Status WFL   Arousal/Participation Cooperative;Alert   Attention Within functional limits   Orientation Level Oriented X4   Memory Within functional limits   Following Commands Follows all commands and directions without difficulty   Comments Pt w/ flat affect throughout session, highly motivated. Tearful at the end   Bed Mobility   Supine to Sit 2  Maximal assistance   Additional items Assist x 1;Increased time required;Verbal cues  (to patient's R side)   Transfers   Sit to Stand 4  Minimal assistance   Additional items Assist x 2;Increased time required   Other 1  Dependent  (QuickMove)   Additional Comments Performed STS x 2 in QuickMove, then x 2 from Recliner chair   Balance   Static Sitting Fair +   Dynamic Sitting Fair +   Static Standing Poor -   Dynamic Standing Poor   Activity Tolerance   Activity Tolerance Patient tolerated treatment well   Medical Staff Made Aware MILENA Sneed   Nurse Made Aware YAN Burroughs   Assessment   Problem List Decreased strength;Decreased range of motion;Decreased endurance;Impaired balance;Decreased mobility   Assessment Madi was seen today for PT intervention. He continues to demonstrate improvements in functional mobility despite continued flaccidity of RUE/LE. He is able to get to the EOB w/ me w/ max A x 1 towards his weak side. Once up, he is able to maintain midline sitting for 7 minutes while getting  washed up with OT, while being able to tolerate weight through LUE. He reports needing to pee, he is able to stand on QuickMove w/ min A x 1, and maintain standing x 3 min w/ min A x 1. He is then able to sit OOB in recliner chair and perform 2 STS from the recliner without the QuikcMove. He is able to stand w/ min A x 2 an dbenefits from L knee block, but is able to accept weight without buckling. He is unable to weight shift at this time. Returned to seated in recliner chair at end of session. DCR continues to be for Level 1 resources upon DC   Goals   Patient Goals to get back to being I   STG Expiration Date 09/21/24   Short Term Goal #1 Patient will: Perform all bed mobility tasks w/ modx1 to improve pt's independence w/ repositioning for decrease risk of skin breakdown, Perform all transfers w/ modx1 consistently from various height surfaces in order to improve I w/ engagement w/ real-world environments/situations, Increase all balance 1/2 grade to decrease risk for falls, Tolerate 3 hr OOB to faciliate upright tolerance, Tolerate seated at EOB at least 20 minutes w/ supervision in order to work on trunk strength/endurance for functional tasks, and Tolerate standing at least 2 minutes w/ modx1 to facilitate functional task performance and facilitate towards readiness for challenging upright mobility   PT Treatment Day 1   Plan   Treatment/Interventions Functional transfer training;LE strengthening/ROM;Therapeutic exercise;Endurance training;Cognitive reorientation;Equipment eval/education;Patient/family training;Bed mobility;Gait training   Progress Progressing toward goals   PT Frequency 3-5x/wk   Discharge Recommendation   Rehab Resource Intensity Level, PT I (Maximum Resource Intensity)   Additional Comments Continue to recommend Sarah w/ RN Staff   AM-PAC Basic Mobility Inpatient   Turning in Flat Bed Without Bedrails 2   Lying on Back to Sitting on Edge of Flat Bed Without Bedrails 2   Moving Bed to  Chair 2   Standing Up From Chair Using Arms 2   Walk in Room 1   Climb 3-5 Stairs With Railing 1   Basic Mobility Inpatient Raw Score 10   Turning Head Towards Sound 4   Follow Simple Instructions 4   Low Function Basic Mobility Raw Score  18   Low Function Basic Mobility Standardized Score  29.25   [facility] Highest Level Of Mobility   [facility] Goal 4: Move to chair/commode   -[facility] Achieved 5: Stand (1 or more minutes)   Education   Education Provided Mobility training   End of Consult   Patient Position at End of Consult Bed/Chair alarm activated;All needs within reach       Pt seen as a co-tx due to the patient's co-morbidities, clinically unstable presentation, and present impairments which are a regression from the patient's baseline.     The patient's AM-PAC Basic Mobility Inpatient Short Form Raw Score is 10. A Raw score of less than or equal to 16 suggests the patient may benefit from discharge to post-acute rehabilitation services. Please also refer to the recommendation of the Physical Therapist for safe discharge planning.    Pt would continue to benefit from skilled PT during this admission in order to progress patient towards goals to decrease risk of falls and maximize independence.     Ashia Rajput, PT, DPT

## 2024-09-17 NOTE — UTILIZATION REVIEW
Continued Stay Review    Date: 9/17/24                          Current Patient Class: Inpatient  Current Level of Care: med surg    HPI:59 y.o. male initially admitted on 9/10/24    Presented with stroke like symptoms, found to have acute CVA.   BP elevated.     Assessment/Plan:     9/17/2024 .  Patient presents with  improving BP with starting Oral  medications and addition of hydralazine. .  Still with right sided neurologic deficits.    On exam diminished breath sounds.  Right-sided neurologic deficits noted including facial droop and sided weakness.   Abnormal labs or imaging:  glucose 153>266  Diagnosis/Plan     acute CVA/Hypertensive urgency. Continue ASA + plavix x 21 days followed by plavix monotherapy.  Continue statin.   Will need rehab, CM involved.   Continue Lantus HS and SSI, goal glucose 140 - 180.   BP control - Continue amlodipine. Lisinopril switched to losartan-HCHydralazine 25 mg TID added -25 mg.  Spironolactone 50 mg daily added.  Cardura 4 mg added .      Vital Signs (last 3 days)       Date/Time Temp Pulse Resp BP MAP (mmHg) SpO2 O2 Device Patient Position - Orthostatic VS [place] Coma Scale Score Pain    09/17/24 13:48:26 -- 54 -- 126/74 91 97 % -- -- -- --    09/17/24 1002 -- -- -- -- -- -- -- -- -- No Pain    09/17/24 0943 -- -- -- -- -- -- -- -- -- No Pain    09/17/24 0845 -- -- -- -- -- -- None (Room air) -- 15 No Pain    09/17/24 08:09:20 97.6 °F (36.4 °C) 62 24 159/99 119 97 % None (Room air) Lying -- --    09/17/24 05:31:52 -- 63 -- 150/68 95 97 % -- -- -- --    09/16/24 1958 -- -- -- -- -- -- -- -- 15 No Pain    09/16/24 19:22:59 97.8 °F (36.6 °C) 67 -- 169/89 116 90 % -- -- -- --    09/16/24 14:57:38 -- 73 15 166/91 116 97 % -- -- -- --    09/16/24 14:32:55 97.9 °F (36.6 °C) 60 18 179/94 122 92 % -- -- -- --    09/16/24 10:01:09 -- 79 -- 165/97 120 97 % None (Room air) -- 15 No Pain    09/16/24 08:34:42 -- 82 -- 195/111 139 97 % -- -- -- --    09/16/24 07:19:05 -- 60 --  207/109 142 96 % -- -- -- --    09/16/24 06:16:07 98.4 °F (36.9 °C) 52 20 180/86 117 93 % -- -- -- --    09/16/24 0500 -- 51 -- 187/86 120 98 % -- -- -- --    09/16/24 04:50:09 -- 73 -- 187/86 120 97 % -- -- -- --    09/16/24 04:47:54 -- 80 -- 187/86 120 97 % -- -- -- --    09/16/24 0300 -- 51 -- -- -- 99 % -- -- -- --    09/16/24 0100 -- 63 -- -- -- 95 % -- -- -- --    09/16/24 0053 -- 61 -- -- -- 97 % -- -- 15 No Pain    09/15/24 2300 -- 56 -- -- -- 96 % -- -- -- --    09/15/24 22:30:49 98.5 °F (36.9 °C) 57 14 171/94 120 97 % -- -- -- --    09/15/24 22:08:45 98.9 °F (37.2 °C) 53 16 170/92 118 97 % None (Room air) -- 15 No Pain    09/15/24 2100 -- 54 -- -- -- 96 % -- -- -- --    09/15/24 1900 -- 59 -- -- -- 96 % -- -- -- --    09/15/24 18:43:24 97.5 °F (36.4 °C) 61 -- 172/96 121 95 % -- -- -- --    09/15/24 16:45:23 -- 55 -- 179/101 127 99 % -- -- -- --    09/15/24 15:24:46 -- 58 -- 178/102 127 99 % -- -- -- --    09/15/24 1523 -- -- -- -- -- -- None (Room air) -- 15 --    09/15/24 11:51:31 -- 73 -- 178/105 129 96 % -- -- -- --    09/15/24 11:06:43 97.5 °F (36.4 °C) 59 -- 177/94 122 98 % None (Room air) -- 15 1    09/15/24 09:48:02 -- 74 -- 172/98 123 97 % -- -- -- --    09/15/24 0947 -- -- -- 172/98 -- -- -- -- -- --    09/15/24 07:45:05 -- 61 -- 214/116 149 98 % -- -- -- --    09/15/24 07:38:02 97.7 °F (36.5 °C) 66 16 211/115 147 99 % None (Room air) Lying -- --    09/15/24 07:35:26 97.7 °F (36.5 °C) 59 16 205/112 143 97 % None (Room air) Lying -- --    09/15/24 05:02:50 -- 56 14 172/84 113 97 % -- -- -- --    09/14/24 2215 -- -- -- 178/89 127 -- -- Lying -- --    09/14/24 21:21:02 97.8 °F (36.6 °C) 60 16 178/99 125 96 % None (Room air) Lying -- --    09/14/24 2025 -- -- 16 -- -- -- None (Room air) Lying 15 No Pain    09/14/24 20:24:53 97.9 °F (36.6 °C) 60 16 181/99 126 97 % -- -- -- --    09/14/24 17:32:27 -- 61 -- 184/101 129 96 % -- -- -- --    09/14/24 14:39:13 97.8 °F (36.6 °C) 64 16 191/105 134 96 % None  (Room air) Lying -- --    09/14/24 0930 -- -- -- -- -- -- -- -- -- No Pain    09/14/24 0850 -- -- -- -- -- -- -- -- -- No Pain    09/14/24 08:06:14 97.9 °F (36.6 °C) 66 16 165/92 116 96 % -- -- -- --    09/14/24 0300 -- 55 -- -- -- 95 % -- -- -- --    09/14/24 02:09:45 97.9 °F (36.6 °C) 66 18 161/92 115 96 % None (Room air) Lying -- --    09/14/24 0100 -- 55 -- -- -- 95 % -- -- -- --        Pertinent Labs/Diagnostic Results:   Radiology:  CT head wo contrast   Final Interpretation by Juan Pablo Ponce MD (09/11 1432)      Evolving acute infarct in in left internal capsule posterior limb. No acute intracranial hemorrhage status post thrombolytic administration.                     Workstation performed: [id number]         MRI brain wo contrast   Final Interpretation by Padmini Collins MD (09/10 7138)      1.  Acute infarct involving left thalamocapsular region. There is minimal FLAIR hyperintensity/edema without mass effect or hemorrhagic transformation.   2.  Chronic lacunar infarcts in the left thalamus, right corona radiata and right basal ganglia.   3.  Nonspecific white matter change most compatible with chronic microangiopathy.   4.  Focus of old microhemorrhage in the right temporal lobe, likely on the basis of chronic hypertension.   5.  Right maxillary sinusitis.      Workstation performed: [id number]         CT head wo contrast   Final Interpretation by Stewart Cee DO (09/10 0604)   No acute intracranial abnormality.                  Workstation performed: [id number]         CT stroke alert brain   Final Interpretation by Padmini Collins MD (09/10 0157)      1.  No acute intracranial CT abnormality.   2.  Sequela of chronic infarct in the right corona radiata.   3.  Stable chronic infarct in the ventrolateral left thalamus.   4.  Chronic right microangiopathic change.   5.  Findings suggestive of acute right maxillary sinusitis.                     Findings were directly discussed with Marques Laureano at  1:09 pm      Workstation performed: [id number]         CTA stroke alert (head/neck)   Final Interpretation by Padmini Collins MD (09/10 1319)      1.  Patent major vessels of the [place] without high-grade stenosis.   2.  No significant stenosis in the cervical carotid or vertebral arteries.   3.  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                  Findings were directly discussed with Marques Laureano at 1:09 pm      Workstation performed: [id number]           Cardiology:  Echo complete w/ contrast if indicated   Final Result by Bucky Alberto MD (09/11 1223)        Left Ventricle: Left ventricular cavity size is normal. Wall thickness    is moderately increased. The left ventricular ejection fraction is 75% by    visual estimation. Systolic function is hyperdynamic.     Aortic Valve: There is aortic valve sclerosis.      The heart is not visualized for most of this study.         ECG 12 lead   Final Result by Bucky Alberto MD (09/12 1712)   Sinus rhythm with Premature supraventricular complexes   Left ventricular hypertrophy with repolarization abnormality (    Sokolow-Mcclellan , Romhilt-Snyder )   Abnormal ECG   When compared with ECG of 23-JUN-2020 08:58,   Premature supraventricular complexes are now Present   ST depression has replaced ST elevation in Anterior leads   Inverted T waves have replaced nonspecific T wave abnormality in Inferior    leads   T wave inversion now evident in Anterolateral leads   Confirmed by Bucky Alberto ([id number]) on 9/12/2024 5:12:49 PM          Results from last 7 days   Lab Units 09/15/24  0507 09/14/24  0213 09/13/24  0444 09/12/24  0437   WBC Thousand/uL 6.30 7.51 10.74* 8.96   HEMOGLOBIN g/dL 17.0 16.3 18.1* 17.9*   HEMATOCRIT % 51.3* 48.5 53.5* 52.8*   PLATELETS Thousands/uL 159 177 197 192   TOTAL NEUT ABS Thousands/µL  --  4.96 8.28* 6.32     Results from last 7 days   Lab Units 09/16/24  9738  09/15/24  0507 09/14/24  0213 09/13/24  0444 09/12/24  0437   SODIUM mmol/L 139 139 137 140 136   POTASSIUM mmol/L 3.8 3.6 3.3* 3.5 3.2*   CHLORIDE mmol/L 106 106 105 107 103   CO2 mmol/L 27 26 25 24 24   ANION GAP mmol/L 6 7 7 9 9   BUN mg/dL 19 14 17 13 12   CREATININE mg/dL 0.70 0.66 0.75 0.64 0.64   EGFR ml/min/1.73sq m 103 105 100 107 107   CALCIUM mg/dL 8.8 8.6 8.4 8.6 8.6   MAGNESIUM mg/dL  --   --   --  1.9 1.8*   PHOSPHORUS mg/dL  --   --  3.8 2.4*  --      Results from last 7 days   Lab Units 09/17/24  1121 09/17/24  0806 09/16/24  2101 09/16/24  1628 09/16/24  1138 09/16/24  0716 09/15/24  2052 09/15/24  1623 09/15/24  1111 09/15/24  0733 09/14/24  2107 09/14/24  1615   POC GLUCOSE mg/dl 266* 153* 232* 179* 237* 169* 240* 157* 224* 118 175* 124     Results from last 7 days   Lab Units 09/16/24  0452 09/15/24  0507 09/14/24  0213 09/13/24  0444 09/12/24  0437   GLUCOSE RANDOM mg/dL 178* 98 98 125 141*     Results from last 7 days   Lab Units 09/15/24  0507   HEMOGLOBIN A1C % 9.0*   EAG mg/dl 212     Results from last 7 days   Lab Units 09/12/24  1038   FERRITIN ng/mL 224         Medications:   Scheduled Medications:  amLODIPine, 10 mg, Oral, Daily  aspirin, 81 mg, Oral, Daily  carvedilol, 25 mg, Oral, BID With Meals  clopidogrel, 75 mg, Oral, Daily  docusate sodium, 100 mg, Oral, BID  doxazosin, 4 mg, Oral, Daily  enoxaparin, 40 mg, Subcutaneous, Q24H JASON  hydrALAZINE, 25 mg, Oral, Q8H JASON  losartan, 100 mg, Oral, Daily   And  hydroCHLOROthiazide, 25 mg, Oral, Daily  insulin glargine, 35 Units, Subcutaneous, HS  insulin lispro, 5-25 Units, Subcutaneous, 4x Daily (AC & HS)  nicotine, 1 patch, Transdermal, Q24H  pantoprazole, 40 mg, Oral, Early Morning  polyethylene glycol, 17 g, Oral, Daily  pravastatin, 20 mg, Oral, Daily With Dinner  senna, 2 tablet, Oral, HS  spironolactone, 25 mg, Oral, Daily    Continuous IV Infusions: none      PRN Meds: not used.   hydrALAZINE, 10 mg, Intravenous, Q6H PRN  labetalol,  10 mg, Intravenous, Q6H PRN      Discharge Plan:  to be determined.     Network Utilization Review Department  ATTENTION: Please call with any questions or concerns to [phone] and carefully listen to the prompts so that you are directed to the right person. All voicemails are confidential.   For Discharge needs, contact Care Management DC Support Team at [phone] opt. [phone]  Send all requests for admission clinical reviews, approved or denied determinations and any other requests to dedicated fax number below belonging to the campus where the patient is receiving treatment. List of dedicated fax numbers for the Facilities:  FACILITY NAME UR FAX NUMBER   ADMISSION DENIALS (Administrative/Medical Necessity) [phone]   DISCHARGE SUPPORT TEAM (NETWORK) [phone]   PARENT CHILD HEALTH (Maternity/NICU/Pediatrics) [phone]   [facility] [phone]   [facility] [phone]   formerly [facility] [phone]   [facility] [phone]   [facility] [phone]   [facility] [phone]   [facility] [phone]   [facility] [phone]   [facility] [phone]   [facility] [phone]   [facility] [phone]   [facility] [phone]

## 2024-09-17 NOTE — PLAN OF CARE
Problem: PHYSICAL THERAPY ADULT  Goal: Performs mobility at highest level of function for planned discharge setting.  See evaluation for individualized goals.  Description: Treatment/Interventions: Functional transfer training, LE strengthening/ROM, Therapeutic exercise, Endurance training, Patient/family training, Equipment eval/education, Bed mobility, Gait training          See flowsheet documentation for full assessment, interventions and recommendations.  Note:    Problem List: Decreased strength, Decreased range of motion, Decreased endurance, Impaired balance, Decreased mobility  Assessment: Madi was seen today for PT intervention. He continues to demonstrate improvements in functional mobility despite continued flaccidity of RUE/LE. He is able to get to the EOB w/ me w/ max A x 1 towards his weak side. Once up, he is able to maintain midline sitting for 7 minutes while getting washed up with OT, while being able to tolerate weight through LUE. He reports needing to pee, he is able to stand on QuickMove w/ min A x 1, and maintain standing x 3 min w/ min A x 1. He is then able to sit OOB in recliner chair and perform 2 STS from the recliner without the QuikcMove. He is able to stand w/ min A x 2 an dbenefits from L knee block, but is able to accept weight without buckling. He is unable to weight shift at this time. Returned to seated in recliner chair at end of session. DCR continues to be for Level 1 resources upon DC     Barriers to Discharge Comments: acute CVA, (+) social support, pt is highly motivated; pt is currently mod A x 2 for all functional mobility when independent at baseline  Rehab Resource Intensity Level, PT: I (Maximum Resource Intensity)    See flowsheet documentation for full assessment.

## 2024-09-17 NOTE — ASSESSMENT & PLAN NOTE
Lab Results   Component Value Date    HGBA1C 9.0 (H) 09/15/2024       Recent Labs     09/16/24  1628 09/16/24  2101 09/17/24  0806 09/17/24  1121   POCGLU 179* 232* 153* 266*     Outpatient regimen: Jaurdiance and 30U Lantus     Plan  SSI achs for goal glucose 140-180 Alg 4  Cont Lantus HS  Hypoglycemia protocol

## 2024-09-17 NOTE — PLAN OF CARE
Problem: OCCUPATIONAL THERAPY ADULT  Goal: Performs self-care activities at highest level of function for planned discharge setting.  See evaluation for individualized goals.  Description:  Outcome: Progressing  Note: Limitation: Decreased ADL status, Decreased UE ROM, Decreased UE strength, Decreased high-level ADLs, Decreased self-care trans, Non-func R UE  Prognosis: Fair  Assessment: Patient participated in Skilled OT session this date with interventions consisting of ADL re training with the use of correct body mechnaics, Energy Conservation techniques, safety awareness and fall prevention techniques, one handed dressing technique,  therapeutic activities to: increase activity tolerance, increase dynamic sit/ stand balance during functional activity , and increase trunk control . Patient agreeable to OT treatment session, upon arrival patient was found supine in bed.  Treatment session as follows: Max A x1 for bed mobility. Sat at EOB with supervision for ADL (grooming, bathing, dressing). Good balance, approx 5-10 mins. Min A x1 for sit to stand utilizing quick move to pull-up. Pt performed toileting in standing with urinal while holding onto quick move with LUE. Required Max A to hold urinal, CM, and martin care. Dependent for transfer to recliner chair utilizing quick move. Min A x2 for sit to stand from recliner chair (no quick move). Patient continues to be functioning below baseline level, occupational performance remains limited secondary to factors listed above and increased risk for falls and injury.  The patient's raw score on the AM-PAC Daily Activity inpatient short form is 14, standardized score is 33.39, less than 39.4. Patients at this level are likely to benefit from DC to post-acute rehabilitation services. From OT standpoint, recommendation at time of d/c would be level 1.   Patient to benefit from continued Occupational Therapy treatment while in the hospital to address deficits as defined  above and maximize level of functional independence with ADLs and functional mobility. Pt seen as a co-tx with PT due to the patient's co-morbidities, clinically unstable presentation, and present impairments which are a regression from the patient's baseline. Pt left with call bell in reach, tray table in reach, needs met, chair alarm activated.     Rehab Resource Intensity Level, OT: I (Maximum Resource Intensity)

## 2024-09-17 NOTE — PLAN OF CARE
Problem: Potential for Falls  Goal: Patient will remain free of falls  Description: INTERVENTIONS:  - Educate patient/family on patient safety including physical limitations  - Instruct patient to call for assistance with activity   - Consult OT/PT to assist with strengthening/mobility   - Keep Call bell within reach  - Keep bed low and locked with side rails adjusted as appropriate  - Keep care items and personal belongings within reach  - Initiate and maintain comfort rounds  - Make Fall Risk Sign visible to staff  - Offer Toileting every 2 Hours, in advance of need  - Initiate/Maintain bed/chair alarm  - Obtain necessary fall risk management equipment  - Apply yellow socks and bracelet for high fall risk patients  - Consider moving patient to room near nurses station  Outcome: Progressing     Problem: PAIN - ADULT  Goal: Verbalizes/displays adequate comfort level or baseline comfort level  Description: Interventions:  - Encourage patient to monitor pain and request assistance  - Assess pain using appropriate pain scale  - Administer analgesics based on type and severity of pain and evaluate response  - Implement non-pharmacological measures as appropriate and evaluate response  - Consider cultural and social influences on pain and pain management  - Notify physician/advanced practitioner if interventions unsuccessful or patient reports new pain  Outcome: Progressing     Problem: INFECTION - ADULT  Goal: Absence or prevention of progression during hospitalization  Description: INTERVENTIONS:  - Assess and monitor for signs and symptoms of infection  - Monitor lab/diagnostic results  - Monitor all insertion sites, i.e. indwelling lines, tubes, and drains  - Monitor endotracheal if appropriate and nasal secretions for changes in amount and color  - [place] appropriate cooling/warming therapies per order  - Administer medications as ordered  - Instruct and encourage patient and family to use good hand hygiene  technique  - Identify and instruct in appropriate isolation precautions for identified infection/condition  Outcome: Progressing  Goal: Absence of fever/infection during neutropenic period  Description: INTERVENTIONS:  - Monitor WBC    Outcome: Progressing     Problem: SAFETY ADULT  Goal: Patient will remain free of falls  Description: INTERVENTIONS:  - Educate patient/family on patient safety including physical limitations  - Instruct patient to call for assistance with activity   - Consult OT/PT to assist with strengthening/mobility   - Keep Call bell within reach  - Keep bed low and locked with side rails adjusted as appropriate  - Keep care items and personal belongings within reach  - Initiate and maintain comfort rounds  - Make Fall Risk Sign visible to staff  - Offer Toileting every 2 Hours, in advance of need  - Initiate/Maintain bed/chair alarm  - Obtain necessary fall risk management equipment  - Apply yellow socks and bracelet for high fall risk patients  - Consider moving patient to room near nurses station  Outcome: Progressing  Goal: Maintain or return to baseline ADL function  Description: INTERVENTIONS:  -  Assess patient's ability to carry out ADLs; assess patient's baseline for ADL function and identify physical deficits which impact ability to perform ADLs (bathing, care of mouth/teeth, toileting, grooming, dressing, etc.)  - Assess/evaluate cause of self-care deficits   - Assess range of motion  - Assess patient's mobility; develop plan if impaired  - Assess patient's need for assistive devices and provide as appropriate  - Encourage maximum independence but intervene and supervise when necessary  - Involve family in performance of ADLs  - Assess for home care needs following discharge   - Consider OT consult to assist with ADL evaluation and planning for discharge  - Provide patient education as appropriate  Outcome: Progressing  Goal: Maintains/Returns to pre admission functional  level  Description: INTERVENTIONS:  - Perform AM-PAC 6 Click Basic Mobility/ Daily Activity assessment daily.  - Set and communicate daily mobility goal to care team and patient/family/caregiver.   - Collaborate with rehabilitation services on mobility goals if consulted  - Perform Range of Motion 3 times a day.  - Reposition patient every 2 hours.  - Dangle patient 3 times a day  - Stand patient 3 times a day  - Ambulate patient 3 times a day  - Out of bed to chair 3 times a day   - Out of bed for meals 3 times a day  - Out of bed for toileting  - Record patient progress and toleration of activity level   Outcome: Progressing     Problem: DISCHARGE PLANNING  Goal: Discharge to home or other facility with appropriate resources  Description: INTERVENTIONS:  - Identify barriers to discharge w/patient and caregiver  - Arrange for needed discharge resources and transportation as appropriate  - Identify discharge learning needs (meds, wound care, etc.)  - Arrange for interpretive services to assist at discharge as needed  - Refer to Case Management Department for coordinating discharge planning if the patient needs post-hospital services based on physician/advanced practitioner order or complex needs related to functional status, cognitive ability, or social support system  Outcome: Progressing     Problem: CARDIOVASCULAR - ADULT  Goal: Maintains optimal cardiac output and hemodynamic stability  Description: INTERVENTIONS:  - Monitor I/O, vital signs and rhythm  - Monitor for S/S and trends of decreased cardiac output  - Administer and titrate ordered vasoactive medications to optimize hemodynamic stability  - Assess quality of pulses, skin color and temperature  - Assess for signs of decreased coronary artery perfusion  - Instruct patient to report change in severity of symptoms  Outcome: Progressing  Goal: Absence of cardiac dysrhythmias or at baseline rhythm  Description: INTERVENTIONS:  - Continuous cardiac  monitoring, vital signs, obtain 12 lead EKG if ordered  - Administer antiarrhythmic and heart rate control medications as ordered  - Monitor electrolytes and administer replacement therapy as ordered  Outcome: Progressing     Problem: Neurological Deficit  Goal: Neurological status is stable or improving  Description: Interventions:  - Monitor and assess patient's level of consciousness, motor function, sensory function, and level of assistance needed for ADLs.   - Monitor and report changes from baseline. Collaborate with interdisciplinary team to initiate plan and implement interventions as ordered.   - Provide and maintain a safe environment.  - Consider seizure precautions.  - Consider fall precautions.  - Consider aspiration precautions.  - Consider bleeding precautions.  Outcome: Progressing     Problem: Activity Intolerance/Impaired Mobility  Goal: Mobility/activity is maintained at optimum level for patient  Description: Interventions:  - Assess and monitor patient  barriers to mobility and need for assistive/adaptive devices.  - Assess patient's emotional response to limitations.  - Collaborate with interdisciplinary team and initiate plans and interventions as ordered.  - Encourage independent activity per ability.  - Maintain proper body alignment.  - Perform active/passive rom as tolerated/ordered.  - Plan activities to conserve energy.  - Turn patient as appropriate  Outcome: Progressing     Problem: Communication Impairment  Goal: Ability to express needs and understand communication  Description: Assess patient's communication skills and ability to understand information.  Patient will demonstrate use of effective communication techniques, alternative methods of communication and understanding even if not able to speak.     - Encourage communication and provide alternate methods of communication as needed.  - Collaborate with case management/ for discharge needs.  - Include  patient/family/caregiver in decisions related to communication.  Outcome: Progressing     Problem: Potential for Aspiration  Goal: Non-ventilated patient's risk of aspiration is minimized  Description: Assess and monitor vital signs, respiratory status, and labs (WBC).  Monitor for signs of aspiration (tachypnea, cough, rales, wheezing, cyanosis, fever).    - Assess and monitor patient's ability to swallow.  - Place patient up in chair to eat if possible.  - HOB up at 90 degrees to eat if unable to get patient up into chair.  - Supervise patient during oral intake.   - Instruct patient/ family to take small bites.  - Instruct patient/ family to take small single sips when taking liquids.  - Follow patient-specific strategies generated by speech pathologist.  Outcome: Progressing  Goal: Ventilated patient's risk of aspiration is minimized  Description: Assess and monitor vital signs, respiratory status, airway cuff pressure, and labs (WBC).  Monitor for signs of aspiration (tachypnea, cough, rales, wheezing, cyanosis, fever).    - Elevate head of bed 30 degrees if patient has tube feeding.  - Monitor tube feeding.  Outcome: Progressing     Problem: Nutrition  Goal: Nutrition/Hydration status is improving  Description: Monitor and assess patient's nutrition/hydration status for malnutrition (ex- brittle hair, bruises, dry skin, pale skin and conjunctiva, muscle wasting, smooth red tongue, and disorientation). Collaborate with interdisciplinary team and initiate plan and interventions as ordered.  Monitor patient's weight and dietary intake as ordered or per policy. Utilize nutrition screening tool and intervene per policy. Determine patient's food preferences and provide high-protein, high-caloric foods as appropriate.     - Assist patient with eating.  - Allow adequate time for meals.  - Encourage patient to take dietary supplement as ordered.  - Collaborate with clinical nutritionist.  - Include  patient/family/caregiver in decisions related to nutrition.  Outcome: Progressing     Problem: Prexisting or High Potential for Compromised Skin Integrity  Goal: Skin integrity is maintained or improved  Description: INTERVENTIONS:  - Identify patients at risk for skin breakdown  - Assess and monitor skin integrity  - Assess and monitor nutrition and hydration status  - Monitor labs   - Assess for incontinence   - Turn and reposition patient  - Assist with mobility/ambulation  - Relieve pressure over bony prominences  - Avoid friction and shearing  - Provide appropriate hygiene as needed including keeping skin clean and dry  - Evaluate need for skin moisturizer/barrier cream  - Collaborate with interdisciplinary team   - Patient/family teaching  - Consider wound care consult   Outcome: Progressing     Problem: Nutrition/Hydration-ADULT  Goal: Nutrient/Hydration intake appropriate for improving, restoring or maintaining nutritional needs  Description: Monitor and assess patient's nutrition/hydration status for malnutrition. Collaborate with interdisciplinary team and initiate plan and interventions as ordered.  Monitor patient's weight and dietary intake as ordered or per policy. Utilize nutrition screening tool and intervene as necessary. Determine patient's food preferences and provide high-protein, high-caloric foods as appropriate.     INTERVENTIONS:  - Monitor oral intake, urinary output, labs, and treatment plans  - Assess nutrition and hydration status and recommend course of action  - Evaluate amount of meals eaten  - Assist patient with eating if necessary   - Allow adequate time for meals  - Recommend/ encourage appropriate diets, oral nutritional supplements, and vitamin/mineral supplements  - Order, calculate, and assess calorie counts as needed  - Recommend, monitor, and adjust tube feedings and TPN/PPN based on assessed needs  - Assess need for intravenous fluids  - Provide specific nutrition/hydration  education as appropriate  - Include patient/family/caregiver in decisions related to nutrition  Outcome: Progressing

## 2024-09-17 NOTE — ASSESSMENT & PLAN NOTE
Presented with R facial weakness, RUE/RLE weakness/ataxia, dysarthria (NIHSS 7)  History of past CVAs (May and June 2020) with history of medication noncompliance.  Outpatient regimen: Plavix and pravastatin  Initial CTH and CTA without evidence of CVA  Patient received TNK 1346on 9/10 in ED with initial improvement in sx  Unfortunately patient's sx returned around 1500 in ED with severe R facial droop and loss of strength in RUE/RLE  Repeat CTH without evidence of hemorrhage   9/10 MRI:  Acute infarct involving left thalamocapsular region. There is minimal FLAIR hyperintensity/edema without mass effect or hemorrhagic transformation. Chronic lacunar infarcts in the left thalamus, right corona radiata and right basal ganglia. Focus of old microhemorrhage in the right temporal lobe, likely on the basis of chronic hypertension.  9/11 CTH with evolving CVA     Plan  Echo reviewed --> limited study but no evidence embolic source. Discussed with neuro, no need for repeat echo/ASHTYN or outpatient cardiac monitor as low suspicion for embolic etiology.   Continue ASA + plavix x 21 days followed by plavix monotherapy  Pravastatin  PT/OT/Speech --> plan to DC to rehab, CM is following for placement   Will need outpatient neurology follow up

## 2024-09-17 NOTE — OCCUPATIONAL THERAPY NOTE
"   Occupational Therapy Treatment Note    Patient Name: Wilder Vargas  Today's Date: 9/17/2024 09/17/24 1002   OT Last Visit   OT Visit Date 09/17/24   Note Type   Note Type Treatment   Pain Assessment   Pain Assessment Tool 0-10   Pain Score No Pain   Restrictions/Precautions   Weight Bearing Precautions Per Order No   Other Precautions Chair Alarm;Bed Alarm;Fall Risk   ADL   Eating Assistance 4  Minimal Assistance   Grooming Assistance 4  Minimal Assistance   UB Bathing Assistance 4  Minimal Assistance   LB Bathing Assistance 2  Maximal Assistance   UB Dressing Assistance 4  Minimal Assistance   LB Dressing Assistance 2  Maximal Assistance   Toileting Assistance  2  Maximal Assistance   Bed Mobility   Supine to Sit 2  Maximal assistance   Additional items Assist x 1;Increased time required;Verbal cues   Additional Comments Pt sat EOB for ADL (grooming, bathing, dressing)   Transfers   Sit to Stand 4  Minimal assistance   Additional items Assist x 2   Other 1  Dependent  (Quick move)   Additional Comments Pt performed toileting in standing with urinal. Required Max A to hold urinal, CM, and martin care.   Therapeutic Exercise - ROM   UE-ROM   (Edu on self-ROM techniques utilizing LUE.)   Subjective   Subjective \"I have a business to run\"   Cognition   Overall Cognitive Status WFL   Arousal/Participation Alert;Cooperative   Attention Within functional limits   Orientation Level Oriented X4   Memory Within functional limits   Following Commands Follows all commands and directions without difficulty   Comments Pt highly motivated. Tearful at times about wanting to get back to his normal life.   Activity Tolerance   Activity Tolerance Patient tolerated treatment well   Medical Staff Made Aware PT Ashia, YAN Quinn, PCA Beba   Assessment   Assessment Patient participated in Skilled OT session this date with interventions consisting of ADL re training with the use of correct body mechnaics, Energy Conservation " techniques, safety awareness and fall prevention techniques, one handed dressing technique,  therapeutic activities to: increase activity tolerance, increase dynamic sit/ stand balance during functional activity , and increase trunk control . Patient agreeable to OT treatment session, upon arrival patient was found supine in bed.  Treatment session as follows: Max A x1 for bed mobility. Sat at EOB with supervision for ADL (grooming, bathing, dressing). Good balance, approx 5-10 mins. Min A x1 for sit to stand utilizing quick move to pull-up. Pt performed toileting in standing with urinal while holding onto quick move with LUE. Required Max A to hold urinal, CM, and martin care. Dependent for transfer to recliner chair utilizing quick move. Min A x2 for sit to stand from recliner chair (no quick move). Patient continues to be functioning below baseline level, occupational performance remains limited secondary to factors listed above and increased risk for falls and injury.  The patient's raw score on the AM-PAC Daily Activity inpatient short form is 14, standardized score is 33.39, less than 39.4. Patients at this level are likely to benefit from DC to post-acute rehabilitation services. From OT standpoint, recommendation at time of d/c would be level 1.   Patient to benefit from continued Occupational Therapy treatment while in the hospital to address deficits as defined above and maximize level of functional independence with ADLs and functional mobility. Pt seen as a co-tx with PT due to the patient's co-morbidities, clinically unstable presentation, and present impairments which are a regression from the patient's baseline. Pt left with call bell in reach, tray table in reach, needs met, chair alarm activated.   Plan   OT Treatment Day 1   Discharge Recommendation   Rehab Resource Intensity Level, OT I (Maximum Resource Intensity)   AM-PAC Daily Activity Inpatient   Lower Body Dressing 2   Bathing 2   Toileting 2    Upper Body Dressing 2   Grooming 3   Eating 3   Daily Activity Raw Score 14   Daily Activity Standardized Score (Calc for Raw Score >=11) 33.39   AM-PAC Applied Cognition Inpatient   Following a Speech/Presentation 4   Understanding Ordinary Conversation 4   Taking Medications 4   Remembering Where Things Are Placed or Put Away 4   Remembering List of 4-5 Errands 4   Taking Care of Complicated Tasks 3   Applied Cognition Raw Score 23   Applied Cognition Standardized Score 53.08     Allison Ghotra MS, OTR/L

## 2024-09-17 NOTE — CASE MANAGEMENT
Case Management Discharge Planning Note    Patient name Wilder Quinn /-01 MRN [id number]  : 1965 Date 2024       Current Admission Date: 9/10/2024  Current Admission Diagnosis:Stroke-like symptom   Patient Active Problem List    Diagnosis Date Noted Date Diagnosed    Tobacco use 09/10/2024     Type 2 diabetes mellitus with diabetic microalbuminuria, with long-term current use of insulin (HCC) 2024     Erythrocytosis 2024     Gastroesophageal reflux disease without esophagitis 2024     Type 2 diabetes mellitus with hyperglycemia (HCC) 2024     S/P stroke due to cerebrovascular disease 2024     Hypertensive emergency 2024     Other male erectile dysfunction 2024     Mixed hyperlipidemia 2024     Stroke-like symptom 2020       LOS (days): 7  Geometric Mean LOS (GMLOS) (days): 2.9  Days to GMLOS:-4.1     OBJECTIVE:  Risk of Unplanned Readmission Score: 10.14         Current admission status: Inpatient   Preferred Pharmacy:   [id number] - [place] - [address] [place]  [address] [place] [address]  Phone: [phone] Fax: [phone]    Primary Care Provider: Gilmar Oro MD    Primary Insurance: [employer]  Secondary Insurance:     DISCHARGE DETAILS:     Additional Comments: Patient reviewed with SUZIE Seay who reports that pt will likely be medically stable for discharge tomorrow pending regulation of BP. CM sent messages to acute rehab facilities requesting they re-review with updated therapy notes now that pt is more stable. CM department to follow for determinations. Auth will be needed once a facility is chosen.

## 2024-09-17 NOTE — PROGRESS NOTES
"Progress Note - Cardiology   Name: Wilder Vargas 59 y.o. male I MRN: [id number]  Unit/Bed#: -01 I Date of Admission: 9/10/2024   Date of Service: 9/17/2024 I Hospital Day: 7         Assessment:  Principal Problem:    Stroke-like symptom  Active Problems:    Type 2 diabetes mellitus with hyperglycemia (HCC)    Hypertensive emergency    Mixed hyperlipidemia    Tobacco use      Plan:    Acute CVA - Presneted with right facial droop, and right-sided hemiparesis along with dysarthria, and is found to have an acute infarct involving the left thalamocapsular region.  Treatment per primary team and neurology.  Allergy feels that this was more hypertensive induced and had a low suspicion for an embolic etiology.    Hypertensive emergency - His blood pressure was significantly elevated in the setting of his CVA.  He also has a history of medication noncompliance.  He is back on his regimen which includes losartan/HCTZ, carvedilol, amlodipine and spironolactone.  With adding hydralazine his blood pressure is now improving.  No changes were made today and patient will be heading towards inpatient rehabilitation.  No further inpatient recommendations, please call if needed.    Subjective:   Patient seen and examined.  No significant events overnight.  Patient's blood pressure improving with getting him back on his outpatient medication and yesterday I added hydralazine.  Clinically unchanged as he still has his right sided neurologic deficits.    Objective:     Vitals: Blood pressure 126/74, pulse (!) 54, temperature 97.6 °F (36.4 °C), temperature source Oral, resp. rate (!) 24, height 5' 8\" (1.727 m), weight 80 kg (176 lb 5.9 oz), SpO2 97%., Body mass index is 26.82 kg/m².,   Orthostatic Blood Pressures      Flowsheet Row Most Recent Value   Blood Pressure 126/74 filed at 09/17/2024 1348   Patient Position - Orthostatic VS Lying filed at 09/17/2024 0809              Intake/Output Summary (Last 24 hours) at 9/17/2024 " 1404  Last data filed at 9/17/2024 1354  Gross per 24 hour   Intake 1602 ml   Output 2130 ml   Net -528 ml       Physical Exam:    GEN: Wilder Vargas appears well, alert and oriented x 3, pleasant and cooperative   HEENT: pupils equal, round, and reactive to light; extraocular muscles intact  NECK: supple, no carotid bruits   HEART: regular rhythm, normal S1 and S2, no murmurs, clicks, gallops or rubs   LUNGS: Diminished bilaterally; no wheezes, rales, or rhonchi   ABDOMEN: normal bowel sounds, soft, no tenderness, no distention  EXTREMITIES: peripheral pulses normal; no clubbing, cyanosis, or significant edema  NEURO: Right-sided neurologic deficits noted including facial droop and sided weakness.  SKIN: normal without suspicious lesions on exposed skin     Medications:      Current Facility-Administered Medications:     amLODIPine (NORVASC) tablet 10 mg, 10 mg, Oral, Daily, 10 mg at 09/17/24 0842 **AND** [DISCONTINUED] lisinopril (ZESTRIL) tablet 40 mg, 40 mg, Oral, Daily, Sheeba Lofton MD, 40 mg at 09/15/24 0809    aspirin (ECOTRIN LOW STRENGTH) EC tablet 81 mg, 81 mg, Oral, Daily, Sheeba Lofton MD, 81 mg at 09/17/24 0842    carvedilol (COREG) tablet 25 mg, 25 mg, Oral, BID With Meals, Alliosn Seay PA-C, 25 mg at 09/17/24 0842    clopidogrel (PLAVIX) tablet 75 mg, 75 mg, Oral, Daily, Sheeba Lofton MD, 75 mg at 09/17/24 0843    docusate sodium (COLACE) capsule 100 mg, 100 mg, Oral, BID, Allison Seay PA-C, 100 mg at 09/17/24 1353    doxazosin (CARDURA) tablet 4 mg, 4 mg, Oral, Daily, Sheeba Lofton MD, 4 mg at 09/17/24 0842    enoxaparin (LOVENOX) subcutaneous injection 40 mg, 40 mg, Subcutaneous, Q24H JASON, Sheeba Lofton MD, 40 mg at 09/17/24 0842    hydrALAZINE (APRESOLINE) injection 10 mg, 10 mg, Intravenous, Q6H PRN, Sheeba Lofton MD, 10 mg at 09/16/24 0722    hydrALAZINE (APRESOLINE) tablet 25 mg, 25 mg, Oral, Q8H JASON, Glynn Oneal MD, 25 mg  at 09/17/24 1353    losartan (COZAAR) tablet 100 mg, 100 mg, Oral, Daily, 100 mg at 09/17/24 0842 **AND** hydroCHLOROthiazide tablet 25 mg, 25 mg, Oral, Daily, Dakota Valdez MD, 25 mg at 09/17/24 0842    insulin glargine (LANTUS) subcutaneous injection 35 Units 0.35 mL, 35 Units, Subcutaneous, HS, Sheeba Lofton MD, 35 Units at 09/16/24 2137    insulin lispro (HumALOG/ADMELOG) 100 units/mL subcutaneous injection 5-25 Units, 5-25 Units, Subcutaneous, 4x Daily (AC & HS), 15 Units at 09/17/24 1134 **AND** Fingerstick Glucose (POCT), , , 4x Daily AC and at bedtime, Sheeba Lofton MD    labetalol (NORMODYNE) injection 10 mg, 10 mg, Intravenous, Q6H PRN, Sheeba Lofton MD, 10 mg at 09/14/24 2032    nicotine (NICODERM CQ) 21 mg/24 hr TD 24 hr patch 1 patch, 1 patch, Transdermal, Q24H, Sheeba Lofton MD, 1 patch at 09/16/24 1747    pantoprazole (PROTONIX) EC tablet 40 mg, 40 mg, Oral, Early Morning, Sheeba Lofton MD, 40 mg at 09/17/24 0527    polyethylene glycol (MIRALAX) packet 17 g, 17 g, Oral, Daily, Allison Seay PA-C, 17 g at 09/17/24 1353    pravastatin (PRAVACHOL) tablet 20 mg, 20 mg, Oral, Daily With Dinner, Allison Seay PA-C, 20 mg at 09/16/24 1746    senna (SENOKOT) tablet 17.2 mg, 2 tablet, Oral, HS, Allison Seay PA-C    spironolactone (ALDACTONE) tablet 25 mg, 25 mg, Oral, Daily, Dakota Valdez MD, 25 mg at 09/17/24 0842     Labs & Results:        Results from last 7 days   Lab Units 09/15/24  0507 09/14/24  0213 09/13/24  0444   WBC Thousand/uL 6.30 7.51 10.74*   HEMOGLOBIN g/dL 17.0 16.3 18.1*   HEMATOCRIT % 51.3* 48.5 53.5*   PLATELETS Thousands/uL 159 177 197           Results from last 7 days   Lab Units 09/16/24  0452 09/15/24  0507 09/14/24  0213   POTASSIUM mmol/L 3.8 3.6 3.3*   CHLORIDE mmol/L 106 106 105   CO2 mmol/L 27 26 25   BUN mg/dL 19 14 17   CREATININE mg/dL 0.70 0.66 0.75   CALCIUM mg/dL 8.8 8.6 8.4           Results from  last 7 days   Lab Units 09/13/24  0444 09/12/24  0437   MAGNESIUM mg/dL 1.9 1.8*         EKG personally reviewed by Glynn Oneal MD. Sinus rhythm with PACs and left ventricular hypertrophy.    ECHO:    Left Ventricle: Left ventricular cavity size is normal. Wall thickness is moderately increased. The left ventricular ejection fraction is 75% by visual estimation. Systolic function is hyperdynamic.    Aortic Valve: There is aortic valve sclerosis.    Counseling / Coordination of Care  Total floor / unit time spent today 25 minutes.  Greater than 50% of total time was spent with the patient and / or family counseling and / or coordination of care.

## 2024-09-18 LAB
ANION GAP SERPL CALCULATED.3IONS-SCNC: 7 MMOL/L (ref 4–13)
BUN SERPL-MCNC: 24 MG/DL (ref 5–25)
CALCIUM SERPL-MCNC: 9 MG/DL (ref 8.4–10.2)
CHLORIDE SERPL-SCNC: 101 MMOL/L (ref 96–108)
CO2 SERPL-SCNC: 30 MMOL/L (ref 21–32)
CREAT SERPL-MCNC: 0.9 MG/DL (ref 0.6–1.3)
ERYTHROCYTE [DISTWIDTH] IN BLOOD BY AUTOMATED COUNT: 12.5 % (ref 11.6–15.1)
GFR SERPL CREATININE-BSD FRML MDRD: 93 ML/MIN/1.73SQ M
GLUCOSE SERPL-MCNC: 118 MG/DL (ref 65–140)
GLUCOSE SERPL-MCNC: 153 MG/DL (ref 65–140)
GLUCOSE SERPL-MCNC: 227 MG/DL (ref 65–140)
GLUCOSE SERPL-MCNC: 346 MG/DL (ref 65–140)
GLUCOSE SERPL-MCNC: 85 MG/DL (ref 65–140)
HCT VFR BLD AUTO: 53.2 % (ref 36.5–49.3)
HGB BLD-MCNC: 17.7 G/DL (ref 12–17)
MCH RBC QN AUTO: 28.2 PG (ref 26.8–34.3)
MCHC RBC AUTO-ENTMCNC: 33.3 G/DL (ref 31.4–37.4)
MCV RBC AUTO: 85 FL (ref 82–98)
PLATELET # BLD AUTO: 206 THOUSANDS/UL (ref 149–390)
PMV BLD AUTO: 10 FL (ref 8.9–12.7)
POTASSIUM SERPL-SCNC: 3.8 MMOL/L (ref 3.5–5.3)
RBC # BLD AUTO: 6.28 MILLION/UL (ref 3.88–5.62)
SODIUM SERPL-SCNC: 138 MMOL/L (ref 135–147)
WBC # BLD AUTO: 6.04 THOUSAND/UL (ref 4.31–10.16)

## 2024-09-18 PROCEDURE — 80048 BASIC METABOLIC PNL TOTAL CA: CPT

## 2024-09-18 PROCEDURE — 82948 REAGENT STRIP/BLOOD GLUCOSE: CPT

## 2024-09-18 PROCEDURE — 99232 SBSQ HOSP IP/OBS MODERATE 35: CPT | Performed by: INTERNAL MEDICINE

## 2024-09-18 PROCEDURE — 85027 COMPLETE CBC AUTOMATED: CPT

## 2024-09-18 RX ORDER — SENNOSIDES 8.6 MG
2 TABLET ORAL 2 TIMES DAILY
Status: DISCONTINUED | OUTPATIENT
Start: 2024-09-18 | End: 2024-09-20 | Stop reason: HOSPADM

## 2024-09-18 RX ADMIN — ENOXAPARIN SODIUM 40 MG: 40 INJECTION SUBCUTANEOUS at 09:10

## 2024-09-18 RX ADMIN — AMLODIPINE BESYLATE 10 MG: 5 TABLET ORAL at 09:11

## 2024-09-18 RX ADMIN — DOXAZOSIN 4 MG: 1 TABLET ORAL at 09:11

## 2024-09-18 RX ADMIN — NICOTINE 1 PATCH: 21 PATCH, EXTENDED RELEASE TRANSDERMAL at 15:53

## 2024-09-18 RX ADMIN — ASPIRIN 81 MG: 81 TABLET, COATED ORAL at 09:11

## 2024-09-18 RX ADMIN — CLOPIDOGREL BISULFATE 75 MG: 75 TABLET ORAL at 09:11

## 2024-09-18 RX ADMIN — HYDRALAZINE HYDROCHLORIDE 25 MG: 25 TABLET ORAL at 15:52

## 2024-09-18 RX ADMIN — SENNOSIDES 17.2 MG: 8.6 TABLET, FILM COATED ORAL at 17:38

## 2024-09-18 RX ADMIN — INSULIN GLARGINE 35 UNITS: 100 INJECTION, SOLUTION SUBCUTANEOUS at 22:02

## 2024-09-18 RX ADMIN — HYDRALAZINE HYDROCHLORIDE 25 MG: 25 TABLET ORAL at 05:28

## 2024-09-18 RX ADMIN — DOCUSATE SODIUM 100 MG: 100 CAPSULE, LIQUID FILLED ORAL at 09:11

## 2024-09-18 RX ADMIN — CARVEDILOL 25 MG: 12.5 TABLET, FILM COATED ORAL at 17:38

## 2024-09-18 RX ADMIN — PRAVASTATIN SODIUM 20 MG: 20 TABLET ORAL at 17:38

## 2024-09-18 RX ADMIN — INSULIN LISPRO 20 UNITS: 100 INJECTION, SOLUTION INTRAVENOUS; SUBCUTANEOUS at 17:37

## 2024-09-18 RX ADMIN — INSULIN LISPRO 10 UNITS: 100 INJECTION, SOLUTION INTRAVENOUS; SUBCUTANEOUS at 11:52

## 2024-09-18 RX ADMIN — INSULIN LISPRO 5 UNITS: 100 INJECTION, SOLUTION INTRAVENOUS; SUBCUTANEOUS at 22:02

## 2024-09-18 RX ADMIN — SPIRONOLACTONE 25 MG: 25 TABLET ORAL at 09:11

## 2024-09-18 RX ADMIN — HYDRALAZINE HYDROCHLORIDE 25 MG: 25 TABLET ORAL at 22:02

## 2024-09-18 RX ADMIN — HYDROCHLOROTHIAZIDE 25 MG: 25 TABLET ORAL at 09:11

## 2024-09-18 RX ADMIN — LOSARTAN POTASSIUM 100 MG: 50 TABLET, FILM COATED ORAL at 09:11

## 2024-09-18 RX ADMIN — POLYETHYLENE GLYCOL 3350 17 G: 17 POWDER, FOR SOLUTION ORAL at 09:10

## 2024-09-18 RX ADMIN — DOCUSATE SODIUM 100 MG: 100 CAPSULE, LIQUID FILLED ORAL at 17:38

## 2024-09-18 RX ADMIN — CARVEDILOL 25 MG: 12.5 TABLET, FILM COATED ORAL at 09:11

## 2024-09-18 RX ADMIN — PANTOPRAZOLE SODIUM 40 MG: 40 TABLET, DELAYED RELEASE ORAL at 05:28

## 2024-09-18 NOTE — CASE MANAGEMENT
Case Management Discharge Planning Note    Patient name Wilder Vargas  Location /-01 MRN [id number]  : 1965 Date 2024       Current Admission Date: 9/10/2024  Current Admission Diagnosis:Stroke-like symptom   Patient Active Problem List    Diagnosis Date Noted Date Diagnosed    Tobacco use 09/10/2024     Type 2 diabetes mellitus with diabetic microalbuminuria, with long-term current use of insulin (HCC) 2024     Erythrocytosis 2024     Gastroesophageal reflux disease without esophagitis 2024     Type 2 diabetes mellitus with hyperglycemia (HCC) 2024     S/P stroke due to cerebrovascular disease 2024     Hypertensive emergency 2024     Other male erectile dysfunction 2024     Mixed hyperlipidemia 2024     Stroke-like symptom 2020       LOS (days): 8  Geometric Mean LOS (GMLOS) (days): 2.9  Days to GMLOS:-5.1     OBJECTIVE:  Risk of Unplanned Readmission Score: 10.66         Current admission status: Inpatient   Preferred Pharmacy:   [id number] [place] - [address]  [address]  [place] [address]  Phone: [phone] Fax: [phone]    Primary Care Provider: Gilmar Oro MD    Primary Insurance: [employer]  Secondary Insurance:     DISCHARGE DETAILS:    Spoke with Karma from [facility], pt approved for admission and they have a bed available.   She provided the NPI and accepting doctor's information for auth process.   CM sent request to [place] support for insurance authorization. Waiting on insurance determination.

## 2024-09-18 NOTE — ASSESSMENT & PLAN NOTE
"Outpatient regimen: 10-40mg amlodipine-benazepril, 25mg carvedilol, 4mg doxazosin  History on medication non-compliance  Presented to ED with -220s  Initially in ICU on nicardipine gtt (Dc'd 9/13)  2020 Renal US neg  Metanephrines ordered   Cardiology consulted  Continue amlodipine 10 mg daily   Lisinopril switched to losartan-HCTZ 100-25 mg  Spironolactone 50 mg daily added  Cardura 4 mg added  Hydralazine 25 mg TID added   /77 (BP Location: Left arm)   Pulse (!) 52   Temp 98 °F (36.7 °C) (Oral)   Resp (!) 24   Ht 5' 8\" (1.727 m)   Wt 80 kg (176 lb 5.9 oz)   SpO2 98%   BMI 26.82 kg/m²     "

## 2024-09-18 NOTE — PROGRESS NOTES
Progress Note - Hospitalist   Name: Wilder Vargas 59 y.o. male I MRN: [id number]  Unit/Bed#: -01 I Date of Admission: 9/10/2024   Date of Service: 9/18/2024 I Hospital Day: 8    Assessment & Plan  Acute CVA (cerebrovascular accident) (HCC)  Presented with R facial weakness, RUE/RLE weakness/ataxia, dysarthria (NIHSS 7)  History of past CVAs (May and June 2020) with history of medication noncompliance.  Outpatient regimen: Plavix and pravastatin  Initial CTH and CTA without evidence of CVA  Patient received TNK 1346on 9/10 in ED with initial improvement in sx  Unfortunately patient's sx returned around 1500 in ED with severe R facial droop and loss of strength in RUE/RLE  Repeat CTH without evidence of hemorrhage   9/10 MRI:  Acute infarct involving left thalamocapsular region. There is minimal FLAIR hyperintensity/edema without mass effect or hemorrhagic transformation. Chronic lacunar infarcts in the left thalamus, right corona radiata and right basal ganglia. Focus of old microhemorrhage in the right temporal lobe, likely on the basis of chronic hypertension.  9/11 CTH with evolving CVA     Plan  Echo reviewed --> limited study but no evidence embolic source. Discussed with neuro, no need for repeat echo/ASHTYN or outpatient cardiac monitor as low suspicion for embolic etiology.   Continue ASA + plavix x 21 days followed by plavix monotherapy  Pravastatin  PT/OT/Speech --> plan to DC to rehab, CM is following for placement   Will need outpatient neurology follow up   Type 2 diabetes mellitus with hyperglycemia (HCC)  Lab Results   Component Value Date    HGBA1C 9.0 (H) 09/15/2024       Recent Labs     09/17/24  1615 09/17/24  2047 09/18/24  0803 09/18/24  1122   POCGLU 179* 287* 118 227*     Outpatient regimen: Jaurdiance and 30U Lantus     Plan  SSI achs for goal glucose 140-180 Alg 4  Cont Lantus HS  Hypoglycemia protocol  Hypertensive emergency  Outpatient regimen: 10-40mg amlodipine-benazepril, 25mg  "carvedilol, 4mg doxazosin  History on medication non-compliance  Presented to ED with -220s  Initially in ICU on nicardipine gtt (Dc'd )  2020 Renal US neg  Metanephrines ordered   Cardiology consulted  Continue amlodipine 10 mg daily   Lisinopril switched to losartan-HCTZ 100-25 mg  Spironolactone 50 mg daily added  Cardura 4 mg added  Hydralazine 25 mg TID added   /77 (BP Location: Left arm)   Pulse (!) 52   Temp 98 °F (36.7 °C) (Oral)   Resp (!) 24   Ht 5' 8\" (1.727 m)   Wt 80 kg (176 lb 5.9 oz)   SpO2 98%   BMI 26.82 kg/m²     Mixed hyperlipidemia  Continue pravastatin  Previously did not tolerate lipitor  History of medication non-compliance  Tobacco use  Currently smokes 1-1.5 packs daily ->previously smoking >2 packs/daily  46 year history  NRT, encourage cessation    VTE Pharmacologic Prophylaxis:   Moderate Risk (Score 3-4) - Pharmacological DVT Prophylaxis Ordered: enoxaparin (Lovenox).    Mobility:   Basic Mobility Inpatient Raw Score: 10  JH-HLM Goal: 4: Move to chair/commode  JH-HLM Achieved: 3: Sit at edge of bed  JH-HLM Goal achieved. Continue to encourage appropriate mobility.    Patient Centered Rounds: I performed bedside rounds with nursing staff today.   Discussions with Specialists or Other Care Team Provider: Cardiology     Education and Discussions with Family / Patient: Updated  (mother) via phone.    Current Length of Stay: 8 day(s)  Current Patient Status: Inpatient   Certification Statement: The patient will continue to require additional inpatient hospital stay due to placement  Discharge Plan: Anticipate discharge in 24-48 hrs to rehab facility.    Code Status: Level 1 - Full Code    Subjective     Seen and examined at bedside  Awake and alert  Denies any chest pain or shortness of breath  Complaining of constipation    Objective     Vitals:   Temp (24hrs), Av.8 °F (36.6 °C), Min:97.4 °F (36.3 °C), Max:98 °F (36.7 °C)    Temp:  [97.4 °F (36.3 " °C)-98 °F (36.7 °C)] 98 °F (36.7 °C)  HR:  [50-64] 52  Resp:  [18-24] 24  BP: (138-166)/(77-94) 165/77  SpO2:  [96 %-98 %] 98 %  Body mass index is 26.82 kg/m².     Input and Output Summary (last 24 hours):     Intake/Output Summary (Last 24 hours) at 9/18/2024 1521  Last data filed at 9/18/2024 1418  Gross per 24 hour   Intake 1082 ml   Output 1625 ml   Net -543 ml       Physical Exam  Vitals and nursing note reviewed.   Constitutional:       General: He is not in acute distress.     Appearance: He is ill-appearing.   HENT:      Head: Normocephalic and atraumatic.   Eyes:      General:         Right eye: No discharge.         Left eye: No discharge.      Extraocular Movements: Extraocular movements intact.      Conjunctiva/sclera: Conjunctivae normal.   Cardiovascular:      Rate and Rhythm: Normal rate and regular rhythm.      Heart sounds: Normal heart sounds. No murmur heard.  Pulmonary:      Effort: Pulmonary effort is normal. No respiratory distress.      Breath sounds: Normal breath sounds. No wheezing, rhonchi or rales.   Abdominal:      General: Bowel sounds are normal.      Palpations: Abdomen is soft.      Tenderness: There is no abdominal tenderness. There is no guarding.   Musculoskeletal:      Right lower leg: No edema.      Left lower leg: No edema.   Skin:     General: Skin is warm and dry.   Neurological:      Mental Status: He is alert and oriented to person, place, and time. Mental status is at baseline.      Motor: Weakness present.   Psychiatric:         Mood and Affect: Mood normal.         Behavior: Behavior normal.          Lines/Drains:  Lines/Drains/Airways       Active Status       None                            Lab Results: I have reviewed the following results:    .     09/18/24  0403   WBC 6.04   HGB 17.7*   HCT 53.2*      SODIUM 138   K 3.8      CO2 30   BUN 24   CREATININE 0.90   GLUC 85       Results from last 7 days   Lab Units 09/18/24  0403 09/15/24  6877  09/14/24  0213   WBC Thousand/uL 6.04   < > 7.51   HEMOGLOBIN g/dL 17.7*   < > 16.3   HEMATOCRIT % 53.2*   < > 48.5   PLATELETS Thousands/uL 206   < > 177   SEGS PCT %  --   --  66   LYMPHO PCT %  --   --  20   MONO PCT %  --   --  11   EOS PCT %  --   --  2    < > = values in this interval not displayed.     Results from last 7 days   Lab Units 09/18/24  0403   SODIUM mmol/L 138   POTASSIUM mmol/L 3.8   CHLORIDE mmol/L 101   CO2 mmol/L 30   BUN mg/dL 24   CREATININE mg/dL 0.90   ANION GAP mmol/L 7   CALCIUM mg/dL 9.0   GLUCOSE RANDOM mg/dL 85         Results from last 7 days   Lab Units 09/18/24  1122 09/18/24  0803 09/17/24  2047 09/17/24  1615 09/17/24  1121 09/17/24  0806 09/16/24  2101 09/16/24  1628 09/16/24  1138 09/16/24  0716 09/15/24  2052 09/15/24  1623   POC GLUCOSE mg/dl 227* 118 287* 179* 266* 153* 232* 179* 237* 169* 240* 157*     Results from last 7 days   Lab Units 09/15/24  0507   HEMOGLOBIN A1C % 9.0*           Recent Cultures (last 7 days):         Imaging Review: No pertinent imaging studies reviewed.  Other Studies: No additional pertinent studies reviewed.    Last 24 Hours Medication List:     Current Facility-Administered Medications:     amLODIPine (NORVASC) tablet 10 mg, Daily **AND** [DISCONTINUED] lisinopril (ZESTRIL) tablet 40 mg, Daily    aspirin (ECOTRIN LOW STRENGTH) EC tablet 81 mg, Daily    carvedilol (COREG) tablet 25 mg, BID With Meals    clopidogrel (PLAVIX) tablet 75 mg, Daily    docusate sodium (COLACE) capsule 100 mg, BID    doxazosin (CARDURA) tablet 4 mg, Daily    enoxaparin (LOVENOX) subcutaneous injection 40 mg, Q24H JASON    hydrALAZINE (APRESOLINE) injection 10 mg, Q6H PRN    hydrALAZINE (APRESOLINE) tablet 25 mg, Q8H JASON    losartan (COZAAR) tablet 100 mg, Daily **AND** hydroCHLOROthiazide tablet 25 mg, Daily    insulin glargine (LANTUS) subcutaneous injection 35 Units 0.35 mL, HS    insulin lispro (HumALOG/ADMELOG) 100 units/mL subcutaneous injection 5-25 Units, 4x Daily  (AC & HS) **AND** Fingerstick Glucose (POCT), 4x Daily AC and at bedtime    labetalol (NORMODYNE) injection 10 mg, Q6H PRN    nicotine (NICODERM CQ) 21 mg/24 hr TD 24 hr patch 1 patch, Q24H    pantoprazole (PROTONIX) EC tablet 40 mg, Early Morning    polyethylene glycol (MIRALAX) packet 17 g, Daily    pravastatin (PRAVACHOL) tablet 20 mg, Daily With Dinner    senna (SENOKOT) tablet 17.2 mg, HS    spironolactone (ALDACTONE) tablet 25 mg, Daily    Administrative Statements   Today, Patient Was Seen By: Nancy Leonard MD  I have spent a total time of 35 minutes in caring for this patient on the day of the visit/encounter including Diagnostic results, Documenting in the medical record, Reviewing / ordering tests, medicine, procedures  , and Communicating with other healthcare professionals .    **Please Note: This note may have been constructed using a voice recognition system.**

## 2024-09-18 NOTE — CASE MANAGEMENT
[facility] received request for authorization from Care Manager.  Authorization request submitted for: Acute Rehab  Facility Name: [facility]  NPI: [id number]   Facility MD: Vitaly Deras    NPI: [id number]  Authorization initiated by contacting insurance:  [employer]  Via: Fax  Clinicals submitted via fax #  [phone]    Care Manager notified: Gabby Lira    Updates to authorization status will be noted in chart. Please reach out to CM for updates on any clinical information.

## 2024-09-18 NOTE — PLAN OF CARE
Problem: PAIN - ADULT  Goal: Verbalizes/displays adequate comfort level or baseline comfort level  Description: Interventions:  - Encourage patient to monitor pain and request assistance  - Assess pain using appropriate pain scale  - Administer analgesics based on type and severity of pain and evaluate response  - Implement non-pharmacological measures as appropriate and evaluate response  - Consider cultural and social influences on pain and pain management  - Notify physician/advanced practitioner if interventions unsuccessful or patient reports new pain  Outcome: Progressing     Problem: INFECTION - ADULT  Goal: Absence or prevention of progression during hospitalization  Description: INTERVENTIONS:  - Assess and monitor for signs and symptoms of infection  - Monitor lab/diagnostic results  - Monitor all insertion sites, i.e. indwelling lines, tubes, and drains  - Monitor endotracheal if appropriate and nasal secretions for changes in amount and color  - Rogers appropriate cooling/warming therapies per order  - Administer medications as ordered  - Instruct and encourage patient and family to use good hand hygiene technique  - Identify and instruct in appropriate isolation precautions for identified infection/condition  Outcome: Progressing  Goal: Absence of fever/infection during neutropenic period  Description: INTERVENTIONS:  - Monitor WBC    Outcome: Progressing

## 2024-09-18 NOTE — CASE MANAGEMENT
Case Management Progress Note    Patient name Wilder Quinn /-01 MRN [id number]  : 1965 Date 2024       LOS (days): 8  Geometric Mean LOS (GMLOS) (days): 2.9  Days to GMLOS:-5        OBJECTIVE:        Current admission status: Inpatient  Preferred Pharmacy:   [id number] [place] - [address]  [address]  [place] [address]  Phone: [phone] Fax: [phone]    Primary Care Provider: Gilmar Oro MD    Primary Insurance: [employer]  Secondary Insurance:     PROGRESS NOTE:    Met with pt at bedside to give him acute rehab choice.   Pt chose [facility].   Updated Karma from [facility] about the pt's choice and requested their NPI information to start the auth process.   Karma is requesting more time to review as their accepting doctor has concern about the pt's BP still.   Dr Leonard stated that the pt's Bps are stable at this time.   Waiting to start the pt's insurance auth until CM has received final acceptance from [facility].

## 2024-09-18 NOTE — [facility] ADMISSION
Referral reviewed with [facility] physician. Patient has been pre-approved for [facility] pending medical stability, insurance authorization and bed availability at campus of choice.  CM has been updated.

## 2024-09-18 NOTE — ASSESSMENT & PLAN NOTE
Lab Results   Component Value Date    HGBA1C 9.0 (H) 09/15/2024       Recent Labs     09/17/24  1615 09/17/24  2047 09/18/24  0803 09/18/24  1122   POCGLU 179* 287* 118 227*     Outpatient regimen: Jaurdiance and 30U Lantus     Plan  SSI achs for goal glucose 140-180 Alg 4  Cont Lantus HS  Hypoglycemia protocol

## 2024-09-19 LAB
ANION GAP SERPL CALCULATED.3IONS-SCNC: 7 MMOL/L (ref 4–13)
BASOPHILS # BLD AUTO: 0.06 THOUSANDS/ΜL (ref 0–0.1)
BASOPHILS NFR BLD AUTO: 1 % (ref 0–1)
BUN SERPL-MCNC: 31 MG/DL (ref 5–25)
CALCIUM SERPL-MCNC: 9.2 MG/DL (ref 8.4–10.2)
CHLORIDE SERPL-SCNC: 98 MMOL/L (ref 96–108)
CO2 SERPL-SCNC: 30 MMOL/L (ref 21–32)
CREAT SERPL-MCNC: 1.18 MG/DL (ref 0.6–1.3)
EOSINOPHIL # BLD AUTO: 0.27 THOUSAND/ΜL (ref 0–0.61)
EOSINOPHIL NFR BLD AUTO: 4 % (ref 0–6)
ERYTHROCYTE [DISTWIDTH] IN BLOOD BY AUTOMATED COUNT: 12.7 % (ref 11.6–15.1)
GFR SERPL CREATININE-BSD FRML MDRD: 67 ML/MIN/1.73SQ M
GLUCOSE SERPL-MCNC: 218 MG/DL (ref 65–140)
GLUCOSE SERPL-MCNC: 238 MG/DL (ref 65–140)
GLUCOSE SERPL-MCNC: 238 MG/DL (ref 65–140)
GLUCOSE SERPL-MCNC: 283 MG/DL (ref 65–140)
GLUCOSE SERPL-MCNC: 77 MG/DL (ref 65–140)
HCT VFR BLD AUTO: 54.3 % (ref 36.5–49.3)
HGB BLD-MCNC: 17.9 G/DL (ref 12–17)
IMM GRANULOCYTES # BLD AUTO: 0.05 THOUSAND/UL (ref 0–0.2)
IMM GRANULOCYTES NFR BLD AUTO: 1 % (ref 0–2)
LYMPHOCYTES # BLD AUTO: 1.65 THOUSANDS/ΜL (ref 0.6–4.47)
LYMPHOCYTES NFR BLD AUTO: 25 % (ref 14–44)
MCH RBC QN AUTO: 28.5 PG (ref 26.8–34.3)
MCHC RBC AUTO-ENTMCNC: 33 G/DL (ref 31.4–37.4)
MCV RBC AUTO: 87 FL (ref 82–98)
MONOCYTES # BLD AUTO: 0.78 THOUSAND/ΜL (ref 0.17–1.22)
MONOCYTES NFR BLD AUTO: 12 % (ref 4–12)
NEUTROPHILS # BLD AUTO: 3.71 THOUSANDS/ΜL (ref 1.85–7.62)
NEUTS SEG NFR BLD AUTO: 57 % (ref 43–75)
NRBC BLD AUTO-RTO: 0 /100 WBCS
PLATELET # BLD AUTO: 200 THOUSANDS/UL (ref 149–390)
PMV BLD AUTO: 9.9 FL (ref 8.9–12.7)
POTASSIUM SERPL-SCNC: 4.1 MMOL/L (ref 3.5–5.3)
RBC # BLD AUTO: 6.28 MILLION/UL (ref 3.88–5.62)
SODIUM SERPL-SCNC: 135 MMOL/L (ref 135–147)
WBC # BLD AUTO: 6.52 THOUSAND/UL (ref 4.31–10.16)

## 2024-09-19 PROCEDURE — 92526 ORAL FUNCTION THERAPY: CPT

## 2024-09-19 PROCEDURE — 92507 TX SP LANG VOICE COMM INDIV: CPT

## 2024-09-19 PROCEDURE — 80048 BASIC METABOLIC PNL TOTAL CA: CPT | Performed by: INTERNAL MEDICINE

## 2024-09-19 PROCEDURE — 99232 SBSQ HOSP IP/OBS MODERATE 35: CPT | Performed by: INTERNAL MEDICINE

## 2024-09-19 PROCEDURE — 82948 REAGENT STRIP/BLOOD GLUCOSE: CPT

## 2024-09-19 PROCEDURE — 85025 COMPLETE CBC W/AUTO DIFF WBC: CPT | Performed by: INTERNAL MEDICINE

## 2024-09-19 RX ORDER — BISACODYL 10 MG
10 SUPPOSITORY, RECTAL RECTAL ONCE
Status: COMPLETED | OUTPATIENT
Start: 2024-09-19 | End: 2024-09-19

## 2024-09-19 RX ADMIN — ASPIRIN 81 MG: 81 TABLET, COATED ORAL at 08:36

## 2024-09-19 RX ADMIN — INSULIN LISPRO 10 UNITS: 100 INJECTION, SOLUTION INTRAVENOUS; SUBCUTANEOUS at 21:02

## 2024-09-19 RX ADMIN — INSULIN LISPRO 10 UNITS: 100 INJECTION, SOLUTION INTRAVENOUS; SUBCUTANEOUS at 08:49

## 2024-09-19 RX ADMIN — INSULIN LISPRO 15 UNITS: 100 INJECTION, SOLUTION INTRAVENOUS; SUBCUTANEOUS at 18:05

## 2024-09-19 RX ADMIN — CLOPIDOGREL BISULFATE 75 MG: 75 TABLET ORAL at 08:35

## 2024-09-19 RX ADMIN — HYDRALAZINE HYDROCHLORIDE 25 MG: 25 TABLET ORAL at 05:46

## 2024-09-19 RX ADMIN — DOXAZOSIN 4 MG: 1 TABLET ORAL at 08:35

## 2024-09-19 RX ADMIN — LOSARTAN POTASSIUM 100 MG: 50 TABLET, FILM COATED ORAL at 08:35

## 2024-09-19 RX ADMIN — HYDROCHLOROTHIAZIDE 25 MG: 25 TABLET ORAL at 08:36

## 2024-09-19 RX ADMIN — AMLODIPINE BESYLATE 10 MG: 5 TABLET ORAL at 08:35

## 2024-09-19 RX ADMIN — BISACODYL 10 MG: 10 SUPPOSITORY RECTAL at 11:24

## 2024-09-19 RX ADMIN — ENOXAPARIN SODIUM 40 MG: 40 INJECTION SUBCUTANEOUS at 08:36

## 2024-09-19 RX ADMIN — CARVEDILOL 25 MG: 12.5 TABLET, FILM COATED ORAL at 08:34

## 2024-09-19 RX ADMIN — CARVEDILOL 25 MG: 12.5 TABLET, FILM COATED ORAL at 18:14

## 2024-09-19 RX ADMIN — DOCUSATE SODIUM 100 MG: 100 CAPSULE, LIQUID FILLED ORAL at 18:04

## 2024-09-19 RX ADMIN — INSULIN LISPRO 10 UNITS: 100 INJECTION, SOLUTION INTRAVENOUS; SUBCUTANEOUS at 12:59

## 2024-09-19 RX ADMIN — PRAVASTATIN SODIUM 20 MG: 20 TABLET ORAL at 18:03

## 2024-09-19 RX ADMIN — HYDRALAZINE HYDROCHLORIDE 25 MG: 25 TABLET ORAL at 21:02

## 2024-09-19 RX ADMIN — HYDRALAZINE HYDROCHLORIDE 25 MG: 25 TABLET ORAL at 14:42

## 2024-09-19 RX ADMIN — NICOTINE 1 PATCH: 21 PATCH, EXTENDED RELEASE TRANSDERMAL at 14:43

## 2024-09-19 RX ADMIN — DOCUSATE SODIUM 100 MG: 100 CAPSULE, LIQUID FILLED ORAL at 08:35

## 2024-09-19 RX ADMIN — INSULIN GLARGINE 35 UNITS: 100 INJECTION, SOLUTION SUBCUTANEOUS at 21:02

## 2024-09-19 RX ADMIN — SENNOSIDES 17.2 MG: 8.6 TABLET, FILM COATED ORAL at 08:35

## 2024-09-19 RX ADMIN — PANTOPRAZOLE SODIUM 40 MG: 40 TABLET, DELAYED RELEASE ORAL at 05:46

## 2024-09-19 RX ADMIN — POLYETHYLENE GLYCOL 3350 17 G: 17 POWDER, FOR SOLUTION ORAL at 08:35

## 2024-09-19 RX ADMIN — SENNOSIDES 17.2 MG: 8.6 TABLET, FILM COATED ORAL at 18:04

## 2024-09-19 RX ADMIN — SPIRONOLACTONE 25 MG: 25 TABLET ORAL at 08:35

## 2024-09-19 NOTE — SPEECH THERAPY NOTE
"Speech Language/Pathology    Speech/Language Pathology Progress Note    Patient Name: Wilder Vargas  Today's Date: 9/19/2024     Problem List  Principal Problem:    Acute CVA (cerebrovascular accident) (HCC)  Active Problems:    Type 2 diabetes mellitus with hyperglycemia (HCC)    Hypertensive emergency    Mixed hyperlipidemia    Tobacco use       Past Medical History  Past Medical History:   Diagnosis Date    CVA (cerebral vascular accident) (HCC)     Diabetes mellitus (HCC)     Hypertension         Past Surgical History  Past Surgical History:   Procedure Laterality Date    NO PAST SURGERIES           Updated History:  Last seen by SLP 9/13 recommending \"continued dysphagia 3/dental soft textures and thin liquids, allow for regular textured snacks, meds as tolerated\"   Pt was also seen for speech therapy that date.     Updated Imaging:  No new imaging.     Subjective:  Pt awake and alert upon arrival, agreeable to PO trials and HOB elevation. Pt denies difficulty swallowing (ie: coughing/choking, globus sensation).     Objective:  Materials administered: regular textures, dental soft textures, thin liquids via straw as pt expressed desire for use of straw d/t R sided weakness.     Complete labial seal for retrieval and containment of all materials, no anterior bolus loss present. Timely rotary mastication of regular textures w/ complete bolus breakdown and adequate bolus transfer. No oral residue noted. Suspected fairly prompt swallow initiation and fair hyolaryngeal elevation to palpation. No overt s/s of aspiration at this time.     Again discussed recommendations of regular textures and thin liquids w/ pt, suspect pt would benefit from continued dysphagia 3/dental soft textures given functional use of L hand/arm for ADLs (ie: feeding) as the dental soft textures are pre-cut. Pt expressed desire for continued dysphagia 3/dental soft textures to aid in functionality of L hand use during feeding. Pt verbalized " agreement, denies questions or concerns at this time.     Assessment:  Pt presents w/ suspected functional oropharyngeal swallow skills characterized by descriptions above.     Plan/Recommendations:  Continue dysphagia 3/dental soft textures and thin liquids, allow for regular textured snacks  Meds as tolerated  Swallow strategies: upright posture, small bites/sips, alternate solids/liquids   Frequent/thorough oral care   ST to s/o regarding swallow function as no further IP ST necessary, though SLP to continue f/u for motor speech treatment

## 2024-09-19 NOTE — ASSESSMENT & PLAN NOTE
Lab Results   Component Value Date    HGBA1C 9.0 (H) 09/15/2024       Recent Labs     09/18/24  1715 09/18/24 2031 09/19/24  0848 09/19/24  1133   POCGLU 346* 153* 238* 238*     Outpatient regimen: Jaurdiance and 30U Lantus     Plan  SSI achs for goal glucose 140-180 Alg 4  Cont Lantus HS  Hypoglycemia protocol

## 2024-09-19 NOTE — PLAN OF CARE
Problem: Potential for Falls  Goal: Patient will remain free of falls  Description: INTERVENTIONS:  - Educate patient/family on patient safety including physical limitations  - Instruct patient to call for assistance with activity   - Consult OT/PT to assist with strengthening/mobility   - Keep Call bell within reach  - Keep bed low and locked with side rails adjusted as appropriate  - Keep care items and personal belongings within reach  - Initiate and maintain comfort rounds  - Make Fall Risk Sign visible to staff  - Apply yellow socks and bracelet for high fall risk patients  - Consider moving patient to room near nurses station  Outcome: Progressing     Problem: INFECTION - ADULT  Goal: Absence or prevention of progression during hospitalization  Description: INTERVENTIONS:  - Assess and monitor for signs and symptoms of infection  - Monitor lab/diagnostic results  - Monitor all insertion sites, i.e. indwelling lines, tubes, and drains  - Monitor endotracheal if appropriate and nasal secretions for changes in amount and color  - [place] appropriate cooling/warming therapies per order  - Administer medications as ordered  - Instruct and encourage patient and family to use good hand hygiene technique  - Identify and instruct in appropriate isolation precautions for identified infection/condition  Outcome: Progressing     Problem: PAIN - ADULT  Goal: Verbalizes/displays adequate comfort level or baseline comfort level  Description: Interventions:  - Encourage patient to monitor pain and request assistance  - Assess pain using appropriate pain scale  - Administer analgesics based on type and severity of pain and evaluate response  - Implement non-pharmacological measures as appropriate and evaluate response  - Consider cultural and social influences on pain and pain management  - Notify physician/advanced practitioner if interventions unsuccessful or patient reports new pain  Outcome: Progressing

## 2024-09-19 NOTE — PROGRESS NOTES
Progress Note - Hospitalist   Name: Wilder Vargas 59 y.o. male I MRN: [id number]  Unit/Bed#: -01 I Date of Admission: 9/10/2024   Date of Service: 9/19/2024 I Hospital Day: 9    Assessment & Plan  Acute CVA (cerebrovascular accident) (HCC)  Presented with R facial weakness, RUE/RLE weakness/ataxia, dysarthria (NIHSS 7)  History of past CVAs (May and June 2020) with history of medication noncompliance.  Outpatient regimen: Plavix and pravastatin  Initial CTH and CTA without evidence of CVA  Patient received TNK 1346on 9/10 in ED with initial improvement in sx  Unfortunately patient's sx returned around 1500 in ED with severe R facial droop and loss of strength in RUE/RLE  Repeat CTH without evidence of hemorrhage   9/10 MRI:  Acute infarct involving left thalamocapsular region. There is minimal FLAIR hyperintensity/edema without mass effect or hemorrhagic transformation. Chronic lacunar infarcts in the left thalamus, right corona radiata and right basal ganglia. Focus of old microhemorrhage in the right temporal lobe, likely on the basis of chronic hypertension.  9/11 CTH with evolving CVA     Plan  Echo reviewed --> limited study but no evidence embolic source. Discussed with neuro, no need for repeat echo/ASHTYN or outpatient cardiac monitor as low suspicion for embolic etiology.   Continue ASA + plavix x 21 days followed by plavix monotherapy  Pravastatin  PT/OT/Speech --> plan to DC to rehab, CM is following for placement   Will need outpatient neurology follow up   Type 2 diabetes mellitus with hyperglycemia (HCC)  Lab Results   Component Value Date    HGBA1C 9.0 (H) 09/15/2024       Recent Labs     09/18/24  1715 09/18/24  2031 09/19/24  0848 09/19/24  1133   POCGLU 346* 153* 238* 238*     Outpatient regimen: Jaurdiance and 30U Lantus     Plan  SSI achs for goal glucose 140-180 Alg 4  Cont Lantus HS  Hypoglycemia protocol  Hypertensive emergency  Outpatient regimen: 10-40mg amlodipine-benazepril, 25mg  "carvedilol, 4mg doxazosin  History on medication non-compliance  Presented to ED with -220s  Initially in ICU on nicardipine gtt (Dc'd )  2020 Renal US neg  Metanephrines ordered   Cardiology consulted  Continue amlodipine 10 mg daily   Lisinopril switched to losartan-HCTZ 100-25 mg  Spironolactone 50 mg daily added  Cardura 4 mg added  Hydralazine 25 mg TID added   /88 (BP Location: Left arm)   Pulse 56   Temp 97.8 °F (36.6 °C) (Oral)   Resp 17   Ht 5' 8\" (1.727 m)   Wt 80 kg (176 lb 5.9 oz)   SpO2 96%   BMI 26.82 kg/m²     Mixed hyperlipidemia  Continue pravastatin  Previously did not tolerate lipitor  History of medication non-compliance  Tobacco use  Currently smokes 1-1.5 packs daily ->previously smoking >2 packs/daily  46 year history  NRT, encourage cessation    VTE Pharmacologic Prophylaxis:   Moderate Risk (Score 3-4) - Pharmacological DVT Prophylaxis Ordered: enoxaparin (Lovenox).    Mobility:   Basic Mobility Inpatient Raw Score: 10  JH-HLM Goal: 4: Move to chair/commode  JH-HLM Achieved: 4: Move to chair/commode (move to comode with device.)  JH-HLM Goal achieved. Continue to encourage appropriate mobility.    Patient Centered Rounds: I performed bedside rounds with nursing staff today.   Discussions with Specialists or Other Care Team Provider: Cardiology     Education and Discussions with Family / Patient: Updated  (mother) via phone.    Current Length of Stay: 9 day(s)  Current Patient Status: Inpatient   Certification Statement: The patient will continue to require additional inpatient hospital stay due to placement  Discharge Plan: Anticipate discharge in 24-48 hrs to rehab facility.    Code Status: Level 1 - Full Code    Subjective     Seen and examined at bedside  Awake and alert  Denies any chest pain or shortness of breath  Complaining of constipation    Objective     Vitals:   Temp (24hrs), Av.9 °F (36.6 °C), Min:97.8 °F (36.6 °C), Max:98 °F (36.7 " °C)    Temp:  [97.8 °F (36.6 °C)-98 °F (36.7 °C)] 97.8 °F (36.6 °C)  HR:  [52-75] 56  Resp:  [17-24] 17  BP: (124-169)/(66-88) 158/88  SpO2:  [95 %-98 %] 96 %  Body mass index is 26.82 kg/m².     Input and Output Summary (last 24 hours):     Intake/Output Summary (Last 24 hours) at 9/19/2024 1413  Last data filed at 9/19/2024 1324  Gross per 24 hour   Intake 1402 ml   Output 1800 ml   Net -398 ml       Physical Exam  Vitals and nursing note reviewed.   Constitutional:       Appearance: Normal appearance.   HENT:      Head: Normocephalic and atraumatic.   Eyes:      Extraocular Movements: Extraocular movements intact.      Conjunctiva/sclera: Conjunctivae normal.   Cardiovascular:      Rate and Rhythm: Normal rate and regular rhythm.      Heart sounds: Normal heart sounds. No murmur heard.  Pulmonary:      Effort: Pulmonary effort is normal.      Breath sounds: Normal breath sounds.   Abdominal:      General: Bowel sounds are normal.      Palpations: Abdomen is soft.      Tenderness: There is no abdominal tenderness.   Musculoskeletal:      Right lower leg: No edema.      Left lower leg: No edema.   Skin:     General: Skin is warm and dry.      Capillary Refill: Capillary refill takes less than 2 seconds.   Neurological:      Mental Status: He is alert and oriented to person, place, and time.      Motor: Weakness present.   Psychiatric:         Mood and Affect: Mood normal.          Lines/Drains:  Lines/Drains/Airways       Active Status       None                            Lab Results: I have reviewed the following results:    .     09/19/24  0424   WBC 6.52   HGB 17.9*   HCT 54.3*      SODIUM 135   K 4.1   CL 98   CO2 30   BUN 31*   CREATININE 1.18   GLUC 77       Results from last 7 days   Lab Units 09/19/24  0424   WBC Thousand/uL 6.52   HEMOGLOBIN g/dL 17.9*   HEMATOCRIT % 54.3*   PLATELETS Thousands/uL 200   SEGS PCT % 57   LYMPHO PCT % 25   MONO PCT % 12   EOS PCT % 4     Results from last 7 days   Lab  Units 09/19/24  0424   SODIUM mmol/L 135   POTASSIUM mmol/L 4.1   CHLORIDE mmol/L 98   CO2 mmol/L 30   BUN mg/dL 31*   CREATININE mg/dL 1.18   ANION GAP mmol/L 7   CALCIUM mg/dL 9.2   GLUCOSE RANDOM mg/dL 77         Results from last 7 days   Lab Units 09/19/24  1133 09/19/24  0848 09/18/24  2031 09/18/24  1715 09/18/24  1122 09/18/24  0803 09/17/24  2047 09/17/24  1615 09/17/24  1121 09/17/24  0806 09/16/24  2101 09/16/24  1628   POC GLUCOSE mg/dl 238* 238* 153* 346* 227* 118 287* 179* 266* 153* 232* 179*     Results from last 7 days   Lab Units 09/15/24  0507   HEMOGLOBIN A1C % 9.0*           Recent Cultures (last 7 days):         Imaging Review: No pertinent imaging studies reviewed.  Other Studies: No additional pertinent studies reviewed.    Last 24 Hours Medication List:     Current Facility-Administered Medications:     amLODIPine (NORVASC) tablet 10 mg, Daily **AND** [DISCONTINUED] lisinopril (ZESTRIL) tablet 40 mg, Daily    aspirin (ECOTRIN LOW STRENGTH) EC tablet 81 mg, Daily    carvedilol (COREG) tablet 25 mg, BID With Meals    clopidogrel (PLAVIX) tablet 75 mg, Daily    docusate sodium (COLACE) capsule 100 mg, BID    doxazosin (CARDURA) tablet 4 mg, Daily    enoxaparin (LOVENOX) subcutaneous injection 40 mg, Q24H JASON    hydrALAZINE (APRESOLINE) injection 10 mg, Q6H PRN    hydrALAZINE (APRESOLINE) tablet 25 mg, Q8H JASON    losartan (COZAAR) tablet 100 mg, Daily **AND** hydroCHLOROthiazide tablet 25 mg, Daily    insulin glargine (LANTUS) subcutaneous injection 35 Units 0.35 mL, HS    insulin lispro (HumALOG/ADMELOG) 100 units/mL subcutaneous injection 5-25 Units, 4x Daily (AC & HS) **AND** Fingerstick Glucose (POCT), 4x Daily AC and at bedtime    labetalol (NORMODYNE) injection 10 mg, Q6H PRN    nicotine (NICODERM CQ) 21 mg/24 hr TD 24 hr patch 1 patch, Q24H    pantoprazole (PROTONIX) EC tablet 40 mg, Early Morning    polyethylene glycol (MIRALAX) packet 17 g, Daily    pravastatin (PRAVACHOL) tablet 20 mg,  Daily With Dinner    senna (SENOKOT) tablet 17.2 mg, BID    spironolactone (ALDACTONE) tablet 25 mg, Daily    Administrative Statements   Today, Patient Was Seen By: Nancy Leonard MD  I have spent a total time of 35 minutes in caring for this patient on the day of the visit/encounter including Diagnostic results, Documenting in the medical record, Reviewing / ordering tests, medicine, procedures  , and Communicating with other healthcare professionals .    **Please Note: This note may have been constructed using a voice recognition system.**

## 2024-09-19 NOTE — CASE MANAGEMENT
Case Management Progress Note    Patient name Wilder Quinn /-01 MRN [id number]  : 1965 Date 2024       LOS (days): 9  Geometric Mean LOS (GMLOS) (days): 2.9  Days to GMLOS:-6.1        OBJECTIVE:        Current admission status: Inpatient  Preferred Pharmacy:   [id number] - [place], [place] - [address]  [address] [place] [address]  Phone: [phone] Fax: [phone]    Primary Care Provider: Gilmar Oro MD    Primary Insurance: [employer]  Secondary Insurance:     PROGRESS NOTE:  Auth is still pending for GS. Updated [facility] via [employer].

## 2024-09-19 NOTE — SPEECH THERAPY NOTE
"Speech Language/Pathology    Speech/Language Pathology Progress Note    Patient Name: Wilder Vargas  Today's Date: 9/19/2024     Problem List  Principal Problem:    Acute CVA (cerebrovascular accident) (HCC)  Active Problems:    Type 2 diabetes mellitus with hyperglycemia (HCC)    Hypertensive emergency    Mixed hyperlipidemia    Tobacco use       Past Medical History  Past Medical History:   Diagnosis Date    CVA (cerebral vascular accident) (HCC)     Diabetes mellitus (HCC)     Hypertension         Past Surgical History  Past Surgical History:   Procedure Laterality Date    NO PAST SURGERIES         Updated History:  Last seen by SLP earlier this AM 9/13 for swallowing re-evaluation     Updated Imaging:  No new imaging.      Subjective:  Pt asleep upon arrival, agreeable to speech/language treatment, OOB in chair. Pt reports feeling as though his speech is \"about 50%\" comparable to his baseline.      Objective:  Given word length verbal/visual stimuli, pt will utilize strategies (ie: over-articulation, slower speech production, and utilizing an appropriate speaking volume) to increase speech intelligibility to to 70% across 3 consecutive therapy sessions at the acute care level.   -SLP reviewed strategies prior to initiation of motor speech tx. Pt independently utilized strategies to aid in increased speech intelligibility to 94% this date, (96/102).      Revised: Given verbal/visual stimuli, pt will orally read or repeat mono through multi-syllabic words containing fricatives with 90% accuracy across 3 consecutive therapy sessions at the acute care level.  -11/12 (91%) independently, increased to 12/12 (100%) given verbal cues for monosyllabic words   -11/12 (91%) independently, increased to 12/12 (100%) given verbal cues for multi-syllabic words   (Targeted /f/)     Revised: Given verbal/visual stimuli, pt will orally read or repeat mono through multi-syllabic words containing affricates with 90% accuracy across " 3 consecutive therapy sessions at the acute care level.  -12/12 (100%) independently for monosyllabic words  -12/12 (100%) independently for multi-syllabic words  (Targeted /ch/)     Given verbal/visual stimuli, pt will orally read or repeat mono through multi-syllabic words containing glides with 70% accuracy across 3 consecutive therapy sessions at the acute care level. (Revised: Given verbal/visual stimuli, pt will orally read or repeat mono through multi-syllabic words containing glides with 90% accuracy across 3 consecutive therapy sessions at the acute care level.)  -12/12 (100%) independently for monosyllabic words   -11/12 (91%) independently, increased to 12/12 (100%) given verbal cues for multi-syllabic words   (Targeted /r/)     Patient will complete daily oral motor exercise to increase labial strength, range of motion and coordination with min cues to 90% effectiveness to strengthen oral musculature and aid in increasing speech intelligibility   -SLP reviewed handout with daily oral motor exercises. Pt independently demonstrated use of each exercise w/ 100% accuracy .   GOAL MET      Pt will utilize incentive spirometer to aid in breath support increasing vowel prolongation to 6 seconds across one tx session at the acute care level. (Revised: Pt will utilize incentive spirometer to aid in breath support increasing vowel prolongation to WFL (15-20 seconds) across 3 consecutive tx sessions at the acute care level.)  -Pt utilized the incentive spirometer x10 reaching an average of 1285 and a vowel prolongation of 10 seconds this date.      Assessment:  Pt presents w/ mild-mod dysarthria characterized by R-sided weakness w/ imprecise articulation of fricatives, affricates, and glides at the single, multi-syllablic and sentence level production.       Plan/Recommendations:  Continue use of oral motor exercises  Encourage use of over-articulation, slowed rate of speech, and increased vocal volume  Continue  use of incentive spirometer   ST to f/u as able and appropriate for motor speech tx

## 2024-09-19 NOTE — ASSESSMENT & PLAN NOTE
"Outpatient regimen: 10-40mg amlodipine-benazepril, 25mg carvedilol, 4mg doxazosin  History on medication non-compliance  Presented to ED with -220s  Initially in ICU on nicardipine gtt (Dc'd 9/13)  2020 Renal US neg  Metanephrines ordered   Cardiology consulted  Continue amlodipine 10 mg daily   Lisinopril switched to losartan-HCTZ 100-25 mg  Spironolactone 50 mg daily added  Cardura 4 mg added  Hydralazine 25 mg TID added   /88 (BP Location: Left arm)   Pulse 56   Temp 97.8 °F (36.6 °C) (Oral)   Resp 17   Ht 5' 8\" (1.727 m)   Wt 80 kg (176 lb 5.9 oz)   SpO2 96%   BMI 26.82 kg/m²     " 87.9

## 2024-09-19 NOTE — PLAN OF CARE
Problem: Potential for Falls  Goal: Patient will remain free of falls  Description: INTERVENTIONS:  - Educate patient/family on patient safety including physical limitations  - Instruct patient to call for assistance with activity   - Consult OT/PT to assist with strengthening/mobility   - Keep Call bell within reach  - Keep bed low and locked with side rails adjusted as appropriate  - Keep care items and personal belongings within reach  - Initiate and maintain comfort rounds  - Make Fall Risk Sign visible to staff  - Offer Toileting every 2 Hours, in advance of need  - Initiate/Maintain shift alarm  - Obtain necessary fall risk management equipment: socks   - Apply yellow socks and bracelet for high fall risk patients  - Consider moving patient to room near nurses station  Outcome: Progressing     Problem: PAIN - ADULT  Goal: Verbalizes/displays adequate comfort level or baseline comfort level  Description: Interventions:  - Encourage patient to monitor pain and request assistance  - Assess pain using appropriate pain scale  - Administer analgesics based on type and severity of pain and evaluate response  - Implement non-pharmacological measures as appropriate and evaluate response  - Consider cultural and social influences on pain and pain management  - Notify physician/advanced practitioner if interventions unsuccessful or patient reports new pain  Outcome: Progressing     Problem: INFECTION - ADULT  Goal: Absence or prevention of progression during hospitalization  Description: INTERVENTIONS:  - Assess and monitor for signs and symptoms of infection  - Monitor lab/diagnostic results  - Monitor all insertion sites, i.e. indwelling lines, tubes, and drains  - Monitor endotracheal if appropriate and nasal secretions for changes in amount and color  - [place] appropriate cooling/warming therapies per order  - Administer medications as ordered  - Instruct and encourage patient and family to use good hand hygiene  technique  - Identify and instruct in appropriate isolation precautions for identified infection/condition  Outcome: Progressing  Goal: Absence of fever/infection during neutropenic period  Description: INTERVENTIONS:  - Monitor WBC    Outcome: Progressing     Problem: SAFETY ADULT  Goal: Patient will remain free of falls  Description: INTERVENTIONS:  - Educate patient/family on patient safety including physical limitations  - Instruct patient to call for assistance with activity   - Consult OT/PT to assist with strengthening/mobility   - Keep Call bell within reach  - Keep bed low and locked with side rails adjusted as appropriate  - Keep care items and personal belongings within reach  - Initiate and maintain comfort rounds  - Make Fall Risk Sign visible to staff  - Offer Toileting every 2 Hours, in advance of need  - Initiate/Maintain shift alarm  - Obtain necessary fall risk management equipment: socks   - Apply yellow socks and bracelet for high fall risk patients  - Consider moving patient to room near nurses station  Outcome: Progressing  Goal: Maintain or return to baseline ADL function  Description: INTERVENTIONS:  -  Assess patient's ability to carry out ADLs; assess patient's baseline for ADL function and identify physical deficits which impact ability to perform ADLs (bathing, care of mouth/teeth, toileting, grooming, dressing, etc.)  - Assess/evaluate cause of self-care deficits   - Assess range of motion  - Assess patient's mobility; develop plan if impaired  - Assess patient's need for assistive devices and provide as appropriate  - Encourage maximum independence but intervene and supervise when necessary  - Involve family in performance of ADLs  - Assess for home care needs following discharge   - Consider OT consult to assist with ADL evaluation and planning for discharge  - Provide patient education as appropriate  Outcome: Progressing  Goal: Maintains/Returns to pre admission functional  level  Description: INTERVENTIONS:  - Perform AM-PAC 6 Click Basic Mobility/ Daily Activity assessment daily.  - Set and communicate daily mobility goal to care team and patient/family/caregiver.   - Collaborate with rehabilitation services on mobility goals if consulted  - Perform Range of Motion 2 times a day.  - Reposition patient every 2 hours.  - Dangle patient 2 times a day  - Stand patient 2 times a day  - Ambulate patient 2 times a day  - Out of bed to chair 2 times a day   - Out of bed for meals 2 times a day  - Out of bed for toileting  - Record patient progress and toleration of activity level   Outcome: Progressing     Problem: DISCHARGE PLANNING  Goal: Discharge to home or other facility with appropriate resources  Description: INTERVENTIONS:  - Identify barriers to discharge w/patient and caregiver  - Arrange for needed discharge resources and transportation as appropriate  - Identify discharge learning needs (meds, wound care, etc.)  - Arrange for interpretive services to assist at discharge as needed  - Refer to Case Management Department for coordinating discharge planning if the patient needs post-hospital services based on physician/advanced practitioner order or complex needs related to functional status, cognitive ability, or social support system  Outcome: Progressing     Problem: CARDIOVASCULAR - ADULT  Goal: Maintains optimal cardiac output and hemodynamic stability  Description: INTERVENTIONS:  - Monitor I/O, vital signs and rhythm  - Monitor for S/S and trends of decreased cardiac output  - Administer and titrate ordered vasoactive medications to optimize hemodynamic stability  - Assess quality of pulses, skin color and temperature  - Assess for signs of decreased coronary artery perfusion  - Instruct patient to report change in severity of symptoms  Outcome: Progressing  Goal: Absence of cardiac dysrhythmias or at baseline rhythm  Description: INTERVENTIONS:  - Continuous cardiac  monitoring, vital signs, obtain 12 lead EKG if ordered  - Administer antiarrhythmic and heart rate control medications as ordered  - Monitor electrolytes and administer replacement therapy as ordered  Outcome: Progressing     Problem: Neurological Deficit  Goal: Neurological status is stable or improving  Description: Interventions:  - Monitor and assess patient's level of consciousness, motor function, sensory function, and level of assistance needed for ADLs.   - Monitor and report changes from baseline. Collaborate with interdisciplinary team to initiate plan and implement interventions as ordered.   - Provide and maintain a safe environment.  - Consider seizure precautions.  - Consider fall precautions.  - Consider aspiration precautions.  - Consider bleeding precautions.  Outcome: Progressing     Problem: Activity Intolerance/Impaired Mobility  Goal: Mobility/activity is maintained at optimum level for patient  Description: Interventions:  - Assess and monitor patient  barriers to mobility and need for assistive/adaptive devices.  - Assess patient's emotional response to limitations.  - Collaborate with interdisciplinary team and initiate plans and interventions as ordered.  - Encourage independent activity per ability.  - Maintain proper body alignment.  - Perform active/passive rom as tolerated/ordered.  - Plan activities to conserve energy.  - Turn patient as appropriate  Outcome: Progressing     Problem: Communication Impairment  Goal: Ability to express needs and understand communication  Description: Assess patient's communication skills and ability to understand information.  Patient will demonstrate use of effective communication techniques, alternative methods of communication and understanding even if not able to speak.     - Encourage communication and provide alternate methods of communication as needed.  - Collaborate with case management/ for discharge needs.  - Include  patient/family/caregiver in decisions related to communication.  Outcome: Progressing     Problem: Potential for Aspiration  Goal: Non-ventilated patient's risk of aspiration is minimized  Description: Assess and monitor vital signs, respiratory status, and labs (WBC).  Monitor for signs of aspiration (tachypnea, cough, rales, wheezing, cyanosis, fever).    - Assess and monitor patient's ability to swallow.  - Place patient up in chair to eat if possible.  - HOB up at 90 degrees to eat if unable to get patient up into chair.  - Supervise patient during oral intake.   - Instruct patient/ family to take small bites.  - Instruct patient/ family to take small single sips when taking liquids.  - Follow patient-specific strategies generated by speech pathologist.  Outcome: Progressing  Goal: Ventilated patient's risk of aspiration is minimized  Description: Assess and monitor vital signs, respiratory status, airway cuff pressure, and labs (WBC).  Monitor for signs of aspiration (tachypnea, cough, rales, wheezing, cyanosis, fever).    - Elevate head of bed 30 degrees if patient has tube feeding.  - Monitor tube feeding.  Outcome: Progressing     Problem: Nutrition  Goal: Nutrition/Hydration status is improving  Description: Monitor and assess patient's nutrition/hydration status for malnutrition (ex- brittle hair, bruises, dry skin, pale skin and conjunctiva, muscle wasting, smooth red tongue, and disorientation). Collaborate with interdisciplinary team and initiate plan and interventions as ordered.  Monitor patient's weight and dietary intake as ordered or per policy. Utilize nutrition screening tool and intervene per policy. Determine patient's food preferences and provide high-protein, high-caloric foods as appropriate.     - Assist patient with eating.  - Allow adequate time for meals.  - Encourage patient to take dietary supplement as ordered.  - Collaborate with clinical nutritionist.  - Include  patient/family/caregiver in decisions related to nutrition.  Outcome: Progressing     Problem: Prexisting or High Potential for Compromised Skin Integrity  Goal: Skin integrity is maintained or improved  Description: INTERVENTIONS:  - Identify patients at risk for skin breakdown  - Assess and monitor skin integrity  - Assess and monitor nutrition and hydration status  - Monitor labs   - Assess for incontinence   - Turn and reposition patient  - Assist with mobility/ambulation  - Relieve pressure over bony prominences  - Avoid friction and shearing  - Provide appropriate hygiene as needed including keeping skin clean and dry  - Evaluate need for skin moisturizer/barrier cream  - Collaborate with interdisciplinary team   - Patient/family teaching  - Consider wound care consult   Outcome: Progressing     Problem: Nutrition/Hydration-ADULT  Goal: Nutrient/Hydration intake appropriate for improving, restoring or maintaining nutritional needs  Description: Monitor and assess patient's nutrition/hydration status for malnutrition. Collaborate with interdisciplinary team and initiate plan and interventions as ordered.  Monitor patient's weight and dietary intake as ordered or per policy. Utilize nutrition screening tool and intervene as necessary. Determine patient's food preferences and provide high-protein, high-caloric foods as appropriate.     INTERVENTIONS:  - Monitor oral intake, urinary output, labs, and treatment plans  - Assess nutrition and hydration status and recommend course of action  - Evaluate amount of meals eaten  - Assist patient with eating if necessary   - Allow adequate time for meals  - Recommend/ encourage appropriate diets, oral nutritional supplements, and vitamin/mineral supplements  - Order, calculate, and assess calorie counts as needed  - Recommend, monitor, and adjust tube feedings and TPN/PPN based on assessed needs  - Assess need for intravenous fluids  - Provide specific nutrition/hydration  education as appropriate  - Include patient/family/caregiver in decisions related to nutrition  Outcome: Progressing

## 2024-09-19 NOTE — CASE MANAGEMENT
Called [employer] ([phone]) to ensure auth request was received. Spoke to Adilia DESOUZA who stated fax request was not received. Adilia provided additional fax number to send request to. F: [phone]. Request re-faxed.  notified: Magda Jones

## 2024-09-20 VITALS
HEIGHT: 68 IN | DIASTOLIC BLOOD PRESSURE: 85 MMHG | OXYGEN SATURATION: 98 % | SYSTOLIC BLOOD PRESSURE: 150 MMHG | WEIGHT: 176.37 LBS | TEMPERATURE: 97.8 F | HEART RATE: 69 BPM | RESPIRATION RATE: 18 BRPM | BODY MASS INDEX: 26.73 KG/M2

## 2024-09-20 LAB
GLUCOSE SERPL-MCNC: 125 MG/DL (ref 65–140)
GLUCOSE SERPL-MCNC: 192 MG/DL (ref 65–140)
GLUCOSE SERPL-MCNC: 268 MG/DL (ref 65–140)
JAK2 P.V617F BLD/T QL: NORMAL
LAB DIRECTOR NAME PROVIDER: NORMAL
LABORATORY COMMENT REPORT: NORMAL
Lab: NORMAL
REF LAB TEST METHOD: NORMAL

## 2024-09-20 PROCEDURE — 82948 REAGENT STRIP/BLOOD GLUCOSE: CPT

## 2024-09-20 PROCEDURE — 99239 HOSP IP/OBS DSCHRG MGMT >30: CPT | Performed by: INTERNAL MEDICINE

## 2024-09-20 RX ORDER — HYDRALAZINE HYDROCHLORIDE 25 MG/1
25 TABLET, FILM COATED ORAL EVERY 8 HOURS SCHEDULED
Start: 2024-09-20

## 2024-09-20 RX ORDER — LOSARTAN POTASSIUM 100 MG/1
100 TABLET ORAL DAILY
Start: 2024-09-21

## 2024-09-20 RX ORDER — AMLODIPINE BESYLATE 10 MG/1
10 TABLET ORAL DAILY
Start: 2024-09-21

## 2024-09-20 RX ORDER — SENNOSIDES 8.6 MG
17.2 TABLET ORAL 2 TIMES DAILY
Start: 2024-09-20

## 2024-09-20 RX ORDER — SPIRONOLACTONE 25 MG/1
25 TABLET ORAL DAILY
Start: 2024-09-21

## 2024-09-20 RX ORDER — INSULIN GLARGINE 100 [IU]/ML
35 INJECTION, SOLUTION SUBCUTANEOUS
Qty: 15 ML | Refills: 0 | Status: SHIPPED | OUTPATIENT
Start: 2024-09-20

## 2024-09-20 RX ORDER — CLOPIDOGREL BISULFATE 75 MG/1
75 TABLET ORAL DAILY
Start: 2024-09-21

## 2024-09-20 RX ORDER — POLYETHYLENE GLYCOL 3350 17 G/17G
17 POWDER, FOR SOLUTION ORAL DAILY
Start: 2024-09-21

## 2024-09-20 RX ORDER — HYDROCHLOROTHIAZIDE 25 MG/1
25 TABLET ORAL DAILY
Start: 2024-09-21

## 2024-09-20 RX ADMIN — DOXAZOSIN 4 MG: 1 TABLET ORAL at 09:03

## 2024-09-20 RX ADMIN — HYDRALAZINE HYDROCHLORIDE 25 MG: 25 TABLET ORAL at 16:19

## 2024-09-20 RX ADMIN — AMLODIPINE BESYLATE 10 MG: 5 TABLET ORAL at 09:02

## 2024-09-20 RX ADMIN — SPIRONOLACTONE 25 MG: 25 TABLET ORAL at 09:02

## 2024-09-20 RX ADMIN — INSULIN LISPRO 15 UNITS: 100 INJECTION, SOLUTION INTRAVENOUS; SUBCUTANEOUS at 12:08

## 2024-09-20 RX ADMIN — INSULIN LISPRO 5 UNITS: 100 INJECTION, SOLUTION INTRAVENOUS; SUBCUTANEOUS at 09:04

## 2024-09-20 RX ADMIN — CARVEDILOL 25 MG: 12.5 TABLET, FILM COATED ORAL at 09:03

## 2024-09-20 RX ADMIN — CLOPIDOGREL BISULFATE 75 MG: 75 TABLET ORAL at 09:02

## 2024-09-20 RX ADMIN — HYDROCHLOROTHIAZIDE 25 MG: 25 TABLET ORAL at 09:02

## 2024-09-20 RX ADMIN — LOSARTAN POTASSIUM 100 MG: 50 TABLET, FILM COATED ORAL at 09:02

## 2024-09-20 RX ADMIN — ENOXAPARIN SODIUM 40 MG: 40 INJECTION SUBCUTANEOUS at 09:03

## 2024-09-20 RX ADMIN — CARVEDILOL 25 MG: 12.5 TABLET, FILM COATED ORAL at 16:17

## 2024-09-20 RX ADMIN — NICOTINE 1 PATCH: 21 PATCH, EXTENDED RELEASE TRANSDERMAL at 16:19

## 2024-09-20 RX ADMIN — ASPIRIN 81 MG: 81 TABLET, COATED ORAL at 09:02

## 2024-09-20 RX ADMIN — HYDRALAZINE HYDROCHLORIDE 25 MG: 25 TABLET ORAL at 04:33

## 2024-09-20 RX ADMIN — PANTOPRAZOLE SODIUM 40 MG: 40 TABLET, DELAYED RELEASE ORAL at 04:33

## 2024-09-20 RX ADMIN — PRAVASTATIN SODIUM 20 MG: 20 TABLET ORAL at 16:17

## 2024-09-20 NOTE — ASSESSMENT & PLAN NOTE
"Outpatient regimen: 10-40mg amlodipine-benazepril, 25mg carvedilol, 4mg doxazosin  History on medication non-compliance  Presented to ED with -220s  Initially in ICU on nicardipine gtt (Dc'd 9/13)  2020 Renal US neg  Metanephrines ordered   Cardiology consulted  Continue amlodipine 10 mg daily   Lisinopril switched to losartan-HCTZ 100-25 mg  Spironolactone 50 mg daily added  Cardura 4 mg added  Hydralazine 25 mg TID added   /85   Pulse 69   Temp 97.8 °F (36.6 °C)   Resp 18   Ht 5' 8\" (1.727 m)   Wt 80 kg (176 lb 5.9 oz)   SpO2 98%   BMI 26.82 kg/m²     "

## 2024-09-20 NOTE — CASE MANAGEMENT
Case Management Discharge Planning Note    Patient name Wilder Vargas  Location /-01 MRN [id number]  : 1965 Date 2024       Current Admission Date: 9/10/2024  Current Admission Diagnosis:Acute CVA (cerebrovascular accident) (HCC)   Patient Active Problem List    Diagnosis Date Noted Date Diagnosed    Tobacco use 09/10/2024     Type 2 diabetes mellitus with diabetic microalbuminuria, with long-term current use of insulin (HCC) 2024     Erythrocytosis 2024     Gastroesophageal reflux disease without esophagitis 2024     Type 2 diabetes mellitus with hyperglycemia ([facility]) 2024     S/P stroke due to cerebrovascular disease 2024     Hypertensive emergency 2024     Other male erectile dysfunction 2024     Mixed hyperlipidemia 2024     Acute CVA (cerebrovascular accident) ([facility]) 2020       LOS (days): 10  Geometric Mean LOS (GMLOS) (days): 2.9  Days to GMLOS:-7.1     OBJECTIVE:  Risk of Unplanned Readmission Score: 12.71         Current admission status: Inpatient   Preferred Pharmacy:   [id number] [place] - [address]  [address] [place] [address]  Phone: [phone] Fax: [phone]    Primary Care Provider: Gilmar Oro MD    Primary Insurance: [employer]  Secondary Insurance:     DISCHARGE DETAILS:        Received auth for patient to go to [facility]. A bed is available today. Request for [facility] transport made via roundtrip. Wait transport time.

## 2024-09-20 NOTE — ASSESSMENT & PLAN NOTE
Continue pravastatin  Previously did not tolerate lipitor  History of medication non-compliance   7/1 sp ex lap  passing flatus; f/u surgery recs  prn pain control/anti emetics  diet as per sx  monitor gi fxn  op gi f/u upon dc

## 2024-09-20 NOTE — ASSESSMENT & PLAN NOTE
Lab Results   Component Value Date    HGBA1C 9.0 (H) 09/15/2024       Recent Labs     09/19/24  1641 09/19/24 2021 09/20/24  0731 09/20/24  1142   POCGLU 283* 218* 192* 268*     Outpatient regimen: Jaurdiance and 30U Lantus     Plan  Continue with Lantus 35 units   Patient covered with sliding scale insulin  Continue with Jardiance

## 2024-09-20 NOTE — CASE MANAGEMENT
Called [employer] ([phone]) to ensure auth request was received. Spoke to Heather ANDREWS who stated request has been received and is currently pending at this time. Pending ref: [id number]. MELITA notified: Fatemeh Wong

## 2024-09-20 NOTE — CASE MANAGEMENT
[facility] has received APPROVED authorization.  Insurance: [employer]   Called in by Rep: Renita LALITA#: [phone]   Authorization received for: Acute Rehab  Facility: [facility]   Authorization #: [id number]   Start of Care: 09/20  Next Review Date: 09/26  Submit next review to #: [phone]     Care Manager notified: Fatemeh Wong     Please reach out to  for updates on any clinical information.

## 2024-09-20 NOTE — PLAN OF CARE
Problem: Potential for Falls  Goal: Patient will remain free of falls  Description: INTERVENTIONS:  - Educate patient/family on patient safety including physical limitations  - Instruct patient to call for assistance with activity   - Consult OT/PT to assist with strengthening/mobility   - Keep Call bell within reach  - Keep bed low and locked with side rails adjusted as appropriate  - Keep care items and personal belongings within reach  - Initiate and maintain comfort rounds  - Make Fall Risk Sign visible to staff  - Offer Toileting every 2 Hours, in advance of need  - Initiate/Maintain bed/chair alarm  - Obtain necessary fall risk management equipment: yellow bracelet/socks  - Apply yellow socks and bracelet for high fall risk patients  - Consider moving patient to room near nurses station  Outcome: Progressing     Problem: SAFETY ADULT  Goal: Maintain or return to baseline ADL function  Description: INTERVENTIONS:  -  Assess patient's ability to carry out ADLs; assess patient's baseline for ADL function and identify physical deficits which impact ability to perform ADLs (bathing, care of mouth/teeth, toileting, grooming, dressing, etc.)  - Assess/evaluate cause of self-care deficits   - Assess range of motion  - Assess patient's mobility; develop plan if impaired  - Assess patient's need for assistive devices and provide as appropriate  - Encourage maximum independence but intervene and supervise when necessary  - Involve family in performance of ADLs  - Assess for home care needs following discharge   - Consider OT consult to assist with ADL evaluation and planning for discharge  - Provide patient education as appropriate  Outcome: Progressing

## 2024-09-20 NOTE — CASE MANAGEMENT
Case Management Discharge Planning Note    Patient name Wilder Vargas  Location /-01 MRN [id number]  : 1965 Date 2024       Current Admission Date: 9/10/2024  Current Admission Diagnosis:Acute CVA (cerebrovascular accident) ([facility])   Patient Active Problem List    Diagnosis Date Noted Date Diagnosed    Tobacco use 09/10/2024     Type 2 diabetes mellitus with diabetic microalbuminuria, with long-term current use of insulin ([facility]) 2024     Erythrocytosis 2024     Gastroesophageal reflux disease without esophagitis 2024     Type 2 diabetes mellitus with hyperglycemia ([facility]) 2024     S/P stroke due to cerebrovascular disease 2024     Hypertensive emergency 2024     Other male erectile dysfunction 2024     Mixed hyperlipidemia 2024     Acute CVA (cerebrovascular accident) ([facility]) 2020       LOS (days): 10  Geometric Mean LOS (GMLOS) (days): 2.9  Days to GMLOS:-7.2     OBJECTIVE:  Risk of Unplanned Readmission Score: 12.74         Current admission status: Inpatient   Preferred Pharmacy:   [id number] [place] [facility] [address]  [address] [place] [address]  Phone: [phone] Fax: [phone]    Primary Care Provider: Gilmar Oro MD    Primary Insurance: [employer]  Secondary Insurance:     DISCHARGE DETAILS:        As per roundtrip, [facility] can transport patient today to [facility] at 7pm. Notified patient, his mother, Radha his nurse and admission at [facility] of transport time.,

## 2024-09-20 NOTE — NURSING NOTE
Report give to Solo @ [facility] [phone]; LISA faxed to [phone]

## 2024-09-20 NOTE — PLAN OF CARE
Problem: Potential for Falls  Goal: Patient will remain free of falls  Description: INTERVENTIONS:  - Educate patient/family on patient safety including physical limitations  - Instruct patient to call for assistance with activity   - Consult OT/PT to assist with strengthening/mobility   - Keep Call bell within reach  - Keep bed low and locked with side rails adjusted as appropriate  - Keep care items and personal belongings within reach  - Initiate and maintain comfort rounds  - Make Fall Risk Sign visible to staff  - Offer Toileting every x Hours, in advance of need  - Initiate/Maintain xalarm  - Obtain necessary fall risk management equipment: x  - Apply yellow socks and bracelet for high fall risk patients  - Consider moving patient to room near nurses station  Outcome: Progressing     Problem: PAIN - ADULT  Goal: Verbalizes/displays adequate comfort level or baseline comfort level  Description: Interventions:  - Encourage patient to monitor pain and request assistance  - Assess pain using appropriate pain scale  - Administer analgesics based on type and severity of pain and evaluate response  - Implement non-pharmacological measures as appropriate and evaluate response  - Consider cultural and social influences on pain and pain management  - Notify physician/advanced practitioner if interventions unsuccessful or patient reports new pain  Outcome: Progressing     Problem: INFECTION - ADULT  Goal: Absence or prevention of progression during hospitalization  Description: INTERVENTIONS:  - Assess and monitor for signs and symptoms of infection  - Monitor lab/diagnostic results  - Monitor all insertion sites, i.e. indwelling lines, tubes, and drains  - Monitor endotracheal if appropriate and nasal secretions for changes in amount and color  - [place] appropriate cooling/warming therapies per order  - Administer medications as ordered  - Instruct and encourage patient and family to use good hand hygiene  technique  - Identify and instruct in appropriate isolation precautions for identified infection/condition  Outcome: Progressing  Goal: Absence of fever/infection during neutropenic period  Description: INTERVENTIONS:  - Monitor WBC    Outcome: Progressing     Problem: SAFETY ADULT  Goal: Patient will remain free of falls  Description: INTERVENTIONS:  - Educate patient/family on patient safety including physical limitations  - Instruct patient to call for assistance with activity   - Consult OT/PT to assist with strengthening/mobility   - Keep Call bell within reach  - Keep bed low and locked with side rails adjusted as appropriate  - Keep care items and personal belongings within reach  - Initiate and maintain comfort rounds  - Make Fall Risk Sign visible to staff  - Offer Toileting every x Hours, in advance of need  - Initiate/Maintain xalarm  - Obtain necessary fall risk management equipment: x  - Apply yellow socks and bracelet for high fall risk patients  - Consider moving patient to room near nurses station  Outcome: Progressing  Goal: Maintain or return to baseline ADL function  Description: INTERVENTIONS:  -  Assess patient's ability to carry out ADLs; assess patient's baseline for ADL function and identify physical deficits which impact ability to perform ADLs (bathing, care of mouth/teeth, toileting, grooming, dressing, etc.)  - Assess/evaluate cause of self-care deficits   - Assess range of motion  - Assess patient's mobility; develop plan if impaired  - Assess patient's need for assistive devices and provide as appropriate  - Encourage maximum independence but intervene and supervise when necessary  - Involve family in performance of ADLs  - Assess for home care needs following discharge   - Consider OT consult to assist with ADL evaluation and planning for discharge  - Provide patient education as appropriate  Outcome: Progressing  Goal: Maintains/Returns to pre admission functional level  Description:  INTERVENTIONS:  - Perform AM-PAC 6 Click Basic Mobility/ Daily Activity assessment daily.  - Set and communicate daily mobility goal to care team and patient/family/caregiver.   - Collaborate with rehabilitation services on mobility goals if consulted  - Perform Range of Motion x times a day.  - Reposition patient every x hours.  - Dangle patient x times a day  - Stand patient x times a day  - Ambulate patient x times a day  - Out of bed to chair x times a day   - Out of bed for meals x times a day  - Out of bed for toileting  - Record patient progress and toleration of activity level   Outcome: Progressing     Problem: DISCHARGE PLANNING  Goal: Discharge to home or other facility with appropriate resources  Description: INTERVENTIONS:  - Identify barriers to discharge w/patient and caregiver  - Arrange for needed discharge resources and transportation as appropriate  - Identify discharge learning needs (meds, wound care, etc.)  - Arrange for interpretive services to assist at discharge as needed  - Refer to Case Management Department for coordinating discharge planning if the patient needs post-hospital services based on physician/advanced practitioner order or complex needs related to functional status, cognitive ability, or social support system  Outcome: Progressing     Problem: CARDIOVASCULAR - ADULT  Goal: Maintains optimal cardiac output and hemodynamic stability  Description: INTERVENTIONS:  - Monitor I/O, vital signs and rhythm  - Monitor for S/S and trends of decreased cardiac output  - Administer and titrate ordered vasoactive medications to optimize hemodynamic stability  - Assess quality of pulses, skin color and temperature  - Assess for signs of decreased coronary artery perfusion  - Instruct patient to report change in severity of symptoms  Outcome: Progressing  Goal: Absence of cardiac dysrhythmias or at baseline rhythm  Description: INTERVENTIONS:  - Continuous cardiac monitoring, vital signs,  obtain 12 lead EKG if ordered  - Administer antiarrhythmic and heart rate control medications as ordered  - Monitor electrolytes and administer replacement therapy as ordered  Outcome: Progressing     Problem: Neurological Deficit  Goal: Neurological status is stable or improving  Description: Interventions:  - Monitor and assess patient's level of consciousness, motor function, sensory function, and level of assistance needed for ADLs.   - Monitor and report changes from baseline. Collaborate with interdisciplinary team to initiate plan and implement interventions as ordered.   - Provide and maintain a safe environment.  - Consider seizure precautions.  - Consider fall precautions.  - Consider aspiration precautions.  - Consider bleeding precautions.  Outcome: Progressing     Problem: Activity Intolerance/Impaired Mobility  Goal: Mobility/activity is maintained at optimum level for patient  Description: Interventions:  - Assess and monitor patient  barriers to mobility and need for assistive/adaptive devices.  - Assess patient's emotional response to limitations.  - Collaborate with interdisciplinary team and initiate plans and interventions as ordered.  - Encourage independent activity per ability.  - Maintain proper body alignment.  - Perform active/passive rom as tolerated/ordered.  - Plan activities to conserve energy.  - Turn patient as appropriate  Outcome: Progressing     Problem: Communication Impairment  Goal: Ability to express needs and understand communication  Description: Assess patient's communication skills and ability to understand information.  Patient will demonstrate use of effective communication techniques, alternative methods of communication and understanding even if not able to speak.     - Encourage communication and provide alternate methods of communication as needed.  - Collaborate with case management/ for discharge needs.  - Include patient/family/caregiver in decisions  related to communication.  Outcome: Progressing     Problem: Potential for Aspiration  Goal: Non-ventilated patient's risk of aspiration is minimized  Description: Assess and monitor vital signs, respiratory status, and labs (WBC).  Monitor for signs of aspiration (tachypnea, cough, rales, wheezing, cyanosis, fever).    - Assess and monitor patient's ability to swallow.  - Place patient up in chair to eat if possible.  - HOB up at 90 degrees to eat if unable to get patient up into chair.  - Supervise patient during oral intake.   - Instruct patient/ family to take small bites.  - Instruct patient/ family to take small single sips when taking liquids.  - Follow patient-specific strategies generated by speech pathologist.  Outcome: Progressing  Goal: Ventilated patient's risk of aspiration is minimized  Description: Assess and monitor vital signs, respiratory status, airway cuff pressure, and labs (WBC).  Monitor for signs of aspiration (tachypnea, cough, rales, wheezing, cyanosis, fever).    - Elevate head of bed 30 degrees if patient has tube feeding.  - Monitor tube feeding.  Outcome: Progressing     Problem: Nutrition  Goal: Nutrition/Hydration status is improving  Description: Monitor and assess patient's nutrition/hydration status for malnutrition (ex- brittle hair, bruises, dry skin, pale skin and conjunctiva, muscle wasting, smooth red tongue, and disorientation). Collaborate with interdisciplinary team and initiate plan and interventions as ordered.  Monitor patient's weight and dietary intake as ordered or per policy. Utilize nutrition screening tool and intervene per policy. Determine patient's food preferences and provide high-protein, high-caloric foods as appropriate.     - Assist patient with eating.  - Allow adequate time for meals.  - Encourage patient to take dietary supplement as ordered.  - Collaborate with clinical nutritionist.  - Include patient/family/caregiver in decisions related to  nutrition.  Outcome: Progressing     Problem: Prexisting or High Potential for Compromised Skin Integrity  Goal: Skin integrity is maintained or improved  Description: INTERVENTIONS:  - Identify patients at risk for skin breakdown  - Assess and monitor skin integrity  - Assess and monitor nutrition and hydration status  - Monitor labs   - Assess for incontinence   - Turn and reposition patient  - Assist with mobility/ambulation  - Relieve pressure over bony prominences  - Avoid friction and shearing  - Provide appropriate hygiene as needed including keeping skin clean and dry  - Evaluate need for skin moisturizer/barrier cream  - Collaborate with interdisciplinary team   - Patient/family teaching  - Consider wound care consult   Outcome: Progressing     Problem: Nutrition/Hydration-ADULT  Goal: Nutrient/Hydration intake appropriate for improving, restoring or maintaining nutritional needs  Description: Monitor and assess patient's nutrition/hydration status for malnutrition. Collaborate with interdisciplinary team and initiate plan and interventions as ordered.  Monitor patient's weight and dietary intake as ordered or per policy. Utilize nutrition screening tool and intervene as necessary. Determine patient's food preferences and provide high-protein, high-caloric foods as appropriate.     INTERVENTIONS:  - Monitor oral intake, urinary output, labs, and treatment plans  - Assess nutrition and hydration status and recommend course of action  - Evaluate amount of meals eaten  - Assist patient with eating if necessary   - Allow adequate time for meals  - Recommend/ encourage appropriate diets, oral nutritional supplements, and vitamin/mineral supplements  - Order, calculate, and assess calorie counts as needed  - Recommend, monitor, and adjust tube feedings and TPN/PPN based on assessed needs  - Assess need for intravenous fluids  - Provide specific nutrition/hydration education as appropriate  - Include  patient/family/caregiver in decisions related to nutrition  Outcome: Progressing

## 2024-09-20 NOTE — DISCHARGE SUMMARY
Discharge Summary - Hospitalist   Name: Wilder Vargas 59 y.o. male I MRN: [id number]  Unit/Bed#: -01 I Date of Admission: 9/10/2024   Date of Service: 9/20/2024 I Hospital Day: 10     Assessment & Plan  Acute CVA (cerebrovascular accident) (HCC)  Presented with R facial weakness, RUE/RLE weakness/ataxia, dysarthria (NIHSS 7)  History of past CVAs (May and June 2020) with history of medication noncompliance.  Outpatient regimen: Plavix and pravastatin  Initial CTH and CTA without evidence of CVA  Patient received TNK 1346on 9/10 in ED with initial improvement in sx  Unfortunately patient's sx returned around 1500 in ED with severe R facial droop and loss of strength in RUE/RLE  Repeat CTH without evidence of hemorrhage   9/10 MRI:  Acute infarct involving left thalamocapsular region. There is minimal FLAIR hyperintensity/edema without mass effect or hemorrhagic transformation. Chronic lacunar infarcts in the left thalamus, right corona radiata and right basal ganglia. Focus of old microhemorrhage in the right temporal lobe, likely on the basis of chronic hypertension.  9/11 CTH with evolving CVA     Plan  Echo reviewed --> limited study but no evidence embolic source. Discussed with neuro, no need for repeat echo/ASHTYN or outpatient cardiac monitor as low suspicion for embolic etiology.   Continue ASA + plavix x 21 days followed by plavix monotherapy  Pravastatin  PT/OT/Speech --> plan to DC to rehab, CM is following for placement   Will need outpatient neurology follow up   Type 2 diabetes mellitus with hyperglycemia (HCC)  Lab Results   Component Value Date    HGBA1C 9.0 (H) 09/15/2024       Recent Labs     09/19/24  1641 09/19/24 2021 09/20/24  0731 09/20/24  1142   POCGLU 283* 218* 192* 268*     Outpatient regimen: Jaurdiance and 30U Lantus     Plan  Continue with Lantus 35 units   Patient covered with sliding scale insulin  Continue with Jardiance  Hypertensive emergency  Outpatient regimen: 10-40mg  "amlodipine-benazepril, 25mg carvedilol, 4mg doxazosin  History on medication non-compliance  Presented to ED with -220s  Initially in ICU on nicardipine gtt (Dc'd 9/13)  2020 Renal US neg  Metanephrines ordered   Cardiology consulted  Continue amlodipine 10 mg daily   Lisinopril switched to losartan-HCTZ 100-25 mg  Spironolactone 50 mg daily added  Cardura 4 mg added  Hydralazine 25 mg TID added   /85   Pulse 69   Temp 97.8 °F (36.6 °C)   Resp 18   Ht 5' 8\" (1.727 m)   Wt 80 kg (176 lb 5.9 oz)   SpO2 98%   BMI 26.82 kg/m²     Mixed hyperlipidemia  Continue pravastatin  Previously did not tolerate lipitor  History of medication non-compliance  Tobacco use  Currently smokes 1-1.5 packs daily ->previously smoking >2 packs/daily  46 year history  NRT, encourage cessation     Admission Date: 9/10/2024 1239  Discharge Date: 09/20/24  Admitting Diagnosis: Slurred speech [R47.81]  Stroke-like episode [R29.90]  Discharge Diagnosis:   Medical Problems       Resolved Problems  Date Reviewed: 9/18/2024   None         HPI:  as per,   KRYSTAL Allison , on 9/10 Wilder Vargas is a 59 y.o. who with PMH of DM2 on lantus, HTN, HLD, GERD, CVA x2 (2020), current tobacco use and long history of medication non-compliance who presented to ED with confusion, slurred speech and right sided weakness. Severe HTN (-220s) and started on Nicardipine gtt. Received TNK at 1346 with initial improvement in stroke like symptoms. However, at 1500 patient's symptoms returned and with no obvious bleed noted on repeat CTH. Admitted to ICU under stroke pathway.     Procedures Performed: No orders of the defined types were placed in this encounter.      Summary of Hospital Course: Patient presented with strokelike symptoms, and has prior history of strokes as well.  Patient was seen and evaluated by neurology.  And received TNK on 9/10.  MRI revealed acute infarct involving left thalamic capsular region.  Repeat CT head " with evolving CVA, no hemorrhage.  Patient was deemed appropriate to continue aspirin and Plavix for 21 days followed by Plavix only.  Patient will continue with pravastatin 20 mg daily.  Patient was also noted to have hypertensive urgency, and hypertensive regimen was adjusted by cardiology.  He will continue with amlodipine 10 mg daily, losartan/hydrochlorothiazide 100/25, Aldactone 50 mg daily, Cardura 4 mg daily and hydralazine 25 mg 3 times daily with outpatient cardiology follow-up.  He can continue with Jardiance, and his Lantus dose was increased to 35 units daily at bedtime.  He was evaluated by PT/OT who recommended rehab    Significant Findings, Care, Treatment and Services Provided: see above     Complications: none    Condition at Discharge: stable       Discharge instructions/Information to patient and family:   See After Visit Summary (AVS) for information provided to patient and family.      Provisions for Follow-Up Care:  See after visit summary for information related to follow-up care and any pertinent home health orders.      PCP: Gilmar Oro MD    Disposition: Short-term rehab at [facility]     Planned Readmission: No     Discharge Medications:  See after visit summary for reconciled discharge medications provided to patient and family.      Discharge Statement:  I have spent a total time of 36 minutes in caring for this patient on the day of the visit/encounter. >30 minutes of time was spent on: Patient and family education, Counseling / Coordination of care, Documenting in the medical record, Reviewing / ordering tests, medicine, procedures  , and Communicating with other healthcare professionals .

## 2024-09-20 NOTE — CASE MANAGEMENT
Case Management Discharge Planning Note    Patient name Wilder Vargas  Location /-01 MRN [id number]  : 1965 Date 2024       Current Admission Date: 9/10/2024  Current Admission Diagnosis:Acute CVA (cerebrovascular accident) (HCC)   Patient Active Problem List    Diagnosis Date Noted Date Diagnosed    Tobacco use 09/10/2024     Type 2 diabetes mellitus with diabetic microalbuminuria, with long-term current use of insulin ([facility]) 2024     Erythrocytosis 2024     Gastroesophageal reflux disease without esophagitis 2024     Type 2 diabetes mellitus with hyperglycemia ([facility]) 2024     S/P stroke due to cerebrovascular disease 2024     Hypertensive emergency 2024     Other male erectile dysfunction 2024     Mixed hyperlipidemia 2024     Acute CVA (cerebrovascular accident) ([facility]) 2020       LOS (days): 10  Geometric Mean LOS (GMLOS) (days): 2.9  Days to GMLOS:-7.1     OBJECTIVE:  Risk of Unplanned Readmission Score: 12.71         Current admission status: Inpatient   Preferred Pharmacy:   [id number] [place] - [address]  [address] [place] [address]  Phone: [phone] Fax: [phone]    Primary Care Provider: Gilmar Oro MD    Primary Insurance: [employer]  Secondary Insurance:     DISCHARGE DETAILS:                                                                                                               Facility Insurance Auth Number: [id number]

## 2024-09-23 ENCOUNTER — TELEPHONE (OUTPATIENT)
Dept: FAMILY MEDICINE CLINIC | Facility: HOSPITAL | Age: 59
End: 2024-09-23

## 2024-09-23 LAB
METANEPH 24H UR-MRATE: 153 UG/24 HR (ref 58–276)
METANEPHS 24H UR-MCNC: 61 UG/L
NORMETANEPHRINE 24H UR-MCNC: 193 UG/L
NORMETANEPHRINE 24H UR-MRATE: 483 UG/24 HR (ref 156–729)

## 2024-09-23 NOTE — UTILIZATION REVIEW
NOTIFICATION OF ADMISSION DISCHARGE   This is a Notification of Discharge from [facility]. Please be advised that this patient has been discharge from our facility. Below you will find the admission and discharge date and time including the patient’s disposition.   UTILIZATION REVIEW CONTACT:  Xuan Nur  Utilization   Network Utilization Review Department  Phone: [phone] x carefully listen to the prompts. All voicemails are confidential.  Email: [email or web address]     ADMISSION INFORMATION  PRESENTATION DATE: 9/10/2024 12:39 PM  OBERVATION ADMISSION DATE: N/A  INPATIENT ADMISSION DATE: 9/10/24  2:07 PM   DISCHARGE DATE: 9/20/2024  7:35 PM   DISPOSITION:Non [facility] Acute Rehab    Network Utilization Review Department  ATTENTION: Please call with any questions or concerns to [phone] and carefully listen to the prompts so that you are directed to the right person. All voicemails are confidential.   For Discharge needs, contact Care Management DC Support Team at [phone] opt. 2  Send all requests for admission clinical reviews, approved or denied determinations and any other requests to dedicated fax number below belonging to the campus where the patient is receiving treatment. List of dedicated fax numbers for the Facilities:  FACILITY NAME UR FAX NUMBER   ADMISSION DENIALS (Administrative/Medical Necessity) [phone]   DISCHARGE SUPPORT TEAM ([facility]) [phone]   PARENT CHILD HEALTH (Maternity/NICU/Pediatrics) [phone]   [facility] [phone]   [facility] [phone]   [facility] [phone]   [facility] [phone]   [facility] [phone]   [facility] [phone]   [facility] [phone]   [facility]  [phone]   [facility] [phone]   [facility] [phone]   [facility] [phone]   [facility] [phone]

## 2024-09-23 NOTE — TELEPHONE ENCOUNTER
[facility]     ----- Message from Gilmar Oro MD sent at 9/20/2024  4:27 PM EDT -----  Needs nakia next week  ----- Message -----  From: Nancy Leonard MD  Sent: 9/20/2024   4:21 PM EDT  To: Gilmar Oro MD    Thank you for allowing us to participate in the care of your patient, Wilder Vargas, who was hospitalized from 9/10/2024 through 9/20/2024 with the admitting diagnosis of Acute CVA and hypertensive urgency.      Medication Changes:  Aspirin and Plavix for 21 days, followed by Plavix monotherapy  Hydralazine, Aldactone, losartan, hydrochlorothiazide amlodipine for blood pressure management    Outpatient testing recommended:  None    If you have any additional questions or would like to discuss further, please feel free to contact me.    Nancy Leonard MD  [facility] Internal Medicine, Hospitalist  [phone]

## 2024-10-06 DIAGNOSIS — F17.200 TOBACCO DEPENDENCE: ICD-10-CM

## 2024-10-07 RX ORDER — NICOTINE 21 MG/24HR
1 PATCH, TRANSDERMAL 24 HOURS TRANSDERMAL EVERY 24 HOURS
Qty: 28 PATCH | Refills: 1 | Status: SHIPPED | OUTPATIENT
Start: 2024-10-07

## 2024-11-08 ENCOUNTER — HOSPITAL ENCOUNTER (EMERGENCY)
Facility: HOSPITAL | Age: 59
Discharge: NURSING, REHAB OR LONG-TERM CARE | End: 2024-11-08
Attending: EMERGENCY MEDICINE
Payer: COMMERCIAL

## 2024-11-08 ENCOUNTER — APPOINTMENT (EMERGENCY)
Dept: CT IMAGING | Facility: HOSPITAL | Age: 59
End: 2024-11-08
Payer: COMMERCIAL

## 2024-11-08 VITALS
BODY MASS INDEX: 30.37 KG/M2 | TEMPERATURE: 98 F | WEIGHT: 199.74 LBS | SYSTOLIC BLOOD PRESSURE: 143 MMHG | OXYGEN SATURATION: 96 % | HEART RATE: 60 BPM | RESPIRATION RATE: 18 BRPM | DIASTOLIC BLOOD PRESSURE: 77 MMHG
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
## 2024-11-08 DIAGNOSIS — M54.2 NECK PAIN: ICD-10-CM

## 2024-11-08 DIAGNOSIS — T14.8XXA ABRASION: ICD-10-CM

## 2024-11-08 PROCEDURE — 99284 EMERGENCY DEPT VISIT MOD MDM: CPT

## 2024-11-08 PROCEDURE — 99284 EMERGENCY DEPT VISIT MOD MDM: CPT | Performed by: EMERGENCY MEDICINE

## 2024-11-08 PROCEDURE — 70450 CT HEAD/BRAIN W/O DYE: CPT

## 2024-11-08 PROCEDURE — 72125 CT NECK SPINE W/O DYE: CPT

## 2024-11-08 NOTE — ED NOTES
Patient discharge delayed due to transportation. Round trip is arranged via stretcher van back to [facility] at 1730.            Simin Fink  11/08/24 1717       Simin Fink  11/08/24 1720

## 2024-11-08 NOTE — ED PROVIDER NOTES
"Emergency Department Trauma Note  Wilder Vargas 59 y.o. male MRN: [id number]  Unit/Bed#: Z2 H1/Z2 H1 Encounter: [id number]      Trauma Alert: Trauma Acuity: Trauma Evaluation  Model of Arrival: Mode of Arrival: BLS via    Trauma Team: Current Providers  Attending Provider: Nick Fried DO  Nurse Practitioner: KRYSTAL Ward  Registered Nurse: Lola Prasad RN  Registered Nurse: Simin Fink  Consultants:     None      History of Present Illness     Chief Complaint:   Chief Complaint   Patient presents with    Fall     Pt coming via ems from [facility] for a fall last night. Pt unable to get up after fall last night and fell on his behind. Pt is having neck pain. Pt takes ASA.      HPI:  Wilder Vargas is a 59 y.o. male who presents with fall.  Mechanism:Details of Incident: Fall when trying to stand, landed on bottox, with head strike, no LOC, slight neck pain. Injury Date: 11/07/24 Injury Time: 2130      Patient is a 59-year-old male past medical history CVA, and utilizing Plavix arriving for evaluation for a fall that occurred last night at 9 PM, where he states he struck the right side of his head and then fell to buttocks.  Patient is upset that he was sent in by [facility] stating \"I just need an x-ray because [facility] is covering their ass.\"  Patient is alert awake oriented x 4.  Patient complaining of neck pain, states his neck hurt at time of injury, Patient states neck pain was brought up this morning when PT came to get him and he was laying in an uncomfortable position but has since subsided and not returned. Patient is non-tender on exam. Patient denies having any neck pain, neurologic changes.  Patient states that he was getting out of bed last night lost his balance because he has minimal use of his right side secondary to his CVA in September.  Patient states that he fell, struck the right side of his head, and then fell to his buttocks.  Patient states he had a minimal abrasion " last night on the top right of his head.  Patient states that he has no chest wall pain, no abdominal pain, no buttock pain. Patient remembers entirety of event, no prodrome. Patient states he had a good PT session today with no pain with ambulation prior to arrival. Patient states neck pain was brought up this morning when PT came to get him and he was laying in an uncomfortable position but has since subsided and not returned. Patient is non-tender on exam. Patient exposed no other ecchymosis/signs of injury appreciated.       Review of Systems   Constitutional: Negative.    HENT: Negative.     Eyes: Negative.    Respiratory: Negative.     Cardiovascular:  Negative for chest pain.   Gastrointestinal: Negative.  Negative for abdominal pain.   Endocrine: Negative.    Genitourinary: Negative.    Musculoskeletal: Negative.    Skin: Negative.    Allergic/Immunologic: Negative.    Neurological: Negative.    Hematological: Negative.    Psychiatric/Behavioral: Negative.     All other systems reviewed and are negative.      Historical Information     Immunizations: There is no immunization history for the selected administration types on file for this patient.    Past Medical History:   Diagnosis Date    CVA (cerebral vascular accident) (HCC)     Diabetes mellitus (HCC)     Hypertension        Family History   Problem Relation Age of Onset    Diabetes Mother     Heart failure Father     Substance Abuse Neg Hx     Mental illness Neg Hx      Past Surgical History:   Procedure Laterality Date    NO PAST SURGERIES       Social History     Tobacco Use    Smoking status: Every Day     Current packs/day: 1.00     Average packs/day: 1 pack/day for 42.9 years (42.9 ttl pk-yrs)     Types: Cigarettes     Start date: 1982    Smokeless tobacco: Never   Vaping Use    Vaping status: Never Used   Substance Use Topics    Alcohol use: Not Currently    Drug use: Yes     Types: Marijuana     Comment: few days     E-Cigarette/Vaping     E-Cigarette Use Never User      E-Cigarette/Vaping Substances       Family History: non-contributory    Meds/Allergies   Prior to Admission Medications   Prescriptions Last Dose Informant Patient Reported? Taking?   Insulin Glargine Solostar (Lantus SoloStar) 100 UNIT/ML SOPN   No No   Sig: Inject 0.35 mL (35 Units total) under the skin daily at bedtime   amLODIPine (NORVASC) 10 mg tablet   No No   Sig: Take 1 tablet (10 mg total) by mouth daily   aspirin (ECOTRIN LOW STRENGTH) 81 mg EC tablet   No No   Sig: Take 1 tablet (81 mg total) by mouth daily for 11 days   carvedilol (COREG) 25 mg tablet   No No   Sig: Take 1 tablet (25 mg total) by mouth 2 (two) times a day with meals   clopidogrel (PLAVIX) 75 mg tablet   No No   Sig: Take 1 tablet (75 mg total) by mouth daily   doxazosin (CARDURA) 4 mg tablet   No No   Sig: TAKE 1 TABLET BY MOUTH DAILY AT BEDTIME   hydrALAZINE (APRESOLINE) 25 mg tablet   No No   Sig: Take 1 tablet (25 mg total) by mouth every 8 (eight) hours   hydroCHLOROthiazide 25 mg tablet   No No   Sig: Take 1 tablet (25 mg total) by mouth daily   losartan (COZAAR) 100 MG tablet   No No   Sig: Take 1 tablet (100 mg total) by mouth daily   nicotine (NICODERM CQ) 21 mg/24 hr TD 24 hr patch   No No   Sig: PLACE 1 PATCH ON THE SKIN OVER 24 HOURS EVERY 24 HOURS   pantoprazole (PROTONIX) 40 mg tablet   No No   Sig: TAKE 1 TABLET BY MOUTH EVERY DAY IN THE MORNING   polyethylene glycol (MIRALAX) 17 g packet   No No   Sig: Take 17 g by mouth daily   pravastatin (PRAVACHOL) 20 mg tablet   No No   Sig: Take 1 tablet (20 mg total) by mouth daily at bedtime   senna (SENOKOT) 8.6 mg   No No   Sig: Take 2 tablets (17.2 mg total) by mouth 2 (two) times a day   spironolactone (ALDACTONE) 25 mg tablet   No No   Sig: Take 1 tablet (25 mg total) by mouth daily      Facility-Administered Medications: None       Allergies   Allergen Reactions    Metformin Diarrhea       PHYSICAL EXAM    PE limited by:     Objective    Vitals:   First set: Temperature: 98 °F (36.7 °C) (11/08/24 1508)  Pulse: 67 (11/08/24 1508)  Respirations: 18 (11/08/24 1508)  Blood Pressure: (!) 178/94 (11/08/24 1508)  SpO2: 97 % (11/08/24 1508)    Primary Survey:   (A) Airway: patent  (B) Breathing: lungs CTA b/l  (C) Circulation: Pulses:   normal  (D) Disabliity:  GCS Total:  15  (E) Expose:  Completed    Portable chest xray not obtained as there was no chest wall tenderness     Secondary Survey: (Click on Physical Exam tab above)  Physical Exam  Vitals and nursing note reviewed.   Constitutional:       Appearance: Normal appearance. He is normal weight.   HENT:      Head: Normocephalic.      Right Ear: External ear normal.      Left Ear: External ear normal.      Nose: Nose normal.      Mouth/Throat:      Mouth: Mucous membranes are moist.      Pharynx: Oropharynx is clear.   Eyes:      General: No visual field deficit.     Extraocular Movements: Extraocular movements intact.      Conjunctiva/sclera: Conjunctivae normal.      Pupils: Pupils are equal, round, and reactive to light.   Neck:      Comments: Non-tender on exam, no midline c-spine tenderness  Cardiovascular:      Rate and Rhythm: Normal rate and regular rhythm.      Pulses: Normal pulses.      Heart sounds: Normal heart sounds.   Pulmonary:      Effort: Pulmonary effort is normal. No respiratory distress.      Breath sounds: Normal breath sounds. No stridor. No wheezing, rhonchi or rales.   Chest:      Chest wall: No tenderness.   Abdominal:      General: Abdomen is flat. Bowel sounds are normal. There is no distension.      Palpations: Abdomen is soft. There is no mass.      Tenderness: There is no abdominal tenderness. There is no right CVA tenderness, left CVA tenderness, guarding or rebound.      Hernia: No hernia is present.   Musculoskeletal:         General: Normal range of motion.      Cervical back: Normal range of motion and neck supple. No edema, erythema, rigidity, torticollis or  crepitus. No pain with movement, spinous process tenderness or muscular tenderness. Normal range of motion.   Skin:     General: Skin is warm.      Capillary Refill: Capillary refill takes less than 2 seconds.      Findings: Abrasion present.          Neurological:      Mental Status: He is alert and oriented to person, place, and time.      GCS: GCS eye subscore is 4. GCS verbal subscore is 5. GCS motor subscore is 6.      Cranial Nerves: Cranial nerve deficit and facial asymmetry present. No dysarthria.      Sensory: Sensation is intact. No sensory deficit.      Motor: Weakness present.      Comments: Neuro exam at baseline per the patient.     0/5 upper extremity strength, no numbness or tingling   1/5 lower extremity strength, no numbness or tingling    Psychiatric:         Mood and Affect: Mood normal.         Behavior: Behavior normal.         Thought Content: Thought content normal.         Judgment: Judgment normal.         Cervical spine cleared by clinical criteria? No (imaging required)      Invasive Devices       None                   Lab Results:   Results Reviewed       None                   Imaging Studies:   Direct to CT: No  TRAUMA - CT head wo contrast   Final Result by Madison Edwards MD (11/08 1548)      No acute intracranial pathology. In particular, no intracranial hemorrhage or calvarial fracture.      Unchanged chronic microvascular ischemic changes and chronic infarcts.      The study was marked in EPIC for immediate notification given trauma designation.               Workstation performed: [id number]         TRAUMA - CT spine cervical wo contrast   Final Result by Madison Edwards MD (11/08 1547)      No cervical spine fracture or traumatic malalignment.      The study was marked in EPIC for immediate notification given trauma designation.               Workstation performed: [id number]               Procedures  Procedures         ED Course           Medical Decision Making  DDx: ICH,  "c-spine fracture  Patient arriving, and upset that he was sent to ED. Patient states that he fell last night at 9 PM secondary to attempting to get his urinal without assistance and fell striking the right side of his head on a table. Patient takes plavix. Denies syncope/near syncope/dizziness. Patient states was mechanical due to known right sided deficits that are at his baseline.    Patient reported neck pain this morning one time to the PT that came to get him, and attributed to being in an uncomfortable position, which then subsided. Arrives with no neck pain. Patient states no numbness or tingling. Patient states that following his stroke he has had right sided residue weakness with no numbness or tingling. Patient states his weakness is at baseline, and even had PT prior to arrival to ED. Patient denies LOC last night, no nausea/vomiting/blurred/double vision since. Patient states he was, \"Sent in for an xray because [facility] is covering their ass.\" Patient denies prodrome prior to fall. Patient denies complaints of chest pain, chest wall pain, abdominal pain, pelvis pain. Patient exposed and no other signs of injury appreciated or areas of tenderness. Patient states no tenderness with ambulation either. CT head and CT C-spine ordered. Tetanus updated for minor abrasion to top right scalp.   Patient non-tender on exam today. Patient c-collar removed and has full rom with no pain. Confirmed with patient multiple times his deficits today are unchanged with no new symptoms.   Reviewed reasons to return to ed.  Patient verbalized understanding of diagnosis and agreement with discharge plan of care as well as understanding of reasons to return to ed.        Amount and/or Complexity of Data Reviewed  Radiology: ordered.    Risk  Prescription drug management.                Disposition  [facility] Transfer: No  Final diagnoses:   Fall, initial encounter   Abrasion   Neck pain     Time reflects when diagnosis " was documented in both MDM as applicable and the Disposition within this note       Time User Action Codes Description Comment    11/8/2024  4:08 PM Magali Jasimne Add [W19.XXXA] Fall, initial encounter     11/8/2024  4:09 PM Magali Jasmine Add [T14.8XXA] Abrasion     11/8/2024  4:09 PM Magali Jasmine Add [M54.2] Neck pain           ED Disposition       ED Disposition   Discharge    Condition   Stable    Date/Time   Fri Nov 8, 2024  4:08 PM    Comment   Wilder Camachoarcadio discharge to home/self care.                   Follow-up Information       Follow up With Specialties Details Why Contact Info Additional Information     [facility] Emergency Department Emergency Medicine   [address] [address] [phone] [facility] Emergency Department, [address] [place], [place] [address]    Gilmar Oro MD Internal Medicine   [address] [place] [address]  [phone]             Discharge Medication List as of 11/8/2024  4:11 PM        CONTINUE these medications which have NOT CHANGED    Details   amLODIPine (NORVASC) 10 mg tablet Take 1 tablet (10 mg total) by mouth daily, Starting Sat 9/21/2024, No Print      aspirin (ECOTRIN LOW STRENGTH) 81 mg EC tablet Take 1 tablet (81 mg total) by mouth daily for 11 days, Starting Sat 9/21/2024, Until Wed 10/2/2024, No Print      carvedilol (COREG) 25 mg tablet Take 1 tablet (25 mg total) by mouth 2 (two) times a day with meals, Starting Wed 1/24/2024, Normal      clopidogrel (PLAVIX) 75 mg tablet Take 1 tablet (75 mg total) by mouth daily, Starting Sat 9/21/2024, No Print      doxazosin (CARDURA) 4 mg tablet TAKE 1 TABLET BY MOUTH DAILY AT BEDTIME, Starting Mon 7/15/2024, Normal      hydrALAZINE (APRESOLINE) 25 mg tablet Take 1 tablet (25 mg total) by mouth every 8 (eight) hours, Starting Fri 9/20/2024, No Print      hydroCHLOROthiazide 25 mg tablet Take 1  tablet (25 mg total) by mouth daily, Starting Sat 9/21/2024, No Print      Insulin Glargine Solostar (Lantus SoloStar) 100 UNIT/ML SOPN Inject 0.35 mL (35 Units total) under the skin daily at bedtime, Starting Fri 9/20/2024, Normal      losartan (COZAAR) 100 MG tablet Take 1 tablet (100 mg total) by mouth daily, Starting Sat 9/21/2024, No Print      nicotine (NICODERM CQ) 21 mg/24 hr TD 24 hr patch PLACE 1 PATCH ON THE SKIN OVER 24 HOURS EVERY 24 HOURS, Starting Mon 10/7/2024, Normal      pantoprazole (PROTONIX) 40 mg tablet TAKE 1 TABLET BY MOUTH EVERY DAY IN THE MORNING, Starting Thu 6/6/2024, Normal      polyethylene glycol (MIRALAX) 17 g packet Take 17 g by mouth daily, Starting Sat 9/21/2024, No Print      pravastatin (PRAVACHOL) 20 mg tablet Take 1 tablet (20 mg total) by mouth daily at bedtime, Starting Tue 7/16/2024, Normal      senna (SENOKOT) 8.6 mg Take 2 tablets (17.2 mg total) by mouth 2 (two) times a day, Starting Fri 9/20/2024, No Print      spironolactone (ALDACTONE) 25 mg tablet Take 1 tablet (25 mg total) by mouth daily, Starting Sat 9/21/2024, No Print           No discharge procedures on file.    PDMP Review         Value Time User    PDMP Reviewed  Yes 9/20/2024  4:19 PM Nancy Leonard MD            ED Provider  Electronically Signed by           KRYSTAL Ward  11/08/24 2452

## 2024-11-08 NOTE — DISCHARGE INSTRUCTIONS
Follow up with PCP.   Return for return of pain.   Call your aid when you need to get out of bed.

## 2024-11-13 DIAGNOSIS — E11.65 TYPE 2 DIABETES MELLITUS WITH HYPERGLYCEMIA, WITHOUT LONG-TERM CURRENT USE OF INSULIN (HCC): ICD-10-CM

## 2024-11-13 RX ORDER — INSULIN GLARGINE 100 [IU]/ML
INJECTION, SOLUTION SUBCUTANEOUS
Qty: 15 ML | Refills: 3 | Status: SHIPPED | OUTPATIENT
Start: 2024-11-13

## 2024-11-17 ENCOUNTER — APPOINTMENT (OUTPATIENT)
Dept: MRI IMAGING | Facility: HOSPITAL | Age: 59
End: 2024-11-17
Payer: COMMERCIAL

## 2024-11-17 ENCOUNTER — APPOINTMENT (EMERGENCY)
Dept: CT IMAGING | Facility: HOSPITAL | Age: 59
End: 2024-11-17
Payer: COMMERCIAL

## 2024-11-17 ENCOUNTER — HOSPITAL ENCOUNTER (OUTPATIENT)
Facility: HOSPITAL | Age: 59
Setting detail: OBSERVATION
Discharge: HOME HEALTH | End: 2024-11-19
Attending: EMERGENCY MEDICINE | Admitting: STUDENT IN AN ORGANIZED HEALTH CARE EDUCATION/TRAINING PROGRAM
Payer: COMMERCIAL

## 2024-11-17 DIAGNOSIS — I63.9 ACUTE CVA (CEREBROVASCULAR ACCIDENT) (HCC): ICD-10-CM

## 2024-11-17 DIAGNOSIS — Z79.4 TYPE 2 DIABETES MELLITUS WITH DIABETIC MICROALBUMINURIA, WITH LONG-TERM CURRENT USE OF INSULIN (HCC): ICD-10-CM

## 2024-11-17 DIAGNOSIS — E11.29 TYPE 2 DIABETES MELLITUS WITH DIABETIC MICROALBUMINURIA, WITH LONG-TERM CURRENT USE OF INSULIN (HCC): ICD-10-CM

## 2024-11-17 DIAGNOSIS — I10 PRIMARY HYPERTENSION: ICD-10-CM

## 2024-11-17 DIAGNOSIS — R42 VERTIGO: Primary | ICD-10-CM

## 2024-11-17 DIAGNOSIS — R80.9 TYPE 2 DIABETES MELLITUS WITH DIABETIC MICROALBUMINURIA, WITH LONG-TERM CURRENT USE OF INSULIN (HCC): ICD-10-CM

## 2024-11-17 LAB
4HR DELTA HS TROPONIN: 0 NG/L
ALBUMIN SERPL BCG-MCNC: 4 G/DL (ref 3.5–5)
ALP SERPL-CCNC: 45 U/L (ref 34–104)
ALT SERPL W P-5'-P-CCNC: 8 U/L (ref 7–52)
ANION GAP SERPL CALCULATED.3IONS-SCNC: 7 MMOL/L (ref 4–13)
APTT PPP: 26 SECONDS (ref 23–34)
AST SERPL W P-5'-P-CCNC: 9 U/L (ref 13–39)
BILIRUB DIRECT SERPL-MCNC: 0.19 MG/DL (ref 0–0.2)
BILIRUB SERPL-MCNC: 1.15 MG/DL (ref 0.2–1)
BUN SERPL-MCNC: 16 MG/DL (ref 5–25)
CALCIUM SERPL-MCNC: 8.7 MG/DL (ref 8.4–10.2)
CARDIAC TROPONIN I PNL SERPL HS: 3 NG/L (ref ?–50)
CARDIAC TROPONIN I PNL SERPL HS: 3 NG/L (ref ?–50)
CHLORIDE SERPL-SCNC: 105 MMOL/L (ref 96–108)
CO2 SERPL-SCNC: 27 MMOL/L (ref 21–32)
CREAT SERPL-MCNC: 0.84 MG/DL (ref 0.6–1.3)
ERYTHROCYTE [DISTWIDTH] IN BLOOD BY AUTOMATED COUNT: 13.9 % (ref 11.6–15.1)
GFR SERPL CREATININE-BSD FRML MDRD: 95 ML/MIN/1.73SQ M
GLUCOSE SERPL-MCNC: 130 MG/DL (ref 65–140)
GLUCOSE SERPL-MCNC: 157 MG/DL (ref 65–140)
GLUCOSE SERPL-MCNC: 203 MG/DL (ref 65–140)
GLUCOSE SERPL-MCNC: 225 MG/DL (ref 65–140)
HCT VFR BLD AUTO: 43.4 % (ref 36.5–49.3)
HGB BLD-MCNC: 14.1 G/DL (ref 12–17)
INR PPP: 1.15 (ref 0.85–1.19)
MCH RBC QN AUTO: 27.5 PG (ref 26.8–34.3)
MCHC RBC AUTO-ENTMCNC: 32.5 G/DL (ref 31.4–37.4)
MCV RBC AUTO: 85 FL (ref 82–98)
PLATELET # BLD AUTO: 185 THOUSANDS/UL (ref 149–390)
PMV BLD AUTO: 9.8 FL (ref 8.9–12.7)
POTASSIUM SERPL-SCNC: 3.7 MMOL/L (ref 3.5–5.3)
PROT SERPL-MCNC: 6.9 G/DL (ref 6.4–8.4)
PROTHROMBIN TIME: 15.2 SECONDS (ref 12.3–15)
RBC # BLD AUTO: 5.13 MILLION/UL (ref 3.88–5.62)
SODIUM SERPL-SCNC: 139 MMOL/L (ref 135–147)
TSH SERPL DL<=0.05 MIU/L-ACNC: 1.42 UIU/ML (ref 0.45–4.5)
WBC # BLD AUTO: 7.76 THOUSAND/UL (ref 4.31–10.16)

## 2024-11-17 PROCEDURE — 70551 MRI BRAIN STEM W/O DYE: CPT

## 2024-11-17 PROCEDURE — 99245 OFF/OP CONSLTJ NEW/EST HI 55: CPT | Performed by: PSYCHIATRY & NEUROLOGY

## 2024-11-17 PROCEDURE — 93005 ELECTROCARDIOGRAM TRACING: CPT

## 2024-11-17 PROCEDURE — 80048 BASIC METABOLIC PNL TOTAL CA: CPT | Performed by: EMERGENCY MEDICINE

## 2024-11-17 PROCEDURE — 99285 EMERGENCY DEPT VISIT HI MDM: CPT | Performed by: EMERGENCY MEDICINE

## 2024-11-17 PROCEDURE — 36415 COLL VENOUS BLD VENIPUNCTURE: CPT | Performed by: EMERGENCY MEDICINE

## 2024-11-17 PROCEDURE — 99223 1ST HOSP IP/OBS HIGH 75: CPT | Performed by: STUDENT IN AN ORGANIZED HEALTH CARE EDUCATION/TRAINING PROGRAM

## 2024-11-17 PROCEDURE — 84484 ASSAY OF TROPONIN QUANT: CPT | Performed by: EMERGENCY MEDICINE

## 2024-11-17 PROCEDURE — 85730 THROMBOPLASTIN TIME PARTIAL: CPT | Performed by: EMERGENCY MEDICINE

## 2024-11-17 PROCEDURE — 84484 ASSAY OF TROPONIN QUANT: CPT | Performed by: PHYSICIAN ASSISTANT

## 2024-11-17 PROCEDURE — 84443 ASSAY THYROID STIM HORMONE: CPT | Performed by: STUDENT IN AN ORGANIZED HEALTH CARE EDUCATION/TRAINING PROGRAM

## 2024-11-17 PROCEDURE — 99285 EMERGENCY DEPT VISIT HI MDM: CPT

## 2024-11-17 PROCEDURE — 82948 REAGENT STRIP/BLOOD GLUCOSE: CPT

## 2024-11-17 PROCEDURE — 70498 CT ANGIOGRAPHY NECK: CPT

## 2024-11-17 PROCEDURE — 85610 PROTHROMBIN TIME: CPT | Performed by: EMERGENCY MEDICINE

## 2024-11-17 PROCEDURE — 70496 CT ANGIOGRAPHY HEAD: CPT

## 2024-11-17 PROCEDURE — 96374 THER/PROPH/DIAG INJ IV PUSH: CPT

## 2024-11-17 PROCEDURE — 80076 HEPATIC FUNCTION PANEL: CPT | Performed by: EMERGENCY MEDICINE

## 2024-11-17 PROCEDURE — 85027 COMPLETE CBC AUTOMATED: CPT | Performed by: EMERGENCY MEDICINE

## 2024-11-17 RX ORDER — HYDRALAZINE HYDROCHLORIDE 25 MG/1
50 TABLET, FILM COATED ORAL EVERY 8 HOURS SCHEDULED
Status: DISCONTINUED | OUTPATIENT
Start: 2024-11-17 | End: 2024-11-19 | Stop reason: HOSPADM

## 2024-11-17 RX ORDER — CARVEDILOL 12.5 MG/1
25 TABLET ORAL 2 TIMES DAILY WITH MEALS
Status: DISCONTINUED | OUTPATIENT
Start: 2024-11-17 | End: 2024-11-17

## 2024-11-17 RX ORDER — FINASTERIDE 5 MG/1
5 TABLET, FILM COATED ORAL DAILY
Status: DISCONTINUED | OUTPATIENT
Start: 2024-11-17 | End: 2024-11-19 | Stop reason: HOSPADM

## 2024-11-17 RX ORDER — PANTOPRAZOLE SODIUM 40 MG/1
40 TABLET, DELAYED RELEASE ORAL EVERY MORNING
Status: DISCONTINUED | OUTPATIENT
Start: 2024-11-17 | End: 2024-11-19 | Stop reason: HOSPADM

## 2024-11-17 RX ORDER — ENOXAPARIN SODIUM 100 MG/ML
40 INJECTION SUBCUTANEOUS DAILY
Status: DISCONTINUED | OUTPATIENT
Start: 2024-11-17 | End: 2024-11-19 | Stop reason: HOSPADM

## 2024-11-17 RX ORDER — MECLIZINE HCL 12.5 MG 12.5 MG/1
25 TABLET ORAL EVERY 8 HOURS SCHEDULED
Status: DISCONTINUED | OUTPATIENT
Start: 2024-11-17 | End: 2024-11-19 | Stop reason: HOSPADM

## 2024-11-17 RX ORDER — INSULIN GLARGINE 100 [IU]/ML
20 INJECTION, SOLUTION SUBCUTANEOUS
Status: DISCONTINUED | OUTPATIENT
Start: 2024-11-17 | End: 2024-11-19 | Stop reason: HOSPADM

## 2024-11-17 RX ORDER — INSULIN LISPRO 100 [IU]/ML
5 INJECTION, SOLUTION INTRAVENOUS; SUBCUTANEOUS
Status: DISCONTINUED | OUTPATIENT
Start: 2024-11-17 | End: 2024-11-19 | Stop reason: HOSPADM

## 2024-11-17 RX ORDER — AMLODIPINE BESYLATE 5 MG/1
10 TABLET ORAL ONCE
Status: COMPLETED | OUTPATIENT
Start: 2024-11-17 | End: 2024-11-17

## 2024-11-17 RX ORDER — HYDRALAZINE HYDROCHLORIDE 25 MG/1
50 TABLET, FILM COATED ORAL EVERY 8 HOURS SCHEDULED
Status: DISCONTINUED | OUTPATIENT
Start: 2024-11-17 | End: 2024-11-17

## 2024-11-17 RX ORDER — ASPIRIN 325 MG
325 TABLET ORAL ONCE
Status: COMPLETED | OUTPATIENT
Start: 2024-11-17 | End: 2024-11-17

## 2024-11-17 RX ORDER — ACETAMINOPHEN 325 MG/1
650 TABLET ORAL EVERY 4 HOURS PRN
Status: DISCONTINUED | OUTPATIENT
Start: 2024-11-17 | End: 2024-11-19 | Stop reason: HOSPADM

## 2024-11-17 RX ORDER — MECLIZINE HCL 12.5 MG 12.5 MG/1
25 TABLET ORAL ONCE
Status: COMPLETED | OUTPATIENT
Start: 2024-11-17 | End: 2024-11-17

## 2024-11-17 RX ORDER — POLYETHYLENE GLYCOL 3350 17 G/17G
17 POWDER, FOR SOLUTION ORAL DAILY
Status: DISCONTINUED | OUTPATIENT
Start: 2024-11-18 | End: 2024-11-19 | Stop reason: HOSPADM

## 2024-11-17 RX ORDER — INSULIN LISPRO 100 [IU]/ML
1-5 INJECTION, SOLUTION INTRAVENOUS; SUBCUTANEOUS
Status: DISCONTINUED | OUTPATIENT
Start: 2024-11-17 | End: 2024-11-19 | Stop reason: HOSPADM

## 2024-11-17 RX ORDER — ASPIRIN 81 MG/1
81 TABLET ORAL DAILY
Status: DISCONTINUED | OUTPATIENT
Start: 2024-11-18 | End: 2024-11-18

## 2024-11-17 RX ORDER — CARVEDILOL 12.5 MG/1
25 TABLET ORAL 2 TIMES DAILY WITH MEALS
Status: DISCONTINUED | OUTPATIENT
Start: 2024-11-17 | End: 2024-11-19 | Stop reason: HOSPADM

## 2024-11-17 RX ORDER — AMLODIPINE BESYLATE 5 MG/1
10 TABLET ORAL DAILY
Status: DISCONTINUED | OUTPATIENT
Start: 2024-11-18 | End: 2024-11-19 | Stop reason: HOSPADM

## 2024-11-17 RX ORDER — ONDANSETRON 2 MG/ML
1 INJECTION INTRAMUSCULAR; INTRAVENOUS ONCE
Status: COMPLETED | OUTPATIENT
Start: 2024-11-17 | End: 2024-11-17

## 2024-11-17 RX ORDER — CLOPIDOGREL BISULFATE 75 MG/1
300 TABLET ORAL ONCE
Status: COMPLETED | OUTPATIENT
Start: 2024-11-17 | End: 2024-11-17

## 2024-11-17 RX ORDER — ONDANSETRON 2 MG/ML
4 INJECTION INTRAMUSCULAR; INTRAVENOUS ONCE
Status: COMPLETED | OUTPATIENT
Start: 2024-11-17 | End: 2024-11-17

## 2024-11-17 RX ORDER — INSULIN LISPRO 100 [IU]/ML
1-6 INJECTION, SOLUTION INTRAVENOUS; SUBCUTANEOUS
Status: DISCONTINUED | OUTPATIENT
Start: 2024-11-17 | End: 2024-11-19 | Stop reason: HOSPADM

## 2024-11-17 RX ORDER — ONDANSETRON 2 MG/ML
INJECTION INTRAMUSCULAR; INTRAVENOUS
Status: COMPLETED
Start: 2024-11-17 | End: 2024-11-17

## 2024-11-17 RX ORDER — CLOPIDOGREL BISULFATE 75 MG/1
75 TABLET ORAL DAILY
Status: DISCONTINUED | OUTPATIENT
Start: 2024-11-18 | End: 2024-11-19 | Stop reason: HOSPADM

## 2024-11-17 RX ORDER — ATORVASTATIN CALCIUM 40 MG/1
40 TABLET, FILM COATED ORAL
Status: DISCONTINUED | OUTPATIENT
Start: 2024-11-17 | End: 2024-11-18

## 2024-11-17 RX ORDER — GABAPENTIN 300 MG/1
300 CAPSULE ORAL
COMMUNITY

## 2024-11-17 RX ORDER — DUTASTERIDE 0.5 MG/1
0.5 CAPSULE, LIQUID FILLED ORAL DAILY
COMMUNITY

## 2024-11-17 RX ORDER — GABAPENTIN 300 MG/1
300 CAPSULE ORAL
Status: DISCONTINUED | OUTPATIENT
Start: 2024-11-17 | End: 2024-11-19 | Stop reason: HOSPADM

## 2024-11-17 RX ADMIN — PANTOPRAZOLE SODIUM 40 MG: 40 TABLET, DELAYED RELEASE ORAL at 13:33

## 2024-11-17 RX ADMIN — IOHEXOL 85 ML: 350 INJECTION, SOLUTION INTRAVENOUS at 09:17

## 2024-11-17 RX ADMIN — INSULIN LISPRO 2 UNITS: 100 INJECTION, SOLUTION INTRAVENOUS; SUBCUTANEOUS at 17:11

## 2024-11-17 RX ADMIN — GABAPENTIN 300 MG: 300 CAPSULE ORAL at 21:21

## 2024-11-17 RX ADMIN — ONDANSETRON 4 MG: 2 INJECTION INTRAMUSCULAR; INTRAVENOUS at 09:11

## 2024-11-17 RX ADMIN — INSULIN LISPRO 5 UNITS: 100 INJECTION, SOLUTION INTRAVENOUS; SUBCUTANEOUS at 17:11

## 2024-11-17 RX ADMIN — FINASTERIDE 5 MG: 5 TABLET, FILM COATED ORAL at 13:33

## 2024-11-17 RX ADMIN — HYDRALAZINE HYDROCHLORIDE 50 MG: 25 TABLET ORAL at 21:21

## 2024-11-17 RX ADMIN — INSULIN GLARGINE 20 UNITS: 100 INJECTION, SOLUTION SUBCUTANEOUS at 21:21

## 2024-11-17 RX ADMIN — AMLODIPINE BESYLATE 10 MG: 5 TABLET ORAL at 10:40

## 2024-11-17 RX ADMIN — ATORVASTATIN CALCIUM 40 MG: 40 TABLET, FILM COATED ORAL at 17:11

## 2024-11-17 RX ADMIN — CLOPIDOGREL 300 MG: 75 TABLET ORAL at 10:39

## 2024-11-17 RX ADMIN — MECLIZINE HYDROCHLORIDE 25 MG: 12.5 TABLET ORAL at 10:40

## 2024-11-17 RX ADMIN — CARVEDILOL 25 MG: 12.5 TABLET, FILM COATED ORAL at 17:11

## 2024-11-17 RX ADMIN — HYDRALAZINE HYDROCHLORIDE 50 MG: 25 TABLET ORAL at 13:33

## 2024-11-17 RX ADMIN — ENOXAPARIN SODIUM 40 MG: 40 INJECTION SUBCUTANEOUS at 13:34

## 2024-11-17 RX ADMIN — MECLIZINE HYDROCHLORIDE 25 MG: 12.5 TABLET ORAL at 21:21

## 2024-11-17 RX ADMIN — ASPIRIN 325 MG ORAL TABLET 325 MG: 325 PILL ORAL at 10:44

## 2024-11-17 NOTE — ASSESSMENT & PLAN NOTE
Lab Results   Component Value Date    HGBA1C 9.0 (H) 09/15/2024       Recent Labs     11/17/24  0846   POCGLU 157*       Blood Sugar Average: Last 72 hrs:  (P) 157    Continue Lantus and mealtime insulin. Mother unsure of mealtime insulin dose. Will assess his needs while admitted.

## 2024-11-17 NOTE — ED PROVIDER NOTES
Time reflects when diagnosis was documented in both MDM as applicable and the Disposition within this note       Time User Action Codes Description Comment    11/17/2024  8:41 AM Henry Perez [R42] Vertigo     11/17/2024 12:35 PM Gladys Lopez [E11.29,  R80.9,  Z79.4] Type 2 diabetes mellitus with diabetic microalbuminuria, with long-term current use of insulin (HCC)     11/17/2024 12:35 PM Gldays Lopez [I63.9] Acute CVA (cerebrovascular accident) (HCC)           ED Disposition       ED Disposition   Admit    Condition   Stable    Date/Time   Sun Nov 17, 2024 10:28 AM    Comment                  Assessment & Plan       Medical Decision Making  Diff includes BPV, hypertensive urgency, stroke    Amount and/or Complexity of Data Reviewed  Labs: ordered.  Radiology: ordered.    Risk  OTC drugs.  Prescription drug management.  Decision regarding hospitalization.        ED Course as of 11/17/24 1241   Sun Nov 17, 2024   0959 Patient unsure of meds - since being here, he went to [facility].   1015 Patient hasn't taken meds today yet.       Medications   ondansetron (ZOFRAN) injection 4 mg (4 mg Intravenous Given 11/17/24 0911)   ondansetron (FOR EMS ONLY) (ZOFRAN) 4 mg/2 mL injection 4 mg (0 mg Does not apply Given to EMS 11/17/24 0848)   iohexol (OMNIPAQUE) 350 MG/ML injection (MULTI-DOSE) 85 mL (85 mL Intravenous Given 11/17/24 0917)   iohexol (OMNIPAQUE) 350 MG/ML injection (MULTI-DOSE) 85 mL (85 mL Intravenous Given 11/17/24 0917)   amLODIPine (NORVASC) tablet 10 mg (10 mg Oral Given 11/17/24 1040)   aspirin tablet 325 mg (325 mg Oral Given 11/17/24 1044)   clopidogrel (PLAVIX) tablet 300 mg (300 mg Oral Given 11/17/24 1039)   meclizine (ANTIVERT) tablet 25 mg (25 mg Oral Given 11/17/24 1040)       ED Risk Strat Scores       Stroke Assessment       Row Name 11/17/24 0907             NIH Stroke Scale    Interval Baseline      Level of Consciousness (1a.) 0      LOC Questions (1b.)  0      LOC Commands (1c.) 0      Best Gaze (2.) 0      Visual (3.) 0      Facial Palsy (4.) 2      Motor Arm, Left (5a.) 0      Motor Arm, Right (5b.) 2      Motor Leg, Left (6a.) 0      Motor Leg, Right (6b.) 2      Limb Ataxia (7.) 2      Sensory (8.) 0      Best Language (9.) 0      Dysarthria (10.) 0      Extinction and Inattention (11.) (Formerly Neglect) 0      Total 8                    Flowsheet Row Most Recent Value   Thrombolytic Decision Options    Thrombolytic Decision Patient not a candidate.   Patient is not a candidate options Recent significant trauma/stroke.                                                History of Present Illness       Chief Complaint   Patient presents with    STROKE Alert     Pt woke up in the middle of the night feeling dizzy with vertigo. Pt went back to sleep. Woke up having headache and vomitting, pt recently had stroke         Past Medical History:   Diagnosis Date    CVA (cerebral vascular accident) (HCC)     Diabetes mellitus (HCC)     Hypertension       Past Surgical History:   Procedure Laterality Date    NO PAST SURGERIES        Family History   Problem Relation Age of Onset    Diabetes Mother     Heart failure Father     Substance Abuse Neg Hx     Mental illness Neg Hx       Social History     Tobacco Use    Smoking status: Former     Current packs/day: 1.00     Average packs/day: 1 pack/day for 42.9 years (42.9 ttl pk-yrs)     Types: Cigarettes     Start date: 1982    Smokeless tobacco: Never   Vaping Use    Vaping status: Never Used   Substance Use Topics    Alcohol use: Not Currently    Drug use: Yes     Types: Marijuana     Comment: few days      E-Cigarette/Vaping    E-Cigarette Use Never User       E-Cigarette/Vaping Substances      I have reviewed and agree with the history as documented.     Hx from paramedics, patient, medical records, family members - [facility] multiple strokes residual right face droop, right arm and leg paralysis.  Recent stroke 2 months ago  treated with TNK.  Currently takes plavix.  C/o dizziness room spinning sensation since around 230 am.  Associated vomited a couple times.  Symptoms worse with movement, improve with rest.  Patient concerned he hasn't been taking his medications correctly since he left [facility] couple days ago.        Review of Systems   Constitutional:  Negative for chills and fever.   HENT:  Negative for rhinorrhea and sore throat.    Respiratory:  Negative for shortness of breath.    Cardiovascular:  Negative for chest pain.   Gastrointestinal:  Positive for nausea and vomiting. Negative for constipation and diarrhea.   Genitourinary:  Negative for dysuria and frequency.   Skin:  Negative for rash.   Neurological:  Positive for dizziness and headaches.   All other systems reviewed and are negative.          Objective       ED Triage Vitals   Temperature Pulse Blood Pressure Respirations SpO2 Patient Position - Orthostatic VS   11/17/24 0843 11/17/24 0843 11/17/24 0842 11/17/24 0842 11/17/24 0842 11/17/24 0908   97.9 °F (36.6 °C) 72 (!) 210/98 18 96 % Lying      Temp src Heart Rate Source BP Location FiO2 (%) Pain Score    -- 11/17/24 0852 11/17/24 0852 -- 11/17/24 1140     Monitor Left arm  No Pain      Vitals      Date and Time Temp Pulse SpO2 Resp BP Pain Score FACES Pain Rating User   11/17/24 1140 -- -- -- -- -- No Pain -- GB   11/17/24 1131 97.5 °F (36.4 °C) 69 96 % 18 174/107 -- -- [facility]   11/17/24 1100 -- 76 96 % 22 -- -- --    11/17/24 1030 -- 66 95 % 18 165/75 -- --    11/17/24 1000 -- 72 95 % 20 183/72 -- --    11/17/24 0930 -- 75 95 % 18 191/88 -- --    11/17/24 0920 -- 72 95 % 20 191/88 -- --    11/17/24 0908 -- 69 -- 20 171/85 -- --    11/17/24 0852 -- 68 94 % 20 186/86 -- --    11/17/24 0843 97.9 °F (36.6 °C) 72 -- -- -- -- -- KAROL   11/17/24 0842 -- -- 96 % 18 210/98 -- -- KAROL            Physical Exam  Vitals and nursing note reviewed.   Constitutional:       Appearance: He is well-developed.   HENT:       Head: Normocephalic and atraumatic.      Right Ear: Tympanic membrane and external ear normal.      Left Ear: Tympanic membrane and external ear normal.      Nose: Nose normal.      Mouth/Throat:      Mouth: Mucous membranes are moist.      Pharynx: Oropharynx is clear.   Eyes:      General: No visual field deficit.     Extraocular Movements: Extraocular movements intact.      Right eye: No nystagmus.      Left eye: No nystagmus.      Conjunctiva/sclera: Conjunctivae normal.      Pupils: Pupils are equal, round, and reactive to light.   Cardiovascular:      Rate and Rhythm: Normal rate and regular rhythm.      Heart sounds: Normal heart sounds.   Pulmonary:      Effort: Pulmonary effort is normal. No respiratory distress.      Breath sounds: Normal breath sounds. No wheezing.   Abdominal:      General: Bowel sounds are normal. There is no distension.      Palpations: Abdomen is soft.      Tenderness: There is no abdominal tenderness.   Musculoskeletal:      Cervical back: Normal range of motion and neck supple. No spinous process tenderness.   Skin:     General: Skin is warm and dry.      Findings: No rash.   Neurological:      Mental Status: He is alert.      GCS: GCS eye subscore is 4. GCS verbal subscore is 5. GCS motor subscore is 6.      Cranial Nerves: Facial asymmetry present.      Sensory: Sensation is intact. No sensory deficit.      Motor: Weakness present.      Comments: Weakness right arm leg chronic, face droop chronic    HINTS exam negative   Psychiatric:         Mood and Affect: Mood normal.         Results Reviewed       Procedure Component Value Units Date/Time    HS Troponin I 4hr [[id number]]     Lab Status: No result Specimen: Blood     Protime-INR [[id number]]  (Abnormal) Collected: 11/17/24 0847    Lab Status: Final result Specimen: Blood from Arm, Right Updated: 11/17/24 0946     Protime 15.2 seconds      INR 1.15    Narrative:      INR Therapeutic Range    Indication                                              INR Range      Atrial Fibrillation                                               2.0-3.0  Hypercoagulable State                                    2.0.2.3  Left Ventricular Asist Device                            2.0-3.0  Mechanical Heart Valve                                  -    Aortic(with afib, MI, embolism, HF, LA enlargement,    and/or coagulopathy)                                     2.0-3.0 (2.5-3.5)     Mitral                                                             2.5-3.5  Prosthetic/Bioprosthetic Heart Valve               2.0-3.0  Venous thromboembolism (VTE: VT, PE        2.0-3.0    APTT [[id number]]  (Normal) Collected: 11/17/24 0847    Lab Status: Final result Specimen: Blood from Arm, Right Updated: 11/17/24 0946     PTT 26 seconds     Hepatic function panel [[id number]]  (Abnormal) Collected: 11/17/24 0847    Lab Status: Final result Specimen: Blood from Arm, Right Updated: 11/17/24 0933     Total Bilirubin 1.15 mg/dL      Bilirubin, Direct 0.19 mg/dL      Alkaline Phosphatase 45 U/L      AST 9 U/L      ALT 8 U/L      Total Protein 6.9 g/dL      Albumin 4.0 g/dL     CBC and Platelet [[id number]]  (Normal) Collected: 11/17/24 0847    Lab Status: Final result Specimen: Blood from Arm, Right Updated: 11/17/24 0921     WBC 7.76 Thousand/uL      RBC 5.13 Million/uL      Hemoglobin 14.1 g/dL      Hematocrit 43.4 %      MCV 85 fL      MCH 27.5 pg      MCHC 32.5 g/dL      RDW 13.9 %      Platelets 185 Thousands/uL      MPV 9.8 fL     HS Troponin I 2hr [[id number]]     Lab Status: No result Specimen: Blood     HS Troponin 0hr (reflex protocol) [[id number]]  (Normal) Collected: 11/17/24 0847    Lab Status: Final result Specimen: Blood from Arm, Right Updated: 11/17/24 0919     hs TnI 0hr 3 ng/L     Basic metabolic panel [[id number]]  (Abnormal) Collected: 11/17/24 0847    Lab Status: Final result Specimen: Blood from Arm, Right Updated: 11/17/24 0910     Sodium 139 mmol/L      Potassium 3.7  mmol/L      Chloride 105 mmol/L      CO2 27 mmol/L      ANION GAP 7 mmol/L      BUN 16 mg/dL      Creatinine 0.84 mg/dL      Glucose 203 mg/dL      Calcium 8.7 mg/dL      eGFR 95 ml/min/1.73sq m     Narrative:      National Kid[employer] Disease Foundation guidelines for Chronic Kidney Disease (CKD):     Stage 1 with normal or high GFR (GFR > 90 mL/min/1.73 square meters)    Stage 2 Mild CKD (GFR = 60-89 mL/min/1.73 square meters)    Stage 3A Moderate CKD (GFR = 45-59 mL/min/1.73 square meters)    Stage 3B Moderate CKD (GFR = 30-44 mL/min/1.73 square meters)    Stage 4 Severe CKD (GFR = 15-29 mL/min/1.73 square meters)    Stage 5 End Stage CKD (GFR <15 mL/min/1.73 square meters)  Note: GFR calculation is accurate only with a steady state creatinine    Fingerstick Glucose (POCT) [[id number]]  (Abnormal) Collected: 11/17/24 0846    Lab Status: Final result Specimen: Blood Updated: 11/17/24 0846     POC Glucose 157 mg/dl             CT stroke alert brain   Final Interpretation by Padmini Collins MD (11/17 0944)      1.  No acute intracranial CT abnormality.   2.  Stable old infarct in the left thalamocapsular region. Stable old lacunar infarct in the right periventricular white matter/corona radiata.   3.  Mild nonspecific white matter change suggesting microangiopathy.               I personally discussed this study with Dr. Roberto Hammond on 11/17/2024 9:05 AM.            Workstation performed: [id number]         CTA stroke alert (head/neck)   Final Interpretation by Padmini Collins MD (11/17 0949)      1.  Patent major vessels of the [place] without high-grade stenosis.   2.  No significant stenosis in the cervical carotid or vertebral arteries.   3.  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               Findings were directly discussed with Roberto Hammond at 9:28 p.m.      Workstation performed: [id number]         MRI Inpatient Order    (Results Pending)        ECG 12 Lead Documentation Only    Date/Time: 11/17/2024 9:18 AM    Performed by: Henry Perez DO  Authorized by: Henry Perez DO    Indications / Diagnosis:  Vertigo htn  ECG reviewed by me, the ED Provider: yes    Patient location:  ED  Previous ECG:     Previous ECG:  Compared to current    Comparison ECG info:  9-24    Similarity:  Changes noted  Interpretation:     Interpretation: non-specific    Rate:     ECG rate:  74    ECG rate assessment: normal    Rhythm:     Rhythm: sinus rhythm    Ectopy:     Ectopy: none    QRS:     QRS axis:  Normal    QRS intervals:  Normal  Conduction:     Conduction: normal    ST segments:     ST segments:  Non-specific    Depression:  V5 and V6  T waves:     T waves: normal    Comments:      This EKG was interpreted by me.      ED Medication and Procedure Management   Prior to Admission Medications   Prescriptions Last Dose Informant Patient Reported? Taking?   Insulin Glargine Solostar (Lantus SoloStar) 100 UNIT/ML SOPN 11/16/2024  No Yes   Sig: INJECT 0.2 ML (20 UNITS TOTAL) UNDER THE SKIN DAILY AT BEDTIME   amLODIPine (NORVASC) 10 mg tablet 11/16/2024  No Yes   Sig: Take 1 tablet (10 mg total) by mouth daily   aspirin (ECOTRIN LOW STRENGTH) 81 mg EC tablet   No No   Sig: Take 1 tablet (81 mg total) by mouth daily for 11 days   carvedilol (COREG) 25 mg tablet 11/16/2024  No Yes   Sig: Take 1 tablet (25 mg total) by mouth 2 (two) times a day with meals   clopidogrel (PLAVIX) 75 mg tablet 11/16/2024  No Yes   Sig: Take 1 tablet (75 mg total) by mouth daily   doxazosin (CARDURA) 4 mg tablet 11/16/2024  No Yes   Sig: TAKE 1 TABLET BY MOUTH DAILY AT BEDTIME   Patient taking differently: Take 8 mg by mouth daily at bedtime   dutasteride (AVODART) 0.5 mg capsule 11/16/2024  Yes Yes   Sig: Take 0.5 mg by mouth daily   gabapentin (NEURONTIN) 300 mg capsule   Yes Yes   Sig: Take 300 mg by mouth daily at bedtime   hydrALAZINE (APRESOLINE) 25 mg tablet 11/16/2024  No Yes   Sig: Take  1 tablet (25 mg total) by mouth every 8 (eight) hours   Patient taking differently: Take 50 mg by mouth every 8 (eight) hours   losartan (COZAAR) 100 MG tablet 11/16/2024  No Yes   Sig: Take 1 tablet (100 mg total) by mouth daily   nicotine (NICODERM CQ) 21 mg/24 hr TD 24 hr patch Not Taking  No No   Sig: PLACE 1 PATCH ON THE SKIN OVER 24 HOURS EVERY 24 HOURS   Patient not taking: Reported on 11/17/2024   pantoprazole (PROTONIX) 40 mg tablet 11/16/2024  No Yes   Sig: TAKE 1 TABLET BY MOUTH EVERY DAY IN THE MORNING   polyethylene glycol (MIRALAX) 17 g packet   No No   Sig: Take 17 g by mouth daily   pravastatin (PRAVACHOL) 20 mg tablet 11/16/2024  No Yes   Sig: Take 1 tablet (20 mg total) by mouth daily at bedtime   senna (SENOKOT) 8.6 mg Not Taking  No No   Sig: Take 2 tablets (17.2 mg total) by mouth 2 (two) times a day   Patient not taking: Reported on 11/17/2024      Facility-Administered Medications: None     Current Discharge Medication List        CONTINUE these medications which have NOT CHANGED    Details   amLODIPine (NORVASC) 10 mg tablet Take 1 tablet (10 mg total) by mouth daily    Associated Diagnoses: Stroke-like episode; Acute CVA (cerebrovascular accident) (HCC)      carvedilol (COREG) 25 mg tablet Take 1 tablet (25 mg total) by mouth 2 (two) times a day with meals  Qty: 180 tablet, Refills: 3    Associated Diagnoses: Primary hypertension      clopidogrel (PLAVIX) 75 mg tablet Take 1 tablet (75 mg total) by mouth daily    Associated Diagnoses: Stroke-like episode; Acute CVA (cerebrovascular accident) (HCC)      doxazosin (CARDURA) 4 mg tablet TAKE 1 TABLET BY MOUTH DAILY AT BEDTIME  Qty: 100 tablet, Refills: 1    Associated Diagnoses: Primary hypertension      dutasteride (AVODART) 0.5 mg capsule Take 0.5 mg by mouth daily      gabapentin (NEURONTIN) 300 mg capsule Take 300 mg by mouth daily at bedtime      hydrALAZINE (APRESOLINE) 25 mg tablet Take 1 tablet (25 mg total) by mouth every 8 (eight)  hours    Associated Diagnoses: Stroke-like episode; Acute CVA (cerebrovascular accident) ([facility])      Insulin Glargine Solostar (Lantus SoloStar) 100 UNIT/ML SOPN INJECT 0.2 ML (20 UNITS TOTAL) UNDER THE SKIN DAILY AT BEDTIME  Qty: 15 mL, Refills: 3    Associated Diagnoses: Type 2 diabetes mellitus with hyperglycemia, without long-term current use of insulin (HCC)      losartan (COZAAR) 100 MG tablet Take 1 tablet (100 mg total) by mouth daily    Associated Diagnoses: Stroke-like episode; Acute CVA (cerebrovascular accident) ([facility])      pantoprazole (PROTONIX) 40 mg tablet TAKE 1 TABLET BY MOUTH EVERY DAY IN THE MORNING  Qty: 30 tablet, Refills: 5    Associated Diagnoses: Gastroesophageal reflux disease without esophagitis      pravastatin (PRAVACHOL) 20 mg tablet Take 1 tablet (20 mg total) by mouth daily at bedtime  Qty: 100 tablet, Refills: 3    Comments: Discontinue atorvastatin  Associated Diagnoses: Mixed hyperlipidemia      aspirin (ECOTRIN LOW STRENGTH) 81 mg EC tablet Take 1 tablet (81 mg total) by mouth daily for 11 days    Associated Diagnoses: Stroke-like episode; Acute CVA (cerebrovascular accident) ([facility])      nicotine (NICODERM CQ) 21 mg/24 hr TD 24 hr patch PLACE 1 PATCH ON THE SKIN OVER 24 HOURS EVERY 24 HOURS  Qty: 28 patch, Refills: 1    Associated Diagnoses: Tobacco dependence      polyethylene glycol (MIRALAX) 17 g packet Take 17 g by mouth daily    Associated Diagnoses: Stroke-like episode; Acute CVA (cerebrovascular accident) ([facility])      senna (SENOKOT) 8.6 mg Take 2 tablets (17.2 mg total) by mouth 2 (two) times a day    Associated Diagnoses: Stroke-like episode; Acute CVA (cerebrovascular accident) ([facility])           No discharge procedures on file.  ED SEPSIS DOCUMENTATION   Time reflects when diagnosis was documented in both MDM as applicable and the Disposition within this note       Time User Action Codes Description Comment    11/17/2024  8:41 AM Henry Perez Add [R42] Vertigo     11/17/2024  12:35 PM Gladys Lopez [E11.29,  R80.9,  Z79.4] Type 2 diabetes mellitus with diabetic microalbuminuria, with long-term current use of insulin ([facility])     11/17/2024 12:35 PM Gladys Lopez [I63.9] Acute CVA (cerebrovascular accident) ([facility])           reviewed with Dr. Hammond - possible BPV try meclizine but high risk for stroke with recent previous stroke - recommends admit under stroke pathway and dual antiplatelet tx    Patient BP high in ER but hasn't taken morning meds yet.  I ordered his morning amlodipine.       Henry Perez,   11/17/24 1246

## 2024-11-17 NOTE — ASSESSMENT & PLAN NOTE
BP uncontrolled. Has a history of uncontrolled HTN  Amlodipine given in ED.  Allow for permissive HTN until MRI brain complete (140-200 per Neurology). Will continue coreg, amlodipine and hydralazine for now. Hold Cardura and losartan

## 2024-11-17 NOTE — H&P
H&P - Hospitalist   Name: Wilder Vargas 59 y.o. male I MRN: [id number]  Unit/Bed#: -01 I Date of Admission: 11/17/2024   Date of Service: 11/17/2024 I Hospital Day: 0     Assessment & Plan  Dizziness  Started this am and is worse with position change.  Neurology consult appreciated.  They are suspecting BPPV and recommending meclizine around-the-clock..  They do however want to admit the patient under stroke pathway and check an MRI of the brain given his stroke history.  Start stroke pathway  MRI of brain  Start aspirin and Plavix for now per neurology recommendation.  Continue statin  Meclizine around-the-clock  Telemetry  BP goal 1 .  Normalized if MRI brain negative.  PT/OT  S/P stroke due to cerebrovascular disease  History of multiple strokes  On Plavix and statin  Recently discharged from rehab 11/15 s/p a stroke in September  Type 2 diabetes mellitus with diabetic microalbuminuria, with long-term current use of insulin (Formerly [facility])  Lab Results   Component Value Date    HGBA1C 9.0 (H) 09/15/2024       Recent Labs     11/17/24  0846   POCGLU 157*       Blood Sugar Average: Last 72 hrs:  (P) 157    Continue Lantus and mealtime insulin. Mother unsure of mealtime insulin dose. Will assess his needs while admitted.     Primary hypertension  BP uncontrolled. Has a history of uncontrolled HTN  Amlodipine given in ED.  Allow for permissive HTN until MRI brain complete (140-200 per Neurology). Will continue coreg, amlodipine and hydralazine for now. Hold Cardura and losartan       VTE Pharmacologic Prophylaxis: VTE Score: 7 High Risk (Score >/= 5) - Pharmacological DVT Prophylaxis Ordered: enoxaparin (Lovenox). Sequential Compression Devices Ordered.  Code Status: Prior Full code  Discussion with family: Updated  (mother) at bedside.    Anticipated Length of Stay: Patient will be admitted on an observation basis with an anticipated length of stay of less than 2 midnights secondary to MRI  brain.    History of Present Illness   Chief Complaint: dizziness    Wilder Vargas is a 59 y.o. male with a PMH of CVA, HTN, DM type 2 who presents with new onset dizziness.  Patient states he woke up earlier this morning around 3 AM with symptoms of dizziness.  He went back to bed, then woke up again with worsening symptoms of dizziness.  Patient states the dizziness is worse with movement.  He also had some associated nausea and upset stomach with his symptoms.  Patient recently suffered a stroke in September.  He continues to have some right-sided deficits from the stroke.  Nothing new except for his dizziness today.  He was discharged to rehab and just recently discharged home on 11/15.  There was some confusion over his medications, but his mother states that he received all of his medications since he was home.  Blood pressure was noted to be elevated today, but he did not take his medications today.  Currently patient is complaining of dizziness.    Review of Systems   Constitutional:  Negative for chills and fever.   HENT:  Negative for ear pain and sore throat.    Eyes:  Negative for pain and visual disturbance.   Respiratory:  Negative for cough and shortness of breath.    Cardiovascular:  Negative for chest pain and palpitations.   Gastrointestinal:  Negative for abdominal pain and vomiting.   Genitourinary:  Negative for dysuria and hematuria.   Musculoskeletal:  Negative for arthralgias and back pain.   Skin:  Negative for color change and rash.   Neurological:  Positive for dizziness. Negative for seizures and syncope.   All other systems reviewed and are negative.      Historical Information   Past Medical History:   Diagnosis Date    CVA (cerebral vascular accident) (HCC)     Diabetes mellitus (HCC)     Hypertension      Past Surgical History:   Procedure Laterality Date    NO PAST SURGERIES       Social History     Tobacco Use    Smoking status: Former     Current packs/day: 1.00     Average  packs/day: 1 pack/day for 42.9 years (42.9 ttl pk-yrs)     Types: Cigarettes     Start date: 1982    Smokeless tobacco: Never   Vaping Use    Vaping status: Never Used   Substance and Sexual Activity    Alcohol use: Not Currently    Drug use: Yes     Types: Marijuana     Comment: few days    Sexual activity: Not on file     E-Cigarette/Vaping    E-Cigarette Use Never User      E-Cigarette/Vaping Substances     Family History   Problem Relation Age of Onset    Diabetes Mother     Heart failure Father     Substance Abuse Neg Hx     Mental illness Neg Hx      Social History:  Marital Status: Single     Patient Pre-hospital Living Situation: Home  Patient Pre-hospital Level of Mobility: non-ambulatory/bed bound  Patient Pre-hospital Diet Restrictions: none    Meds/Allergies   I have reviewed home medications with patient family member.  Prior to Admission medications    Medication Sig Start Date End Date Taking? Authorizing Provider   amLODIPine (NORVASC) 10 mg tablet Take 1 tablet (10 mg total) by mouth daily 9/21/24  Yes Nancy Leonard MD   carvedilol (COREG) 25 mg tablet Take 1 tablet (25 mg total) by mouth 2 (two) times a day with meals 1/24/24  Yes Gilmar Oro MD   clopidogrel (PLAVIX) 75 mg tablet Take 1 tablet (75 mg total) by mouth daily 9/21/24  Yes Nancy Leonard MD   doxazosin (CARDURA) 4 mg tablet TAKE 1 TABLET BY MOUTH DAILY AT BEDTIME  Patient taking differently: Take 8 mg by mouth daily at bedtime 7/15/24  Yes Gilmar Oro MD   dutasteride (AVODART) 0.5 mg capsule Take 0.5 mg by mouth daily   Yes Historical Provider, MD   gabapentin (NEURONTIN) 300 mg capsule Take 300 mg by mouth daily at bedtime   Yes Historical Provider, MD   hydrALAZINE (APRESOLINE) 25 mg tablet Take 1 tablet (25 mg total) by mouth every 8 (eight) hours  Patient taking differently: Take 50 mg by mouth every 8 (eight) hours 9/20/24  Yes Nancy Leonard MD   Insulin Glargine Solostar (Lantus SoloStar) 100 UNIT/ML SOPN INJECT 0.2 ML (20 UNITS  TOTAL) UNDER THE SKIN DAILY AT BEDTIME 11/13/24  Yes Gilmar Oro MD   losartan (COZAAR) 100 MG tablet Take 1 tablet (100 mg total) by mouth daily 9/21/24  Yes Nancy Leonard MD   pantoprazole (PROTONIX) 40 mg tablet TAKE 1 TABLET BY MOUTH EVERY DAY IN THE MORNING 6/6/24  Yes Gilmar Oro MD   pravastatin (PRAVACHOL) 20 mg tablet Take 1 tablet (20 mg total) by mouth daily at bedtime 7/16/24  Yes Gilmar Oro MD   aspirin (ECOTRIN LOW STRENGTH) 81 mg EC tablet Take 1 tablet (81 mg total) by mouth daily for 11 days 9/21/24 10/2/24  Nancy Leonard MD   hydroCHLOROthiazide 25 mg tablet Take 1 tablet (25 mg total) by mouth daily  Patient not taking: Reported on 11/17/2024 9/21/24   Nancy Leonard MD   nicotine (NICODERM CQ) 21 mg/24 hr TD 24 hr patch PLACE 1 PATCH ON THE SKIN OVER 24 HOURS EVERY 24 HOURS  Patient not taking: Reported on 11/17/2024 10/7/24   Gilmar Oro MD   polyethylene glycol (MIRALAX) 17 g packet Take 17 g by mouth daily 9/21/24   Nancy Leonard MD   senna (SENOKOT) 8.6 mg Take 2 tablets (17.2 mg total) by mouth 2 (two) times a day  Patient not taking: Reported on 11/17/2024 9/20/24   Nancy Leonard MD   spironolactone (ALDACTONE) 25 mg tablet Take 1 tablet (25 mg total) by mouth daily  Patient not taking: Reported on 11/17/2024 9/21/24   Nancy Leonard MD     Allergies   Allergen Reactions    Metformin Diarrhea       Objective :  Temp:  [97.5 °F (36.4 °C)-97.9 °F (36.6 °C)] 97.5 °F (36.4 °C)  HR:  [66-76] 69  BP: (165-210)/() 174/107  Resp:  [18-22] 18  SpO2:  [94 %-96 %] 96 %  O2 Device: None (Room air)    Physical Exam  Constitutional:       General: He is not in acute distress.     Appearance: He is well-developed.   HENT:      Head: Normocephalic and atraumatic.   Cardiovascular:      Rate and Rhythm: Normal rate and regular rhythm.      Heart sounds: No murmur heard.  Pulmonary:      Effort: Pulmonary effort is normal. No respiratory distress.      Breath sounds: Normal breath sounds. No wheezing or  rales.   Abdominal:      General: Bowel sounds are normal. There is no distension.      Palpations: Abdomen is soft.   Musculoskeletal:      Cervical back: Normal range of motion and neck supple.   Skin:     General: Skin is warm and dry.      Findings: No rash.   Neurological:      Mental Status: He is alert and oriented to person, place, and time.      Motor: Weakness (right sided) present.      Comments: Right facial droop and slurred speech. No nystagmus           Lines/Drains:            Lab Results: I have reviewed the following results:  Results from last 7 days   Lab Units 11/17/24  0847   WBC Thousand/uL 7.76   HEMOGLOBIN g/dL 14.1   HEMATOCRIT % 43.4   PLATELETS Thousands/uL 185     Results from last 7 days   Lab Units 11/17/24  0847   SODIUM mmol/L 139   POTASSIUM mmol/L 3.7   CHLORIDE mmol/L 105   CO2 mmol/L 27   BUN mg/dL 16   CREATININE mg/dL 0.84   ANION GAP mmol/L 7   CALCIUM mg/dL 8.7   ALBUMIN g/dL 4.0   TOTAL BILIRUBIN mg/dL 1.15*   ALK PHOS U/L 45   ALT U/L 8   AST U/L 9*   GLUCOSE RANDOM mg/dL 203*     Results from last 7 days   Lab Units 11/17/24  0847   INR  1.15     Results from last 7 days   Lab Units 11/17/24  0846   POC GLUCOSE mg/dl 157*     Lab Results   Component Value Date    HGBA1C 9.0 (H) 09/15/2024    HGBA1C 9.2 (H) 09/10/2024    HGBA1C 11.9 (H) 05/17/2024           Imaging Results Review: I reviewed radiology reports from this admission including: CTA head and neck and CT head.  Other Study Results Review: EKG was reviewed.     Administrative Statements   Topics discussed with the patient / family include symptom assessment and management, medication review, medication adjustment, and advanced directives.    ** Please Note: This note has been constructed using a voice recognition system. **

## 2024-11-17 NOTE — ASSESSMENT & PLAN NOTE
Started this am and is worse with position change.  Neurology consult appreciated.  They are suspecting BPPV and recommending meclizine around-the-clock..  They do however want to admit the patient under stroke pathway and check an MRI of the brain given his stroke history.  Start stroke pathway  MRI of brain  Start aspirin and Plavix for now per neurology recommendation.  Continue statin  Meclizine around-the-clock  Telemetry  BP goal 1 .  Normalized if MRI brain negative.  PT/OT

## 2024-11-17 NOTE — PLAN OF CARE
Problem: PAIN - ADULT  Goal: Verbalizes/displays adequate comfort level or baseline comfort level  Description: Interventions:  - Encourage patient to monitor pain and request assistance  - Assess pain using appropriate pain scale  - Administer analgesics based on type and severity of pain and evaluate response  - Implement non-pharmacological measures as appropriate and evaluate response  - Consider cultural and social influences on pain and pain management  - Notify physician/advanced practitioner if interventions unsuccessful or patient reports new pain  Outcome: Progressing     Problem: INFECTION - ADULT  Goal: Absence or prevention of progression during hospitalization  Description: INTERVENTIONS:  - Assess and monitor for signs and symptoms of infection  - Monitor lab/diagnostic results  - Monitor all insertion sites, i.e. indwelling lines, tubes, and drains  - Administer medications as ordered  - Instruct and encourage patient and family to use good hand hygiene technique  - Identify and instruct in appropriate isolation precautions for identified infection/condition  Outcome: Progressing     Problem: SAFETY ADULT  Goal: Patient will remain free of falls  Description: INTERVENTIONS:  - Educate patient/family on patient safety including physical limitations  - Instruct patient to call for assistance with activity   - Consult OT/PT to assist with strengthening/mobility   - Keep Call bell within reach  - Keep bed low and locked with side rails adjusted as appropriate  - Keep care items and personal belongings within reach  - Initiate and maintain comfort rounds  - Make Fall Risk Sign visible to staff  - Offer Toileting every 2 Hours, in advance of need  - Initiate/Maintain bed alarm  - Obtain necessary fall risk management equipment: socks  - Apply yellow socks and bracelet for high fall risk patients  - Consider moving patient to room near nurses station  Outcome: Progressing  Goal: Maintain or return to  baseline ADL function  Description: INTERVENTIONS:  -  Assess patient's ability to carry out ADLs; assess patient's baseline for ADL function and identify physical deficits which impact ability to perform ADLs (bathing, care of mouth/teeth, toileting, grooming, dressing, etc.)  - Assess/evaluate cause of self-care deficits   - Assess range of motion  - Assess patient's mobility; develop plan if impaired  - Assess patient's need for assistive devices and provide as appropriate  - Encourage maximum independence but intervene and supervise when necessary  - Involve family in performance of ADLs  - Assess for home care needs following discharge   - Consider OT consult to assist with ADL evaluation and planning for discharge  - Provide patient education as appropriate  Outcome: Progressing  Goal: Maintains/Returns to pre admission functional level  Description: INTERVENTIONS:  - Perform AM-PAC 6 Click Basic Mobility/ Daily Activity assessment daily.  - Set and communicate daily mobility goal to care team and patient/family/caregiver.   - Collaborate with rehabilitation services on mobility goals if consulted  - Perform Range of Motion 3 times a day.  - Reposition patient every 2 hours.  - Dangle patient 2 times a day  - Stand patient 2 times a day  - Ambulate patient 2 times a day  - Out of bed to chair 2 times a day   - Out of bed for meals 2 times a day  - Out of bed for toileting  - Record patient progress and toleration of activity level   Outcome: Progressing     Problem: DISCHARGE PLANNING  Goal: Discharge to home or other facility with appropriate resources  Description: INTERVENTIONS:  - Identify barriers to discharge w/patient and caregiver  - Arrange for needed discharge resources and transportation as appropriate  - Identify discharge learning needs (meds, wound care, etc.)  - Arrange for interpretive services to assist at discharge as needed  - Refer to Case Management Department for coordinating discharge  planning if the patient needs post-hospital services based on physician/advanced practitioner order or complex needs related to functional status, cognitive ability, or social support system  Outcome: Progressing     Problem: Knowledge Deficit  Goal: Patient/family/caregiver demonstrates understanding of disease process, treatment plan, medications, and discharge instructions  Description: Complete learning assessment and assess knowledge base.  Interventions:  - Provide teaching at level of understanding  - Provide teaching via preferred learning methods  Outcome: Progressing     Problem: Neurological Deficit  Goal: Neurological status is stable or improving  Description: Interventions:  - Monitor and assess patient's level of consciousness, motor function, sensory function, and level of assistance needed for ADLs.   - Monitor and report changes from baseline. Collaborate with interdisciplinary team to initiate plan and implement interventions as ordered.   - Provide and maintain a safe environment.  - Consider seizure precautions.  - Consider fall precautions.  - Consider aspiration precautions.  - Consider bleeding precautions.  Outcome: Progressing     Problem: Communication Impairment  Goal: Ability to express needs and understand communication  Description: Assess patient's communication skills and ability to understand information.  Patient will demonstrate use of effective communication techniques, alternative methods of communication and understanding even if not able to speak.     - Encourage communication and provide alternate methods of communication as needed.  - Collaborate with case management/ for discharge needs.  - Include patient/family/caregiver in decisions related to communication.  Outcome: Progressing

## 2024-11-17 NOTE — ASSESSMENT & PLAN NOTE
History of multiple strokes  On Plavix and statin  Recently discharged from rehab 11/15 s/p a stroke in September

## 2024-11-17 NOTE — CONSULTS
stroke Consultation   Wilder Vargas 59 y.o. male MRN: [id number]  Unit/Bed#: -01 Encounter: [id number]      Assessment & Plan   Assessment: Stronger suspicion for bppv given symptoms worsen with position change. He also shifts frequently during his sleep and noticed symptoms upon waking iniitally at 3:30 am to use his bedside urinal. He had an acute left thalamic stroke this past September which left him with rt facial asymmetry, dysarthria, rue greater than rle weakness.  On my exam no new deficits. He does not have nystagmus. Cannot fully exclude the possibility of posterior circulation cva. No afib hx. Sbp 190s just over 200 here. [facility] was in [facility] and from there another facility [facility] and just came home Friday night. He is living with his mother. Patient does not know the specific names of his meds including antiplatelets.  Ct head showing chronic/recent strokes, no evidence of developing acute cva and cta head/neck with no lvo.    Plan: Admit on stroke pathway  Aspirin 325 mg load, Plavix 300 mg load, from tomorrow aspirin 81 mg and plavix 75 mg daily  Lipitor 40 mg daily  Tele  Mri brain w/o contrast  Meczlizine 25 mg po q8  Iv fluids  Zofran or reglan prn  Sbp 140-200, normalize if mri brain negative for acute cva  Patient had recent echo on 9/11/24, no clinical indication to repeat  Flp, tsh, hba1c, b12  PT/OT recs    History of Present Illness       Physician Requesting Consult: Meggan Perales MD  Reason for Consult / Principal Problem: Stroke Alert  Patient last known well: Last night  Stroke alert called: 8:42 AM  Neurology time of arrival/evaluation: 8:42 AM initially by phone  TPA Decision: Patient is outside of the IV TNK window        HPI: Wilder Vargas is a 59 y.o. year old  male who presents with acute vertiginous symptoms.  He says when we woke at 3:30 am to use his bedside urinal he noticed the room spinning but then went back to bed. When he woke up around 7:30 am the  "spinning was worse and he had nausea and vomiting. His mother attended to him and he instructed her to call EMS. Patient was then stroke alerted here at the [facility].      Per 12 point review see HPI, has slurred speech right-sided weakness right-sided facial droop from the recent stroke September, currently with vertiginous symptoms, rest negative    Historical Information   Past Medical History:   Diagnosis Date    CVA (cerebral vascular accident) (HCC)     Diabetes mellitus (HCC)     Hypertension      Past Surgical History:   Procedure Laterality Date    NO PAST SURGERIES       Social History   Social History     Substance and Sexual Activity   Alcohol Use Not Currently     Social History     Substance and Sexual Activity   Drug Use Yes    Types: Marijuana    Comment: few days     Social History     Tobacco Use   Smoking Status Every Day    Current packs/day: 1.00    Average packs/day: 1 pack/day for 42.9 years (42.9 ttl pk-yrs)    Types: Cigarettes    Start date: 1982   Smokeless Tobacco Never       Family History: Family history non-contributory    Meds/Allergies   all current active meds have been reviewed    Allergies   Allergen Reactions    Metformin Diarrhea       Objective   Vitals:Blood pressure 165/75, pulse 76, temperature 97.9 °F (36.6 °C), resp. rate 22, height 5' 8\" (1.727 m), weight 88.6 kg (195 lb 5.2 oz), SpO2 96%.,Body mass index is 29.7 kg/m².    Physical exam  General.  No acute distress  Extremities.  Atraumatic no visible deformities    Neuro exam  Mental status.  Alert and oriented x 3.  Attention concentration intact.  No expressive or receptive aphasia  Cranial nerves II to XII.  2-12 intact except right facial asymmetry mild dysarthria  Motor exam.  Increased tone right upper extremity and right lower extremity more so in the right upper extremity.  Patient has 2 power in the right deltoid biceps and triceps unable to extend or flex fingers.  Right hip flexion 3 - can " barely elevate off the bed knee extension 4 - knee flexion 3 -, dorsiflexion, plantarflexion trace.  Left upper and left lower extremities 5 power.  Urination.  Unable to do finger-nose testing with the right upper extremity with the right lower extremity able to heel onto the shin move about an inch but cannot go further.  Finger-nose testing with the left upper extremity is intact along with heel-to-shin testing in the left lower extremity.  Sensory exam.  Light touch intact upper and lower extremities bilaterally  Reflexes.  Triceps 1 bilaterally biceps 2 bilaterally brachioradialis 1 bilaterally, right patellar 3+ left patellar 3, there is clonus in the right ankle left ankle is 1+      NIHSS:  1a.Level of Consciousness: 0 = Alert   1b. LOC Questions: 0 = Answers both correctly   1c. LOC Commands: 0 = Obeys both correctly   2. Best Gaze: 0 = Normal   3. Visual: 0 = No visual field loss   4. Facial Palsy: 2=Partial paralysis (total or near total paralysis of the lower face)   5a. Motor Right Arm: 3=No effort against gravity, limb falls   5b. Motor Left Arm: 0=No drift, limb holds 90 (or 45) degrees for full 10 seconds   6a. Motor Right Le=Some effort against gravity, limb cannot get to or maintain (if cured) 90 (or 45) degrees, drifts down to bed, but has some effort against gravity   6b. Motor Left Le=No drift, limb holds 90 (or 45) degrees for full 10 seconds   7. Limb Ataxia:  1=Present in one limb   8. Sensory: 0=Normal; no sensory loss   9. Best Language:  0=No aphasia, normal   10. Dysarthria: 1=Mild to moderate, patient slurs at least some words and at worst, can be understood with some difficulty   11. Extinction and Inattention (formerly Neglect): 0=No abnormality   Total Score: 9       Lab Results: I have personally reviewed pertinent reports.    Imaging Studies: Results Review Statement: I personally reviewed the following image studies in PACS and associated radiology reports: CT head. My  interpretation of the radiology images/reports is: no different than radiology.

## 2024-11-18 LAB
ANION GAP SERPL CALCULATED.3IONS-SCNC: 5 MMOL/L (ref 4–13)
ATRIAL RATE: 63 BPM
ATRIAL RATE: 74 BPM
BUN SERPL-MCNC: 18 MG/DL (ref 5–25)
CALCIUM SERPL-MCNC: 8.7 MG/DL (ref 8.4–10.2)
CHLORIDE SERPL-SCNC: 105 MMOL/L (ref 96–108)
CHOLEST SERPL-MCNC: 115 MG/DL (ref ?–200)
CO2 SERPL-SCNC: 30 MMOL/L (ref 21–32)
CREAT SERPL-MCNC: 0.91 MG/DL (ref 0.6–1.3)
GFR SERPL CREATININE-BSD FRML MDRD: 91 ML/MIN/1.73SQ M
GLUCOSE SERPL-MCNC: 138 MG/DL (ref 65–140)
GLUCOSE SERPL-MCNC: 147 MG/DL (ref 65–140)
GLUCOSE SERPL-MCNC: 152 MG/DL (ref 65–140)
GLUCOSE SERPL-MCNC: 155 MG/DL (ref 65–140)
GLUCOSE SERPL-MCNC: 158 MG/DL (ref 65–140)
HDLC SERPL-MCNC: 44 MG/DL
LDLC SERPL CALC-MCNC: 60 MG/DL (ref 0–100)
P AXIS: 56 DEGREES
P AXIS: 68 DEGREES
POTASSIUM SERPL-SCNC: 3.6 MMOL/L (ref 3.5–5.3)
PR INTERVAL: 156 MS
PR INTERVAL: 164 MS
QRS AXIS: 60 DEGREES
QRS AXIS: 63 DEGREES
QRSD INTERVAL: 106 MS
QRSD INTERVAL: 94 MS
QT INTERVAL: 434 MS
QT INTERVAL: 440 MS
QTC INTERVAL: 450 MS
QTC INTERVAL: 481 MS
SODIUM SERPL-SCNC: 140 MMOL/L (ref 135–147)
T WAVE AXIS: -17 DEGREES
T WAVE AXIS: 16 DEGREES
TRIGL SERPL-MCNC: 54 MG/DL (ref ?–150)
VENTRICULAR RATE: 63 BPM
VENTRICULAR RATE: 74 BPM

## 2024-11-18 PROCEDURE — 97167 OT EVAL HIGH COMPLEX 60 MIN: CPT

## 2024-11-18 PROCEDURE — 92610 EVALUATE SWALLOWING FUNCTION: CPT

## 2024-11-18 PROCEDURE — 99233 SBSQ HOSP IP/OBS HIGH 50: CPT | Performed by: PHYSICIAN ASSISTANT

## 2024-11-18 PROCEDURE — 97163 PT EVAL HIGH COMPLEX 45 MIN: CPT

## 2024-11-18 PROCEDURE — 80061 LIPID PANEL: CPT | Performed by: PHYSICIAN ASSISTANT

## 2024-11-18 PROCEDURE — 82948 REAGENT STRIP/BLOOD GLUCOSE: CPT

## 2024-11-18 PROCEDURE — 80048 BASIC METABOLIC PNL TOTAL CA: CPT | Performed by: PHYSICIAN ASSISTANT

## 2024-11-18 PROCEDURE — 93010 ELECTROCARDIOGRAM REPORT: CPT | Performed by: INTERNAL MEDICINE

## 2024-11-18 PROCEDURE — 99232 SBSQ HOSP IP/OBS MODERATE 35: CPT | Performed by: PHYSICIAN ASSISTANT

## 2024-11-18 RX ORDER — SENNOSIDES 8.6 MG
2 TABLET ORAL
Status: DISCONTINUED | OUTPATIENT
Start: 2024-11-18 | End: 2024-11-19 | Stop reason: HOSPADM

## 2024-11-18 RX ORDER — DOXAZOSIN 1 MG/1
8 TABLET ORAL
Status: DISCONTINUED | OUTPATIENT
Start: 2024-11-18 | End: 2024-11-19 | Stop reason: HOSPADM

## 2024-11-18 RX ORDER — PRAVASTATIN SODIUM 20 MG
20 TABLET ORAL
Status: DISCONTINUED | OUTPATIENT
Start: 2024-11-18 | End: 2024-11-19 | Stop reason: HOSPADM

## 2024-11-18 RX ORDER — LOSARTAN POTASSIUM 50 MG/1
100 TABLET ORAL DAILY
Status: DISCONTINUED | OUTPATIENT
Start: 2024-11-19 | End: 2024-11-19 | Stop reason: HOSPADM

## 2024-11-18 RX ORDER — DOCUSATE SODIUM 100 MG/1
100 CAPSULE, LIQUID FILLED ORAL 2 TIMES DAILY
Status: DISCONTINUED | OUTPATIENT
Start: 2024-11-18 | End: 2024-11-19 | Stop reason: HOSPADM

## 2024-11-18 RX ADMIN — GABAPENTIN 300 MG: 300 CAPSULE ORAL at 22:05

## 2024-11-18 RX ADMIN — INSULIN LISPRO 5 UNITS: 100 INJECTION, SOLUTION INTRAVENOUS; SUBCUTANEOUS at 12:11

## 2024-11-18 RX ADMIN — MECLIZINE HYDROCHLORIDE 25 MG: 12.5 TABLET ORAL at 22:04

## 2024-11-18 RX ADMIN — HYDRALAZINE HYDROCHLORIDE 50 MG: 25 TABLET ORAL at 05:51

## 2024-11-18 RX ADMIN — INSULIN LISPRO 5 UNITS: 100 INJECTION, SOLUTION INTRAVENOUS; SUBCUTANEOUS at 17:30

## 2024-11-18 RX ADMIN — CARVEDILOL 25 MG: 12.5 TABLET, FILM COATED ORAL at 16:31

## 2024-11-18 RX ADMIN — INSULIN LISPRO 1 UNITS: 100 INJECTION, SOLUTION INTRAVENOUS; SUBCUTANEOUS at 22:07

## 2024-11-18 RX ADMIN — ASPIRIN 81 MG: 81 TABLET, COATED ORAL at 08:50

## 2024-11-18 RX ADMIN — PRAVASTATIN SODIUM 20 MG: 20 TABLET ORAL at 16:31

## 2024-11-18 RX ADMIN — SENNOSIDES 17.2 MG: 8.6 TABLET, FILM COATED ORAL at 22:06

## 2024-11-18 RX ADMIN — TRIMETHOBENZAMIDE HYDROCHLORIDE 200 MG: 100 INJECTION INTRAMUSCULAR at 19:53

## 2024-11-18 RX ADMIN — HYDRALAZINE HYDROCHLORIDE 50 MG: 25 TABLET ORAL at 22:04

## 2024-11-18 RX ADMIN — DOXAZOSIN 8 MG: 1 TABLET ORAL at 22:05

## 2024-11-18 RX ADMIN — MECLIZINE HYDROCHLORIDE 25 MG: 12.5 TABLET ORAL at 05:51

## 2024-11-18 RX ADMIN — INSULIN LISPRO 1 UNITS: 100 INJECTION, SOLUTION INTRAVENOUS; SUBCUTANEOUS at 12:11

## 2024-11-18 RX ADMIN — CLOPIDOGREL 75 MG: 75 TABLET ORAL at 08:47

## 2024-11-18 RX ADMIN — PANTOPRAZOLE SODIUM 40 MG: 40 TABLET, DELAYED RELEASE ORAL at 05:51

## 2024-11-18 RX ADMIN — DOCUSATE SODIUM 100 MG: 100 CAPSULE, LIQUID FILLED ORAL at 22:06

## 2024-11-18 RX ADMIN — MECLIZINE HYDROCHLORIDE 25 MG: 12.5 TABLET ORAL at 13:31

## 2024-11-18 RX ADMIN — INSULIN LISPRO 5 UNITS: 100 INJECTION, SOLUTION INTRAVENOUS; SUBCUTANEOUS at 08:52

## 2024-11-18 RX ADMIN — FINASTERIDE 5 MG: 5 TABLET, FILM COATED ORAL at 08:47

## 2024-11-18 RX ADMIN — INSULIN GLARGINE 20 UNITS: 100 INJECTION, SOLUTION SUBCUTANEOUS at 22:08

## 2024-11-18 RX ADMIN — INSULIN LISPRO 1 UNITS: 100 INJECTION, SOLUTION INTRAVENOUS; SUBCUTANEOUS at 08:51

## 2024-11-18 RX ADMIN — ENOXAPARIN SODIUM 40 MG: 40 INJECTION SUBCUTANEOUS at 12:09

## 2024-11-18 NOTE — ASSESSMENT & PLAN NOTE
Lab Results   Component Value Date    HGBA1C 9.0 (H) 09/15/2024       Recent Labs     11/17/24  1602 11/17/24 2012 11/18/24  0750 11/18/24  1120   POCGLU 225* 130 158* 155*       Blood Sugar Average: Last 72 hrs:  (P) 165    Continue Lantus 20 units and mealtime insulin. Mother unsure of mealtime insulin dose. Will assess his needs while admitted.

## 2024-11-18 NOTE — SPEECH THERAPY NOTE
Speech Language/Pathology  Speech-Language Pathology Bedside Swallow Evaluation      Patient Name: Wilder Vargas    Today's Date: 11/18/2024     Problem List  Principal Problem:    Dizziness  Active Problems:    S/P stroke due to cerebrovascular disease    Type 2 diabetes mellitus with diabetic microalbuminuria, with long-term current use of insulin (HCC)    Primary hypertension      Past Medical History  Past Medical History:   Diagnosis Date    CVA (cerebral vascular accident) (HCC)     Diabetes mellitus (HCC)     Hypertension        Past Surgical History  Past Surgical History:   Procedure Laterality Date    NO PAST SURGERIES         Summary   Pt presents w/ s/s suggestive of min oral dysphagia, ultimately functional and suspected functional pharyngeal swallow skills.    Complete labial seal for retrieval and containment of all materials, no anterior bolus loss present. Min prolonged rotary mastication of regular textures, ultimately functional. Complete bolus breakdown and adequate bolus transfer. Min oral residue noted, cleared w/ liquid wash. Suspected fairly prompt swallow initiation and fair hyolaryngeal elevation to palpation. No overt s/s of aspiration at this time.     Education initiated w/ pt regarding strategies to optimize swallow safety including frequent/thorough oral care and to notify medical staff if s/s of aspiration arise. Pt verbalized understanding and agreement, denies questions or concerns at this time.     Pt w/ increased risk of aspiration 2/2 h/o dysphagia, h/o CVA, current admission c/f CVA, and multiple medical co-morbidities. SLP to f/u for diet tolerance x1 as able and appropriate.       Recommended Diet: regular diet and thin liquids   Recommended Form of Meds: as tolerated    Aspiration precautions and swallowing strategies: upright posture, only feed when fully alert, slow rate of feeding, small bites/sips, and alternating bites and sips  Other Recommendations: Continue frequent  oral care, provide set up assistance as necessary         Current Medical Status  Pt is a 59 y.o. male  with a PMH of CVA, HTN, DM type 2 who presents with new onset dizziness.  Patient states he woke up earlier this morning around 3 AM with symptoms of dizziness.  He went back to bed, then woke up again with worsening symptoms of dizziness.  Patient states the dizziness is worse with movement.  He also had some associated nausea and upset stomach with his symptoms.  Patient recently suffered a stroke in September.  He continues to have some right-sided deficits from the stroke.  Nothing new except for his dizziness today.  He was discharged to rehab and just recently discharged home on 11/15.  There was some confusion over his medications, but his mother states that he received all of his medications since he was home.  Blood pressure was noted to be elevated today, but he did not take his medications today.  Currently patient is complaining of dizziness.     Allergies:  No known food allergies    Past medical history:  Please see H&P for details    Special Studies:  11/17/24 MRI brain wo contrast:  Small focus of mild diffusion signal hyperintensity without signal loss on the ADC map, in the corona radiata white matter posterior left frontal lobe which may represent a small subacute infarct or T2 shine through in this region.     Additional focus of diffusion signal hyperintensity again initiated involving the left thalamic capsular region, with corresponding signal hyperintensity on the ADC map, most compatible with T2 shine through related to prior infarct in this region with   associated gliosis. Wallerian degeneration noted involving the left corticospinal tract.     Multiple old small additional lacunar infarcts as described above.     Mild chronic white matter microangiopathic changes involving both cerebral hemispheres.     Scattered chronic punctate microhemorrhages involving both cerebral hemispheres, the  deep gray matter, and the right dentate nucleus, which likely represent sequela of chronic hypertension given the distribution.     The study was marked in EPIC for immediate notification.    11/17/24 CTA stroke alert (head/neck):  1.  Patent major vessels of the [place] of [place] without high-grade stenosis.  2.  No significant stenosis in the cervical carotid or vertebral arteries.  3.  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    11/17/24 CT stroke alert brain:  1.  No acute intracranial CT abnormality.  2.  Stable old infarct in the left thalamocapsular region. Stable old lacunar infarct in the right periventricular white matter/corona radiata.  3.  Mild nonspecific white matter change suggesting microangiopathy.    History of VFSS:  N/A     Social/Education/Vocational Hx:  Pt lives at home     Swallow Information   Current Diet: regular diet and thin liquids   Baseline Diet: regular diet and thin liquids per pt report       Baseline Assessment   Behavior/Cognition: alert  Speech/Language Status: able to participate in conversation and able to follow commands  Patient Positioning: upright in bed  Pain Status/Interventions/Response to Interventions: No report of or nonverbal indications of pain.       Oral Mechanism Exam  Facial: right facial droop  Labial: decreased ROM right side  Lingual: WFL  Velum: symmetrical  Mandible: adequate ROM  Dentition: limited, missing multiple teeth   Vocal quality:clear/adequate   Volitional Cough: strong/productive   Respiratory Status: on RA   Additional: pt w/ noted dysarthria, baseline d/t previous CVA       Consistencies Assessed and Performance   Consistencies Administered: thin liquids and hard solids    Oral Stage:   Complete labial seal for retrieval and containment of all materials, no anterior bolus loss present. Min prolonged rotary mastication of regular textures, ultimately functional. Complete  bolus breakdown and adequate bolus transfer. Min oral residue noted, cleared w/ liquid wash.     Pharyngeal Stage:   Suspected fairly prompt swallow initiation and fair hyolaryngeal elevation to palpation. No overt s/s of aspiration at this time.     Esophageal Concerns: none reported    Summary and Recommendations (see above)    Results Reviewed with: patient, RN, and MD     Treatment Recommended: as able and appropriate x1 w/ larger PO sample    Dysphagia LTG  -Patient will demonstrate safe and effective oral intake (without overt s/s significant oral/pharyngeal dysphagia including s/s penetration or aspiration) for the highest appropriate diet level.     Short Term Goals:  -Pt will tolerate regular textures thin liquid with no significant s/s oral or pharyngeal dysphagia across 1-3 diagnostic session/s    -Patient will comply with a Video/Modified Barium Swallow study for more complete assessment of swallowing anatomy/physiology/aspiration risk and to assess efficacy of treatment techniques so as to best guide treatment plan if medically indicated     Speech Therapy Prognosis   Prognosis: good    Prognosis Considerations: age, medical status, and prior medical history

## 2024-11-18 NOTE — PLAN OF CARE
Problem: PHYSICAL THERAPY ADULT  Goal: Performs mobility at highest level of function for planned discharge setting.  See evaluation for individualized goals.  Description: Treatment/Interventions: Functional transfer training, LE strengthening/ROM, Endurance training, Therapeutic exercise, Patient/family training, Equipment eval/education, Bed mobility  Equipment Recommended: Wheelchair       See flowsheet documentation for full assessment, interventions and recommendations.  11/18/2024 1612 by Ashia Rajput, PT  Note:    Problem List: Decreased strength, Decreased endurance, Impaired balance, Decreased mobility  Assessment: Wilder Vargas is a 59 y.o. Male who presents to [facility] on 11/17/2024 from home w/ c/o dizziness and diagnosis of dizziness. Orders for PT eval and treat received. Pt presents w/ comorbidities of recent acute CVA (6/2024), DMII, uncontrolled HTN, HLD. Madi reports since DC home [facility], he's been squat pivoting to a WC. Upon evaluation, pt presents w/ the following deficits: weakness, decreased ROM, impaired tone, impaired balance, and decreased endurance. Upon eval, pt requires mod A x 1 for bed mobility, min A x 1 for transfers. Based on this PT evaluation today, patient's discharge recommendation is for Level III - Low Rehab Resource Intensity. During this admission, pt would benefit from continued skilled inpatient PT in the acute care setting in order to address the abovementioned deficits to maximize function and mobility before DC from acute care.        Rehab Resource Intensity Level, PT: III (Minimum Resource Intensity)    See flowsheet documentation for full assessment.

## 2024-11-18 NOTE — PLAN OF CARE
Problem: OCCUPATIONAL THERAPY ADULT  Goal: Performs self-care activities at highest level of function for planned discharge setting.  See evaluation for individualized goals.  Description:   Outcome: Progressing  Note: Limitation: Decreased ADL status, Decreased UE ROM, Decreased UE strength, Decreased endurance, Decreased sensation, Decreased self-care trans  Prognosis: Fair  Assessment: Pt is a 59 y.o. male seen for OT evaluation at [facility], admitted 11/17/2024 w/ Dizziness.  OT completed extensive review of pt's medical and social history. Comorbidities affecting pt's functional performance at time of assessment include: hx CVA, DM type 2, HTN. Prior to admission, pt was living in 2SH, 4STE (stays on 1st floor) and was assisted for ADLs, squat pivots for transfers to w/c. Upon evaluation, pt presents to OT below baseline due to the following performance deficits: weakness, decreased strength, decreased balance, and decreased tolerance. Pt to benefit from continued skilled OT tx while in the hospital to address deficits as defined above and maximize level of functional independence w ADL's and functional mobility. Occupational Performance areas to address include: eating, grooming, bathing/shower, toilet hygiene, dressing, functional mobility, and functional transfers, bed mobility . The patient's raw score on the AM-PAC Daily Activity inpatient short form is 15, standardized score is 34.69, less than 39.4. Patients at this level are likely to benefit from DC to post-acute rehabilitation services. Based on findings, pt is of high complexity, due to medical comorbidities. Pt seen as a co-eval with PT due to the patient's co-morbidities, clinically unstable presentation, and present impairments which are a regression from the patient's baseline. At this time, OT recommendations at time of discharge are level 3- based off pt's baseline level of function.     Rehab Resource Intensity Level, OT: III  (Minimum Resource Intensity)

## 2024-11-18 NOTE — CASE MANAGEMENT
Case Management Discharge Planning Note    Patient name Wilder Quinn /-01 MRN [id number]  : 1965 Date 2024       Current Admission Date: 2024  Current Admission Diagnosis:Dizziness   Patient Active Problem List    Diagnosis Date Noted Date Diagnosed    Primary hypertension 2024     Dizziness 2024     Tobacco use 09/10/2024     Type 2 diabetes mellitus with diabetic microalbuminuria, with long-term current use of insulin ([facility]) 2024     Erythrocytosis 2024     Gastroesophageal reflux disease without esophagitis 2024     Type 2 diabetes mellitus with hyperglycemia (HCC) 2024     History of stroke 2024     Hypertensive emergency 2024     Other male erectile dysfunction 2024     Mixed hyperlipidemia 2024     Acute CVA (cerebrovascular accident) ([facility]) 2020       LOS (days): 0  Geometric Mean LOS (GMLOS) (days):   Days to GMLOS:     OBJECTIVE:            Current admission status: Observation   Preferred Pharmacy:   [id number] - [place] - [address] [place]  [address] [place] [place] [address]  Phone: [phone] Fax: [phone]    Primary Care Provider: Gilmar Oro MD    Primary Insurance: [employer]  Secondary Insurance:     DISCHARGE DETAILS:    Requested Home Health Care         Is the patient interested in HHC at discharge?: Yes  Home Health Discipline requested:: Nursing, Occupational Therapy, Physical Therapy  Home Health Agency Name:: Other ([facility])  HHA External Referral Reason (only applicable if external HHA name selected): Services not provided in network or near patient location  Home Health Follow-Up Provider:: PCP  Home Health Services Needed:: Evaluate Functional Status and Safety, Gait/ADL Training, Strengthening/Theraputic Exercises to Improve Function  Homebound Criteria Met:: Requires the Assistance of Another Person for Safe Ambulation or to  Leave the Home  Supporting Clincal Findings:: Limited Endurance

## 2024-11-18 NOTE — PROGRESS NOTES
Progress Note - Neurology   Name: Wilder Vargas 59 y.o. male I MRN: [id number]  Unit/Bed#: -01 I Date of Admission: 11/17/2024   Date of Service: 11/18/2024 I Hospital Day: 0    Assessment & Plan  Dizziness  59 y.o. male with multiple prior strokes (L midbrain/L thalamus in 5/2020, R corona radiata 6/2020, L thalamocapsular region 9/2024 with residual R face/arm/leg weakness), HTN, HLD, DM2, and tobacco use who presented to [facility] on 11/17/2024 as a stroke alert due to acute room spinning vertigo upon waking associated with nausea and vomiting.    /98  NIHSS 9 (chronic R face/arm/leg weakness and dysarthria)  CT head negative for acute intracranial abnormalities.  Only noted stable chronic strokes  CTA head/neck negative for LVO or significant stenosis.  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  He was loaded with aspirin/Plavix and placed on stroke pathway    MRI brain does not show any acute stroke in the cerebellum.  Only notes small subacute infarct in the left thalamic capsular region, subacute stroke vs T2 shine through in the L corona radiata white matter, and multiple old lacunar infarcts.  LDL 60, Cholesterol 115  A1C from 9/15/2024 was 9.0    Today, patient continues to have intermittent room spinning vertigo that occurs when turning in bed and resolves when lying still.  Etiology for vertigo likely BPPV.    Plan:  - Can discontinue stroke pathway  - Echo pending; will defer if needed to primary team  - Continue Meclizine 25 mg Q8 hours  - Continue home Plavix 75 mg daily and can stop aspirin  Per prior Neurology notes from 9/2024, patient completed DAPT with ASA/Plavix for 21 days (until approximately 10/3/2024) and then was to transition to Plavix monotherapy  - Continue home pravastatin 20 mg QHS (patient reports intolerance to Lipitor in the  past)  - Normotensive goal  - PT/OT; patient may benefit from Epley maneuver  - Follow up with ENT if symptoms persist   - Medical management and supportive care per primary team. Correction of any metabolic or infectious disturbances  - No further inpatient Neuro recommendations  History of stroke  - Multiple prior strokes  - Most recent stroke in 9/2024 (left thalamocapsular region)  - S/p DAPT with aspirin/Plavix and plan to transition to Plavix monotherapy  - Continue statin as above  Primary hypertension  - Normotensive goal    Wilder Vargas will need follow-up in  4-6 weeks  with neurovascular team for Other in 60 minute appointment. He will not require outpatient neurological testing.      Subjective   On exam, patient states that he continues to have intermittent dizziness that mostly occurs when he is turning and resolves while at rest.  The dizziness is better today compared to yesterday.  He continues to have R sided weakness from his prior stroke, but denies any new symptoms.    Review of Systems   Neurological:  Positive for dizziness, speech difficulty and weakness.   All other systems reviewed and are negative.        Objective :  Temp:  [97.5 °F (36.4 °C)-98.6 °F (37 °C)] 98.4 °F (36.9 °C)  HR:  [59-88] 66  BP: (131-174)/() 144/77  Resp:  [18-22] 18  SpO2:  [94 %-96 %] 96 %  O2 Device: None (Room air)    Physical Exam  Vitals and nursing note reviewed.   Constitutional:       Appearance: Normal appearance.      Comments: Patient lying comfortably in bed in no acute distress   HENT:      Head: Normocephalic and atraumatic.      Mouth/Throat:      Mouth: Mucous membranes are moist.      Pharynx: Oropharynx is clear.   Eyes:      Extraocular Movements: Extraocular movements intact.      Conjunctiva/sclera: Conjunctivae normal.      Pupils: Pupils are equal, round, and reactive to light.   Pulmonary:      Effort: Pulmonary effort is normal.   Musculoskeletal:      Comments: R sided weakness  (arm>leg)   Skin:     General: Skin is warm and dry.   Neurological:      Mental Status: He is alert.     Neurological Exam  Mental Status  Alert.  Patient awake and alert.  Oriented to person, place, month, and year.  Mild dysarthria  No aphasia  Able to follow simple midline and appendicular commands with left/right confusion.    Cranial Nerves  CN III, IV, VI: Extraocular movements intact bilaterally. Pupils equal round and reactive to light bilaterally.  Pupils 3 mm, round, reactive to light bilaterally.  EOMs intact without nystagmus.  No visual field deficits.  Facial sensation to light touch intact throughout.  R facial droop  Tongue midline.  Hearing grossly intact..    Motor    2/5 R deltoid, 1/5 R biceps, 1/5 R triceps, 0/5 R  strength, unable to wiggle fingers on the right  4-/5 R iliopsoas muscle, 3/5 R knee flexion, 0/5 R dorsiflexion/plantarflexion.  Unable to wiggle toes on the R  5/5 strength throughout LUE/LLE  No abnormal movements.    Sensory  Sensation to light touch intact throughout..    Reflexes  No ankle clonus bilaterally.        Lab Results: I have reviewed the following results:  Imaging Results Review: I personally reviewed the following image studies in PACS and associated radiology reports: MRI brain. My interpretation of the radiology images/reports is: as above.  Other Study Results Review: My interpretation of other studies include: prior MRI brain: L thalamic stroke.    VTE Pharmacologic Prophylaxis: VTE covered by:  enoxaparin, Subcutaneous, 40 mg at 11/17/24 9564    and Sequential compression device (Venodyne)

## 2024-11-18 NOTE — PLAN OF CARE
Problem: PAIN - ADULT  Goal: Verbalizes/displays adequate comfort level or baseline comfort level  Description: Interventions:  - Encourage patient to monitor pain and request assistance  - Assess pain using appropriate pain scale  - Administer analgesics based on type and severity of pain and evaluate response  - Implement non-pharmacological measures as appropriate and evaluate response  - Consider cultural and social influences on pain and pain management  - Notify physician/advanced practitioner if interventions unsuccessful or patient reports new pain  Outcome: Progressing     Problem: INFECTION - ADULT  Goal: Absence or prevention of progression during hospitalization  Description: INTERVENTIONS:  - Assess and monitor for signs and symptoms of infection  - Monitor lab/diagnostic results  - Monitor all insertion sites, i.e. indwelling lines, tubes, and drains  - Administer medications as ordered  - Instruct and encourage patient and family to use good hand hygiene technique  - Identify and instruct in appropriate isolation precautions for identified infection/condition  Outcome: Progressing     Problem: SAFETY ADULT  Goal: Patient will remain free of falls  Description: INTERVENTIONS:  - Educate patient/family on patient safety including physical limitations  - Instruct patient to call for assistance with activity   - Consult OT/PT to assist with strengthening/mobility   - Keep Call bell within reach  - Keep bed low and locked with side rails adjusted as appropriate  - Keep care items and personal belongings within reach  - Initiate and maintain comfort rounds  - Make Fall Risk Sign visible to staff  - Offer Toileting every 2 Hours, in advance of need  - Initiate/Maintain bed alarm  - Obtain necessary fall risk management equipment: socks  - Apply yellow socks and bracelet for high fall risk patients  - Consider moving patient to room near nurses station  Outcome: Progressing  Goal: Maintain or return to  baseline ADL function  Description: INTERVENTIONS:  -  Assess patient's ability to carry out ADLs; assess patient's baseline for ADL function and identify physical deficits which impact ability to perform ADLs (bathing, care of mouth/teeth, toileting, grooming, dressing, etc.)  - Assess/evaluate cause of self-care deficits   - Assess range of motion  - Assess patient's mobility; develop plan if impaired  - Assess patient's need for assistive devices and provide as appropriate  - Encourage maximum independence but intervene and supervise when necessary  - Involve family in performance of ADLs  - Assess for home care needs following discharge   - Consider OT consult to assist with ADL evaluation and planning for discharge  - Provide patient education as appropriate  Outcome: Progressing  Goal: Maintains/Returns to pre admission functional level  Description: INTERVENTIONS:  - Perform AM-PAC 6 Click Basic Mobility/ Daily Activity assessment daily.  - Set and communicate daily mobility goal to care team and patient/family/caregiver.   - Collaborate with rehabilitation services on mobility goals if consulted  - Perform Range of Motion 3 times a day.  - Reposition patient every 2 hours.  - Dangle patient 2 times a day  - Stand patient 2 times a day  - Ambulate patient 2 times a day  - Out of bed to chair 2 times a day   - Out of bed for meals 2 times a day  - Out of bed for toileting  - Record patient progress and toleration of activity level   Outcome: Progressing     Problem: DISCHARGE PLANNING  Goal: Discharge to home or other facility with appropriate resources  Description: INTERVENTIONS:  - Identify barriers to discharge w/patient and caregiver  - Arrange for needed discharge resources and transportation as appropriate  - Identify discharge learning needs (meds, wound care, etc.)  - Arrange for interpretive services to assist at discharge as needed  - Refer to Case Management Department for coordinating discharge  planning if the patient needs post-hospital services based on physician/advanced practitioner order or complex needs related to functional status, cognitive ability, or social support system  Outcome: Progressing     Problem: Knowledge Deficit  Goal: Patient/family/caregiver demonstrates understanding of disease process, treatment plan, medications, and discharge instructions  Description: Complete learning assessment and assess knowledge base.  Interventions:  - Provide teaching at level of understanding  - Provide teaching via preferred learning methods  Outcome: Progressing     Problem: Neurological Deficit  Goal: Neurological status is stable or improving  Description: Interventions:  - Monitor and assess patient's level of consciousness, motor function, sensory function, and level of assistance needed for ADLs.   - Monitor and report changes from baseline. Collaborate with interdisciplinary team to initiate plan and implement interventions as ordered.   - Provide and maintain a safe environment.  - Consider seizure precautions.  - Consider fall precautions.  - Consider aspiration precautions.  - Consider bleeding precautions.  Outcome: Progressing     Problem: Activity Intolerance/Impaired Mobility  Goal: Mobility/activity is maintained at optimum level for patient  Description: Interventions:  - Assess and monitor patient  barriers to mobility and need for assistive/adaptive devices.  - Assess patient's emotional response to limitations.  - Collaborate with interdisciplinary team and initiate plans and interventions as ordered.  - Encourage independent activity per ability.  - Maintain proper body alignment.  - Perform active/passive rom as tolerated/ordered.  - Plan activities to conserve energy.  - Turn patient as appropriate  Outcome: Progressing     Problem: Communication Impairment  Goal: Ability to express needs and understand communication  Description: Assess patient's communication skills and ability  to understand information.  Patient will demonstrate use of effective communication techniques, alternative methods of communication and understanding even if not able to speak.     - Encourage communication and provide alternate methods of communication as needed.  - Collaborate with case management/ for discharge needs.  - Include patient/family/caregiver in decisions related to communication.  Outcome: Progressing     Problem: Potential for Aspiration  Goal: Non-ventilated patient's risk of aspiration is minimized  Description: Assess and monitor vital signs, respiratory status, and labs (WBC).  Monitor for signs of aspiration (tachypnea, cough, rales, wheezing, cyanosis, fever).    - Assess and monitor patient's ability to swallow.  - Place patient up in chair to eat if possible.  - HOB up at 90 degrees to eat if unable to get patient up into chair.  - Supervise patient during oral intake.   - Instruct patient/ family to take small bites.  - Instruct patient/ family to take small single sips when taking liquids.  - Follow patient-specific strategies generated by speech pathologist.  Outcome: Progressing  Goal: Ventilated patient's risk of aspiration is minimized  Description: Assess and monitor vital signs, respiratory status, airway cuff pressure, and labs (WBC).  Monitor for signs of aspiration (tachypnea, cough, rales, wheezing, cyanosis, fever).    - Elevate head of bed 30 degrees if patient has tube feeding.  - Monitor tube feeding.  Outcome: Progressing     Problem: Nutrition  Goal: Nutrition/Hydration status is improving  Description: Monitor and assess patient's nutrition/hydration status for malnutrition (ex- brittle hair, bruises, dry skin, pale skin and conjunctiva, muscle wasting, smooth red tongue, and disorientation). Collaborate with interdisciplinary team and initiate plan and interventions as ordered.  Monitor patient's weight and dietary intake as ordered or per policy. Utilize  nutrition screening tool and intervene per policy. Determine patient's food preferences and provide high-protein, high-caloric foods as appropriate.     - Assist patient with eating.  - Allow adequate time for meals.  - Encourage patient to take dietary supplement as ordered.  - Collaborate with clinical nutritionist.  - Include patient/family/caregiver in decisions related to nutrition.  Outcome: Progressing     Problem: Prexisting or High Potential for Compromised Skin Integrity  Goal: Skin integrity is maintained or improved  Description: INTERVENTIONS:  - Identify patients at risk for skin breakdown  - Assess and monitor skin integrity  - Assess and monitor nutrition and hydration status  - Monitor labs   - Assess for incontinence   - Turn and reposition patient  - Assist with mobility/ambulation  - Relieve pressure over bony prominences  - Avoid friction and shearing  - Provide appropriate hygiene as needed including keeping skin clean and dry  - Evaluate need for skin moisturizer/barrier cream  - Collaborate with interdisciplinary team   - Patient/family teaching  - Consider wound care consult   Outcome: Progressing

## 2024-11-18 NOTE — PROGRESS NOTES
Progress Note - Hospitalist   Name: Wilder Vargas 59 y.o. male I MRN: [id number]  Unit/Bed#: -01 I Date of Admission: 11/17/2024   Date of Service: 11/18/2024 I Hospital Day: 0    Assessment & Plan  Dizziness  Started am of 11/17 and is worse with position change.  Neurology consult appreciated.  They are suspecting BPPV and recommending meclizine around-the-clock.  MRI brain negative for acute stroke  Discontinue stroke pathway  Meclizine around-the-clock  Restart BP meds  No indication for echo  PT/OT  History of stroke  History of multiple strokes  On Plavix and statin  Recently discharged from rehab 11/15 s/p a stroke in September  Type 2 diabetes mellitus with diabetic microalbuminuria, with long-term current use of insulin ([facility])  Lab Results   Component Value Date    HGBA1C 9.0 (H) 09/15/2024       Recent Labs     11/17/24  1602 11/17/24 2012 11/18/24  0750 11/18/24  1120   POCGLU 225* 130 158* 155*       Blood Sugar Average: Last 72 hrs:  (P) 165    Continue Lantus 20 units and mealtime insulin. Mother unsure of mealtime insulin dose. Will assess his needs while admitted.     Primary hypertension  BP uncontrolled. Has a history of uncontrolled HTN  Restart home BP regimen since MRI brain negative    VTE Pharmacologic Prophylaxis: VTE Score: 7 High Risk (Score >/= 5) - Pharmacological DVT Prophylaxis Ordered: enoxaparin (Lovenox). Sequential Compression Devices Ordered.    Mobility:   Basic Mobility Inpatient Raw Score: 12  JH-HLM Goal: 4: Move to chair/commode  JH-HLM Achieved: 2: Bed activities/Dependent transfer  JH-HLM Goal NOT achieved. Continue with multidisciplinary rounding and encourage appropriate mobility to improve upon JH-HLM goals.    Patient Centered Rounds: I performed bedside rounds with nursing staff today.   Discussions with Specialists or Other Care Team Provider: case management    Education and Discussions with Family / Patient: Updated  (mother) via phone.    Current  Length of Stay: 0 day(s)  Current Patient Status: Observation   Certification Statement: The patient will continue to require additional inpatient hospital stay due to vertigo management  Discharge Plan: Anticipate discharge tomorrow to home with home services.    Code Status: Level 1 - Full Code    Subjective   Still with dizziness, but improving.     Objective :  Temp:  [98.2 °F (36.8 °C)-98.6 °F (37 °C)] 98.4 °F (36.9 °C)  HR:  [59-88] 66  BP: (131-169)/(75-95) 144/77  Resp:  [18] 18  SpO2:  [94 %-96 %] 96 %  O2 Device: None (Room air)    Body mass index is 30.97 kg/m².     Input and Output Summary (last 24 hours):     Intake/Output Summary (Last 24 hours) at 11/18/2024 1223  Last data filed at 11/18/2024 1050  Gross per 24 hour   Intake 2620 ml   Output 850 ml   Net 1770 ml       Physical Exam  Constitutional:       General: He is not in acute distress.     Appearance: He is well-developed.   HENT:      Head: Normocephalic and atraumatic.   Cardiovascular:      Rate and Rhythm: Normal rate and regular rhythm.      Heart sounds: No murmur heard.  Pulmonary:      Effort: Pulmonary effort is normal. No respiratory distress.      Breath sounds: Normal breath sounds. No wheezing or rales.   Abdominal:      General: Bowel sounds are normal. There is no distension.      Palpations: Abdomen is soft.   Musculoskeletal:      Cervical back: Normal range of motion and neck supple.   Skin:     General: Skin is warm and dry.      Findings: No rash.   Neurological:      Mental Status: He is alert and oriented to person, place, and time.      Motor: Weakness (right sided weakness and facial droop) present.           Lines/Drains:              Lab Results: I have reviewed the following results:   Results from last 7 days   Lab Units 11/17/24  0847   WBC Thousand/uL 7.76   HEMOGLOBIN g/dL 14.1   HEMATOCRIT % 43.4   PLATELETS Thousands/uL 185     Results from last 7 days   Lab Units 11/18/24  0549 11/17/24  0847   SODIUM mmol/L  140 139   POTASSIUM mmol/L 3.6 3.7   CHLORIDE mmol/L 105 105   CO2 mmol/L 30 27   BUN mg/dL 18 16   CREATININE mg/dL 0.91 0.84   ANION GAP mmol/L 5 7   CALCIUM mg/dL 8.7 8.7   ALBUMIN g/dL  --  4.0   TOTAL BILIRUBIN mg/dL  --  1.15*   ALK PHOS U/L  --  45   ALT U/L  --  8   AST U/L  --  9*   GLUCOSE RANDOM mg/dL 147* 203*     Results from last 7 days   Lab Units 11/17/24  0847   INR  1.15     Results from last 7 days   Lab Units 11/18/24  1120 11/18/24  0750 11/17/24 2012 11/17/24  1602 11/17/24  0846   POC GLUCOSE mg/dl 155* 158* 130 225* 157*               Recent Cultures (last 7 days):           Last 24 Hours Medication List:     Current Facility-Administered Medications:     acetaminophen (TYLENOL) tablet 650 mg, Q4H PRN    amLODIPine (NORVASC) tablet 10 mg, Daily    carvedilol (COREG) tablet 25 mg, BID With Meals    clopidogrel (PLAVIX) tablet 75 mg, Daily    doxazosin (CARDURA) tablet 8 mg, HS    enoxaparin (LOVENOX) subcutaneous injection 40 mg, Daily    finasteride (PROSCAR) tablet 5 mg, Daily    gabapentin (NEURONTIN) capsule 300 mg, HS    hydrALAZINE (APRESOLINE) tablet 50 mg, Q8H JASON    insulin glargine (LANTUS) subcutaneous injection 20 Units 0.2 mL, HS    insulin lispro (HumALOG/ADMELOG) 100 units/mL subcutaneous injection 1-5 Units, HS    insulin lispro (HumALOG/ADMELOG) 100 units/mL subcutaneous injection 1-6 Units, TID AC **AND** Fingerstick Glucose (POCT), TID AC    insulin lispro (HumALOG/ADMELOG) 100 units/mL subcutaneous injection 5 Units, TID With Meals    [START ON 11/19/2024] losartan (COZAAR) tablet 100 mg, Daily    meclizine (ANTIVERT) tablet 25 mg, Q8H JASON    pantoprazole (PROTONIX) EC tablet 40 mg, QAM    polyethylene glycol (MIRALAX) packet 17 g, Daily    pravastatin (PRAVACHOL) tablet 20 mg, Daily With Dinner    trimethobenzamide (TIGAN) IM injection 200 mg, Q6H PRN    Administrative Statements   Today, Patient Was Seen By: Gladys Lopez PA-C      **Please Note: This note may have been  constructed using a voice recognition system.**

## 2024-11-18 NOTE — ASSESSMENT & PLAN NOTE
- Multiple prior strokes  - Most recent stroke in 9/2024 (left thalamocapsular region)  - S/p DAPT with aspirin/Plavix and plan to transition to Plavix monotherapy  - Continue statin as above

## 2024-11-18 NOTE — CASE MANAGEMENT
Case Management Assessment & Discharge Planning Note    Patient name Wilder Vargas  Location /-01 MRN [id number]  : 1965 Date 2024       Current Admission Date: 2024  Current Admission Diagnosis:Dizziness   Patient Active Problem List    Diagnosis Date Noted Date Diagnosed    Primary hypertension 2024     Dizziness 2024     Tobacco use 09/10/2024     Type 2 diabetes mellitus with diabetic microalbuminuria, with long-term current use of insulin ([facility]) 2024     Erythrocytosis 2024     Gastroesophageal reflux disease without esophagitis 2024     Type 2 diabetes mellitus with hyperglycemia ([facility]) 2024     History of stroke 2024     Hypertensive emergency 2024     Other male erectile dysfunction 2024     Mixed hyperlipidemia 2024     Acute CVA (cerebrovascular accident) ([facility]) 2020       LOS (days): 0  Geometric Mean LOS (GMLOS) (days):   Days to GMLOS:     OBJECTIVE:          Current admission status: Observation    Preferred Pharmacy:   [id number] [place] - [address] [place]  [address] [place] [address]  Phone: [phone] Fax: [phone]    Primary Care Provider: Gilmar Oro MD    Primary Insurance: [employer]  Secondary Insurance:     ASSESSMENT:  Active Health Care Proxies       Navdeep Vargas [facility] Care Representative - Mother   Primary Phone: [phone] (Home)                 Advance Directives  Primary Contact: Lorena Vargas: mother: [phone]    Readmission Root Cause  30 Day Readmission: No    Patient Information  Admitted from:: Home  Mental Status: Alert  During Assessment patient was accompanied by: Not accompanied during assessment  Assessment information provided by:: Patient  Primary Caregiver: Self  Support Systems: Self, Parent  County of Residence: [place]  What city do you live in?: [place]  Home entry access options. Select all that apply.:  Stairs  Number of steps to enter home.: 4  Do the steps have railings?: Yes  Type of Current Residence: 2 story home  Upon entering residence, is there a bedroom on the main floor (no further steps)?: No  A bedroom is located on the following floor levels of residence (select all that apply):: 2nd Floor  Upon entering residence, is there a bathroom on the main floor (no further steps)?: No  Indicate which floors of current residence have a bathroom (select all the apply):: 2nd Floor  Number of steps to 2nd floor from main floor: One Flight  Living Arrangements: Lives w/ Parent(s)  Is patient a ?: No    Activities of Daily Living Prior to Admission  Functional Status: Independent  Completes ADLs independently?: Yes  Ambulates independently?: Yes  Does patient use assisted devices?: No  Does patient currently own DME?: Yes  What DME does the patient currently own?: Bedside Commode, Wheelchair, Walker  Does patient have a history of Outpatient Therapy (PT/OT)?: No  Does the patient have a history of Short-Term Rehab?: Yes ([facility], [facility])  Does patient have a history of HHC?: No  Does patient currently have HHC?: No    Patient Information Continued  Income Source: Unknown  Does patient have prescription coverage?: Yes  Does patient receive dialysis treatments?: No  Does patient have a history of substance abuse?: No  Does patient have a history of Mental Health Diagnosis?: No    Means of Transportation  Means of Transport to Appts:: Family transport    DISCHARGE DETAILS:    Discharge planning discussed with:: Patient  Freedom of Choice: Yes  Comments - Freedom of Choice: [facility] [facility] referrals sent for SNPT/OT    Additional Comments: Met with Pt. Pt presents AA&Ox3. Discussed role of case management. Pt resides with his mother in 2sh, 4 samantha with railings. B/B on 2nd floor. Independent PTA. Owns commode, wheelchair, walkers. Uses [email or web address] in [place] and able to afford medications. No POA/living will  and declines information. Hx of [facility], [facility].No hx of MH/D&A. Pt reports he thought he was supposed to have home care upon discharge from [facility] but no one came. Per Emilie at [facility], SW was looking at [facility] but [facility] cannot accept. [place] [facility] referrals sent via [facility].  to follow.

## 2024-11-18 NOTE — CASE MANAGEMENT
Case Management Progress Note    Patient name Wilder Quinn /-01 MRN [id number]  : 1965 Date 2024       LOS (days): 0  Geometric Mean LOS (GMLOS) (days):   Days to GMLOS:        OBJECTIVE:        Current admission status: Observation  Preferred Pharmacy:   [id number] [place] - [address] [place]  [address] [place] [address]  Phone: [phone] Fax: [phone]    Primary Care Provider: Gilmar Oro MD    Primary Insurance: [employer]  Secondary Insurance:     PROGRESS NOTE:  [facility] only accepting VNA able to accept Pt due to insurance contract or out of service area. Met with Pt. Informed Pt that [facility] is only accepting VNA agency. Pt in agreement. [facility] reserved in [facility].

## 2024-11-18 NOTE — UTILIZATION REVIEW
Initial Clinical Review    Admission: Date/Time/Statement:   Admission Orders (From admission, onward)       Ordered        11/17/24 1039  Place in Observation  Once                          Orders Placed This Encounter   Procedures    Place in Observation     Standing Status:   Standing     Number of Occurrences:   1     Level of Care:   Med Surg [16]     ED Arrival Information       Expected   -    Arrival   11/17/2024 08:38    Acuity   Emergent              Means of arrival   Ambulance    Escorted by   [facility]    Service   Hospitalist    Admission type   Emergency              Arrival complaint   -             Chief Complaint   Patient presents with    STROKE Alert     Pt woke up in the middle of the night feeling dizzy with vertigo. Pt went back to sleep. Woke up having headache and vomitting, pt recently had stroke         Initial Presentation: 59 y.o. male presents to ed from home on 11-17 for evaluation and treatment of headache, dizziness and vomiting. PMHX: recent stroke.  Neurology consulted - suspect BPPV.  Recommends scheduled meclizine.  Rule out stroke.  Admit to observation.   Plan for MRI brain, aspirin, plavix, statin, telemetry, neuro checks, SBP goal 140-200, PT/OT evaluations.  No indication for echo.     Anticipated Length of Stay/Certification Statement: Patient will be admitted on an observation basis with an anticipated length of stay of less than 2 midnights secondary to MRI brain.     Date: 11-18-24    Day 2: observation   MRI negative for stroke.  Restart BP meds.  PT/OT : patient will benefit for rehab due to functional deficits.   Discharge plan for home with [facility].    ED Treatment-Medication Administration from 11/17/2024 0838 to 11/17/2024 1113         Date/Time Order Dose Route Action     11/17/2024 0911 ondansetron (ZOFRAN) injection 4 mg 4 mg Intravenous Given     11/17/2024 0848 ondansetron (FOR EMS ONLY) (ZOFRAN) 4 mg/2 mL injection 4 mg   Given to EMS     11/17/2024  1040 amLODIPine (NORVASC) tablet 10 mg 10 mg Oral Given     11/17/2024 1044 aspirin tablet 325 mg 325 mg Oral Given     11/17/2024 1039 clopidogrel (PLAVIX) tablet 300 mg 300 mg Oral Given     11/17/2024 1040 meclizine (ANTIVERT) tablet 25 mg 25 mg Oral Given       Scheduled Medications:  amLODIPine, 10 mg, Oral, Daily  carvedilol, 25 mg, Oral, BID With Meals  clopidogrel, 75 mg, Oral, Daily  doxazosin, 8 mg, Oral, HS  enoxaparin, 40 mg, Subcutaneous, Daily  finasteride, 5 mg, Oral, Daily  gabapentin, 300 mg, Oral, HS  hydrALAZINE, 50 mg, Oral, Q8H JASON  insulin glargine, 20 Units, Subcutaneous, HS  insulin lispro, 1-5 Units, Subcutaneous, HS  insulin lispro, 1-6 Units, Subcutaneous, TID AC  insulin lispro, 5 Units, Subcutaneous, TID With Meals  [START ON 11/19/2024] losartan, 100 mg, Oral, Daily  meclizine, 25 mg, Oral, Q8H JASON  pantoprazole, 40 mg, Oral, QAM  polyethylene glycol, 17 g, Oral, Daily  pravastatin, 20 mg, Oral, Daily With Dinner      Continuous IV Infusions:     PRN Meds:  acetaminophen, 650 mg, Oral, Q4H PRN  trimethobenzamide, 200 mg, Intramuscular, Q6H PRN      ED Triage Vitals   Temperature Pulse Respirations Blood Pressure SpO2 Pain Score   11/17/24 0843 11/17/24 0843 11/17/24 0842 11/17/24 0842 11/17/24 0842 11/17/24 1140   97.9 °F (36.6 °C) 72 18 (!) 210/98 96 % No Pain     Weight (last 2 days)       Date/Time Weight    11/17/24 1100 92.4 (203.71)    11/17/24 09:17:05 88.6 (195.33)    11/17/24 0841 92.4 (203.71)            Vital Signs (last 3 days)       Date/Time Temp Pulse Resp BP MAP (mmHg) SpO2 O2 Device Ariana Coma Scale Score Pain    11/18/24 15:57:32 98.7 °F (37.1 °C) 63 -- 168/93 118 97 % -- -- --    11/18/24 15:55:14 98.7 °F (37.1 °C) 71 -- -- -- 93 % -- -- --    11/18/24 14:34:48 -- 71 -- 166/99 121 99 % -- -- --    11/18/24 1430 -- -- -- -- -- -- -- -- No Pain    11/18/24 1427 -- -- -- -- -- -- -- -- No Pain    11/18/24 13:30:28 -- -- -- 147/80 102 -- -- -- --    11/18/24 10:11:44  98.4 °F (36.9 °C) 66 18 144/77 99 96 % None (Room air) -- --    11/18/24 08:49:45 98.3 °F (36.8 °C) 80 -- 144/83 103 95 % -- -- --    11/18/24 0830 -- -- -- -- -- -- -- 15 No Pain    11/18/24 05:51:03 -- 63 -- 150/82 105 94 % -- 15 --    11/18/24 0551 -- -- -- 150/82 -- -- -- -- --    11/18/24 02:30:31 -- 59 -- 135/80 98 95 % -- -- --    11/18/24 01:28:23 -- 70 -- 135/84 101 96 % -- 15 --    11/17/24 2300 -- -- -- -- -- -- -- 15 --    11/17/24 22:33:36 98.6 °F (37 °C) 75 -- 131/86 101 94 % -- -- --    11/17/24 20:54:03 -- 81 -- 158/84 109 95 % -- 15 No Pain    11/17/24 18:34:22 -- 88 -- 161/92 115 95 % -- -- --    11/17/24 16:29:46 98.2 °F (36.8 °C) 83 -- 169/95 120 95 % -- -- --    11/17/24 14:51:12 98.5 °F (36.9 °C) 78 -- 138/75 96 95 % -- -- --    11/17/24 1319 -- -- -- 149/83 -- -- -- -- --    11/17/24 1140 -- -- -- -- -- -- -- -- No Pain    11/17/24 11:31:32 97.5 °F (36.4 °C) 69 18 174/107 129 96 % -- -- --    11/17/24 1100 -- 76 22 -- 131 96 % -- -- --    11/17/24 1030 -- 66 18 165/75 -- 95 % -- 15 --    11/17/24 1000 -- 72 20 183/72 -- 95 % -- 15 --    11/17/24 0930 -- 75 18 191/88 -- 95 % None (Room air) 15 --    11/17/24 0920 -- 72 20 191/88 -- 95 % -- 15 --    11/17/24 0908 -- 69 20 171/85 123 -- -- 15 --    11/17/24 0852 -- 68 20 186/86 124 94 % None (Room air) 15 --    11/17/24 0843 97.9 °F (36.6 °C) 72 -- -- -- -- -- -- --    11/17/24 0842 -- -- 18 210/98 -- 96 % -- -- --              Pertinent Labs/Diagnostic Test Results:   Radiology:  MRI brain wo contrast   Final  (11/17 1906)      Small focus of mild diffusion signal hyperintensity without signal loss on the ADC map, in the corona radiata white matter posterior left frontal lobe which may represent a small subacute infarct or T2 shine through in this region.      Additional focus of diffusion signal hyperintensity again initiated involving the left thalamic capsular region, with corresponding signal hyperintensity on the ADC map, most compatible  with T2 shine through related to prior infarct in this region with associated gliosis. Wallerian degeneration noted involving the left corticospinal tract.         Multiple old small additional lacunar infarcts as described above.      Mild chronic white matter microangiopathic changes involving both cerebral hemispheres.      Scattered chronic punctate microhemorrhages involving both cerebral hemispheres, the deep gray matter, and the right dentate nucleus, which likely represent sequela of chronic hypertension given the distribution.         CT stroke alert brain   Final(11/17 0944)      1.  No acute intracranial CT abnormality.   2.  Stable old infarct in the left thalamocapsular region. Stable old lacunar infarct in the right periventricular white matter/corona radiata.   3.  Mild nonspecific white matter change suggesting microangiopathy.            CTA stroke alert (head/neck)   Final Interpretation by Padmini Collins MD (11/17 0943)      1.  Patent major vessels of the [place] of rodriguez without high-grade stenosis.   2.  No significant stenosis in the cervical carotid or vertebral arteries.   3.  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               Findings were directly discussed with Roberto Hammond at 9:28 p.m.      Workstation performed: [id number]           Cardiology:  ECG 12 lead   Final  (11/18 1238)   Normal sinus rhythm   ST & T wave abnormality, consider lateral ischemia   Prolonged QT   Abnormal ECG      ECG 12 lead   Final (11/18 1239)   Normal sinus rhythm   T wave abnormality, consider lateral ischemia   Abnormal ECG        GI:  No orders to display           Results from last 7 days   Lab Units 11/17/24  0847   WBC Thousand/uL 7.76   HEMOGLOBIN g/dL 14.1   HEMATOCRIT % 43.4   PLATELETS Thousands/uL 185         Results from last 7 days   Lab Units 11/18/24  0549 11/17/24  0847   SODIUM mmol/L 140 139   POTASSIUM mmol/L 3.6 3.7    CHLORIDE mmol/L 105 105   CO2 mmol/L 30 27   ANION GAP mmol/L 5 7   BUN mg/dL 18 16   CREATININE mg/dL 0.91 0.84   EGFR ml/min/1.73sq m 91 95   CALCIUM mg/dL 8.7 8.7     Results from last 7 days   Lab Units 11/17/24  0847   AST U/L 9*   ALT U/L 8   ALK PHOS U/L 45   TOTAL PROTEIN g/dL 6.9   ALBUMIN g/dL 4.0   TOTAL BILIRUBIN mg/dL 1.15*   BILIRUBIN DIRECT mg/dL 0.19     Results from last 7 days   Lab Units 11/18/24  1726 11/18/24  1120 11/18/24  0750 11/17/24  2012 11/17/24  1602 11/17/24  0846   POC GLUCOSE mg/dl 138 155* 158* 130 225* 157*     Results from last 7 days   Lab Units 11/18/24  0549 11/17/24  0847   GLUCOSE RANDOM mg/dL 147* 203*       Results from last 7 days   Lab Units 11/17/24  1331 11/17/24  0847   HS TNI 0HR ng/L  --  3   HS TNI 4HR ng/L 3  --    HSTNI D4 ng/L 0  --          Results from last 7 days   Lab Units 11/17/24  0847   PROTIME seconds 15.2*   INR  1.15   PTT seconds 26     Results from last 7 days   Lab Units 11/17/24  0847   TSH 3RD GENERATON uIU/mL 1.421         Past Medical History:   Diagnosis Date    CVA (cerebral vascular accident) (HCC)     Diabetes mellitus (HCC)     Hypertension      Present on Admission:  **None**      Admitting Diagnosis:   Vertigo [R42]  Stroke (HCC) [I63.9]    Age/Sex: 59 y.o. male    Network Utilization Review Department  ATTENTION: Please call with any questions or concerns to [phone] and carefully listen to the prompts so that you are directed to the right person. All voicemails are confidential.   For Discharge needs, contact Care Management DC Support Team at [phone] opt. 2  Send all requests for admission clinical reviews, approved or denied determinations and any other requests to dedicated fax number below belonging to the campus where the patient is receiving treatment. List of dedicated fax numbers for the Facilities:  FACILITY NAME UR FAX NUMBER   ADMISSION DENIALS (Administrative/Medical Necessity) [phone]   DISCHARGE SUPPORT  TEAM (NETWORK) [phone]   PARENT CHILD HEALTH (Maternity/NICU/Pediatrics) [phone]   [facility] [phone]   General acute hospital [phone]   [facility] [phone]   [facility] [phone]   [facility] [phone]   [facility] [phone]   [facility] [phone]   [facility] [phone]   [facility] [phone]   [facility] [phone]   [facility] [phone]   [facility] [phone]

## 2024-11-18 NOTE — ASSESSMENT & PLAN NOTE
59 y.o. male with multiple prior strokes (L midbrain/L thalamus in 5/2020, R corona radiata 6/2020, L thalamocapsular region 9/2024 with residual R face/arm/leg weakness), HTN, HLD, DM2, and tobacco use who presented to [facility] on 11/17/2024 as a stroke alert due to acute room spinning vertigo upon waking associated with nausea and vomiting.    /98  NIHSS 9 (chronic R face/arm/leg weakness and dysarthria)  CT head negative for acute intracranial abnormalities.  Only noted stable chronic strokes  CTA head/neck negative for LVO or significant stenosis.  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  He was loaded with aspirin/Plavix and placed on stroke pathway    MRI brain does not show any acute stroke in the cerebellum.  Only notes small subacute infarct in the left thalamic capsular region, subacute stroke vs T2 shine through in the L corona radiata white matter, and multiple old lacunar infarcts.  LDL 60, Cholesterol 115  A1C from 9/15/2024 was 9.0    Today, patient continues to have intermittent room spinning vertigo that occurs when turning in bed and resolves when lying still.  Etiology for vertigo likely BPPV.    Plan:  - Can discontinue stroke pathway  - Echo pending; will defer if needed to primary team  - Continue Meclizine 25 mg Q8 hours  - Continue home Plavix 75 mg daily and can stop aspirin  Per prior Neurology notes from 9/2024, patient completed DAPT with ASA/Plavix for 21 days (until approximately 10/3/2024) and then was to transition to Plavix monotherapy  - Continue home pravastatin 20 mg QHS (patient reports intolerance to Lipitor in the past)  - Normotensive goal  - PT/OT; patient may benefit from Epley maneuver  - Follow up with ENT if symptoms persist   - Medical management and supportive care per primary team. Correction of any metabolic or  infectious disturbances  - No further inpatient Neuro recommendations

## 2024-11-18 NOTE — PROGRESS NOTES
Patient:  OSMANY HAYWOOD    MRN:  [id number]    Aidin Request ID:  [id number]    Level of care reserved:  Home Health Agency    Partner Reserved:  [facility], [place] [address] [phone]    Clinical needs requested:    Geography searched:  [phone]    Start of Service:    Request sent:  12:39pm EST on 11/18/2024 by Magda Jones    Partner reserved:  3:21pm EST on 11/18/2024 by Magda Jones    Choice list shared:  3:21pm EST on 11/18/2024 by Magda Jones

## 2024-11-18 NOTE — PHYSICAL THERAPY NOTE
"                                                                                  PHYSICAL THERAPY EVALUATION NOTE      Patient Name: Wilder Vargas  Today's Date: 2024    AGE:   59 y.o.  Mrn:   [id number]  ADMIT DX:  Vertigo [R42]  Stroke (HCC) [I63.9]    Past Medical History:   Diagnosis Date    CVA (cerebral vascular accident) (HCC)     Diabetes mellitus (HCC)     Hypertension      Length Of Stay: 0  PHYSICAL THERAPY EVALUATION :   Patient's identity confirmed via 2 patient identifiers (full name and ) at start of session       24 1430   PT Last Visit   PT Visit Date 24   Note Type   Note type Evaluation   Pain Assessment   Pain Assessment Tool 0-10   Pain Score No Pain   Restrictions/Precautions   Weight Bearing Precautions Per Order No   Other Precautions Bed Alarm;Chair Alarm;Telemetry;Fall Risk   Home Living   Type of Home House   Home Layout Two level;Stairs to enter with rails  (4 OSWALDO, FFSU)   Home Equipment Walker;Wheelchair-manual;Hospital bed   Additional Comments Madi reports performing squat pivot to wheelchair/commode, has a hospital bed   Prior Function   Level of [place] Needs assistance with ADLs;Needs assistance with functional mobility;Modified independent with wheelchair   Lives With Family  (Mother)   Receives Help From Family   IADLs Family/Friend/Other provides transportation;Family/Friend/Other provides meals;Family/Friend/Other provides medication management   Falls in the last 6 months 1 to 4  (1 while at [facility])   Vocational On disability   Comments Madi reports he's been home [email or web address] since Friday   General   Family/Caregiver Present No   Cognition   Overall Cognitive Status WFL   Arousal/Participation Alert   Attention Within functional limits   Orientation Level Oriented X4   Memory Within functional limits   Following Commands Follows one step commands without difficulty   Comments Mild dysarthria, flat affect   Subjective   Subjective \"I get dizzy when I " "turn my head to the right\"   RUE Assessment   RUE Assessment   (0/5)   Strength RLE   RLE Overall Strength 1/5  (0/5 distal)   Tone RLE   RLE Tone Hypotonic  (- clonus)   Vision-Basic Assessment   Current Vision No visual deficits   Vestibular   Vestibular Comments No nystagmus noted w/ head turns to the R or sitting up to the R   Bed Mobility   Supine to Sit 3  Moderate assistance   Additional items Assist x 1;Increased time required  (trunk assist towards his R)   Additional Comments (S)  Madi is able to sit EOB w/ supervision. /99   Transfers   Sit pivot 4  Minimal assistance   Additional items Assist x 1;Increased time required  (sit pivot from EOB to recliner on his L)   Additional Comments Madi reports he usually pivots to his R, aka his weak side. Recommend transferring towards his L w/ RN staff as he may perform even better.   Balance   Static Sitting Fair   Activity Tolerance   Activity Tolerance Other (Comment)  (dizziness but improves w/ (+) time after position changes)   Medical Staff Made Aware MILENA Sneed   Nurse Made Aware YAN Saleh   Assessment   Problem List Decreased strength;Decreased endurance;Impaired balance;Decreased mobility   Assessment Wilder Vargas is a 59 y.o. Male who presents to [facility]17[facility]20[facility] from home w/ c/o dizziness and diagnosis of dizziness. Orders for PT eval and treat received. Pt presents w/ comorbidities of recent acute CVA (6/2024), DMII, uncontrolled HTN, HLD. Madi reports since DC home from [facility], he's been squat pivoting to a WC. Upon evaluation, pt presents w/ the following deficits: weakness, decreased ROM, impaired tone, impaired balance, and decreased endurance. Upon eval, pt requires mod A x 1 for bed mobility, min A x 1 for transfers. Based on this PT evaluation today, patient's discharge recommendation is for Level III - Low Rehab Resource Intensity. During this admission, pt would benefit from continued skilled inpatient PT in the acute care setting in order to " address the abovementioned deficits to maximize function and mobility before DC from acute care.   Goals   Patient Goals to sit in the chair   STG Expiration Date 11/28/24   Plan   Treatment/Interventions Functional transfer training;LE strengthening/ROM;Endurance training;Therapeutic exercise;Patient/family training;Equipment eval/education;Bed mobility   PT Frequency 2-3x/wk   Discharge Recommendation   Rehab Resource Intensity Level, PT III (Minimum Resource Intensity)   Equipment Recommended Wheelchair   AM-PAC Basic Mobility Inpatient   Turning in Flat Bed Without Bedrails 3   Lying on Back to Sitting on Edge of Flat Bed Without Bedrails 2   Moving Bed to Chair 3   Standing Up From Chair Using Arms 2   Walk in Room 2   Climb 3-5 Stairs With Railing 1   Basic Mobility Inpatient Raw Score 13   Basic Mobility Standardized Score 33.99   [facility] Highest Level Of Mobility   [facility] Goal 4: Move to chair/commode   [facility] Achieved 4: Move to chair/commode  (this is the most he does at baseline)   End of Consult   Patient Position at End of Consult Bedside chair;Bed/Chair alarm activated;All needs within reach           The patient's AM-PAC Basic Mobility Inpatient Short Form Raw Score is 13, Standardized Score is 33.99. A standardized score less than 38.32 (raw score of 16) suggests the patient may benefit from discharge to post-acute rehabilitation services which may not coincide with above PT recommendations. However please refer to therapist recommendation for discharge planning given other factors that may influence destination.    Pt would benefit from skilled inpatient PT during this admission in order to facilitate progress towards goals to maximize functional independence    Ashia Rajput PT, DPT

## 2024-11-18 NOTE — OCCUPATIONAL THERAPY NOTE
"  Occupational Therapy Evaluation      Wilder Vargas    11/18/2024    Principal Problem:    Dizziness  Active Problems:    History of stroke    Type 2 diabetes mellitus with diabetic microalbuminuria, with long-term current use of insulin (HCC)    Primary hypertension      Past Medical History:   Diagnosis Date    CVA (cerebral vascular accident) (HCC)     Diabetes mellitus (HCC)     Hypertension        Past Surgical History:   Procedure Laterality Date    NO PAST SURGERIES          11/18/24 1427   OT Last Visit   OT Visit Date 11/18/24   Note Type   Note type Evaluation   Pain Assessment   Pain Assessment Tool 0-10   Pain Score No Pain   Restrictions/Precautions   Weight Bearing Precautions Per Order No   Home Living   Type of Home House   Home Layout Two level  (4STE; stays on 1st floor)   Bathroom Shower/Tub   (sponge bathes)   Bathroom Equipment Commode   Home Equipment Wheelchair-manual;Hospital bed   Additional Comments Uses a commode on the 1st floor; sponge bathes at sink   Prior Function   Level of Thomas Needs assistance with ADLs;Needs assistance with functional mobility;Modified independent with wheelchair   Lives With Family  (mother)   Receives Help From Family   IADLs Family/Friend/Other provides transportation;Family/Friend/Other provides meals;Family/Friend/Other provides medication management   Falls in the last 6 months 1 to 4  (1 during recent rehab stay)   Comments Has been home from rehab a few days and squat-pivots to wheelchair/bed/commode   Subjective   Subjective \"I get dizzy when I turn my head to the right\"   ADL   Eating Assistance 4  Minimal Assistance   Grooming Assistance 4  Minimal Assistance   UB Bathing Assistance 4  Minimal Assistance   LB Bathing Assistance 3  Moderate Assistance   UB Dressing Assistance 4  Minimal Assistance   LB Dressing Assistance 3  Moderate Assistance   Toileting Assistance  4  Minimal Assistance   Bed Mobility   Supine to Sit 3  Moderate assistance "   Additional items Assist x 1;Bedrails;HOB elevated  (trunk assist; hospital bed at home)   Transfers   Sit pivot 4  Minimal assistance   Additional items Assist x 1   Additional Comments Pt only performs sit pivot at baseline 2* CVA   Activity Tolerance   Activity Tolerance Patient tolerated treatment well   Medical Staff Made Aware PT Ashia   Nurse Made Aware YAN Saleh   RUFRANKLIN Assessment   RUE Assessment   (flaccid 2* prior CVA)   LUE Assessment   LUE Assessment WFL   Hand Function   Gross Motor Coordination Impaired   Fine Motor Coordination Impaired   Hand Function Comments Baseline 2* hx of CVA   Vision-Basic Assessment   Current Vision No visual deficits   Vision - Complex Assessment   Additional Comments No current visual changes   Cognition   Overall Cognitive Status WFL   Arousal/Participation Alert;Cooperative   Attention Within functional limits   Orientation Level Oriented X4   Memory Within functional limits   Following Commands Follows one step commands without difficulty   Comments Flat affect   Assessment   Limitation Decreased ADL status;Decreased UE ROM;Decreased UE strength;Decreased endurance;Decreased sensation;Decreased self-care trans   Prognosis Fair   Assessment Pt is a 59 y.o. male seen for OT evaluation at [facility], admitted 11/17/2024 w/ Dizziness.  OT completed extensive review of pt's medical and social history. Comorbidities affecting pt's functional performance at time of assessment include: hx CVA, DM type 2, HTN. Prior to admission, pt was living in 2SH, 4STE (stays on 1st floor) and was assisted for ADLs, squat pivots for transfers to w/c. Upon evaluation, pt presents to OT below baseline due to the following performance deficits: weakness, decreased strength, decreased balance, and decreased tolerance. Pt to benefit from continued skilled OT tx while in the hospital to address deficits as defined above and maximize level of functional independence w ADL's and functional  mobility. Occupational Performance areas to address include: eating, grooming, bathing/shower, toilet hygiene, dressing, functional mobility, and functional transfers, bed mobility . The patient's raw score on the AM-PAC Daily Activity inpatient short form is 15, standardized score is 34.69, less than 39.4. Patients at this level are likely to benefit from DC to post-acute rehabilitation services. Based on findings, pt is of high complexity, due to medical comorbidities. Pt seen as a co-eval with PT due to the patient's co-morbidities, clinically unstable presentation, and present impairments which are a regression from the patient's baseline. At this time, OT recommendations at time of discharge are level 3- based off pt's baseline level of function.   Goals   Patient Goals sit in the chair   Plan   Treatment Interventions ADL retraining;Functional transfer training;UE strengthening/ROM;Endurance training;Cognitive reorientation;Patient/family training;Equipment evaluation/education;Compensatory technique education;Continued evaluation;Energy conservation   Goal Expiration Date 11/28/24   OT Frequency 1-2x/wk   Discharge Recommendation   Rehab Resource Intensity Level, OT III (Minimum Resource Intensity)   AM-PAC Daily Activity Inpatient   Lower Body Dressing 2   Bathing 2   Toileting 2   Upper Body Dressing 3   Grooming 3   Eating 3   Daily Activity Raw Score 15   Daily Activity Standardized Score (Calc for Raw Score >=11) 34.69   AM-PAC Applied Cognition Inpatient   Following a Speech/Presentation 4   Understanding Ordinary Conversation 4   Taking Medications 4   Remembering Where Things Are Placed or Put Away 4   Remembering List of 4-5 Errands 4   Taking Care of Complicated Tasks 3   Applied Cognition Raw Score 23   Applied Cognition Standardized Score 53.08     Pt will achieve the following goals within 10 days.    *Pt will complete grooming with modified independence.    *Pt will complete UB bathing and  dressing with Min A.    *Pt will complete LB bathing and dressing with Min A .    *Pt will complete toileting (hygiene and clothing management) with modified independence    *Pt will complete bed mobility with modified independence, utilizing hospital bed.    *Pt will perform functional squat pivot transfers with modified independence in order to complete ADL routine.    *Pt will demonstrate increased activity tolerance in order to complete ADL routine.    *Pt will participate in UE therapeutic exercise in order to maximize strength for ADL transfers.    *Pt will sit on EOB for 5 minutes for increased safety with seated activity tolerance during ADL tasks.    *Pt will identify 3-5 fall risks to ensure safety upon discharge.    Allison Ghotra MS, OTR/L

## 2024-11-18 NOTE — ASSESSMENT & PLAN NOTE
Started am of 11/17 and is worse with position change.  Neurology consult appreciated.  They are suspecting BPPV and recommending meclizine around-the-clock.  MRI brain negative for acute stroke  Discontinue stroke pathway  Meclizine around-the-clock  Restart BP meds  No indication for echo  PT/OT

## 2024-11-18 NOTE — ASSESSMENT & PLAN NOTE
BP uncontrolled. Has a history of uncontrolled HTN  Restart home BP regimen since MRI brain negative

## 2024-11-19 VITALS
OXYGEN SATURATION: 96 % | TEMPERATURE: 97.5 F | HEART RATE: 72 BPM | RESPIRATION RATE: 17 BRPM | DIASTOLIC BLOOD PRESSURE: 87 MMHG | WEIGHT: 203.71 LBS | HEIGHT: 68 IN | BODY MASS INDEX: 30.87 KG/M2 | SYSTOLIC BLOOD PRESSURE: 159 MMHG

## 2024-11-19 LAB
GLUCOSE SERPL-MCNC: 127 MG/DL (ref 65–140)
GLUCOSE SERPL-MCNC: 138 MG/DL (ref 65–140)

## 2024-11-19 PROCEDURE — 82948 REAGENT STRIP/BLOOD GLUCOSE: CPT

## 2024-11-19 PROCEDURE — 99239 HOSP IP/OBS DSCHRG MGMT >30: CPT

## 2024-11-19 RX ORDER — MECLIZINE HYDROCHLORIDE 25 MG/1
25 TABLET ORAL EVERY 8 HOURS PRN
Qty: 30 TABLET | Refills: 0 | Status: SHIPPED | OUTPATIENT
Start: 2024-11-19

## 2024-11-19 RX ADMIN — HYDRALAZINE HYDROCHLORIDE 50 MG: 25 TABLET ORAL at 13:46

## 2024-11-19 RX ADMIN — MECLIZINE HYDROCHLORIDE 25 MG: 12.5 TABLET ORAL at 13:46

## 2024-11-19 RX ADMIN — HYDRALAZINE HYDROCHLORIDE 50 MG: 25 TABLET ORAL at 05:48

## 2024-11-19 RX ADMIN — CLOPIDOGREL 75 MG: 75 TABLET ORAL at 08:11

## 2024-11-19 RX ADMIN — INSULIN LISPRO 5 UNITS: 100 INJECTION, SOLUTION INTRAVENOUS; SUBCUTANEOUS at 12:59

## 2024-11-19 RX ADMIN — FINASTERIDE 5 MG: 5 TABLET, FILM COATED ORAL at 08:12

## 2024-11-19 RX ADMIN — PANTOPRAZOLE SODIUM 40 MG: 40 TABLET, DELAYED RELEASE ORAL at 05:48

## 2024-11-19 RX ADMIN — DOCUSATE SODIUM 100 MG: 100 CAPSULE, LIQUID FILLED ORAL at 08:11

## 2024-11-19 RX ADMIN — MECLIZINE HYDROCHLORIDE 25 MG: 12.5 TABLET ORAL at 05:48

## 2024-11-19 RX ADMIN — LOSARTAN POTASSIUM 100 MG: 50 TABLET, FILM COATED ORAL at 08:12

## 2024-11-19 RX ADMIN — ENOXAPARIN SODIUM 40 MG: 40 INJECTION SUBCUTANEOUS at 13:46

## 2024-11-19 RX ADMIN — INSULIN LISPRO 5 UNITS: 100 INJECTION, SOLUTION INTRAVENOUS; SUBCUTANEOUS at 08:12

## 2024-11-19 RX ADMIN — AMLODIPINE BESYLATE 10 MG: 5 TABLET ORAL at 08:11

## 2024-11-19 NOTE — NURSING NOTE
Patient to be discharged to home in stable condition. Instructions given to patient. Mother is to take him home.

## 2024-11-19 NOTE — ASSESSMENT & PLAN NOTE
BP uncontrolled. Has a history of uncontrolled HTN with sequela of this noted on MRI  Restart home BP regimen since MRI brain negative  SBP stable 130-150s following BP meds  Discussed with cardiology --> recommend outpt follow up for resistant HTN, will give referral for office f/u

## 2024-11-19 NOTE — DISCHARGE INSTR - AVS FIRST PAGE
Follow up providers:  Please follow up with PCP within 1 week of discharge for post hospital visit  Please follow up with outpatient neurology within 4-6 weeks of discharge   Please follow up with ENT (Ears, Nose, Throat) if vertigo does not resolve  Please follow-up with outpatient physical therapy for vestibular therapy to assist with resolution of vertigo    Medications:   Please continue all prior medication as previously prescribed   Please take meclizine 25 mg every 8 hours as needed for vertigo     Please return the hospital if experience new fevers or chills, chest pain, difficulty breathing, nausea, vomiting, or any additional concerning symptoms.

## 2024-11-19 NOTE — ASSESSMENT & PLAN NOTE
Started am of 11/17 and is worse with position change.  Neurology consult appreciated.  They are suspecting BPPV and recommending meclizine around-the-clock.  Discontinue aspirin, continue on plavix monotherapy as previously prescribed   MRI brain- does not show any acute stroke in the cerebellum.  Only notes small subacute infarct in the left thalamic capsular region, subacute stroke vs T2 shine through in the L corona radiata white matter, and multiple old lacunar infarcts. negative for acute stroke   Discontinue stroke pathway  Meclizine around-the-clock  Restart BP meds 11/18, stable   No indication for echo  PT/OT --> recommend [facility]

## 2024-11-19 NOTE — ASSESSMENT & PLAN NOTE
Lab Results   Component Value Date    HGBA1C 9.0 (H) 09/15/2024       Recent Labs     11/18/24  1726 11/18/24  2201 11/19/24  0733 11/19/24  1120   POCGLU 138 152* 127 138       Blood Sugar Average: Last 72 hrs:  (P) 153.9195538808568654    Continue Lantus 20 units and mealtime insulin.  Does not use mealtime insulin at home   Recent A1C improving from prior

## 2024-11-19 NOTE — DISCHARGE SUMMARY
Discharge Summary - Hospitalist   Name: Wilder Vargas 59 y.o. male I MRN: [id number]  Unit/Bed#: -01 I Date of Admission: 11/17/2024   Date of Service: 11/19/2024 I Hospital Day: 0     Assessment & Plan  Dizziness  Started am of 11/17 and is worse with position change.  Neurology consult appreciated.  They are suspecting BPPV and recommending meclizine around-the-clock.  Discontinue aspirin, continue on plavix monotherapy as previously prescribed   MRI brain- does not show any acute stroke in the cerebellum.  Only notes small subacute infarct in the left thalamic capsular region, subacute stroke vs T2 shine through in the L corona radiata white matter, and multiple old lacunar infarcts. negative for acute stroke   Discontinue stroke pathway  Meclizine around-the-clock  Restart BP meds 11/18, stable   No indication for echo  PT/OT --> recommend [facility]   History of stroke  History of multiple strokes  On Plavix and statin  Recently discharged from rehab 11/15 s/p a stroke in September  Type 2 diabetes mellitus with diabetic microalbuminuria, with long-term current use of insulin ([facility])  Lab Results   Component Value Date    HGBA1C 9.0 (H) 09/15/2024       Recent Labs     11/18/24  1726 11/18/24  2201 11/19/24  0733 11/19/24  1120   POCGLU 138 152* 127 138       Blood Sugar Average: Last 72 hrs:  (P) 153.2474594608981658    Continue Lantus 20 units and mealtime insulin.  Does not use mealtime insulin at home   Recent A1C improving from prior     Primary hypertension  BP uncontrolled. Has a history of uncontrolled HTN with sequela of this noted on MRI  Restart home BP regimen since MRI brain negative  SBP stable 130-150s following BP meds  Discussed with cardiology --> recommend outpt follow up for resistant HTN, will give referral for office f/u     Medical Problems       Resolved Problems  Date Reviewed: 9/18/2024   None       Discharging Physician / Practitioner: Allison Seay PA-C  PCP: Gilmar Oro  MD  Admission Date:   Admission Orders (From admission, onward)       Ordered        11/17/24 1039  Place in Observation  Once                          Discharge Date: 11/19/24    Consultations During Hospital Stay:  Neurology     Procedures Performed:   None     Significant Findings / Test Results:   MRI brain wo contrast   Final Result by Peggy Sepulveda MD (11/17 8196)      Small focus of mild diffusion signal hyperintensity without signal loss on the ADC map, in the corona radiata white matter posterior left frontal lobe which may represent a small subacute infarct or T2 shine through in this region.      Additional focus of diffusion signal hyperintensity again initiated involving the left thalamic capsular region, with corresponding signal hyperintensity on the ADC map, most compatible with T2 shine through related to prior infarct in this region with    associated gliosis. Wallerian degeneration noted involving the left corticospinal tract.      Multiple old small additional lacunar infarcts as described above.      Mild chronic white matter microangiopathic changes involving both cerebral hemispheres.      Scattered chronic punctate microhemorrhages involving both cerebral hemispheres, the deep gray matter, and the right dentate nucleus, which likely represent sequela of chronic hypertension given the distribution.      The study was marked in EPIC for immediate notification.      Workstation performed: [id number]         CT stroke alert brain   Final Result by Padmini Collins MD (11/17 9202)      1.  No acute intracranial CT abnormality.   2.  Stable old infarct in the left thalamocapsular region. Stable old lacunar infarct in the right periventricular white matter/corona radiata.   3.  Mild nonspecific white matter change suggesting microangiopathy.               I personally discussed this study with Dr. Roberto Hammond on 11/17/2024 9:05 AM.            Workstation performed: [id number]         CTA stroke alert  (head/neck)   Final Result by Padmini Collins MD (11/17 6519)      1.  Patent major vessels of the [place] without high-grade stenosis.   2.  No significant stenosis in the cervical carotid or vertebral arteries.   3.  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               Findings were directly discussed with Roberto Hammond at 9:28 p.m.      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   MRI Brain: Small focus of mild diffusion signal hyperintensity without signal loss on the ADC map, in the corona radiata white matter posterior left frontal lobe which may represent a small subacute infarct or T2 shine through in this region.     Additional focus of diffusion signal hyperintensity again initiated involving the left thalamic capsular region, with corresponding signal hyperintensity on the ADC map, most compatible with T2 shine through related to prior infarct in this region with   associated gliosis. Wallerian degeneration noted involving the left corticospinal tract.     Multiple old small additional lacunar infarcts as described above.     Mild chronic white matter microangiopathic changes involving both cerebral hemispheres.     Scattered chronic punctate microhemorrhages involving both cerebral hemispheres, the deep gray matter, and the right dentate nucleus, which likely represent sequela of chronic hypertension given the distribution.  I reviewed the above mentioned incidental findings with the patient and/or family and they expressed understanding.    Test Results Pending at Discharge (will require follow up):   None      Outpatient Tests Requested:  None     Complications:  None     Reason for Admission: dizziness     Hospital Course:   Wilder Vargas is a 59 y.o. male patient with PMH of  insulin-dependent type 2 diabetes, hypertension, prior CVA with residual right-sided weakness and facial droop recently discharged from rehab who originally presented to the hospital on 11/17/2024 due to new onset dizziness with associated nausea and vomiting.  Patient was admitted for stroke rule out given prior history of stroke.  Neurology was consulted.  MRI was completed which showed no acute stroke in the cerebellum to account for patient's symptoms, only showed small subacute infarct in the left thalamic capsular region and subacute stroke versus T2 shine through in the left corona radiata white matter as well as old lacunar infarcts.  Neurology recommended continuing patient's previous Plavix monotherapy from recent stroke in September.  Stroke pathway was discontinued, patient did not require echocardiogram.  Neurology this was ultimately secondary to BPPV/peripheral cause, no further inpatient neurologic recommendations, cleared from their standpoint.  Patient improved symptomatically on meclizine 3 times daily, will be continued as needed on discharge.  He will have home health care as recommended by PT/OT Level 3 recommendation.  Patient will follow-up with outpatient PT for vestibular therapy, as well as neurology in 4 to 6 weeks for stroke management.     Notably patient appears to have uncontrolled hypertension, this was discussed with cardiology who recommended outpatient follow-up as patient is currently not following with cardiology.  Will give referral and office information on discharge.    Prior to discharge all labs and vital stable.  Patient feeling much improved today, minimal symptoms with getting up to the commode multiple times today.  Will be discharged to home with home health care via family.      Please see above list of diagnoses and related plan for additional information.     Condition at Discharge: stable    Discharge Day Visit / Exam:   Subjective: Patient reports dizziness is now  "minimal, does not experience as many symptoms with head turning, does experience slight dizziness with position changes/sitting up however did very well without severe symptoms while getting to the commode multiple times today.  He states he is wheelchair dependent at baseline since leaving rehab.  He denies any new visual symptoms, headaches, numbness or tingling, and right sided deficits remain at baseline.  He denies any chest pain, difficulty breathing, abdominal symptoms, or other complaints.  Looking forward to going home.  Vitals: Blood Pressure: 159/87 (11/19/24 1050)  Pulse: 72 (11/19/24 1050)  Temperature: 97.5 °F (36.4 °C) (11/19/24 0741)  Temp Source: Oral (11/19/24 0741)  Respirations: 17 (11/19/24 0741)  Height: 5' 8\" (172.7 cm) (11/17/24 1100)  Weight - Scale: 92.4 kg (203 lb 11.3 oz) (11/17/24 1100)  SpO2: 96 % (11/19/24 1050)  Physical Exam  Vitals and nursing note reviewed.   Constitutional:       General: He is not in acute distress.     Appearance: He is well-developed.   HENT:      Head: Normocephalic and atraumatic.   Eyes:      General:         Right eye: No discharge.         Left eye: No discharge.      Extraocular Movements: Extraocular movements intact.      Conjunctiva/sclera: Conjunctivae normal.   Cardiovascular:      Rate and Rhythm: Normal rate and regular rhythm.      Heart sounds: No murmur heard.  Pulmonary:      Effort: Pulmonary effort is normal. No respiratory distress.      Breath sounds: Rhonchi present. No wheezing or rales.   Abdominal:      General: Bowel sounds are normal. There is no distension.      Palpations: Abdomen is soft.      Tenderness: There is no abdominal tenderness.   Musculoskeletal:      Cervical back: Neck supple.      Right lower leg: No edema.      Left lower leg: No edema.   Skin:     General: Skin is warm and dry.      Capillary Refill: Capillary refill takes less than 2 seconds.   Neurological:      Mental Status: He is alert and oriented to person, " place, and time. Mental status is at baseline.      Cranial Nerves: No cranial nerve deficit.      Motor: Weakness present.      Comments: Right-sided facial droop, and right sided muscular weakness right upper extremity greater than right lower extremity is currently at baseline.  No additional deficits noted.   Psychiatric:         Mood and Affect: Mood normal.         Behavior: Behavior normal.          Discussion with Family: Updated  (mother) via phone.    Discharge instructions/Information to patient and family:   See after visit summary for information provided to patient and family.      Provisions for Follow-Up Care:  See after visit summary for information related to follow-up care and any pertinent home health orders.      Mobility at time of Discharge:   Basic Mobility Inpatient Raw Score: 15  JH-HLM Goal: 4: Move to chair/commode  JH-HLM Achieved: 2: Bed activities/Dependent transfer  HLM Goal NOT achieved. Continue to encourage mobility in post discharge setting.     Disposition:   Home with VNA Services (Reminder: Complete face to face encounter)    Planned Readmission: none    Discharge Medications:  See after visit summary for reconciled discharge medications provided to patient and/or family.      Administrative Statements   Discharge Statement:  I have spent a total time of 45 minutes in caring for this patient on the day of the visit/encounter. >30 minutes of time was spent on: Diagnostic results, Instructions for management, Patient and family education, Counseling / Coordination of care, Documenting in the medical record, Reviewing / ordering tests, medicine, procedures  , and Communicating with other healthcare professionals .    **Please Note: This note may have been constructed using a voice recognition system**

## 2024-11-19 NOTE — CASE MANAGEMENT
Case Management Progress Note    Patient name Wilder Quinn /-01 MRN [id number]  : 1965 Date 2024       LOS (days): 0  Geometric Mean LOS (GMLOS) (days):   Days to GMLOS:        OBJECTIVE:        Current admission status: Observation  Preferred Pharmacy:   [id number] - [place], [place] - [address]  [address] [place] [address]  Phone: [phone] Fax: [phone]    Primary Care Provider: Gilmar Oro MD    Primary Insurance: [employer]  Secondary Insurance:     PROGRESS NOTE:    Faxed discharge instructions to [facility] at [phone].

## 2024-11-19 NOTE — PLAN OF CARE
Problem: PAIN - ADULT  Goal: Verbalizes/displays adequate comfort level or baseline comfort level  Description: Interventions:  - Encourage patient to monitor pain and request assistance  - Assess pain using appropriate pain scale  - Administer analgesics based on type and severity of pain and evaluate response  - Implement non-pharmacological measures as appropriate and evaluate response  - Consider cultural and social influences on pain and pain management  - Notify physician/advanced practitioner if interventions unsuccessful or patient reports new pain  Outcome: Progressing     Problem: SAFETY ADULT  Goal: Patient will remain free of falls  Description: INTERVENTIONS:  - Educate patient/family on patient safety including physical limitations  - Instruct patient to call for assistance with activity   - Consult OT/PT to assist with strengthening/mobility   - Keep Call bell within reach  - Keep bed low and locked with side rails adjusted as appropriate  - Keep care items and personal belongings within reach  - Initiate and maintain comfort rounds  - Make Fall Risk Sign visible to staff  - Offer Toileting every 2 Hours, in advance of need  - Initiate/Maintain bed alarm  - Obtain necessary fall risk management equipment: socks  - Apply yellow socks and bracelet for high fall risk patients  - Consider moving patient to room near nurses station  Outcome: Progressing     Problem: SAFETY ADULT  Goal: Maintain or return to baseline ADL function  Description: INTERVENTIONS:  -  Assess patient's ability to carry out ADLs; assess patient's baseline for ADL function and identify physical deficits which impact ability to perform ADLs (bathing, care of mouth/teeth, toileting, grooming, dressing, etc.)  - Assess/evaluate cause of self-care deficits   - Assess range of motion  - Assess patient's mobility; develop plan if impaired  - Assess patient's need for assistive devices and provide as appropriate  - Encourage maximum  independence but intervene and supervise when necessary  - Involve family in performance of ADLs  - Assess for home care needs following discharge   - Consider OT consult to assist with ADL evaluation and planning for discharge  - Provide patient education as appropriate  Outcome: Progressing     Problem: SAFETY ADULT  Goal: Maintains/Returns to pre admission functional level  Description: INTERVENTIONS:  - Perform AM-PAC 6 Click Basic Mobility/ Daily Activity assessment daily.  - Set and communicate daily mobility goal to care team and patient/family/caregiver.   - Collaborate with rehabilitation services on mobility goals if consulted  - Perform Range of Motion 3 times a day.  - Reposition patient every 2 hours.  - Dangle patient 2 times a day  - Stand patient 2 times a day  - Ambulate patient 2 times a day  - Out of bed to chair 2 times a day   - Out of bed for meals 2 times a day  - Out of bed for toileting  - Record patient progress and toleration of activity level   Outcome: Progressing

## 2024-11-20 ENCOUNTER — TRANSITIONAL CARE MANAGEMENT (OUTPATIENT)
Dept: FAMILY MEDICINE CLINIC | Facility: HOSPITAL | Age: 59
End: 2024-11-20

## 2024-11-29 ENCOUNTER — TELEMEDICINE (OUTPATIENT)
Dept: FAMILY MEDICINE CLINIC | Facility: HOSPITAL | Age: 59
End: 2024-11-29
Payer: COMMERCIAL

## 2024-11-29 VITALS
RESPIRATION RATE: 16 BRPM | HEART RATE: 62 BPM | WEIGHT: 203 LBS | DIASTOLIC BLOOD PRESSURE: 84 MMHG | HEIGHT: 68 IN | BODY MASS INDEX: 30.77 KG/M2 | SYSTOLIC BLOOD PRESSURE: 136 MMHG

## 2024-11-29 DIAGNOSIS — R80.9 TYPE 2 DIABETES MELLITUS WITH DIABETIC MICROALBUMINURIA, WITH LONG-TERM CURRENT USE OF INSULIN (HCC): ICD-10-CM

## 2024-11-29 DIAGNOSIS — E11.29 TYPE 2 DIABETES MELLITUS WITH DIABETIC MICROALBUMINURIA, WITH LONG-TERM CURRENT USE OF INSULIN (HCC): ICD-10-CM

## 2024-11-29 DIAGNOSIS — N40.0 BENIGN PROSTATIC HYPERPLASIA WITHOUT LOWER URINARY TRACT SYMPTOMS: ICD-10-CM

## 2024-11-29 DIAGNOSIS — Z79.4 TYPE 2 DIABETES MELLITUS WITH DIABETIC MICROALBUMINURIA, WITH LONG-TERM CURRENT USE OF INSULIN (HCC): ICD-10-CM

## 2024-11-29 DIAGNOSIS — H81.10 BPPV (BENIGN PAROXYSMAL POSITIONAL VERTIGO), UNSPECIFIED LATERALITY: Primary | ICD-10-CM

## 2024-11-29 DIAGNOSIS — I10 PRIMARY HYPERTENSION: ICD-10-CM

## 2024-11-29 PROBLEM — I16.1 HYPERTENSIVE EMERGENCY: Status: RESOLVED | Noted: 2024-01-24 | Resolved: 2024-11-29

## 2024-11-29 PROBLEM — R42 DIZZINESS: Status: RESOLVED | Noted: 2024-11-17 | Resolved: 2024-11-29

## 2024-11-29 PROBLEM — I63.9 ACUTE CVA (CEREBROVASCULAR ACCIDENT) (HCC): Status: RESOLVED | Noted: 2020-06-23 | Resolved: 2024-11-29

## 2024-11-29 PROCEDURE — 99495 TRANSJ CARE MGMT MOD F2F 14D: CPT | Performed by: INTERNAL MEDICINE

## 2024-11-29 RX ORDER — DUTASTERIDE 0.5 MG/1
0.5 CAPSULE, LIQUID FILLED ORAL DAILY
Qty: 90 CAPSULE | Refills: 3 | Status: SHIPPED | OUTPATIENT
Start: 2024-11-29

## 2024-11-29 RX ORDER — MECLIZINE HYDROCHLORIDE 25 MG/1
25 TABLET ORAL EVERY 8 HOURS PRN
Qty: 90 TABLET | Refills: 1 | Status: SHIPPED | OUTPATIENT
Start: 2024-11-29

## 2024-11-29 RX ORDER — HYDRALAZINE HYDROCHLORIDE 25 MG/1
50 TABLET, FILM COATED ORAL EVERY 8 HOURS SCHEDULED
Qty: 540 TABLET | Refills: 3 | Status: SHIPPED | OUTPATIENT
Start: 2024-11-29

## 2024-11-29 RX ORDER — GABAPENTIN 300 MG/1
300 CAPSULE ORAL
Qty: 90 CAPSULE | Refills: 3 | Status: SHIPPED | OUTPATIENT
Start: 2024-11-29

## 2024-11-29 RX ORDER — LOSARTAN POTASSIUM 100 MG/1
100 TABLET ORAL DAILY
Qty: 90 TABLET | Refills: 3 | Status: SHIPPED | OUTPATIENT
Start: 2024-11-29

## 2024-11-29 NOTE — ASSESSMENT & PLAN NOTE
He has hypertension.  His blood pressure was acceptable today at 136/84.    His electrolytes and renal function were normal on November 18.    He requested refill on losartan and hydralazine.  He takes hydralazine 50 mg 3 times a day.  Continue losartan, hydralazine, amlodipine, carvedilol  Orders:    losartan (COZAAR) 100 MG tablet; Take 1 tablet (100 mg total) by mouth daily    hydrALAZINE (APRESOLINE) 25 mg tablet; Take 2 tablets (50 mg total) by mouth every 8 (eight) hours

## 2024-11-29 NOTE — ASSESSMENT & PLAN NOTE
Lab Results   Component Value Date    HGBA1C 9.0 (H) 09/15/2024     Patient has type 2 diabetes mellitus with microalbuminuria.  His mother manages his insulin who is also diabetic.    Mother gives patient 20 units of Lantus in the evening and tests patient's blood sugars 4 times a day.  His blood sugars run between 120 and 140.    I will recheck his A1c after December 15  Orders:    Glucometer test strips    Hemoglobin A1C; Future    Comprehensive metabolic panel; Future    CBC and Platelet; Future

## 2024-11-29 NOTE — PROGRESS NOTES
Virtual TCM Visit:    Verification of patient location:    Patient is located at Home in the following state in which I hold an active license [place]    Assessment & Plan  BPPV (benign paroxysmal positional vertigo), unspecified laterality  We had a virtual EULA appointment with patient was discharged on November 19.  He presented to hospital with dizziness and was diagnosed with benign positional vertigo.  MRI of the brain showed no acute stroke.    Patient complains of mild dizziness that last for up to 1 minute when he sits up or lays down.  Denies nausea, change in vision.    He is wheelchair-bound since his stroke in September this year  He feels the meclizine helps him and requested a refill.    Pt's occupational therapist helped establish video connection on her phone as pt's phone was not working.      Orders:    meclizine (ANTIVERT) 25 mg tablet; Take 1 tablet (25 mg total) by mouth every 8 (eight) hours as needed for dizziness    Primary hypertension  He has hypertension.  His blood pressure was acceptable today at 136/84.    His electrolytes and renal function were normal on November 18.    He requested refill on losartan and hydralazine.  He takes hydralazine 50 mg 3 times a day.  Continue losartan, hydralazine, amlodipine, carvedilol  Orders:    losartan (COZAAR) 100 MG tablet; Take 1 tablet (100 mg total) by mouth daily    hydrALAZINE (APRESOLINE) 25 mg tablet; Take 2 tablets (50 mg total) by mouth every 8 (eight) hours    Type 2 diabetes mellitus with diabetic microalbuminuria, with long-term current use of insulin ([facility])    Lab Results   Component Value Date    HGBA1C 9.0 (H) 09/15/2024     Patient has type 2 diabetes mellitus with microalbuminuria.  His mother manages his insulin who is also diabetic.    Mother gives patient 20 units of Lantus in the evening and tests patient's blood sugars 4 times a day.  His blood sugars run between 120 and 140.    I will recheck his A1c after December 15  Orders:     Glucometer test strips    Hemoglobin A1C; Future    Comprehensive metabolic panel; Future    CBC and Platelet; Future    Benign prostatic hyperplasia without lower urinary tract symptoms  Patient has BPH.  He takes Avodart.  He feels his urination is adequate.  Continue Avodart.  Orders:    dutasteride (AVODART) 0.5 mg capsule; Take 1 capsule (0.5 mg total) by mouth daily           Encounter provider Gilmar Oro MD     Provider located at [facility] [address] [address]  [address] [place] [id number]    After connecting through [employer], the patient was identified by name and date of birth. Wilder Vargas was informed that this is a telemedicine visit and that the visit is being conducted through the Epic Embedded platform. He agrees to proceed..  My office door was closed. No one else was in the room.  He acknowledged consent and understanding of privacy and security of the video platform. The patient has agreed to participate and understands they can discontinue the visit at any time.    Patient is aware this is a billable service.    History of Present Illness     Transitional Care Management Review:   Wilder Vargas is a 59 y.o. male here for TCM follow up.    During the TCM phone call patient stated:  TCM Call       Date and time call was made  11/20/2024  1:40 PM    Patient was hospitialized at  [facility]    Date of Admission  11/17/24    Date of discharge  11/19/24    Diagnosis  dizziness    Disposition  Home    Current Symptoms  Dizziness    Quality Character  Vertigo    Episode pattern  Intermittent          TCM Call       Scheduled for follow up?  Yes    I have advised the patient to call PCP with any new or worsening symptoms  Kim Stephenson CMA,  [facility] [address]    Living Arrangements  Family members    Support System  Family    Comments  I spoke with patient to set up TCM. States he is doing better but  "still w/vertigo. Meclizine does help. Meds reviewed per D/c list. Pt is not able to walk due to stroke 8 weeks ago. Is receiving home PT. Lives with his mother who helps to provide care. Call transferrd to  to set up virtual TCM. CT          HPI  Review of Systems   Constitutional:  Negative for fever.   Respiratory:  Negative for shortness of breath.    Cardiovascular:  Negative for chest pain.   Gastrointestinal:  Negative for abdominal pain.   Genitourinary:  Negative for dysuria.   Musculoskeletal:  Positive for gait problem (wheelchair bound).   Neurological:  Positive for dizziness and weakness. Negative for headaches.     Objective   /84   Pulse 62   Resp 16   Ht 5' 8\" (1.727 m)   Wt 92.1 kg (203 lb)   BMI 30.87 kg/m²     Physical Exam  Constitutional:       General: He is not in acute distress.     Appearance: He is not toxic-appearing.   Pulmonary:      Effort: Pulmonary effort is normal. No respiratory distress.   Neurological:      Mental Status: He is alert.      Cranial Nerves: Cranial nerve deficit (right facial droop was present) present.      Motor: Weakness (RUE weakness) present.   Psychiatric:         Mood and Affect: Mood normal.       Medications have been reviewed by provider in current encounter      Gilmar Oro MD      VIRTUAL VISIT DISCLAIMER    Wilder Dougarcadio verbally agrees to participate in Virtual Care Services. Pt is aware that Virtual Care Services could be limited without vital signs or the ability to perform a full hands-on physical exam. Wilder Vargas understands he or the provider may request at any time to terminate the video visit and request the patient to seek care or treatment in person.  "

## 2024-11-29 NOTE — ASSESSMENT & PLAN NOTE
We had a virtual EULA appointment with patient was discharged on November 19.  He presented to hospital with dizziness and was diagnosed with benign positional vertigo.  MRI of the brain showed no acute stroke.    Patient complains of mild dizziness that last for up to 1 minute when he sits up or lays down.  Denies nausea, change in vision.    He is wheelchair-bound since his stroke in September this year  He feels the meclizine helps him and requested a refill.    Pt's occupational therapist helped establish video connection on her phone as pt's phone was not working.      Orders:    meclizine (ANTIVERT) 25 mg tablet; Take 1 tablet (25 mg total) by mouth every 8 (eight) hours as needed for dizziness

## 2024-11-29 NOTE — ASSESSMENT & PLAN NOTE
Patient has BPH.  He takes Avodart.  He feels his urination is adequate.  Continue Avodart.  Orders:    dutasteride (AVODART) 0.5 mg capsule; Take 1 capsule (0.5 mg total) by mouth daily

## 2024-12-02 ENCOUNTER — TELEPHONE (OUTPATIENT)
Age: 59
End: 2024-12-02

## 2024-12-02 DIAGNOSIS — E11.29 TYPE 2 DIABETES MELLITUS WITH DIABETIC MICROALBUMINURIA, WITH LONG-TERM CURRENT USE OF INSULIN (HCC): Primary | ICD-10-CM

## 2024-12-02 DIAGNOSIS — Z79.4 TYPE 2 DIABETES MELLITUS WITH DIABETIC MICROALBUMINURIA, WITH LONG-TERM CURRENT USE OF INSULIN (HCC): Primary | ICD-10-CM

## 2024-12-02 DIAGNOSIS — R80.9 TYPE 2 DIABETES MELLITUS WITH DIABETIC MICROALBUMINURIA, WITH LONG-TERM CURRENT USE OF INSULIN (HCC): Primary | ICD-10-CM

## 2024-12-02 NOTE — TELEPHONE ENCOUNTER
Can you please have the doctor prescribe test strips that work with the patients machine, with a diagnosis code so we can process a PA. Please let the PA team know when completed. Thank you

## 2024-12-02 NOTE — TELEPHONE ENCOUNTER
See call/ He is using Accu-[employer] Beckie Plus In Vitro Strip   Also needs to have a dx code.

## 2024-12-02 NOTE — TELEPHONE ENCOUNTER
Reason for call:   [x] Prior Auth  [] Other:     Caller:  [x] Patient  [] Pharmacy  Name:   Address:   Callback Number:     Medication: Glucometer test strips     Dose/Frequency: Comments: Qid testing   Which brand of glucometer test strips? Accu-chek Beckie Plus In Vitro Strip    Quantity: Quantity:  100     Ordering Provider:   [x] PCP/Provider - Dr Oro  [] Speciality/Provider -     Has the patient tried other medications and failed? If failed, which medications did they fail?    [] No   [] Yes -     Is the patient's insurance updated in EPIC?   [x] Yes   [] No     Is a copy of the patient's insurance scanned in EPIC?   [x] Yes   [] No

## 2024-12-03 ENCOUNTER — TELEPHONE (OUTPATIENT)
Age: 59
End: 2024-12-03

## 2024-12-03 DIAGNOSIS — R80.9 TYPE 2 DIABETES MELLITUS WITH DIABETIC MICROALBUMINURIA, WITH LONG-TERM CURRENT USE OF INSULIN (HCC): Primary | ICD-10-CM

## 2024-12-03 DIAGNOSIS — E11.29 TYPE 2 DIABETES MELLITUS WITH DIABETIC MICROALBUMINURIA, WITH LONG-TERM CURRENT USE OF INSULIN (HCC): Primary | ICD-10-CM

## 2024-12-03 DIAGNOSIS — Z79.4 TYPE 2 DIABETES MELLITUS WITH DIABETIC MICROALBUMINURIA, WITH LONG-TERM CURRENT USE OF INSULIN (HCC): Primary | ICD-10-CM

## 2024-12-03 RX ORDER — BLOOD SUGAR DIAGNOSTIC
STRIP MISCELLANEOUS
Qty: 100 EACH | Refills: 5 | Status: SHIPPED | OUTPATIENT
Start: 2024-12-03 | End: 2024-12-11

## 2024-12-03 NOTE — TELEPHONE ENCOUNTER
Caller: Wilder Vargas     Doctor: Dr. Oneal     Reason for call: Patient is scheduled for a consult visit with Dr. Oneal on 2/11/25. Patient is requesting for his visit to be done virtually due to having a stroke and not being able to walk.     Call back#: [phone]

## 2024-12-03 NOTE — TELEPHONE ENCOUNTER
Good Morning, the test strips would need to be on the patients active medication list for me to start the PA for the patient. If you can update that and also with the name of the strips that match his testing machine. Please let the PA team know once completed. Thank you.

## 2024-12-05 NOTE — TELEPHONE ENCOUNTER
If pt should return call again, please attempt to obtain an email so that the pt can receive a My Chart link for future virtual visits. Thank you.

## 2024-12-05 NOTE — TELEPHONE ENCOUNTER
Caller: Wilder Vargas    Doctor: Jm    Reason for call: Patient called back. Let him know that his appointment in Feb 2025 can be a virtual appointment.    Call back#: [phone]

## 2024-12-06 ENCOUNTER — TELEPHONE (OUTPATIENT)
Age: 59
End: 2024-12-06

## 2024-12-06 NOTE — TELEPHONE ENCOUNTER
PA for (Accu-Chek Beckie Plus) test strip SUBMITTED to [facility]     via    [x]CMM-KEY: [id number]  []Surescripts-Case ID #   []Availity-Auth ID # NDC #   []Faxed to plan   []Other website   []Phone call Case ID #     [x]PA sent as URGENT    All office notes, labs and other pertaining documents and studies sent. Clinical questions answered. Awaiting determination from insurance company.     Turnaround time for your insurance to make a decision on your Prior Authorization can take 7-21 business days.

## 2024-12-09 ENCOUNTER — TELEPHONE (OUTPATIENT)
Age: 59
End: 2024-12-09

## 2024-12-09 NOTE — TELEPHONE ENCOUNTER
[facility] called stating that patient had fallen over weekend and bumped head but does not appear to be injured.  Please notify PCP of fall and any questions call [facility] at [phone].

## 2024-12-11 ENCOUNTER — TELEPHONE (OUTPATIENT)
Age: 59
End: 2024-12-11

## 2024-12-11 DIAGNOSIS — Z79.4 TYPE 2 DIABETES MELLITUS WITH DIABETIC MICROALBUMINURIA, WITH LONG-TERM CURRENT USE OF INSULIN (HCC): Primary | ICD-10-CM

## 2024-12-11 DIAGNOSIS — F17.200 TOBACCO DEPENDENCE: ICD-10-CM

## 2024-12-11 DIAGNOSIS — R80.9 TYPE 2 DIABETES MELLITUS WITH DIABETIC MICROALBUMINURIA, WITH LONG-TERM CURRENT USE OF INSULIN (HCC): Primary | ICD-10-CM

## 2024-12-11 DIAGNOSIS — E11.29 TYPE 2 DIABETES MELLITUS WITH DIABETIC MICROALBUMINURIA, WITH LONG-TERM CURRENT USE OF INSULIN (HCC): Primary | ICD-10-CM

## 2024-12-11 RX ORDER — IBUPROFEN 100 MG/5ML
SUSPENSION ORAL
Qty: 1 KIT | Refills: 0 | Status: SHIPPED | OUTPATIENT
Start: 2024-12-11

## 2024-12-11 NOTE — TELEPHONE ENCOUNTER
Occupational therapist with [facility] called to ask PCP to prescribe sleep aide for patient. He is only getting 3 hours of sleep at the most and it is impeding his ability to do therapy. He has been using Melatonin but, it is no longer working.  Please advise

## 2024-12-11 NOTE — TELEPHONE ENCOUNTER
PA for (Accu-Chek Beckie Plus) test strip DENIED    Reason:(Screenshot if applicable)        Message sent to office clinical pool Yes    Denial letter scanned into Media Yes    Appeal started No (Provider will need to decide if appeal is warranted and send clinical documentation to Prior Authorization Team for initiation.)    **Please follow up with your patient regarding denial and next steps**

## 2024-12-12 ENCOUNTER — TELEPHONE (OUTPATIENT)
Dept: CARDIOLOGY CLINIC | Facility: CLINIC | Age: 59
End: 2024-12-12

## 2024-12-12 RX ORDER — NICOTINE 21 MG/24HR
1 PATCH, TRANSDERMAL 24 HOURS TRANSDERMAL EVERY 24 HOURS
Qty: 28 PATCH | Refills: 1 | Status: SHIPPED | OUTPATIENT
Start: 2024-12-12

## 2024-12-12 NOTE — TELEPHONE ENCOUNTER
----- Message from Rosemary REYES sent at 12/12/2024 10:58 AM EST -----    ----- Message -----  From: Kelsea Mullen  Sent: 12/11/2024   1:02 PM EST  To: Cardiology [facility]    Patient's e-mail is as follows: [email or web address]  This was requested to be able to do a video visit.

## 2024-12-13 ENCOUNTER — TELEPHONE (OUTPATIENT)
Age: 59
End: 2024-12-13

## 2024-12-13 DIAGNOSIS — I69.351 HEMIPARESIS OF RIGHT DOMINANT SIDE AS LATE EFFECT OF CEREBRAL INFARCTION (HCC): Primary | ICD-10-CM

## 2024-12-13 NOTE — TELEPHONE ENCOUNTER
Pt and mother called and were not happy with provider not prescribing a sleep aid as pt is not able to sleep. Pt currently taking melatonin but it is not helping. Please call to advise, #[phone], thank you.

## 2024-12-13 NOTE — TELEPHONE ENCOUNTER
Left detailed message with instructions for gabapentin and to let them know PT/OT orders have been placed

## 2024-12-13 NOTE — TELEPHONE ENCOUNTER
Pt mother requesting orders for outpatient physical and occupational therapies 3x/week. Please call when order is placed so initial evaluations can be made.

## 2024-12-23 ENCOUNTER — TELEPHONE (OUTPATIENT)
Age: 59
End: 2024-12-23

## 2024-12-23 DIAGNOSIS — I10 PRIMARY HYPERTENSION: Primary | ICD-10-CM

## 2024-12-23 NOTE — TELEPHONE ENCOUNTER
Patient's mother called to report that patient wants to discontinue all of the medications such as the patch and the gum that doctor Preethi had prescribed to him for stopping smoking. He has since being on the medications stopped smoking so he no longer needs them to be filled at the prescription. If any questions please follow up with the patient in regards to this thank you. Also they are requesting a script for a new blood pressure machine and cuff so that they could try to order this to the insurance can someone please follow up with them in regards to this as well?     thank you

## 2024-12-23 NOTE — TELEPHONE ENCOUNTER
See call/ Would like a script for home BP machine.  Would also like all smoking cessation meds Dc'd

## 2024-12-27 ENCOUNTER — TELEPHONE (OUTPATIENT)
Age: 59
End: 2024-12-27

## 2024-12-27 NOTE — TELEPHONE ENCOUNTER
Lola ken had called in, stating she received a call from the office, in regards to a wavier service that was dropped off last week.     She wanted to let the ladies know that the wavier was for this patient, if any additional questions were needed to reach back out to her at [phone]

## 2025-01-02 NOTE — TELEPHONE ENCOUNTER
Lola will be stopping by the office today to  a copy of the signed and completed Physicians Certification Form Scanned into patients on 12/27/24 as they need a copy to give to SS. She will be stopping by today thank you

## 2025-01-06 ENCOUNTER — RESULTS FOLLOW-UP (OUTPATIENT)
Dept: FAMILY MEDICINE CLINIC | Facility: HOSPITAL | Age: 60
End: 2025-01-06

## 2025-01-06 ENCOUNTER — APPOINTMENT (OUTPATIENT)
Dept: LAB | Facility: HOSPITAL | Age: 60
End: 2025-01-06
Attending: INTERNAL MEDICINE
Payer: COMMERCIAL

## 2025-01-06 DIAGNOSIS — E11.29 TYPE 2 DIABETES MELLITUS WITH DIABETIC MICROALBUMINURIA, WITH LONG-TERM CURRENT USE OF INSULIN (HCC): Primary | ICD-10-CM

## 2025-01-06 DIAGNOSIS — R80.9 TYPE 2 DIABETES MELLITUS WITH DIABETIC MICROALBUMINURIA, WITH LONG-TERM CURRENT USE OF INSULIN (HCC): Primary | ICD-10-CM

## 2025-01-06 DIAGNOSIS — Z79.4 TYPE 2 DIABETES MELLITUS WITH DIABETIC MICROALBUMINURIA, WITH LONG-TERM CURRENT USE OF INSULIN (HCC): Primary | ICD-10-CM

## 2025-01-06 LAB
ALBUMIN SERPL BCG-MCNC: 3.8 G/DL (ref 3.5–5)
ALP SERPL-CCNC: 48 U/L (ref 34–104)
ALT SERPL W P-5'-P-CCNC: 8 U/L (ref 7–52)
ANION GAP SERPL CALCULATED.3IONS-SCNC: 8 MMOL/L (ref 4–13)
AST SERPL W P-5'-P-CCNC: 11 U/L (ref 13–39)
BASOPHILS # BLD AUTO: 0.05 THOUSANDS/ΜL (ref 0–0.1)
BASOPHILS NFR BLD AUTO: 1 % (ref 0–1)
BILIRUB SERPL-MCNC: 0.97 MG/DL (ref 0.2–1)
BUN SERPL-MCNC: 15 MG/DL (ref 5–25)
CALCIUM SERPL-MCNC: 9.1 MG/DL (ref 8.4–10.2)
CHLORIDE SERPL-SCNC: 102 MMOL/L (ref 96–108)
CO2 SERPL-SCNC: 28 MMOL/L (ref 21–32)
CREAT SERPL-MCNC: 0.94 MG/DL (ref 0.6–1.3)
EOSINOPHIL # BLD AUTO: 0.2 THOUSAND/ΜL (ref 0–0.61)
EOSINOPHIL NFR BLD AUTO: 3 % (ref 0–6)
ERYTHROCYTE [DISTWIDTH] IN BLOOD BY AUTOMATED COUNT: 13 % (ref 11.6–15.1)
EST. AVERAGE GLUCOSE BLD GHB EST-MCNC: 146 MG/DL
GFR SERPL CREATININE-BSD FRML MDRD: 88 ML/MIN/1.73SQ M
GLUCOSE P FAST SERPL-MCNC: 183 MG/DL (ref 65–99)
HBA1C MFR BLD: 6.7 %
HCT VFR BLD AUTO: 41.4 % (ref 36.5–49.3)
HGB BLD-MCNC: 13.8 G/DL (ref 12–17)
IMM GRANULOCYTES # BLD AUTO: 0.01 THOUSAND/UL (ref 0–0.2)
IMM GRANULOCYTES NFR BLD AUTO: 0 % (ref 0–2)
LYMPHOCYTES # BLD AUTO: 1.37 THOUSANDS/ΜL (ref 0.6–4.47)
LYMPHOCYTES NFR BLD AUTO: 23 % (ref 14–44)
MCH RBC QN AUTO: 28.3 PG (ref 26.8–34.3)
MCHC RBC AUTO-ENTMCNC: 33.3 G/DL (ref 31.4–37.4)
MCV RBC AUTO: 85 FL (ref 82–98)
MONOCYTES # BLD AUTO: 0.66 THOUSAND/ΜL (ref 0.17–1.22)
MONOCYTES NFR BLD AUTO: 11 % (ref 4–12)
NEUTROPHILS # BLD AUTO: 3.67 THOUSANDS/ΜL (ref 1.85–7.62)
NEUTS SEG NFR BLD AUTO: 62 % (ref 43–75)
NRBC BLD AUTO-RTO: 0 /100 WBCS
PLATELET # BLD AUTO: 168 THOUSANDS/UL (ref 149–390)
PMV BLD AUTO: 10.6 FL (ref 8.9–12.7)
POTASSIUM SERPL-SCNC: 4.6 MMOL/L (ref 3.5–5.3)
PROT SERPL-MCNC: 6.7 G/DL (ref 6.4–8.4)
RBC # BLD AUTO: 4.87 MILLION/UL (ref 3.88–5.62)
SODIUM SERPL-SCNC: 138 MMOL/L (ref 135–147)
WBC # BLD AUTO: 5.96 THOUSAND/UL (ref 4.31–10.16)

## 2025-01-06 PROCEDURE — 80053 COMPREHEN METABOLIC PANEL: CPT

## 2025-01-06 PROCEDURE — 83036 HEMOGLOBIN GLYCOSYLATED A1C: CPT

## 2025-01-06 PROCEDURE — 36415 COLL VENOUS BLD VENIPUNCTURE: CPT

## 2025-01-06 PROCEDURE — 85025 COMPLETE CBC W/AUTO DIFF WBC: CPT

## 2025-01-14 ENCOUNTER — EVALUATION (OUTPATIENT)
Facility: CLINIC | Age: 60
End: 2025-01-14
Payer: COMMERCIAL

## 2025-01-14 DIAGNOSIS — I69.351 HEMIPARESIS OF RIGHT DOMINANT SIDE AS LATE EFFECT OF CEREBRAL INFARCTION (HCC): ICD-10-CM

## 2025-01-14 PROCEDURE — 97163 PT EVAL HIGH COMPLEX 45 MIN: CPT | Performed by: PHYSICAL THERAPIST
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
## 2025-01-20 ENCOUNTER — OFFICE VISIT (OUTPATIENT)
Facility: CLINIC | Age: 60
End: 2025-01-20
Payer: COMMERCIAL

## 2025-01-20 DIAGNOSIS — I69.351 HEMIPARESIS OF RIGHT DOMINANT SIDE AS LATE EFFECT OF CEREBRAL INFARCTION (HCC): Primary | ICD-10-CM

## 2025-01-20 PROCEDURE — 97112 NEUROMUSCULAR REEDUCATION: CPT | Performed by: PHYSICAL THERAPIST

## 2025-01-20 NOTE — PROGRESS NOTES
Daily Note     Today's date: 2025  Patient name: Wilder Vargas  : 1965  MRN: [id number]  Referring provider: Gilmar Oro MD  Dx:   Encounter Diagnosis     ICD-10-CM    1. Hemiparesis of right dominant side as late effect of cerebral infarction (HCC)  I69.351           Start Time: 0155  Stop Time: 0237  Total time in clinic (min): 42 minutes    Subjective:   He fell off the bed this past weekend.  He states he continues to feel left buttock pain from last fall, however, indicates no open areas or bruising noted.  8/10 when he is sitting on it.       Objective: See treatment diary below      Assessment:   Gait challenges in SOLO today with nice stability.  Added 5# weight to right LE to improve step through with good tolerance.  Discussed use of hemiwalker at home due to limited grasp on right UE, mom is currently assisting holding device.  He remains uncertain of using a unilateral device as he feels less safe.        Plan: Continue per plan of care.   No complaints post treatment, continue to progress gait challenges to tolerance, consider compliant surfaces for static balance.     Daily Treatment Diary     Precautions: h/o strokes, hemiparesis right, h/o BPPV, DM, HTN      POC Expires Reeval for Medicare to be completed  Unit Limit Auth Expiration Date PT/OT/STVisit Limit   3/25/2025 By visit N/A BOMN 2025 N/A    Completed on visit                    Auth Status DATE        Approved Visit # 1 2        Remaining         TESTING         TUG 62.5sec        5x STS 25.9sec        SIMONS 34/56        Gait speed nv        3 min walk test nv                 THERAPEUTIC EXERCISE HEP                                                                                                                                                               NEUROMUSCULAR REEDUCATION           Static balance          Gait with HM/ MAFO- SOLO   Hemiwalker 4 laps in SOLO; std cane in SOLO 5# on right 6 laps        Hurdles          Step taps          Step up                                                                                                    THERAPEUTIC ACTIVITY                                                  GAIT TRAINING                                                  MODALITIES

## 2025-01-21 ENCOUNTER — OFFICE VISIT (OUTPATIENT)
Facility: CLINIC | Age: 60
End: 2025-01-21
Payer: COMMERCIAL

## 2025-01-21 DIAGNOSIS — I69.351 HEMIPARESIS OF RIGHT DOMINANT SIDE AS LATE EFFECT OF CEREBRAL INFARCTION (HCC): Primary | ICD-10-CM

## 2025-01-21 PROCEDURE — 97112 NEUROMUSCULAR REEDUCATION: CPT | Performed by: PHYSICAL THERAPIST

## 2025-01-21 NOTE — PROGRESS NOTES
Daily Note     Today's date: 2025  Patient name: Wilder Vargas  : 1965  MRN: [id number]  Referring provider: Gilmar Oro MD  Dx:   Encounter Diagnosis     ICD-10-CM    1. Hemiparesis of right dominant side as late effect of cerebral infarction (HCC)  I69.351             Start Time: 214  Stop Time: 0305  Total time in clinic (min): 51 minutes    Subjective:   No falls, no complaints.  No difficulty getting back into the house after treatment yesterday.      Objective: See treatment diary below      Assessment:    Introduced step ups with 1 rise progressing to 2 rise, assist needed with higher height.  Cuing for sequencing.  Hurdles flattened with focus on right LE placement and increased stride.  Resisted gait with improving weight shift.  Cuing to increase pace to normalize stepping.  SOLO used t/o session for safety.  While he continues to express fear of falling, self correction even with slight LOB with good control.  Sit to stand off normal height chair t/o session.  Educated in short distance ambulation at home with std cane to improve strength and confidence on levels.  Patient demonstrates well and voiced understanding including need for CTG from family with gait belt in place (they have belt).      Plan: Continue per plan of care.   No complaints post treatment, continue to progress gait challenges to tolerance, consider compliant surfaces for static balance.     Daily Treatment Diary     Precautions: h/o strokes, hemiparesis right, h/o BPPV, DM, HTN      POC Expires Reeval for Medicare to be completed  Unit Limit Auth Expiration Date PT/OT/STVisit Limit   3/25/2025 By visit N/A BOMN 2025 N/A    Completed on visit                    Auth Status DATE       Approved Visit # 1 2 3       Remaining         TESTING         TUG 62.5sec        5x STS 25.9sec        SIMONS 34/56        Gait speed nv        3 min walk test nv                 THERAPEUTIC EXERCISE HEP                                                                                                                                                                NEUROMUSCULAR REEDUCATION           Static balance          Gait with HM/ MAFO- SOLO   Hemiwalker 4 laps in SOLO; std cane in SOLO 5# on right 6 laps 4 laps in SOLO with std cane 5# on right;       Hurdles    3 flattened step to- increased right LE placement control x4 laps      Step taps          Step up    1 rise x4 step up with cane, cuing for sequencing;  2 rise x2 with initial assist for step up, cuing to allow for right LE clearance down step      Gait with resistance posterior    1 lap with improved weight shift w/ std cane                                                                                      THERAPEUTIC ACTIVITY                                                  GAIT TRAINING                                                  MODALITIES

## 2025-01-28 ENCOUNTER — OFFICE VISIT (OUTPATIENT)
Facility: CLINIC | Age: 60
End: 2025-01-28
Payer: COMMERCIAL

## 2025-01-28 ENCOUNTER — EVALUATION (OUTPATIENT)
Facility: CLINIC | Age: 60
End: 2025-01-28
Payer: COMMERCIAL

## 2025-01-28 DIAGNOSIS — I69.351 HEMIPARESIS OF RIGHT DOMINANT SIDE AS LATE EFFECT OF CEREBRAL INFARCTION (HCC): Primary | ICD-10-CM

## 2025-01-28 PROCEDURE — 97110 THERAPEUTIC EXERCISES: CPT

## 2025-01-28 PROCEDURE — 97112 NEUROMUSCULAR REEDUCATION: CPT | Performed by: PHYSICAL THERAPIST

## 2025-01-28 PROCEDURE — 97167 OT EVAL HIGH COMPLEX 60 MIN: CPT

## 2025-01-28 NOTE — PROGRESS NOTES
OCCUPATIONAL THERAPY INITIAL EVALUATION        2025  Wilder Vargas  1965  [id number]  Gilmar Oro MD   Diagnosis ICD-10-CM Associated Orders   1. Hemiparesis of right dominant side as late effect of cerebral infarction (HCC)  I69.351 Ambulatory Referral to Occupational Therapy            Assessment/Plan      SKILLED ANALYSIS:    Pt is a 59 y.o. male referred to Occupational Therapy s/p Hemiparesis of right dominant side as late effect of cerebral infarction (HCC) [I69.351].  Pt participated in skilled OT evaluation and, following formalized testing as well as clinical observation, pt presents with the following areas of deficit: FMC/FMS, GMC/GMS, hypertonicity, and impaired motor control of RUE  impacting ability to independently and safely complete ADL/IADL and leisure tasks.  Pt does demo the need for skilled Occupational Therapy services 2x/week for 12 weeks with focus on ADL re-training, IADL re-training, fxnl xfers, standing tolerance, standing balance, UE NMR, UE strengthening, UE endurance, FMC/GMC, DME training/education, family training/education, leisure pursuits, and community re-integration to address the goals as listed below. Pt in agreement with POC, POC to  25.          Short Term Goals (to be achieved within 4 weeks):    Pt will tolerate up to 15 min of PROM/ stretching to RUE to increase range of motion and maintain joint integrity   Pt will increase AROM of RUE into all planes by 1/4 range to improve functional use   Pt will improve RUE motor control and strength AEB 1/2 grade increase on MMT scale  Pt will be I with positioning of RUE for best management of R shoulder sublux         Long Term Goals (to be achieved within 12 weeks):  Pt will improve RUE motor control so as to be able to use as functional assist in 80% of all daily activities   Pt will demo with decreased R shoulder sublux to trace for painfree AROM for improved ADL and IADL engagement   Pt will demo G gross  "grasp/ release of R hand to be able to  and manipulate ADL items with min difficulty   Pt will be setup/ MI with ADLs of bathing, dressing and groom tasks   Pt will be I with HEP            SUBJECTIVE      SUBJECTIVE: \"I want to go hunting and fishing\"    PATIENT GOAL: \"I want to be able to use my right arm\"        HISTORY OF PRESENT ILLNESS:     Pt is a 59 y.o. male who was referred to Occupational Therapy s/p  Hemiparesis of right dominant side as late effect of cerebral infarction (HCC) [I69.351]. Pt with h/o CVAs, two in 2020 and one recently in Sept 2024 involving left thalamocapsular region. Pt with residual RUE/LE weakness. Following hospitalization in Sept, pt had subacute rehab, followed by [facility]. Pt now presents to OPOT for cont therapy.         PMH:   Past Medical History:   Diagnosis Date    CVA (cerebral vascular accident) (HCC)     Diabetes mellitus (HCC)     Hypertension        Past Surgical Hx:   Past Surgical History:   Procedure Laterality Date    NO PAST SURGERIES          HOME SETUP/ CLOF: lives with mother; 2 SH; bedroom on 2nd floor; currently sleeping on a hospital bed 1st floor; full bath on 2nd floor; BSC on 1st floor; Tub shower combo     ADLs: (mother provides assist with ADLs)  Showers once a week to once every 2 weeks as shower on 2nd floor; sponge bathes mostly  Bathing: A with LUE; A with buttocks and LE/feet  Dressing: I with OH shirt; A with C style shirt; I with pants; A with B socks/shoes  Toileting: A with toilet transfer and A with hygiene  Tub transfer: A with lifting LE over edge of tub     IADLs: pt's mother completes     Functional Mobility: w/c level    Work: has Stir business     DME: transfer tub bench; BSC on 1st floor; lift chair, w/c, hospital bed     Falls: once in NH and 3 falls at home  Reports dizziness/ vertigo with position changes     Pain Levels: R shoulder; 6/10           OBJECTIVE         PHYSICAL ASSESSMENT    (+) R shoulder 1 finger sublux   (+) " R scapular winging   Has a R surgical sling and R sarah sling       RUFRANKLIN MULLINS Comments                 UPPER EXTREMITY FXN Impaired Intact Dominant Hand: Right                  UE ROM LUE AROM  RUE PROM  RUE AROM COMMENTS   Shoulder Flex WNL  1/2 range Initiated     Shoulder Ext WFL  WFL 1/2 range    Shoulder ABD WFL  1/2 range  Initiated     Horizontal ABD WFL  WFL Unable     Horizontal ADD WFL  WFL Unable     Shoulder IR  WFL   WFL 1/8 range    Shoulder ER WFL  3/4 range Unable     Elbow Flex WFL  WFL Initiated      Elbow Ext  WFL  WFL Unable     Supination  WFL  3/4 range Unable     Pronation  WFL  WFL 1/4 range     Wrist Flex WFL  WFL 1/4 range     Wrist Ext WFL  WFL Unable     Finger Flex WFL  WFL Unable     Finger Ext WFL  WFL Unable     Opposition  WFL  WFL Unable                                MMT  LUE RUE   Comments   Shoulder Flex/Ext 5/5 1/5    Shoulder Abd 5/5 1/5    Shoulder ER 5/5 0/5    Shoulder IR 5/5 1/5    Elbow Flex 5/5 1/5    Elbow Ext 5/5 0/5    Wrist Flex 5/5 2-/5    Wrist Ext 5/5 2-/5    Gross Grasp 5/5 0/5          SENSATION LUE RUE Comments   Sharp Dull  Intact Intact    Proprioception Intact Intact    Pain Intact Intact    Hot/Cold Temp Intact Intact      COORDINATION LUE RUFRANKLIN    9 Hole Peg Test NT Unable     Keyhole Peg Test       O'Horacio Finger Dexterity Test       Handwriting (Print)      Handwriting (script)           MODIFIED ZO SCALE (TONE) HARPREET RUFRANKLIN    No increase in muscle tone (0) 0     Slight Increase in muscle tone with catch and release or min resist at end range (1)  Elbow     Slight Increase in muscle tone with catch and release, followed by min resistance through remainder of range (1+)      Increased muscle tone through full range, able to be moved easily (2)      Considerable increase in tone, difficult to move (3)      Rigid in Flexion/Extension (4)                  COGNITIVE ASSESSMENT      Cognitive Checklist: Patient indicated that he is experiencing the following  symptoms:    Memory: Remembering what people have told you; pt reporting difficulty with remembering details from phone calls     Attention: no deficits     Processing: no deficits     Executive Functions: no deficits      Communication: Word finding in conversation    Visual: no changes     Emotional: emotional lability; difficulty sleeping          VISUAL ASSESSMENT      Comments: (-) glasses     Vision Screen     ROM Intact    Tracking Intact    Saccades Intact    Convergence/ Divergence Intact    Visual Fields  Intact        Today's Session:   -Began education on RUE postioning while seated and while in bed; discussed shoulder taping vs sling for sublux management; pt with G understanding; will cont to address at next session           PLANNED THERAPY INTERVENTIONS:  Therapeutic activity  Therapeutic exercise  Neuromuscular re-education   ADL re-training   IADL re-training  RTW simulations   Manual tx  Hand to target  WBearing strategies   Closed chain activities  Open chain activities       INTERVENTION COMMENTS:  Diagnosis: Hemiparesis of right dominant side as late effect of cerebral infarction (HCC) [I69.351]  Precautions: R sided weakness, fall   Insurance: Payor: [employer] / Plan: [employer] / Product Type: Medicaid HMO /   1 of 24 visits through 12/31/25  PN due every 10th visit, currently on 1/10  POC Expires: 4/22/25      Auth Tracker   DATE 1/28        Visit # 1

## 2025-01-28 NOTE — PROGRESS NOTES
Daily Note     Today's date: 2025  Patient name: Wilder Vargas  : 1965  MRN: [id number]  Referring provider: Gilmar Oro MD  Dx:   Encounter Diagnosis     ICD-10-CM    1. Hemiparesis of right dominant side as late effect of cerebral infarction (HCC)  I69.351               Start Time: 311  Stop Time: 035  Total time in clinic (min): 39 minutes    Subjective:   No falls, no complaints.  He arrived in W/C accompanied by mom.      Objective: See treatment diary below      Assessment:    Progressed compliant surfaces with hidden obstacles, SOLO utilized t/o session.  Occasional assist needed for right LE placement in clearing.  Gait with std cane with ease in SOLO.  Static stance on foam with occ assist for balance correction.  Sit to stand with cuing for forward translation.  Introduced gait without device with MAFO with nice control.  Consider progression to gait outside SOLO.      Plan: Continue per plan of care.   No complaints post treatment, continue to progress gait challenges to tolerance, continue to progress compliant surfaces to tolerance.       Daily Treatment Diary     Precautions: h/o strokes, hemiparesis right, h/o BPPV, DM, HTN      POC Expires Reeval for Medicare to be completed  Unit Limit Auth Expiration Date PT/OT/STVisit Limit   3/25/2025 By visit N/A BOMN 2025 N/A    Completed on visit                    Auth Status DATE       Approved Visit # 1 2 3       Remaining         TESTING         TUG 62.5sec        5x STS 25.9sec        SIMONS 34/56        Gait speed nv        3 min walk test nv                 THERAPEUTIC EXERCISE HEP                                                                                                                                                               NEUROMUSCULAR REEDUCATION           Static balance     Stance on foam, 5x2 EO HT     Gait with HM/ MAFO- SOLO   Hemiwalker 4 laps in SOLO; std cane in SOLO 5# on right 6 laps 4 laps in  SOLO with std cane 5# on right;  4 laps in SOLO with std cane     Hurdles    3 flattened step to- increased right LE placement control x4 laps Stepping over foam beam 2x4 laps, step up onto beam x2 in SOLO occ assist for right LE placement     Step taps          Step up    1 rise x4 step up with cane, cuing for sequencing;  2 rise x2 with initial assist for step up, cuing to allow for right LE clearance down step      Gait with resistance posterior    1 lap with improved weight shift w/ std cane      Gait without device with MAFO     2 laps SOLO     Gait over hidden obstacles      2 foam beams under mat, occ assist for balance correction 4 laps SOLO                                                                 THERAPEUTIC ACTIVITY                                                  GAIT TRAINING                                                  MODALITIES

## 2025-01-30 ENCOUNTER — OFFICE VISIT (OUTPATIENT)
Facility: CLINIC | Age: 60
End: 2025-01-30
Payer: COMMERCIAL

## 2025-01-30 DIAGNOSIS — I69.351 HEMIPARESIS OF RIGHT DOMINANT SIDE AS LATE EFFECT OF CEREBRAL INFARCTION (HCC): Primary | ICD-10-CM

## 2025-01-30 PROCEDURE — 97112 NEUROMUSCULAR REEDUCATION: CPT

## 2025-01-30 NOTE — PROGRESS NOTES
Occupational Therapy Daily Note:    Today's date: 2025  Patient name: Wilder Vargas  : 1965  MRN: [id number]  Referring provider: Gilmar Oro MD  Dx:   Encounter Diagnosis   Name Primary?    Hemiparesis of right dominant side as late effect of cerebral infarction (HCC) Yes       Subjective: no complaints     Objective: Pt engaged in skilled OT treatment session with focus on UE NMR, UE strengthening, UE endurance, and family training/education to increase engagement, tolerance, and independence with daily ADL and IADL tasks.     CPT Code Minutes                                           Task Details        Therapeutic Activity               Neuro Re-Ed  NMRE to RUE with use of muscle tapping and vibration to elicit increased motor movement. Pt able to achieve the following movement with muscle facilitations:    Sh flex: 1/4 range  Sh ext: 1/2 range  Sh Abd: initiated to 1/8 range  Sh horizontal ab/adduction: 1/2 range  ER: 1/8-1/4 range  IR: 1/2 range  Elbow flex: 1/4 range  Elbow ext: 1/4 range  Supination: initiated  Pronation: 1/4 range  Wrist ext: to neutral  Wrist flex: initiated to 1/8 range   Finger flex: 1/4 range   Finger ext: initiated  Thumb: initiated           Educated pt and pt's mother on correct donning/doffing R sarah sling and sublux management HEP; both with G understanding.         Therapeutic Exercise               Manual          HEP  : sublux reduction exercises, 5 reps 2x/ day          Assessment: Tolerated treatment well. Pt with improved active movement of RUE, with use of muscle facilitations. Responds well to muscle activation techniques. Patient would benefit from continued skilled OT.    Plan: Continued skilled OT per POC    INTERVENTION COMMENTS:  Diagnosis: Hemiparesis of right dominant side as late effect of cerebral infarction (HCC) [I69.351]  Precautions: R sided weakness, fall   Insurance: Payor: [employer] / Plan: [employer] / Product Type: Medicaid HMO  /   2 of 24 visits through 12/31/25  POC Expires: 4/22/25      Auth Tracker   DATE 1/28 1/30       Visit # 1 2

## 2025-01-31 ENCOUNTER — OFFICE VISIT (OUTPATIENT)
Facility: CLINIC | Age: 60
End: 2025-01-31
Payer: COMMERCIAL

## 2025-01-31 DIAGNOSIS — I69.351 HEMIPARESIS OF RIGHT DOMINANT SIDE AS LATE EFFECT OF CEREBRAL INFARCTION (HCC): Primary | ICD-10-CM

## 2025-01-31 PROCEDURE — 97112 NEUROMUSCULAR REEDUCATION: CPT

## 2025-01-31 NOTE — PROGRESS NOTES
Daily Note     Today's date: 2025  Patient name: Wilder Vargas  : 1965  MRN: [id number]  Referring provider: Gilmar Oro MD  Dx:   Encounter Diagnosis     ICD-10-CM    1. Hemiparesis of right dominant side as late effect of cerebral infarction (HCC)  I69.351                 Start Time: 1323  Stop Time: 1407  Total time in clinic (min): 44 minutes    Subjective:   Pt states no changes or falls since last session. He arrived in W/C accompanied by mom.      Objective: See treatment diary below      Assessment:    Continued compliant surfaces with hidden obstacles, SOLO utilized t/o session. He demonstrated only one significant instance of LOB in SOLO with independent recovery. Difficulty maintaining knee lock during foam with head turns, occasional assistance from therapist provided. Good demonstration of large step length when cued.  Pt will continue to benefit from skilled PT services.       Plan: Continue per plan of care.   No complaints post treatment, continue to progress gait challenges to tolerance, continue to progress compliant surfaces to tolerance.       Daily Treatment Diary     Precautions: h/o strokes, hemiparesis right, h/o BPPV, DM, HTN      POC Expires Reeval for Medicare to be completed  Unit Limit Auth Expiration Date PT/OT/STVisit Limit   3/25/2025 By visit N/A BOMN 2025 N/A    Completed on visit                    Auth Status DATE     Approved Visit # 1 2 3 4 5     Remaining         TESTING         TUG 62.5sec        5x STS 25.9sec        SIMONS 34/56        Gait speed nv        3 min walk test nv                 THERAPEUTIC EXERCISE HEP                                                                                                                                                               NEUROMUSCULAR REEDUCATION           Static balance     Stance on foam, 5x2 EO HT Stance on foam, 5x2 EO HT    Gait with HM/ MAFO- SOLO   Hemiwalker 4 laps in SOLO;  std cane in SOLO 5# on right 6 laps 4 laps in SOLO with std cane 5# on right;  4 laps in SOLO with std cane 4 laps in SOLO with std cane    Hurdles    3 flattened step to- increased right LE placement control x4 laps Stepping over foam beam 2x4 laps, step up onto beam x2 in SOLO occ assist for right LE placement Stepping over foam beam 2x2 laps, step up onto beam x2 in SOLO occ assist for right LE placement    Step taps          Step up    1 rise x4 step up with cane, cuing for sequencing;  2 rise x2 with initial assist for step up, cuing to allow for right LE clearance down step      Gait with resistance posterior    1 lap with improved weight shift w/ std cane      Gait without device with MAFO     2 laps SOLO 2 laps SOLO    Gait over hidden obstacles      2 foam beams under mat, occ assist for balance correction 4 laps SOLO 2 foam beams under mat, occ assist for balance correction 4 laps SOLO                                                                THERAPEUTIC ACTIVITY                                                  GAIT TRAINING                                                  MODALITIES

## 2025-02-06 ENCOUNTER — APPOINTMENT (OUTPATIENT)
Facility: CLINIC | Age: 60
End: 2025-02-06
Payer: COMMERCIAL

## 2025-02-11 ENCOUNTER — OFFICE VISIT (OUTPATIENT)
Facility: CLINIC | Age: 60
End: 2025-02-11
Payer: COMMERCIAL

## 2025-02-11 DIAGNOSIS — I69.351 HEMIPARESIS OF RIGHT DOMINANT SIDE AS LATE EFFECT OF CEREBRAL INFARCTION (HCC): Primary | ICD-10-CM

## 2025-02-11 DIAGNOSIS — I10 PRIMARY HYPERTENSION: ICD-10-CM

## 2025-02-11 PROCEDURE — 97112 NEUROMUSCULAR REEDUCATION: CPT | Performed by: PHYSICAL THERAPIST

## 2025-02-11 PROCEDURE — 97112 NEUROMUSCULAR REEDUCATION: CPT

## 2025-02-11 RX ORDER — CARVEDILOL 25 MG/1
25 TABLET ORAL 2 TIMES DAILY WITH MEALS
Qty: 180 TABLET | Refills: 1 | Status: SHIPPED | OUTPATIENT
Start: 2025-02-11

## 2025-02-11 NOTE — PROGRESS NOTES
Occupational Therapy Daily Note:    Today's date: 2025  Patient name: Wilder Vargas  : 1965  MRN: [id number]  Referring provider: Gilmar Oro MD  Dx:   Encounter Diagnosis   Name Primary?    Hemiparesis of right dominant side as late effect of cerebral infarction (HCC) Yes         Subjective: no complaints     Objective: Pt engaged in skilled OT treatment session with focus on UE NMR, UE strengthening, UE endurance, and family training/education to increase engagement, tolerance, and independence with daily ADL and IADL tasks.     CPT Code Minutes                                           Task Details        Therapeutic Activity               Neuro Re-Ed  NMRE to RUE with use of muscle tapping and vibration to elicit increased motor movement. Pt able to achieve the following movement with muscle facilitations:    Sh flex: 1/4 range  Sh ext: 1/2 range  Sh Abd: initiated to 1/8 range  Sh horizontal ab/adduction: 1/2 range  Elbow flex: 1/4 range  Elbow ext: 1/4 range  Supination: initiated  Pronation: 1/4 range  Wrist ext: to neutral  Wrist flex: initiated to 1/8 range   Finger flex: 1/4 range   Finger ext: initiated  Thumb: initiated                   Therapeutic Exercise               Manual          HEP  : sublux reduction exercises, 5 reps 2x/ day          Assessment: Tolerated treatment well. Pt with improved active movement of RUE, with use of muscle facilitations. Responds well to muscle activation techniques. Patient would benefit from continued skilled OT.    Plan: Continued skilled OT per POC    INTERVENTION COMMENTS:  Diagnosis: Hemiparesis of right dominant side as late effect of cerebral infarction (HCC) [I69.351]  Precautions: R sided weakness, fall   Insurance: Payor: [employer] / Plan: [employer] / Product Type: Medicaid HMO /   3 of 24 visits through 25  POC Expires: 25      Auth Tracker   DATE       Visit # 1 2 3

## 2025-02-11 NOTE — PROGRESS NOTES
Daily Note     Today's date: 2025  Patient name: Wilder Vargas  : 1965  MRN: [id number]  Referring provider: Gilmar Oro MD  Dx:   Encounter Diagnosis     ICD-10-CM    1. Hemiparesis of right dominant side as late effect of cerebral infarction (HCC)  I69.351                   Start Time: 348  Stop Time: 440  Total time in clinic (min): 52 minutes    Subjective:   Pt states no changes or falls since last session. He arrived in W/C accompanied by mom.      Objective: See treatment diary below      Assessment:    Std cane use in SOLO with excellent stability and stride length.  Patient indicates he has not been wearing his brace at home due to difficulty having his mom tie his shoes.  Given information on elastic push lock laces to allow greater ease in getting shoes on and off, encouraging increased wear.  Gait outside SOLO today with hemiwalker with excellent stability.  Step over flattened to low height hurdles with difficulty controlling right step.  Patient discussed at end of session, continued dizziness with getting up from lying down.  Resting on edge of bed with alleviation of symptoms.       Plan: Continue per plan of care.   Due to dizziness noted retest for BPPV, continue to progress gait with one handed device to allow for improved right LE clearance.  Consider padding for right brace along shin line.     Daily Treatment Diary     Precautions: h/o strokes, hemiparesis right, h/o BPPV, DM, HTN      POC Expires Reeval for Medicare to be completed  Unit Limit Auth Expiration Date PT/OT/STVisit Limit   3/25/2025 By visit N/A BOMN 2025 N/A    Completed on visit                    Auth Status DATE    Approved Visit # 1 3 4 5 6    Remaining        TESTING        TUG 62.5sec       5x STS 25.9sec       SIMONS 34/56       Gait speed nv       3 min walk test nv               THERAPEUTIC EXERCISE HEP                                                                                                                                                NEUROMUSCULAR REEDUCATION          Static balance    Stance on foam, 5x2 EO HT Stance on foam, 5x2 EO HT    Gait with HM/ MAFO- SOLO   4 laps in SOLO with std cane 5# on right;  4 laps in SOLO with std cane 4 laps in SOLO with std cane 4 laps in SOLO with std cane; outside SOLO with hemiwalker and MAFO 6 laps   Hurdles   3 flattened step to- increased right LE placement control x4 laps Stepping over foam beam 2x4 laps, step up onto beam x2 in SOLO occ assist for right LE placement Stepping over foam beam 2x2 laps, step up onto beam x2 in SOLO occ assist for right LE placement Flattened hurdles 3x2 laps, repeated low level with assist to maintain rocío position right lead 4 laps   Step taps         Step up   1 rise x4 step up with cane, cuing for sequencing;  2 rise x2 with initial assist for step up, cuing to allow for right LE clearance down step      Gait with resistance posterior   1 lap with improved weight shift w/ std cane      Gait without device with MAFO    2 laps SOLO 2 laps SOLO    Gait over hidden obstacles     2 foam beams under mat, occ assist for balance correction 4 laps SOLO 2 foam beams under mat, occ assist for balance correction 4 laps SOLO                                                          THERAPEUTIC ACTIVITY                                             GAIT TRAINING                                             MODALITIES

## 2025-02-13 ENCOUNTER — OFFICE VISIT (OUTPATIENT)
Facility: CLINIC | Age: 60
End: 2025-02-13
Payer: COMMERCIAL

## 2025-02-13 DIAGNOSIS — I69.351 HEMIPARESIS OF RIGHT DOMINANT SIDE AS LATE EFFECT OF CEREBRAL INFARCTION (HCC): Primary | ICD-10-CM

## 2025-02-13 PROCEDURE — 97110 THERAPEUTIC EXERCISES: CPT

## 2025-02-13 PROCEDURE — 97112 NEUROMUSCULAR REEDUCATION: CPT

## 2025-02-13 PROCEDURE — 97112 NEUROMUSCULAR REEDUCATION: CPT | Performed by: PHYSICAL THERAPIST

## 2025-02-13 NOTE — PROGRESS NOTES
Daily Note     Today's date: 2025  Patient name: Wilder Vargas  : 1965  MRN: [id number]  Referring provider: Gilmar Oro MD  Dx:   Encounter Diagnosis     ICD-10-CM    1. Hemiparesis of right dominant side as late effect of cerebral infarction (HCC)  I69.351                     Start Time: 0155  Stop Time: 0243  Total time in clinic (min): 48 minutes    Subjective:   Pt states no changes or falls since last session. He arrived in W/C accompanied by mom.  Continues to indicate he feels dizziness getting up out of bed in the morning.      Objective: See treatment diary below      Assessment:   Due to shin discomfort with MAFO, added padding to shin with improved comfort.  CTG with std cane, improved clearance on left.  Due to dizziness, retested positioning.  No nystagmus noted, however, increased symptoms noted to right DHP.  Attempted loaded DHP and modified Sandoval's with symptoms, no nystagmus- nausea noted.  Ambulation after position changes with prolonged nausea, end of session with LE stretching supine to allow for symptom reduction for OT.  Discussed with patient and mom, if increased dizziness remains, he has been instructed to contact PT be be seen tomorrow for further assessment with Frenzel glasses.  Patient voiced understanding to instructions.      Plan: Continue per plan of care.    Continue to promote mobility with habituation as needed, continue balance and gait training to increase mobility and safety.  Slight nausea noted end of session, no pain.       Daily Treatment Diary     Precautions: h/o strokes, hemiparesis right, h/o BPPV, DM, HTN      POC Expires Reeval for Medicare to be completed  Unit Limit Auth Expiration Date PT/OT/STVisit Limit   3/25/2025 By visit N/A BOMN 2025 N/A    Completed on visit                    Auth Status DATE    Approved Visit # 1 4 5 6 7    Remaining        TESTING        TUG 62.5sec       5x STS 25.9sec       SIMONS 34/56      "  Gait speed nv       3 min walk test nv               THERAPEUTIC EXERCISE HEP                                                                                                                                               NEUROMUSCULAR REEDUCATION          Static balance   Stance on foam, 5x2 EO HT Stance on foam, 5x2 EO HT  Stance FT 10\" x3 EC   Gait with HM/ MAFO- SOLO   4 laps in SOLO with std cane 4 laps in SOLO with std cane 4 laps in SOLO with std cane; outside SOLO with hemiwalker and MAFO 6 laps 2 laps with std cane and MAFO, CTG   Hurdles   Stepping over foam beam 2x4 laps, step up onto beam x2 in SOLO occ assist for right LE placement Stepping over foam beam 2x2 laps, step up onto beam x2 in SOLO occ assist for right LE placement Flattened hurdles 3x2 laps, repeated low level with assist to maintain rocío position right lead 4 laps    Step taps         Step up         Gait with resistance posterior         Gait without device with MAFO   2 laps SOLO 2 laps SOLO     Gait over hidden obstacles    2 foam beams under mat, occ assist for balance correction 4 laps SOLO 2 foam beams under mat, occ assist for balance correction 4 laps SOLO     Modified liz's      2x2                                                THERAPEUTIC ACTIVITY                                             GAIT TRAINING                                             MODALITIES                                     "

## 2025-02-13 NOTE — PROGRESS NOTES
Occupational Therapy Daily Note:    Today's date: 2025  Patient name: Wilder Vargas  : 1965  MRN: [id number]  Referring provider: Gilmar Oro MD  Dx:   Encounter Diagnosis   Name Primary?    Hemiparesis of right dominant side as late effect of cerebral infarction (HCC) Yes         Subjective: no complaints     Objective: Pt engaged in skilled OT treatment session with focus on UE NMR, UE strengthening, UE endurance, and family training/education to increase engagement, tolerance, and independence with daily ADL and IADL tasks.     CPT Code Minutes                                           Task Details        Therapeutic Activity               Neuro Re-Ed  NMRE to RUE supine on mat table 2* intense nausea post PT session. Pt completed 2x5 of the following movement: shd abd, shd add, bicep flex, tricep ext, wrist flex, finger flex/ext.     Shd flex AROM 3x5 with min manual cues  Shd flex hold in place with mod manual cues                  Therapeutic Exercise  RUE ROM with PLS at end range, all planes to maintain joint integrity and increase ROM.              Manual          HEP  : sublux reduction exercises, 5 reps 2x/ day          Assessment: Tolerated treatment well. Pt labile t/o session in regards to having stroke and feeling like a burden to his loved ones, provided reassurance. Pt with improved active movement of RUE supine on mat. Responds well to muscle activation techniques. Patient would benefit from continued skilled OT.    Plan: Continued skilled OT per POC    INTERVENTION COMMENTS:  Diagnosis: Hemiparesis of right dominant side as late effect of cerebral infarction (HCC) [I69.351]  Precautions: R sided weakness, fall   Insurance: Payor: [employer] / Plan: [employer] / Product Type: Medicaid HMO /   4 of 24 visits through 25  POC Expires: 25      Auth Tracker   DATE      Visit # 1 2 3 4

## 2025-02-18 ENCOUNTER — OFFICE VISIT (OUTPATIENT)
Facility: CLINIC | Age: 60
End: 2025-02-18
Payer: COMMERCIAL

## 2025-02-18 DIAGNOSIS — I69.351 HEMIPARESIS OF RIGHT DOMINANT SIDE AS LATE EFFECT OF CEREBRAL INFARCTION (HCC): Primary | ICD-10-CM

## 2025-02-18 PROCEDURE — 97112 NEUROMUSCULAR REEDUCATION: CPT

## 2025-02-18 PROCEDURE — 97112 NEUROMUSCULAR REEDUCATION: CPT | Performed by: PHYSICAL THERAPIST

## 2025-02-18 NOTE — PROGRESS NOTES
Occupational Therapy Daily Note:    Today's date: 2025  Patient name: Wilder Vargas  : 1965  MRN: [id number]  Referring provider: Gilmar Oro MD  Dx:   Encounter Diagnosis   Name Primary?    Hemiparesis of right dominant side as late effect of cerebral infarction (HCC) Yes         Subjective: no complaints     Objective: Pt engaged in skilled OT treatment session with focus on UE NMR, UE strengthening, UE endurance, and family training/education to increase engagement, tolerance, and independence with daily ADL and IADL tasks.     CPT Code Minutes                                           Task Details        Therapeutic Activity               Neuro Re-Ed  NMRE to RUE with use of muscle tapping and vibration to elicit increased motor movement.   -completed isolated target reaching into all planes of sh, elbow, forearm, wrist and finger movement   -completed 5 reps into each movement            Item retrieval and transfer:  Retrieval of medium sized foam blocks from R side of table and transfer to 4 tabletop targets  -completed with min manual cues; RUE on towel to increase ease of movement             Therapeutic Exercise  Completed RUE PROM with LPS at end range, all planes to maintain joint integrity and increase ROM.              Manual          HEP  : sublux reduction exercises, 5 reps 2x/ day          Assessment: Tolerated treatment well. Pt with improving motor control of RUE to targets; responds well to muscle facilitations. Patient would benefit from continued skilled OT.    Plan: Continued skilled OT per POC    INTERVENTION COMMENTS:  Diagnosis: Hemiparesis of right dominant side as late effect of cerebral infarction (HCC) [I69.351]  Precautions: R sided weakness, fall   Insurance: [facility]: [facility] / Plan: [employer] / Product Type: Medicaid HMO /   5 of 24 visits through 25  POC Expires: 25      Auth Tracker   DATE     Visit # 1 2 3 4 5
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
## 2025-02-20 ENCOUNTER — OFFICE VISIT (OUTPATIENT)
Facility: CLINIC | Age: 60
End: 2025-02-20
Payer: COMMERCIAL

## 2025-02-20 DIAGNOSIS — I69.351 HEMIPARESIS OF RIGHT DOMINANT SIDE AS LATE EFFECT OF CEREBRAL INFARCTION (HCC): Primary | ICD-10-CM

## 2025-02-20 PROCEDURE — 97112 NEUROMUSCULAR REEDUCATION: CPT

## 2025-02-20 PROCEDURE — 97112 NEUROMUSCULAR REEDUCATION: CPT | Performed by: PHYSICAL THERAPY ASSISTANT

## 2025-02-20 NOTE — PROGRESS NOTES
"Daily Note     Today's date: 2025  Patient name: Wilder Vargas  : 1965  MRN: [id number]  Referring provider: Gilmar Oro MD  Dx:   No diagnosis found.                           Subjective:   Pt denies any changes since last session. He arrived in W/C accompanied by mom. Continues to have dizziness when getting OOB in the morning.       Objective: See treatment diary below      Assessment:   Good tolerance to TE as noted with pt demonstrating moderate fatigue post TE.  Increased ambulation distance today and added step taps with LLE to increase Wb'ing on RLE with good tolerance and no LOB. Pt will benefit from continued therapy to improve balance, strength and endurance for return to maximal function.   Plan: Continue per plan of care.    Continue to promote mobility with habituation as needed, continue balance and gait training to increase mobility and safety.  Slight nausea noted end of session, no pain.       Daily Treatment Diary     Precautions: h/o strokes, hemiparesis right, h/o BPPV, DM, HTN      POC Expires Reeval for Medicare to be completed  Unit Limit Auth Expiration Date PT/OT/STVisit Limit   3/25/2025 By visit N/A BOMN 2025 N/A    Completed on visit                    Auth Status DATE    Approved Visit # 1 6 7 8- Progress note 9    Remaining        TESTING        TUG 62.5sec       5x STS 25.9sec       SIMONS 34/56       Gait speed nv       3 min walk test nv               THERAPEUTIC EXERCISE HEP                                                                                                                                               NEUROMUSCULAR REEDUCATION          Static balance    Stance FT 10\" x3 EC  Stance FT 10\" x3 EC   Gait with HM/ MAFO- SOLO   4 laps in SOLO with std cane; outside SOLO with hemiwalker and MAFO 6 laps 2 laps with std cane and MAFO, CTG  2 laps (50')with std cane and MAFO, CTG   Hurdles   Flattened hurdles 3x2 laps, repeated low " level with assist to maintain rocío position right lead 4 laps   Flattened hurdles 3x2 laps  Lead with RLE  FWD  3x1 lap   LAT   Step taps      LLE 2x10 onto foam pad   Step up         Gait with resistance posterior         Gait without device with MAFO         Gait over hidden obstacles          Modified liz's    2x2                                                  THERAPEUTIC ACTIVITY                                             GAIT TRAINING                                             MODALITIES

## 2025-02-20 NOTE — PROGRESS NOTES
Occupational Therapy Daily Note:    Today's date: 2025  Patient name: Wilder Vargas  : 1965  MRN: [id number]  Referring provider: Gilmar Oro MD  Dx:   Encounter Diagnosis   Name Primary?    Hemiparesis of right dominant side as late effect of cerebral infarction (HCC) Yes         Subjective: no complaints     Objective: Pt engaged in skilled OT treatment session with focus on UE NMR, UE strengthening, UE endurance, and family training/education to increase engagement, tolerance, and independence with daily ADL and IADL tasks.     CPT Code Minutes                                           Task Details        Therapeutic Activity               Neuro Re-Ed  NMRE to RUE with use of muscle tapping to elicit increased motor movement.   -completed isolated target reaching into all planes of sh, elbow, forearm, wrist and finger movement   -completed 5 reps into each movement            Item retrieval and transfer:  Retrieval and transfer of medium sized foam blocks between 3 tabletop targets and R side of table. Able to retreive from R side and transfer to 3 targets, then reverse and retreive from 3 targets and transfer to R side of table  -completed with min manual cues; RUE on towel to increase ease of movement             Therapeutic Exercise  Completed RUE PROM with LPS at end range, all planes to maintain joint integrity and increase ROM.              Manual          HEP  : sublux reduction exercises, 5 reps 2x/ day          Assessment: Tolerated treatment well. Pt with increased motor control of RUE, now with movement into supination and finger flex/ ext during item transfer. Pt would benefit from continued skilled OT.    Plan: Continued skilled OT per POC    INTERVENTION COMMENTS:  Diagnosis: Hemiparesis of right dominant side as late effect of cerebral infarction (HCC) [I69.351]  Precautions: R sided weakness, fall   Insurance: Payor: [facility] / Plan: [employer] / Product Type: Medicaid  HMO /   6 of 24 visits through 12/31/25  POC Expires: 4/22/25      Auth Tracker   DATE 1/28 1/30 2/11 2/13 2/18 2/20   Visit # 1 2 3 4 5 6

## 2025-02-23 DIAGNOSIS — H81.10 BPPV (BENIGN PAROXYSMAL POSITIONAL VERTIGO), UNSPECIFIED LATERALITY: ICD-10-CM

## 2025-02-24 RX ORDER — MECLIZINE HYDROCHLORIDE 25 MG/1
25 TABLET ORAL EVERY 8 HOURS PRN
Qty: 90 TABLET | Refills: 1 | Status: SHIPPED | OUTPATIENT
Start: 2025-02-24

## 2025-02-24 NOTE — TELEPHONE ENCOUNTER
Pt called back to follow up, was told by [facility] his medication was denied. Advised it's still pending review

## 2025-02-25 ENCOUNTER — OFFICE VISIT (OUTPATIENT)
Facility: CLINIC | Age: 60
End: 2025-02-25
Payer: COMMERCIAL

## 2025-02-25 DIAGNOSIS — I69.351 HEMIPARESIS OF RIGHT DOMINANT SIDE AS LATE EFFECT OF CEREBRAL INFARCTION (HCC): Primary | ICD-10-CM

## 2025-02-25 PROCEDURE — 97110 THERAPEUTIC EXERCISES: CPT

## 2025-02-25 PROCEDURE — 97112 NEUROMUSCULAR REEDUCATION: CPT

## 2025-02-25 NOTE — PROGRESS NOTES
"Daily Note     Today's date: 2025  Patient name: Wilder Vargas  : 1965  MRN: [id number]  Referring provider: Gilmar Oro MD  Dx:   Encounter Diagnosis     ICD-10-CM    1. Hemiparesis of right dominant side as late effect of cerebral infarction (HCC)  I69.351                       Start Time: 1456  Stop Time: 1535  Total time in clinic (min): 39 minutes    Subjective:   Pt states nothing new. States that he is getting occasional dizziness when he gets up in the morning but it is getting better.       Objective: See treatment diary below      Assessment:   Pt tolerated session well. Focused today on high intensity training aiming for 8/10 RPE with walking. Continues to demonstrate improved balance with ambulation experiencing only 1 LOB with independent recovery today. Pt will benefit from continued therapy to improve balance, strength and endurance for return to maximal function.     Plan: Continue per plan of care.    Continue to promote mobility with habituation as needed, continue balance and gait training to increase mobility and safety.  Slight nausea noted end of session, no pain.       Daily Treatment Diary     Precautions: h/o strokes, hemiparesis right, h/o BPPV, DM, HTN      POC Expires Reeval for Medicare to be completed  Unit Limit Auth Expiration Date PT/OT/STVisit Limit   3/25/2025 By visit N/A BOMN 2025 N/A    Completed on visit                    Auth Status DATE    Approved Visit # 1 6 7 8- Progress note 9 10    Remaining         TESTING         TUG 62.5sec        5x STS 25.9sec        SIMONS 34/56        Gait speed nv        3 min walk test nv                 THERAPEUTIC EXERCISE HEP                                                                                                                                                               NEUROMUSCULAR REEDUCATION           Static balance    Stance FT 10\" x3 EC  Stance FT 10\" x3 EC Stance FT 10\" x3 EC "   Gait with HM/ MAFO- SOLO   4 laps in SOLO with std cane; outside SOLO with hemiwalker and MAFO 6 laps 2 laps with std cane and MAFO, CTG  2 laps (50')with std cane and MAFO, CTG 2 laps x2 at 8/10 RPE SOLO SPC    Hurdles   Flattened hurdles 3x2 laps, repeated low level with assist to maintain rocío position right lead 4 laps   Flattened hurdles 3x2 laps  Lead with RLE  FWD  3x1 lap   LAT 5x 2 laps lead with RLE FWD    Step taps      LLE 2x10 onto foam pad    Step up          Gait with resistance posterior          Gait without device with MAFO          Gait over hidden obstacles           Modified liz's    2x2                                                        THERAPEUTIC ACTIVITY                                                  GAIT TRAINING                                                  MODALITIES

## 2025-02-25 NOTE — PROGRESS NOTES
Occupational Therapy Daily Note:    Today's date: 2025  Patient name: Wilder Vargas  : 1965  MRN: [id number]  Referring provider: Gilmar Oro MD  Dx:   Encounter Diagnosis   Name Primary?    Hemiparesis of right dominant side as late effect of cerebral infarction (HCC) Yes         Subjective: no complaints     Objective: Pt engaged in skilled OT treatment session with focus on UE NMR, UE strengthening, UE endurance, and family training/education to increase engagement, tolerance, and independence with daily ADL and IADL tasks.     CPT Code Minutes                                           Task Details        Therapeutic Activity               Neuro Re-Ed  NMRE to RUE with use of muscle tapping to elicit increased motor movement.   -completed isolated target reaching into all planes of sh, elbow, forearm, wrist and finger movement   -completed 2x5 reps into each movement                        Therapeutic Exercise  Completed RUE PROM with LPS at end range, all planes to maintain joint integrity and increase ROM.              Manual          HEP  : sublux reduction exercises, 5 reps 2x/ day          Assessment: Tolerated treatment well. Pt with increased motor control of RUE, responding well to muscle tapping. Pt would benefit from continued skilled OT.    Plan: Continued skilled OT per POC    INTERVENTION COMMENTS:  Diagnosis: Hemiparesis of right dominant side as late effect of cerebral infarction (HCC) [I69.351]  Precautions: R sided weakness, fall   Insurance: Payor: [employer] / Plan: [employer] / Product Type: Medicaid HMO /   7 of 24 visits through 25  POC Expires: 25      Auth Tracker   DATE    Visit # 1 2 3 4 5 6 7

## 2025-02-27 ENCOUNTER — OFFICE VISIT (OUTPATIENT)
Facility: CLINIC | Age: 60
End: 2025-02-27
Payer: COMMERCIAL

## 2025-02-27 DIAGNOSIS — I69.351 HEMIPARESIS OF RIGHT DOMINANT SIDE AS LATE EFFECT OF CEREBRAL INFARCTION (HCC): Primary | ICD-10-CM

## 2025-02-27 PROCEDURE — 97112 NEUROMUSCULAR REEDUCATION: CPT

## 2025-02-27 PROCEDURE — 97110 THERAPEUTIC EXERCISES: CPT

## 2025-02-27 NOTE — PROGRESS NOTES
Occupational Therapy Daily Note:    Today's date: 2025  Patient name: Wilder Vargas  : 1965  MRN: [id number]  Referring provider: Gilmar Oro MD  Dx:   Encounter Diagnosis   Name Primary?    Hemiparesis of right dominant side as late effect of cerebral infarction (HCC) Yes         Subjective: no complaints     Objective: Pt engaged in skilled OT treatment session with focus on UE NMR, UE strengthening, UE endurance, and family training/education to increase engagement, tolerance, and independence with daily ADL and IADL tasks.     CPT Code Minutes                                           Task Details        Therapeutic Activity               Neuro Re-Ed  NMRE to RUE with use of muscle tapping to elicit increased motor movement.   -completed isolated target reaching into all planes of sh, elbow, forearm, wrist and finger movement   -completed 5 reps into each movement            Item retrieval and transfer:  -Retrieval of medium sized foam blocks from R side of table and transfer to 3 different tabletop targets  -completed with min manual cues; RUE on towel to increase ease of movement           Therapeutic Exercise  Completed RUE PROM with LPS at end range, all planes to maintain joint integrity and increase ROM.              Manual          HEP  : sublux reduction exercises, 5 reps 2x/ day          Assessment: Tolerated treatment well. Pt cont to demo improving motor control of RUE, requiring decreased manual cues to achieve target. Pt would benefit from continued skilled OT.    Plan: Continued skilled OT per POC    INTERVENTION COMMENTS:  Diagnosis: Hemiparesis of right dominant side as late effect of cerebral infarction (HCC) [I69.351]  Precautions: R sided weakness, fall   Insurance: Payor: [employer] / Plan: [employer] / Product Type: Medicaid HMO /   8 of 24 visits through 25  POC Expires: 25      Auth Tracker   DATE          Visit # 8

## 2025-02-27 NOTE — PROGRESS NOTES
Daily Note     Today's date: 2025  Patient name: Wilder Vargas  : 1965  MRN: [id number]  Referring provider: Gilmar Oro MD  Dx:   Encounter Diagnosis     ICD-10-CM    1. Hemiparesis of right dominant side as late effect of cerebral infarction (HCC)  I69.351               Start Time: 1445  Stop Time: 1530  Total time in clinic (min): 45 minutes    Subjective:   Pt offers no new complaints and is willing to participate in PT session.       Objective: See treatment diary below      Assessment:   Pt tolerated session well evident by appropriate muscle fatigue with exercises. Pt required Vcs for increased step length on LLE to help facilitate increased stance time on affected R side with good carry over. Pt also required Vcs fro increased R hip and knee flexion for obstacle negotiation with fair carry over. Pt will benefit from continued therapy to improve balance, strength and endurance for return to maximal function.     Plan: Continue per plan of care.    Continue to promote mobility with habituation as needed, continue balance and gait training to increase mobility and safety.  Slight nausea noted end of session, no pain.       Daily Treatment Diary     Precautions: h/o strokes, hemiparesis right, h/o BPPV, DM, HTN      POC Expires Reeval for Medicare to be completed  Unit Limit Auth Expiration Date PT/OT/STVisit Limit   3/25/2025 By visit N/A BOMN 2025 N/A    Completed on visit                    Auth Status DATE    Approved Visit # 1 6 7 8- Progress note 9 10 11    Remaining          TESTING          TUG 62.5sec         5x STS 25.9sec         SIMONS 34/56         Gait speed nv         3 min walk test nv                   THERAPEUTIC EXERCISE HEP                                                                                                                                                                               NEUROMUSCULAR REEDUCATION            Static  "balance    Stance FT 10\" x3 EC  Stance FT 10\" x3 EC Stance FT 10\" x3 EC Stance FT 15\" x3 EC   Gait with HM/ MAFO- SOLO   4 laps in SOLO with std cane; outside SOLO with hemiwalker and MAFO 6 laps 2 laps with std cane and MAFO, CTG  2 laps (50')with std cane and MAFO, CTG 2 laps x2 at 8/10 RPE SOLO SPC  (50')with std cane and MAFO, CTG     (50')with std cane and MAFO, CTG w/ 3# aw on LLE   Hurdles   Flattened hurdles 3x2 laps, repeated low level with assist to maintain rocío position right lead 4 laps   Flattened hurdles 3x2 laps  Lead with RLE  FWD  3x1 lap   LAT 5x 2 laps lead with RLE FWD  5x 1  lead with RLE FWD    Step taps      LLE 2x10 onto foam pad  LLE 2x10 onto 2\" step   Step up           Gait with resistance posterior           Gait without device with MAFO           Gait over hidden obstacles            Modified liz's    2x2                                                              THERAPEUTIC ACTIVITY                                                       GAIT TRAINING                                                       MODALITIES                                           "

## 2025-03-04 ENCOUNTER — OFFICE VISIT (OUTPATIENT)
Facility: CLINIC | Age: 60
End: 2025-03-04
Payer: COMMERCIAL

## 2025-03-04 DIAGNOSIS — I69.351 HEMIPARESIS OF RIGHT DOMINANT SIDE AS LATE EFFECT OF CEREBRAL INFARCTION (HCC): Primary | ICD-10-CM

## 2025-03-04 PROCEDURE — 97112 NEUROMUSCULAR REEDUCATION: CPT

## 2025-03-04 PROCEDURE — 97110 THERAPEUTIC EXERCISES: CPT

## 2025-03-04 NOTE — PROGRESS NOTES
Occupational Therapy Daily Note:    Today's date: 3/4/2025  Patient name: Wilder Vargas  : 1965  MRN: [id number]  Referring provider: Gilmar Oro MD  Dx:   Encounter Diagnosis   Name Primary?    Hemiparesis of right dominant side as late effect of cerebral infarction (HCC) Yes         Subjective: pt reporting fall on the stairs last night, no injuries    Objective: Pt engaged in skilled OT treatment session with focus on UE NMR, UE strengthening, UE endurance, and family training/education to increase engagement, tolerance, and independence with daily ADL and IADL tasks.     CPT Code Minutes                                           Task Details        Therapeutic Activity               Neuro Re-Ed  NMRE to RUE with use of muscle tapping and vibration to elicit increased motor movement.   -completed isolated target reaching into all planes of sh, elbow, forearm, wrist and finger movement   -completed 5 reps into each movement            Item retrieval and transfer:  -Retrieval of medium sized foam blocks from R side of table and transfer to Left sided tabletop target  -completed with min muscle facilitations of vibration and muscle tapping; RUE on towel to increase ease of movement           Therapeutic Exercise  Completed RUE PROM with LPS at end range, all planes to maintain joint integrity and increase ROM.              Manual          HEP  : sublux reduction exercises, 5 reps 2x/ day          Assessment: Tolerated treatment well. Pt cont to demo improving motor control of RUE, requiring decreased manual cues to achieve target. Pt would benefit from continued skilled OT.    Plan: Continued skilled OT per POC    INTERVENTION COMMENTS:  Diagnosis: Hemiparesis of right dominant side as late effect of cerebral infarction (HCC) [I69.351]  Precautions: R sided weakness, fall   Insurance: Payor: [employer] / Plan: [employer] / Product Type: Medicaid O /   9 of 24 visits through 25  POC  Expires: 4/22/25      Auth Tracker   DATE 2/27 3/4        Visit # 8 9

## 2025-03-04 NOTE — PROGRESS NOTES
"Daily Note     Today's date: 3/4/2025  Patient name: Wilder Vargas  : 1965  MRN: [id number]  Referring provider: Gilmar Oro MD  Dx:   Encounter Diagnosis     ICD-10-CM    1. Hemiparesis of right dominant side as late effect of cerebral infarction (HCC)  I69.351                 Start Time: 1530  Stop Time: 1608  Total time in clinic (min): 38 minutes    Subjective: Pt states he is okay today. Was very tired after last session.       Objective: See treatment diary below      Assessment:   Pt tolerated session well appropriate signs of fatigue. Continues to benefit from Vcs for R hip and knee flexion. Trialed TM today achieving 0.3mph with 8/10 RPE for 2 sets of 3 min, establishing baseline speed for future TM training. Pt will benefit from continued therapy to improve balance, strength and endurance for return to maximal function.     Plan: Continue per plan of care.    Continue to promote mobility with habituation as needed, continue balance and gait training to increase mobility and safety.  Slight nausea noted end of session, no pain.       Daily Treatment Diary     Precautions: h/o strokes, hemiparesis right, h/o BPPV, DM, HTN      POC Expires Reeval for Medicare to be completed  Unit Limit Auth Expiration Date PT/OT/STVisit Limit   3/25/2025 By visit N/A BOMN 2025 N/A    Completed on visit                    Auth Status DATE 1/14 3/4 2/13 2/18 2/20 2/25 2/27   Approved Visit # 1 12 7 8- Progress note 9 10 11    Remaining          TESTING          TUG 62.5sec         5x STS 25.9sec         SIMONS 34/56         Gait speed nv         3 min walk test nv                   THERAPEUTIC EXERCISE HEP                                                                                                                                                                               NEUROMUSCULAR REEDUCATION            Static balance    Stance FT 10\" x3 EC  Stance FT 10\" x3 EC Stance FT 10\" x3 EC Stance FT 15\" x3 EC " "  Gait with HM/ MAFO- SOLO   Treadmill walking 2x3 min 8/10 RPE 0.3mph      Over ground x30 ft SPC CGA  2 laps with std cane and MAFO, CTG  2 laps (50')with std cane and MAFO, CTG 2 laps x2 at 8/10 RPE SOLO SPC  (50')with std cane and MAFO, CTG     (50')with std cane and MAFO, CTG w/ 3# aw on LLE   Hurdles   X2 laps lead with RLE FWD 6 hurdles    Flattened hurdles 3x2 laps  Lead with RLE  FWD  3x1 lap   LAT 5x 2 laps lead with RLE FWD  5x 1  lead with RLE FWD    Step taps   LLE 2x10 onto 2\" step   LLE 2x10 onto foam pad  LLE 2x10 onto 2\" step   Step up           Gait with resistance posterior           Gait without device with MAFO           Gait over hidden obstacles            Modified liz's    2x2                                                              THERAPEUTIC ACTIVITY                                                       GAIT TRAINING                                                       MODALITIES                                           "

## 2025-03-06 ENCOUNTER — OFFICE VISIT (OUTPATIENT)
Facility: CLINIC | Age: 60
End: 2025-03-06
Payer: COMMERCIAL

## 2025-03-06 DIAGNOSIS — I69.351 HEMIPARESIS OF RIGHT DOMINANT SIDE AS LATE EFFECT OF CEREBRAL INFARCTION (HCC): Primary | ICD-10-CM

## 2025-03-06 PROCEDURE — 97110 THERAPEUTIC EXERCISES: CPT

## 2025-03-06 PROCEDURE — 97112 NEUROMUSCULAR REEDUCATION: CPT | Performed by: PHYSICAL THERAPIST

## 2025-03-06 PROCEDURE — 97112 NEUROMUSCULAR REEDUCATION: CPT

## 2025-03-06 NOTE — PROGRESS NOTES
Occupational Therapy Daily Note:    Today's date: 3/6/2025  Patient name: Wilder Vargas  : 1965  MRN: [id number]  Referring provider: Gilmar Oro MD  Dx:   Encounter Diagnosis   Name Primary?    Hemiparesis of right dominant side as late effect of cerebral infarction (HCC) Yes         Subjective: no new complaints     Objective: Pt engaged in skilled OT treatment session with focus on UE NMR, UE strengthening, UE endurance, and family training/education to increase engagement, tolerance, and independence with daily ADL and IADL tasks.     CPT Code Minutes                                           Task Details        Therapeutic Activity               Neuro Re-Ed  NMRE to RUE with use of muscle tapping and vibration to elicit increased motor movement.   -completed isolated target reaching into all planes of sh, elbow, forearm, wrist and finger movement            Item retrieval and transfer:  -Retrieval of medium sized foam blocks from R side of table and transfer to 5 different tabletop targets  -completed with min muscle facilitations of vibration and muscle tapping; RUE on towel to increase ease of movement           Therapeutic Exercise  Completed RUE PROM with LPS at end range, all planes to maintain joint integrity and increase ROM.              Manual          HEP  : sublux reduction exercises, 5 reps 2x/ day          Assessment: Tolerated treatment well. Pt responds well to muscle facilitations of tapping and vibration with noted increased motor control into all planes of RUE movement. Pt would benefit from continued skilled OT.    Plan: Continued skilled OT per POC    INTERVENTION COMMENTS:  Diagnosis: Hemiparesis of right dominant side as late effect of cerebral infarction (HCC) [I69.351]  Precautions: R sided weakness, fall   Insurance: Payor: [employer] / Plan: [employer] / Product Type: Medicaid O /   10 of 24 visits through 25  POC Expires: 25      Auth Tracker   DATE  2/27 3/4 3/6       Visit # 8 9 10

## 2025-03-06 NOTE — PROGRESS NOTES
"Daily Note     Today's date: 3/6/2025  Patient name: Wilder Vargas  : 1965  MRN: [id number]  Referring provider: Gilmar Oro MD  Dx:   Encounter Diagnosis     ICD-10-CM    1. Hemiparesis of right dominant side as late effect of cerebral infarction (HCC)  I69.351           Start Time: 0300  Stop Time: 0345  Total time in clinic (min): 45 minutes    Subjective: No current complaints, arrived in W/C accompanied by mom.      Objective: See treatment diary below      Assessment:   Nice gains in gait without device.  He continues to rely on RW for home ambulation despite improvement with std cane.  He expresses difficulty with steps at home.  Increased step ups in //bars with good control, occasional adduction with descending right lead.  Reviewed HEP.  Encouraged increased ambulation.      Plan: Continue per plan of care.   No complaints end of session with exception of fatigue.  Consider full flight of stairs to increase step control for home.       Daily Treatment Diary     Precautions: h/o strokes, hemiparesis right, h/o BPPV, DM, HTN      POC Expires Reeval for Medicare to be completed  Unit Limit Auth Expiration Date PT/OT/STVisit Limit   3/25/2025 By visit N/A BOMN 2025 N/A    Completed on visit                    Auth Status DATE 1/14 3/4 2/25 2/27 3/6   Approved Visit # 1 12 10 11 13    Remaining        TESTING        TUG 62.5sec       5x STS 25.9sec       SIMONS 34/56       Gait speed nv       3 min walk test nv               THERAPEUTIC EXERCISE HEP                                                                                                                                               NEUROMUSCULAR REEDUCATION          Static balance    Stance FT 10\" x3 EC Stance FT 15\" x3 EC    Gait with HM/ MAFO- SOLO   Treadmill walking 2x3 min 8/10 RPE 0.3mph      Over ground x30 ft SPC CGA  2 laps x2 at 8/10 RPE SOLO SPC  (50')with std cane and MAFO, CTG     (50')with std cane and MAFO, CTG w/ 3# aw on " "' with std cane and MAFO CTG   Hurdles   X2 laps lead with RLE FWD 6 hurdles  5x 2 laps lead with RLE FWD  5x 1  lead with RLE FWD     Step taps   LLE 2x10 onto 2\" step  LLE 2x10 onto 2\" step    Step up      3 rise in //bars 6x2   Gait with resistance posterior         Gait without device with MAFO         Gait over hidden obstacles          Modified hill's         Sit to stand with increased forward translation      5x3                                       THERAPEUTIC ACTIVITY                                             GAIT TRAINING                                             MODALITIES                                   "

## 2025-03-10 ENCOUNTER — OFFICE VISIT (OUTPATIENT)
Facility: CLINIC | Age: 60
End: 2025-03-10
Payer: COMMERCIAL

## 2025-03-10 DIAGNOSIS — I69.351 HEMIPARESIS OF RIGHT DOMINANT SIDE AS LATE EFFECT OF CEREBRAL INFARCTION (HCC): Primary | ICD-10-CM

## 2025-03-10 PROCEDURE — 97112 NEUROMUSCULAR REEDUCATION: CPT | Performed by: PHYSICAL THERAPIST

## 2025-03-10 NOTE — PROGRESS NOTES
Daily Note     Today's date: 3/10/2025  Patient name: Wilder Vargas  : 1965  MRN: [id number]  Referring provider: Gilmar Oro MD  Dx:   Encounter Diagnosis     ICD-10-CM    1. Hemiparesis of right dominant side as late effect of cerebral infarction (HCC)  I69.351             Start Time: 231  Stop Time: 315  Total time in clinic (min): 44 minutes    Subjective:  Shoulder pain continues to be noted, arrived in W/C accompanied by mom.      Objective: See treatment diary below      Assessment:   Gait with MAFO and cane with nice stability, patient continues to be resistant to walking at home indicating mom has difficulty putting on his MAFO so he doesn't use it at home.  Discussed at length options for donning including slide laces to ease process with voiced understanding.  Patient also expressed not being able to cook or perform ADL tasks due to lack of right UE use.  Educated in equipment options for ease in one handed cooking.  Full flight of stairs today with rail with excellent LE placement including improved right LE placement descending with decreased scissoring noted.  Sidestepping to increase abduction strength with good tolerance.  Continue to encourage increased walking at home with brace.      Plan: Continue per plan of care.   No complaints end of session with exception of fatigue.  Consider increased compliant surfaces and increased LE resistance.       Daily Treatment Diary     Precautions: h/o strokes, hemiparesis right, h/o BPPV, DM, HTN      POC Expires Reeval for Medicare to be completed  Unit Limit Auth Expiration Date PT/OT/STVisit Limit   3/25/2025 By visit N/A BOMN 2025 N/A    Completed on visit                    Auth Status DATE 1/14 2/25 2/27 3/6 3/10   Approved Visit # 1 10 11 13 14    Remaining        TESTING        TUG 62.5sec       5x STS 25.9sec       SIMONS 34/56       Gait speed nv       3 min walk test nv               THERAPEUTIC EXERCISE HEP        [place]      To  "assist with shoulder pain 5x2 assisted                                                                                                                                 NEUROMUSCULAR REEDUCATION          Static balance   Stance FT 10\" x3 EC Stance FT 15\" x3 EC     Gait with HM/ MAFO- SOLO   2 laps x2 at 8/10 RPE SOLO SPC  (50')with std cane and MAFO, CTG     (50')with std cane and MAFO, CTG w/ 3# aw on ' with std cane and MAFO CTG 60' x2 with cane & MAFO close supervision to CTG   Hurdles   5x 2 laps lead with RLE FWD  5x 1  lead with RLE FWD      Step taps    LLE 2x10 onto 2\" step     Step up     3 rise in //bars 6x2 Full flight stairs with rail and CTG ascending and descending   Gait with resistance posterior         Gait without device with MAFO         Gait over hidden obstacles          Modified hill's         Sit to stand with increased forward translation     5x3    Sidestepping       4 laps UE support                              THERAPEUTIC ACTIVITY                                             GAIT TRAINING                                             MODALITIES                                   "

## 2025-03-13 ENCOUNTER — TELEMEDICINE (OUTPATIENT)
Dept: CARDIOLOGY CLINIC | Facility: CLINIC | Age: 60
End: 2025-03-13
Payer: COMMERCIAL

## 2025-03-13 DIAGNOSIS — Z79.4 TYPE 2 DIABETES MELLITUS WITH DIABETIC MICROALBUMINURIA, WITH LONG-TERM CURRENT USE OF INSULIN (HCC): ICD-10-CM

## 2025-03-13 DIAGNOSIS — Z86.73 HISTORY OF STROKE: ICD-10-CM

## 2025-03-13 DIAGNOSIS — I11.9 HYPERTENSIVE HEART DISEASE WITHOUT HEART FAILURE: ICD-10-CM

## 2025-03-13 DIAGNOSIS — E78.2 MIXED HYPERLIPIDEMIA: ICD-10-CM

## 2025-03-13 DIAGNOSIS — R80.9 TYPE 2 DIABETES MELLITUS WITH DIABETIC MICROALBUMINURIA, WITH LONG-TERM CURRENT USE OF INSULIN (HCC): ICD-10-CM

## 2025-03-13 DIAGNOSIS — E11.29 TYPE 2 DIABETES MELLITUS WITH DIABETIC MICROALBUMINURIA, WITH LONG-TERM CURRENT USE OF INSULIN (HCC): ICD-10-CM

## 2025-03-13 DIAGNOSIS — I10 PRIMARY HYPERTENSION: Primary | ICD-10-CM

## 2025-03-13 PROCEDURE — 99214 OFFICE O/P EST MOD 30 MIN: CPT | Performed by: INTERNAL MEDICINE

## 2025-03-13 NOTE — ASSESSMENT & PLAN NOTE
Wilder' blood pressure appears under better control now that he is compliant on his regimen.  No changes were made and he will continue on the medications listed below.  He has not been checking his blood pressure at home since visiting nursing discharged him.  I have asked him to either get a blood pressure cuff or have it checked at physical therapy.  We will see him back in 6 months.    Orders:    Ambulatory Referral to Cardiology

## 2025-03-13 NOTE — ASSESSMENT & PLAN NOTE
Lab Results   Component Value Date    HGBA1C 6.7 (H) 01/06/2025     Blood sugars were uncontrolled at the time of his his hospitalization.  However his hemoglobin A1c has improved to 6.7 on insulin.  He follows closely with his internist.

## 2025-03-13 NOTE — ASSESSMENT & PLAN NOTE
During his hospitalization he had moderate LVH on echocardiogram.  Normal LV systolic function.  He has no signs of CHF.

## 2025-03-13 NOTE — PROGRESS NOTES
Virtual Regular VisitName: Wilder Vargas      : 1965      MRN: [id number]  Encounter Provider: Glynn Oneal MD  Encounter Date: 3/13/2025   Encounter department: [facility]  :  Assessment & Plan  Primary hypertension    Jannette blood pressure appears under better control now that he is compliant on his regimen.  No changes were made and he will continue on the medications listed below.  He has not been checking his blood pressure at home since visiting nursing discharged him.  I have asked him to either get a blood pressure cuff or have it checked at physical therapy.  We will see him back in 6 months.    Orders:    Ambulatory Referral to Cardiology    Hypertensive heart disease without heart failure  During his hospitalization he had moderate LVH on echocardiogram.  Normal LV systolic function.  He has no signs of CHF.       Mixed hyperlipidemia  Currently on pravastatin 20 mg daily.  Blood work is followed closely.       History of stroke  This occurred during a hospitalization in September.  It was presumed to hypertensive induced based on neurology's assessment.  He continues with physical therapy as he does have residual right-sided weakness.       Type 2 diabetes mellitus with diabetic microalbuminuria, with long-term current use of insulin ([facility])    Lab Results   Component Value Date    HGBA1C 6.7 (H) 2025     Blood sugars were uncontrolled at the time of his his hospitalization.  However his hemoglobin A1c has improved to 6.7 on insulin.  He follows closely with his internist.           History of Present Illness     Mr. Vargas is having a virtual video follow-up today given his risk factors for cardiovascular disease and history of an acute CVA.  I met him during a hospitalization in September in which she presented with right facial droop along with right sided hemiparesis along and dysarthria.  He was seen and treated by neurology who felt that this was all  hypertensive induced as his blood pressure was significantly elevated and uncontrolled.  His blood pressure was likely uncontrolled for some time as he was noncompliant on outpatient medical therapy.  He had an echocardiogram that showed normal LV systolic function, moderate LVH without significant valve disease.    During that hospitalization he was started back on his hypertensive regimen that included losartan/HCTZ, carvedilol, amlodipine and spironolactone.  Hydralazine was added to his regimen and his blood pressure has overall improved.  His blood pressure was followed closely by visiting nursing but since that ended he has not been checking his blood pressure.  He continues to work with physical therapy twice per week.  His blood sugars were also uncontrolled at his hospitalization, as he does have diabetes mellitus, and this has overall improved.  He follows closely with Dr. Oro.    Wilder continues to have residual effects from his stroke.  He tells me he still has right-sided weakness.  It has improved with physical therapy but he does have difficulty walking.  He has been compliant on his medical therapy.  He denies any specific cardiac symptoms.  No chest pain or shortness of breath.  No signs or symptoms of CHF.  No palpitations, lightheadedness or syncope.    HPI  Review of Systems   Constitutional: Negative.    HENT: Negative.     Eyes: Negative.    Respiratory: Negative.     Cardiovascular: Negative.    Gastrointestinal: Negative.    Musculoskeletal:  Positive for gait problem.   Skin: Negative.    Allergic/Immunologic: Negative.    Neurological:  Positive for weakness.   Hematological: Negative.    Psychiatric/Behavioral: Negative.     All other systems reviewed and are negative.      Objective   There were no vitals taken for this visit.    Physical Exam  Constitutional:       Appearance: Normal appearance. He is normal weight.   HENT:      Head: Normocephalic.      Nose: Nose normal.    Pulmonary:      Effort: Pulmonary effort is normal.   Musculoskeletal:         General: Normal range of motion.      Cervical back: Normal range of motion.   Skin:     General: Skin is dry.   Neurological:      Mental Status: He is alert and oriented to person, place, and time.      Motor: Weakness present.   Psychiatric:         Mood and Affect: Mood normal.         Behavior: Behavior normal.         Administrative Statements   Encounter provider Glynn Oneal MD    The Patient is located at Home and in the following state in which I hold an active license [place].    The patient was identified by name and date of birth. Wilder Vargas was informed that this is a telemedicine visit and that the visit is being conducted through the Epic Embedded platform. He agrees to proceed..  My office door was closed. No one else was in the room.  He acknowledged consent and understanding of privacy and security of the video platform. The patient has agreed to participate and understands they can discontinue the visit at any time.    I have spent a total time of 25 minutes in caring for this patient on the day of the visit/encounter including Instructions for management, Importance of tx compliance, Risk factor reductions, Impressions, Counseling / Coordination of care, Documenting in the medical record, and Reviewing/placing orders in the medical record (including tests, medications, and/or procedures), not including the time spent for establishing the audio/video connection.

## 2025-03-13 NOTE — ASSESSMENT & PLAN NOTE
This occurred during a hospitalization in September.  It was presumed to hypertensive induced based on neurology's assessment.  He continues with physical therapy as he does have residual right-sided weakness.

## 2025-03-14 ENCOUNTER — OFFICE VISIT (OUTPATIENT)
Facility: CLINIC | Age: 60
End: 2025-03-14
Payer: COMMERCIAL

## 2025-03-14 DIAGNOSIS — I69.351 HEMIPARESIS OF RIGHT DOMINANT SIDE AS LATE EFFECT OF CEREBRAL INFARCTION (HCC): Primary | ICD-10-CM

## 2025-03-14 PROCEDURE — 97112 NEUROMUSCULAR REEDUCATION: CPT

## 2025-03-14 PROCEDURE — 97110 THERAPEUTIC EXERCISES: CPT

## 2025-03-14 NOTE — PROGRESS NOTES
Occupational Therapy Daily Note:    Today's date: 3/14/2025  Patient name: Wilder Vargas  : 1965  MRN: [id number]  Referring provider: Gilmar Oro MD  Dx:   Encounter Diagnosis   Name Primary?    Hemiparesis of right dominant side as late effect of cerebral infarction (HCC) Yes         Subjective: no new complaints     Objective: Pt engaged in skilled OT treatment session with focus on UE NMR, UE strengthening, UE endurance, and family training/education to increase engagement, tolerance, and independence with daily ADL and IADL tasks.     CPT Code Minutes                                           Task Details        Therapeutic Activity               Neuro Re-Ed  NMRE to RUE with use of muscle tapping and vibration to elicit increased motor movement.   -completed isolated target reaching into all planes of sh, elbow, forearm, wrist and finger movement                      Therapeutic Exercise  Completed RUE PROM with LPS at end range, all planes to maintain joint integrity and increase ROM.              Manual          HEP  : sublux reduction exercises, 5 reps 2x/ day          Assessment: Tolerated treatment well. Pt responds well to muscle facilitations of tapping with noted increased motor control into all planes of RUE movement. Pt would benefit from continued skilled OT.    Plan: Continued skilled OT per POC    INTERVENTION COMMENTS:  Diagnosis: Hemiparesis of right dominant side as late effect of cerebral infarction (HCC) [I69.351]  Precautions: R sided weakness, fall   Insurance: Payor: [employer] / Plan: [employer] / Product Type: Medicaid HMO /   11 of 24 visits through 25  POC Expires: 25      Auth Tracker   DATE 2/27 3/4 3/6 3/14      Visit # 8 9 10 11

## 2025-03-17 ENCOUNTER — APPOINTMENT (OUTPATIENT)
Facility: CLINIC | Age: 60
End: 2025-03-17
Payer: COMMERCIAL

## 2025-03-20 ENCOUNTER — OFFICE VISIT (OUTPATIENT)
Facility: CLINIC | Age: 60
End: 2025-03-20
Payer: COMMERCIAL

## 2025-03-20 DIAGNOSIS — I69.351 HEMIPARESIS OF RIGHT DOMINANT SIDE AS LATE EFFECT OF CEREBRAL INFARCTION (HCC): Primary | ICD-10-CM

## 2025-03-20 PROCEDURE — 97112 NEUROMUSCULAR REEDUCATION: CPT

## 2025-03-20 NOTE — PROGRESS NOTES
Occupational Therapy Daily Note:    Today's date: 3/20/2025  Patient name: Wilder Vargas  : 1965  MRN: [id number]  Referring provider: Gilmar Oro MD  Dx:   Encounter Diagnosis   Name Primary?    Hemiparesis of right dominant side as late effect of cerebral infarction (HCC) Yes         Subjective: no new complaints     Objective: Pt engaged in skilled OT treatment session with focus on UE NMR, UE strengthening, UE endurance, and family training/education to increase engagement, tolerance, and independence with daily ADL and IADL tasks.     CPT Code Minutes                                           Task Details        Therapeutic Activity               Neuro Re-Ed  NMRE to RUE with use of muscle tapping and vibration to elicit increased motor movement.   -completed isolated target reaching into all planes of sh, elbow, forearm, wrist and finger movement          Item retrieval and transfer:  -Retrieval of medium sized foam blocks from R side of table and transfer to 5 different tabletop targets  -completed with min/mod muscle facilitations of vibration and muscle tapping to elicit finger flexion to grasp block and at shoulder/ elbow to increase functional reach             Therapeutic Exercise  Completed RUE PROM with LPS at end range, all planes to maintain joint integrity and increase ROM.              Manual          HEP  : sublux reduction exercises, 5 reps 2x/ day          Assessment: Tolerated treatment well. Pt responds well to muscle facilitations of tapping and vibration with noted increased motor control into all planes of RUE movement. Pt would benefit from continued skilled OT.    Plan: Continued skilled OT per POC    INTERVENTION COMMENTS:  Diagnosis: Hemiparesis of right dominant side as late effect of cerebral infarction (HCC) [I69.351]  Precautions: R sided weakness, fall   Insurance: Payor: [employer] [facility] / Plan: [employer] / Product Type: Medicaid O /   12 of 24 visits through  12/31/25  POC Expires: 4/22/25      Auth Tracker   DATE 2/27 3/4 3/6 3/14 3/20     Visit # 8 9 10 11 12

## 2025-03-24 ENCOUNTER — OFFICE VISIT (OUTPATIENT)
Facility: CLINIC | Age: 60
End: 2025-03-24
Payer: COMMERCIAL

## 2025-03-24 DIAGNOSIS — I69.351 HEMIPARESIS OF RIGHT DOMINANT SIDE AS LATE EFFECT OF CEREBRAL INFARCTION (HCC): Primary | ICD-10-CM

## 2025-03-24 PROCEDURE — 97112 NEUROMUSCULAR REEDUCATION: CPT | Performed by: PHYSICAL THERAPIST

## 2025-03-24 NOTE — PROGRESS NOTES
Daily Note     Today's date: 3/24/2025  Patient name: Wilder Vargas  : 1965  MRN: [id number]  Referring provider: Gilmar Oro MD  Dx:   Encounter Diagnosis     ICD-10-CM    1. Hemiparesis of right dominant side as late effect of cerebral infarction (HCC)  I69.351               Start Time: 235  Stop Time: 316  Total time in clinic (min): 41 minutes    Subjective:  No instability at home, however, he indicates he continues to feel dizziness.  He indicates he has been getting up 4 times a day and arrived in W/C.      Objective: See treatment diary below      Assessment:   Due to complaints of increased dizziness attempted modified Sandoval's with increased dizziness getting up from right, he rarely does this motion at home as this is his involved side.  No nystagmus noted and dizziness short in duration 10-15 seconds.  No decrease in symptoms noted with reps.  Sit to stand with increased dizziness noted, increased holding of breath noted with cuing and counting out loud to assist with slight change.  Patient educated in need to perform increased motion at home, he has been challenged with increased STS t/o day with report of how many times next session.  Transfers with improved right foot stepping.  Due to poor sleep per his report, attempted modified prone protecting right shoulder with good mobility, however, no increase in comfort noted.  Slight prone position in sidlying with increased pillow support with decreased pain, discussed options for home.  Patient instructed in increased sit to stand to increase habituation t/o day.  Will assess if any changes noted with symptoms on next visit.  If no change noted, will assess DHP, loaded.      Plan: Continue per plan of care.   Slight queasiness noted end of session, no complaints of pain.     Daily Treatment Diary     Precautions: h/o strokes, hemiparesis right, h/o BPPV, DM, HTN      POC Expires Reeval for Medicare to be completed  Unit Limit Auth Expiration  "Date PT/OT/STVisit Limit   3/25/2025 By visit N/A BOMN 12/31/2025 N/A    Completed on visit                    Auth Status DATE 1/14 2/27 3/6 3/10 3/24   Approved Visit # 1 11 13 14 15    Remaining        TESTING        TUG 62.5sec       5x STS 25.9sec       SIMONS 34/56       Gait speed nv       3 min walk test nv               THERAPEUTIC EXERCISE HEP        Codmans     To assist with shoulder pain 5x2 assisted                                                                                                                                  NEUROMUSCULAR REEDUCATION          Static balance   Stance FT 15\" x3 EC      Gait with HM/ MAFO- SOLO   (50')with std cane and MAFO, CTG     (50')with std cane and MAFO, CTG w/ 3# aw on ' with std cane and MAFO CTG 60' x2 with cane & MAFO close supervision to CTG Rapid transfers chair to mat step to 4x3   Hurdles   5x 1  lead with RLE FWD       Step taps   LLE 2x10 onto 2\" step      Step up    3 rise in //bars 6x2 Full flight stairs with rail and CTG ascending and descending    Gait with resistance posterior         Gait without device with MAFO         Gait over hidden obstacles          Modified hill's         Sit to stand with increased forward translation    5x3     Sidestepping      4 laps UE support    Sit to sidelying      4x2, assist for right shoulder protection                     THERAPEUTIC ACTIVITY                                             GAIT TRAINING                                             MODALITIES                                   "

## 2025-03-27 ENCOUNTER — OFFICE VISIT (OUTPATIENT)
Facility: CLINIC | Age: 60
End: 2025-03-27
Payer: COMMERCIAL

## 2025-03-27 DIAGNOSIS — I69.351 HEMIPARESIS OF RIGHT DOMINANT SIDE AS LATE EFFECT OF CEREBRAL INFARCTION (HCC): Primary | ICD-10-CM

## 2025-03-27 PROCEDURE — 97110 THERAPEUTIC EXERCISES: CPT

## 2025-03-27 PROCEDURE — 97112 NEUROMUSCULAR REEDUCATION: CPT | Performed by: PHYSICAL THERAPIST

## 2025-03-27 PROCEDURE — 97112 NEUROMUSCULAR REEDUCATION: CPT

## 2025-03-27 NOTE — PROGRESS NOTES
Daily Note     Today's date: 3/27/2025  Patient name: Wilder Vargas  : 1965  MRN: [id number]  Referring provider: Gilmar Oro MD  Dx:   Encounter Diagnosis     ICD-10-CM    1. Hemiparesis of right dominant side as late effect of cerebral infarction (HCC)  I69.351                 Start Time: 301  Stop Time: 035  Total time in clinic (min): 49 minutes    Subjective:  He continues to c/o dizziness.  He has limited ability to perform sit to supine due to bed being upstairs.  No dizziness at start of session.      Objective: See treatment diary below      Assessment:   Per patient, he ran out of Gabapentin and his mom is working with the pharmacy to see what is needed, he agreed to keep PT informed.  This has resulted in continued poor sleep.  Assisted sit to supine to right for shoulder protection with increased dizziness to 8-9/10.  Repeated 3 times with minimal changes.  Initiated change in habituation to forward flexion to thoracic rotation with initial increase in dizziness, lessening with reps.  Educated to continue at home.  Ambulation with nice gains in right LE clearance.  Encouraged ambulation at home.     Plan: Continue per plan of care.   No complaints end of session.  Assess forward rotation habituation and continue to progress standing challenges.      Daily Treatment Diary     Precautions: h/o strokes, hemiparesis right, h/o BPPV, DM, HTN      POC Expires Reeval for Medicare to be completed  Unit Limit Auth Expiration Date PT/OT/STVisit Limit   3/25/2025 By visit N/A BOMN 2025 N/A    Completed on visit                    Auth Status DATE 1/14 3/6 3/10 3/24 3/27   Approved Visit # 1 13 14 15 16      Remaining        TESTING        TUG 62.5sec       5x STS 25.9sec       SIMONS 34/56       Gait speed nv       3 min walk test nv               THERAPEUTIC EXERCISE HEP        Codmans    To assist with shoulder pain 5x2 assisted                                                                                                                                    NEUROMUSCULAR REEDUCATION          Static balance         Gait with HM/ MAFO- SOLO   100' with std cane and MAFO CTG 60' x2 with cane & MAFO close supervision to CTG Rapid transfers chair to mat step to 4x3 No device with MAFO:  100' x2, CTG   Hurdles         Step taps         Step up   3 rise in //bars 6x2 Full flight stairs with rail and CTG ascending and descending     Gait with resistance posterior         Gait without device with MAFO         Gait over hidden obstacles          Modified liz's      X3 to right D: 8-9/10   Sit to stand with increased forward translation   5x3   5x3   Sidestepping     4 laps UE support     Sit to sidelying     4x2, assist for right shoulder protection    Seated forward flex with ext/rotation      5x3            THERAPEUTIC ACTIVITY                                             GAIT TRAINING                                             MODALITIES

## 2025-03-27 NOTE — PROGRESS NOTES
Occupational Therapy Daily Note:    Today's date: 3/27/2025  Patient name: Wilder Vargas  : 1965  MRN: [id number]  Referring provider: Gilmar Oro MD  Dx:   Encounter Diagnosis   Name Primary?    Hemiparesis of right dominant side as late effect of cerebral infarction (HCC) Yes         Subjective: no new complaints     Objective: Pt engaged in skilled OT treatment session with focus on UE NMR, UE strengthening, UE endurance, and family training/education to increase engagement, tolerance, and independence with daily ADL and IADL tasks.     CPT Code Minutes                                           Task Details        Therapeutic Activity               Neuro Re-Ed  NMRE to RUE with use of muscle tapping and vibration to elicit increased motor movement.   -completed isolated target reaching into all planes of sh, elbow, forearm, wrist and finger movement          Item retrieval and transfer:  -Retrieval of medium sized foam blocks from R side of table and transfer to 5 different tabletop targets  -completed with min muscle facilitations of vibration and muscle tapping to elicit increased motor control             Therapeutic Exercise  Completed RUE PROM with LPS at end range, all planes to maintain joint integrity and increase ROM.              Manual          HEP  : sublux reduction exercises, 5 reps 2x/ day          Assessment: Tolerated treatment well. Pt with improved functional reach, requiring decreased manual cues. Pt with noted increased active movement into supination during item retrieval on R side. Pt would benefit from continued skilled OT.    Plan: Continued skilled OT per POC    INTERVENTION COMMENTS:  Diagnosis: Hemiparesis of right dominant side as late effect of cerebral infarction (HCC) [I69.351]  Precautions: R sided weakness, fall   Insurance: Payor: [employer] / Plan: [employer] / Product Type: Medicaid HMO /   13 of 24 visits through 25  POC Expires: 25      Auth  Tracker   DATE 2/27 3/4 3/6 3/14 3/20 3/27    Visit # 8 9 10 11 12 13

## 2025-04-01 ENCOUNTER — OFFICE VISIT (OUTPATIENT)
Facility: CLINIC | Age: 60
End: 2025-04-01
Payer: COMMERCIAL

## 2025-04-01 DIAGNOSIS — I69.351 HEMIPARESIS OF RIGHT DOMINANT SIDE AS LATE EFFECT OF CEREBRAL INFARCTION (HCC): Primary | ICD-10-CM

## 2025-04-01 PROCEDURE — 97112 NEUROMUSCULAR REEDUCATION: CPT | Performed by: PHYSICAL THERAPIST

## 2025-04-01 PROCEDURE — 97110 THERAPEUTIC EXERCISES: CPT

## 2025-04-01 PROCEDURE — 97112 NEUROMUSCULAR REEDUCATION: CPT

## 2025-04-01 NOTE — PROGRESS NOTES
Occupational Therapy Daily Note:    Today's date: 2025  Patient name: Wilder Vargas  : 1965  MRN: [id number]  Referring provider: Gilmar Oro MD  Dx:   Encounter Diagnosis   Name Primary?    Hemiparesis of right dominant side as late effect of cerebral infarction (HCC) Yes         Subjective: no new complaints     Objective: Pt engaged in skilled OT treatment session with focus on UE NMR, UE strengthening, UE endurance, and family training/education to increase engagement, tolerance, and independence with daily ADL and IADL tasks.     CPT Code Minutes                                           Task Details        Therapeutic Activity               Neuro Re-Ed  NMRE to RUE with use of muscle tapping and vibration to elicit increased motor movement.   -completed isolated target reaching into all planes of sh, elbow, forearm, wrist and finger movement          Item retrieval and transfer:  -R hand gross grasp to retreive medium sized foam blocks from R side of table, transfer over towel barrier on table and place on L side of table; required max manual cues for release at target.   -completed with min muscle facilitations of vibration and muscle tapping to elicit increased motor control             Therapeutic Exercise  Completed RUE PROM with LPS at end range, all planes to maintain joint integrity and increase ROM.              Manual          HEP  : sublux reduction exercises, 5 reps 2x/ day          Assessment: Tolerated treatment well. Pt with improving gross grasp with use of vibration to elicit increased motor control and improving item transfer. Pt would benefit from continued skilled OT.    Plan: Continued skilled OT per POC    INTERVENTION COMMENTS:  Diagnosis: Hemiparesis of right dominant side as late effect of cerebral infarction (HCC) [I69.351]  Precautions: R sided weakness, fall   Insurance: Payor: [employer] / Plan: [employer] / Product Type: Medicaid HMO /   14 of 24 visits  through 12/31/25  POC Expires: 4/22/25      Auth Tracker   DATE 2/27 3/4 3/6 3/14 3/20 3/27 4/1   Visit # 8 9 10 11 12 13 14

## 2025-04-01 NOTE — PATIENT INSTRUCTIONS
Daily Note     Today's date: 2025  Patient name: Wilder Vargas  : 1965  MRN: [id number]  Referring provider: Gilmar rOo MD  Dx:   Encounter Diagnosis     ICD-10-CM    1. Hemiparesis of right dominant side as late effect of cerebral infarction (HCC)  I69.351                 Start Time: 441  Stop Time: 522  Total time in clinic (min): 41 minutes    Subjective:  Patient states his dizziness is less, he has been working on going from sitting to lying down at home.  He continues to refuse to use the cane at home and uses the RW with intermittent unsteadiness noted.      Objective: See treatment diary below      Assessment:   Ambulation without a device with improving control and LE clearance.  Discussed use of cane at home with hesitation due to fear of falling.  Ambulation with cane with excellent control.  Suggested he have his girlfriend come to PT to allow for instruction in guarding to increase confidence, he said he will consider it.  He indicates carpet is the limitation for clearance of his leg at home.  Introduced ambulation over foam with good stability using cane and CTG.  Continue to reinforce increased mobility.    Plan: Continue per plan of care.   No complaints end of session.  Continue vestibular stim with position changes and increase compliant challenges.      Daily Treatment Diary     Precautions: h/o strokes, hemiparesis right, h/o BPPV, DM, HTN      POC Expires Reeval for Medicare to be completed  Unit Limit Auth Expiration Date PT/OT/STVisit Limit   3/25/2025 By visit N/A BOMN 2025 N/A    Completed on visit                    Auth Status DATE 1/14 3/10 3/24 3/27 4/1   Approved Visit # 1 14 15 16   17    Remaining        TESTING        TUG 62.5sec       5x STS 25.9sec       SIMONS 34/56       Gait speed nv       3 min walk test nv               THERAPEUTIC EXERCISE HEP        Codmans   To assist with shoulder pain 5x2 assisted                                                                                                                                     NEUROMUSCULAR REEDUCATION          Static balance         Gait with HM/ MAFO- SOLO   60' x2 with cane & MAFO close supervision to CTG Rapid transfers chair to mat step to 4x3 No device with MAFO:  100' x2, CTG No device with MAFO ' repeated with cane 100'   Hurdles         Step taps         Step up   Full flight stairs with rail and CTG ascending and descending      Gait with resistance posterior         Gait without device with MAFO         Gait over hidden obstacles          Modified hill's     X3 to right D: 8-9/10 X2 each slight dizziness 1-2/10   Sit to stand with increased forward translation     5x3 5x   Sidestepping    4 laps UE support      Sit to sidelying    4x2, assist for right shoulder protection     Seated forward flex with ext/rotation     5x3    Ambulation over foam squares with cane      4 squares CTG with cane   THERAPEUTIC ACTIVITY                                             GAIT TRAINING                                             MODALITIES

## 2025-04-01 NOTE — PROGRESS NOTES
Daily Note     Today's date: 2025  Patient name: Wilder Vargas  : 1965  MRN: [id number]  Referring provider: Gilmar Oro MD  Dx:   Encounter Diagnosis     ICD-10-CM    1. Hemiparesis of right dominant side as late effect of cerebral infarction (HCC)  I69.351                              Subjective:  He continues to c/o dizziness.  He has limited ability to perform sit to supine due to bed being upstairs.  No dizziness at start of session.      Objective: See treatment diary below      Assessment:   Per patient, he ran out of Gabapentin and his mom is working with the pharmacy to see what is needed, he agreed to keep PT informed.  This has resulted in continued poor sleep.  Assisted sit to supine to right for shoulder protection with increased dizziness to 8-9/10.  Repeated 3 times with minimal changes.  Initiated change in habituation to forward flexion to thoracic rotation with initial increase in dizziness, lessening with reps.  Educated to continue at home.  Ambulation with nice gains in right LE clearance.  Encouraged ambulation at home.     Plan: Continue per plan of care.   No complaints end of session.  Assess forward rotation habituation and continue to progress standing challenges.      Daily Treatment Diary     Precautions: h/o strokes, hemiparesis right, h/o BPPV, DM, HTN      POC Expires Reeval for Medicare to be completed  Unit Limit Auth Expiration Date PT/OT/STVisit Limit   3/25/2025 By visit N/A BOMN 2025 N/A    Completed on visit                    Auth Status DATE 1/14 3/10 3/24 3/27    Approved Visit # 1 14 15 16       Remaining        TESTING        TUG 62.5sec       5x STS 25.9sec       SIMONS 34/56       Gait speed nv       3 min walk test nv               THERAPEUTIC EXERCISE HEP        Codmans   To assist with shoulder pain 5x2 assisted                                                                                                                                     NEUROMUSCULAR REEDUCATION          Static balance         Gait with HM/ MAFO- SOLO   60' x2 with cane & MAFO close supervision to CTG Rapid transfers chair to mat step to 4x3 No device with MAFO:  100' x2, CTG    Hurdles         Step taps         Step up   Full flight stairs with rail and CTG ascending and descending      Gait with resistance posterior         Gait without device with MAFO         Gait over hidden obstacles          Modified hill's     X3 to right D: 8-9/10    Sit to stand with increased forward translation     5x3    Sidestepping    4 laps UE support      Sit to sidelying    4x2, assist for right shoulder protection     Seated forward flex with ext/rotation     5x3             THERAPEUTIC ACTIVITY                                             GAIT TRAINING                                             MODALITIES

## 2025-04-03 ENCOUNTER — OFFICE VISIT (OUTPATIENT)
Facility: CLINIC | Age: 60
End: 2025-04-03
Payer: COMMERCIAL

## 2025-04-03 DIAGNOSIS — I69.351 HEMIPARESIS OF RIGHT DOMINANT SIDE AS LATE EFFECT OF CEREBRAL INFARCTION (HCC): Primary | ICD-10-CM

## 2025-04-03 PROCEDURE — 97112 NEUROMUSCULAR REEDUCATION: CPT | Performed by: PHYSICAL THERAPIST

## 2025-04-03 PROCEDURE — 97112 NEUROMUSCULAR REEDUCATION: CPT
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
## 2025-04-03 NOTE — PROGRESS NOTES
Occupational Therapy Daily Note:    Today's date: 4/3/2025  Patient name: Wilder Vargas  : 1965  MRN: [id number]  Referring provider: Gilmar Oro MD  Dx:   Encounter Diagnosis   Name Primary?    Hemiparesis of right dominant side as late effect of cerebral infarction (HCC) Yes         Subjective: no new complaints     Objective: Pt engaged in skilled OT treatment session with focus on UE NMR, UE strengthening, UE endurance, and family training/education to increase engagement, tolerance, and independence with daily ADL and IADL tasks.     CPT Code Minutes                                           Task Details        Therapeutic Activity               Neuro Re-Ed  NMRE to RUE with use of muscle tapping and vibration to elicit increased motor movement.   -completed isolated target reaching into all planes of sh, elbow, forearm, wrist and finger movement    - scapular elevation 3x5 and scapular retraction 3x5 with min manual cues for full ROM and to dec subluxation.                      Therapeutic Exercise               Manual          HEP  : sublux reduction exercises, 5 reps 2x/ day          Assessment: Tolerated treatment well. Pt with improving gross grasp with use of vibration to elicit increased motor control and improving item transfer. Pt would benefit from continued skilled OT.    Plan: Continued skilled OT per POC    INTERVENTION COMMENTS:  Diagnosis: Hemiparesis of right dominant side as late effect of cerebral infarction (HCC) [I69.351]  Precautions: R sided weakness, fall   Insurance: Payor: [employer] / Plan: [employer] / Product Type: Medicaid HMO /   15 of 24 visits through 25  POC Expires: 25      Auth Tracker   DATE 2/27 3/4 3/6 3/14 3/20 3/27 4/1 4/3    Visit # 8 9 10 11 12 13 14 15

## 2025-04-08 ENCOUNTER — OFFICE VISIT (OUTPATIENT)
Facility: CLINIC | Age: 60
End: 2025-04-08
Payer: COMMERCIAL

## 2025-04-08 DIAGNOSIS — I69.351 HEMIPARESIS OF RIGHT DOMINANT SIDE AS LATE EFFECT OF CEREBRAL INFARCTION (HCC): Primary | ICD-10-CM

## 2025-04-08 PROCEDURE — 97112 NEUROMUSCULAR REEDUCATION: CPT | Performed by: PHYSICAL THERAPIST

## 2025-04-08 PROCEDURE — 97112 NEUROMUSCULAR REEDUCATION: CPT

## 2025-04-08 NOTE — PROGRESS NOTES
Occupational Therapy Daily Note:    Today's date: 2025  Patient name: Wilder Vargas  : 1965  MRN: [id number]  Referring provider: Gilmar Oro MD  Dx:   Encounter Diagnosis   Name Primary?    Hemiparesis of right dominant side as late effect of cerebral infarction (HCC) Yes         Subjective: no new complaints     Objective: Pt engaged in skilled OT treatment session with focus on UE NMR, UE strengthening, UE endurance, and family training/education to increase engagement, tolerance, and independence with daily ADL and IADL tasks.     CPT Code Minutes                                           Task Details        Therapeutic Activity               Neuro Re-Ed  NMRE to RUE with use of muscle tapping and vibration to elicit increased motor movement.   -completed isolated target reaching into all planes of sh, elbow, forearm, wrist and finger movement           Item retrieval and transfer:  -R hand gross grasp to retreive medium sized foam blocks from R side of table and transfer to 3 different tabletop targets  -completed with min/mod facilitations of vibration and muscle tapping to elicit increased motor control during functional reach and required max manual cues for release at target.             Therapeutic Exercise               Manual          HEP  : sublux reduction exercises, 5 reps 2x/ day          Assessment: Tolerated treatment well. Pt cont to demo improving functional reach and gross grasp with muscle facilitations. Pt would benefit from continued skilled OT.    Plan: Continued skilled OT per POC    INTERVENTION COMMENTS:  Diagnosis: Hemiparesis of right dominant side as late effect of cerebral infarction (HCC) [I69.351]  Precautions: R sided weakness, fall   Insurance: Payor: [employer] / Plan: [employer] / Product Type: Medicaid HMO /   16 of 24 visits through 25  POC Expires: 25      Auth Tracker   DATE 2/27 3/4 3/6 3/14 3/20 3/27 4/1 4/3 4/8   Visit # 8 9 10 11 12  13 14 15 16

## 2025-04-08 NOTE — PROGRESS NOTES
Daily Note     Today's date: 2025  Patient name: Wilder Vargas  : 1965  MRN: [id number]  Referring provider: Gilmar Oro MD  Dx:   Encounter Diagnosis     ICD-10-CM    1. Hemiparesis of right dominant side as late effect of cerebral infarction (HCC)  I69.351                     Start Time: 308  Stop Time: 034  Total time in clinic (min): 37 minutes    Subjective:  He arrived late to PT today, difficulty with sleeping and increased peripheral neuropathy pain noted.  Encouraged patient to contact his PCP to further discuss options for pain relief to promote better sleeping.       Objective: See treatment diary below      Assessment:   Gait with std cane with focus on increased speed.  Nice clearance right LE.  Sit to stand without UE with cuing for forward translation.  Patient expressed fear of falling forward with education on need for forward motion weight shift.  Continue to reinforce increased gait at home.    Plan: Continue per plan of care.   No complaints end of session.  Continue to progress balance and gait challenges.     Daily Treatment Diary     Precautions: h/o strokes, hemiparesis right, h/o BPPV, DM, HTN      POC Expires Reeval for Medicare to be completed  Unit Limit Auth Expiration Date PT/OT/STVisit Limit   2025 By visit N/A BOMN 2025 N/A    Completed on visit                    Auth Status DATE 1/14 3/24 3/27 4/3 4/8   Approved Visit # 1 15 16   PROGRESS NOTE 18    Remaining        TESTING        TUG 62.5sec       5x STS 25.9sec       SIMONS 34/56       Gait speed nv       3 min walk test nv               THERAPEUTIC EXERCISE HEP        [place]                                                                                                                                       NEUROMUSCULAR REEDUCATION          Static balance         Gait with HM/ MAFO- SOLO   Rapid transfers chair to mat step to 4x3 No device with MAFO:  100' x2, CTG  Std cane and MAFO 180' with focus on  increased speed   Hurdles         Step taps         Step up         Gait with resistance posterior         Gait without device with MAFO         Gait over hidden obstacles          Modified liz's    X3 to right D: 8-9/10     Sit to stand with increased forward translation    5x3  5x3 no UE   Sidestepping          Sit to sidelying   4x2, assist for right shoulder protection      Seated forward flex with ext/rotation    5x3              THERAPEUTIC ACTIVITY                                             GAIT TRAINING                                             MODALITIES

## 2025-04-10 ENCOUNTER — OFFICE VISIT (OUTPATIENT)
Facility: CLINIC | Age: 60
End: 2025-04-10
Payer: COMMERCIAL

## 2025-04-10 DIAGNOSIS — I69.351 HEMIPARESIS OF RIGHT DOMINANT SIDE AS LATE EFFECT OF CEREBRAL INFARCTION (HCC): Primary | ICD-10-CM

## 2025-04-10 PROCEDURE — 97112 NEUROMUSCULAR REEDUCATION: CPT | Performed by: PHYSICAL THERAPIST

## 2025-04-10 PROCEDURE — 97112 NEUROMUSCULAR REEDUCATION: CPT

## 2025-04-10 NOTE — PROGRESS NOTES
Daily Note     Today's date: 4/10/2025  Patient name: Wilder Vargas  : 1965  MRN: [id number]  Referring provider: Gilmar Oro MD  Dx:   Encounter Diagnosis     ICD-10-CM    1. Hemiparesis of right dominant side as late effect of cerebral infarction (HCC)  I69.351                       Start Time: 034  Stop Time: 0430  Total time in clinic (min): 44 minutes    Subjective:  Patient slept 3 hours last night, expressed feeling fatigued.  Right shoulder pain noted, 3/10.        Objective: See treatment diary below      Assessment:   Increased distance ambulation today with decreased right toe catch.  Hurdles with assist for right LE placement, increased adduction noted.  Discussed toilet modifications due to low surface.  He currently has a commode, but is using it downstairs.      Plan: Continue per plan of care.   No complaints end of session.  Continue to progress balance and gait challenges.     Daily Treatment Diary     Precautions: h/o strokes, hemiparesis right, h/o BPPV, DM, HTN      POC Expires Reeval for Medicare to be completed  Unit Limit Auth Expiration Date PT/OT/STVisit Limit   2025 By visit N/A BOMN 2025 N/A    Completed on visit                    Auth Status DATE 1/14 4/3 4/8 4/10   Approved Visit # 1 PROGRESS NOTE 19 20    Remaining       TESTING       TUG 62.5sec      5x STS 25.9sec      SIMONS 34/56      Gait speed nv      3 min walk test nv             THERAPEUTIC EXERCISE HEP       [place]                                                                                                                        NEUROMUSCULAR REEDUCATION         Static balance        Gait with HM/ MAFO- SOLO    Std cane and MAFO 180' with focus on increased speed Std cane 200' MAFO   Hurdles     Low with hold 5x2, 2 sets with cane   Stance reach floor to overhead cone stacking     10x2   Step taps        Step up        Gait with resistance posterior        Gait without device with MAFO        Gait  over hidden obstacles         Modified liz's        Sit to stand with increased forward translation    5x3 no UE    Sidestepping         Sit to sidelying        Seated forward flex with ext/rotation                THERAPEUTIC ACTIVITY                                        GAIT TRAINING                                        MODALITIES

## 2025-04-10 NOTE — PROGRESS NOTES
Occupational Therapy Daily Note:    Today's date: 4/10/2025  Patient name: Wilder Vargas  : 1965  MRN: [id number]  Referring provider: Gilmar Oro MD  Dx:   Encounter Diagnosis   Name Primary?    Hemiparesis of right dominant side as late effect of cerebral infarction (HCC) Yes         Subjective: pt reporting increased fatigue today; reports cont difficulty with sleeping     Objective: Pt engaged in skilled OT treatment session with focus on UE NMR, UE strengthening, UE endurance, and family training/education to increase engagement, tolerance, and independence with daily ADL and IADL tasks.     CPT Code Minutes                                           Task Details        Therapeutic Activity               Neuro Re-Ed  NMRE to RUE with use of muscle tapping and vibration to elicit increased motor movement.   -completed isolated target reaching into all planes of sh, elbow, forearm, wrist and finger movement           Item retrieval and transfer:  -R hand gross grasp to retreive various shaped foam blocks from R side of table and transfer to target on L side of table   -completed with min facilitations of vibration to elicit increased motor control during functional reach and required max manual cues for release at target.             Therapeutic Exercise               Manual          HEP  : sublux reduction exercises, 5 reps 2x/ day          Assessment: Tolerated treatment well. Pt with improved functional reach, with new noted elbow flex AG during item transfer and improved gross grasp on medium sized objects. Pt would benefit from continued skilled OT.    Plan: Continued skilled OT per POC    INTERVENTION COMMENTS:  Diagnosis: Hemiparesis of right dominant side as late effect of cerebral infarction (HCC) [I69.351]  Precautions: R sided weakness, fall   Insurance: Payor: [employer] / Plan: [employer] / Product Type: Medicaid HMO /   17 of 24 visits through 25  POC Expires:  4/22/25      Auth Tracker   DATE 4/10           Visit # 17

## 2025-04-15 ENCOUNTER — OFFICE VISIT (OUTPATIENT)
Facility: CLINIC | Age: 60
End: 2025-04-15
Payer: COMMERCIAL

## 2025-04-15 ENCOUNTER — TELEPHONE (OUTPATIENT)
Age: 60
End: 2025-04-15

## 2025-04-15 DIAGNOSIS — I69.351 HEMIPARESIS OF RIGHT DOMINANT SIDE AS LATE EFFECT OF CEREBRAL INFARCTION (HCC): Primary | ICD-10-CM

## 2025-04-15 PROCEDURE — 97112 NEUROMUSCULAR REEDUCATION: CPT | Performed by: PHYSICAL THERAPIST

## 2025-04-15 PROCEDURE — 97112 NEUROMUSCULAR REEDUCATION: CPT

## 2025-04-15 NOTE — PROGRESS NOTES
Occupational Therapy Daily Note:    Today's date: 4/15/2025  Patient name: Wilder Vargas  : 1965  MRN: [id number]  Referring provider: Gilmar Oro MD  Dx:   Encounter Diagnosis   Name Primary?    Hemiparesis of right dominant side as late effect of cerebral infarction (HCC) Yes         Subjective: pt without new complaints       Objective: Pt engaged in skilled OT treatment session with focus on UE NMR, UE strengthening, UE endurance, and family training/education to increase engagement, tolerance, and independence with daily ADL and IADL tasks.     CPT Code Minutes                                           Task Details        Therapeutic Activity               Neuro Re-Ed  NMRE to RUE with use of muscle tapping and vibration to elicit increased motor movement.   -completed isolated target reaching into all planes of sh, elbow, forearm, wrist and finger movement           Item retrieval and transfer:  -R hand gross grasp to retreive medium sized foam blocks from R side of table, transfer over paper obstacle in center of table to 3 left sided targets.   -RUE retrieval of blocks from L side of table and sweeping over center paper obstacle to R side of table   -completed with min facilitations of vibration to elicit increased motor control during functional reach and required max manual cues for release at target.             Therapeutic Exercise               Manual          HEP  : sublux reduction exercises, 5 reps 2x/ day          Assessment: Tolerated treatment well. Pt with improved functional reach, able to achieve elbow flex AG during item transfer and improved gross grasp on medium sized objects. Pt would benefit from continued skilled OT.    Plan: Continued skilled OT per POC    INTERVENTION COMMENTS:  Diagnosis: Hemiparesis of right dominant side as late effect of cerebral infarction (HCC) [I69.351]  Precautions: R sided weakness, fall   [facility] Plan: [employer] /  Product Type: Medicaid HMO /   18 of 24 visits through 12/31/25  POC Expires: 4/22/25      Auth Tracker   DATE 4/10 4/15          Visit # 17 18

## 2025-04-15 NOTE — TELEPHONE ENCOUNTER
Lorena called asking if Dr Oro could do paperwork for her to get the disability placard for their vehicle . Please advise if pt needs an evaluation before completing forms.

## 2025-04-15 NOTE — PROGRESS NOTES
Daily Note     Today's date: 4/15/2025  Patient name: Wilder Vargas  : 1965  MRN: [id number]  Referring provider: Gilmar Oro MD  Dx:   Encounter Diagnosis     ICD-10-CM    1. Hemiparesis of right dominant side as late effect of cerebral infarction (HCC)  I69.351                         Start Time: 345  Stop Time: 0430  Total time in clinic (min): 45 minutes    Subjective:  Patient indicates he continues to feel tired after PT with difficulty getting into his house.  No present pain.  He indicates dizziness is better.      Objective: See treatment diary below      Assessment:   Increased distance ambulation with 5# weight right, nice gains in step clearance.  Step taps on 1 rise with assist for right LE placement.  Sit to stand with cuing for forward translation, improved from raised height.  Encouraged patient to consider what activity at home he may want to focus on to allow for increased feeling of success.  He agreed to come ready to discuss further next session.    Plan: Continue per plan of care.   No complaints end of session.  Continue to progress balance and gait challenges.     Daily Treatment Diary     Precautions: h/o strokes, hemiparesis right, h/o BPPV, DM, HTN      POC Expires Reeval for Medicare to be completed  Unit Limit Auth Expiration Date PT/OT/STVisit Limit   2025 By visit N/A BOMN 2025 N/A    Completed on visit                    Auth Status DATE 1/14 4/10 4/15   Approved Visit # 1 20 21    Remaining      TESTING      TUG 62.5sec     5x STS 25.9sec     SIMONS 34/56     Gait speed nv     3 min walk test nv           THERAPEUTIC EXERCISE HEP      [employer]                                                                                                         NEUROMUSCULAR REEDUCATION        Static balance       Gait with HM/ MAFO- SOLO   Std cane 200' MAFO Std cane MAFO with 5# weight 230'   Hurdles   Low with hold 5x2, 2 sets with cane    Stance reach floor to overhead cone  stacking   10x2    Step taps    5x2 with assist for right LE placement   Step up       Gait with resistance posterior    Backward stepping 10' x3   Gait without device with MAFO       Gait over hidden obstacles     Rapid stepping w/ MAFO 80'   Modified hill's       Sit to stand with increased forward translation    Off regular height 5x2, off raised x5 no UE   Sidestepping        Sit to sidelying       Seated forward flex with ext/rotation              THERAPEUTIC ACTIVITY                                   GAIT TRAINING                                   MODALITIES

## 2025-04-16 NOTE — TELEPHONE ENCOUNTER
Spoke to pt. He is not able to do a video visit as he has a problem with his phone. Looks like he had a virtual visit with you in Nov 2024. Please advise

## 2025-04-16 NOTE — TELEPHONE ENCOUNTER
Patient returned call - he stated he had a stroke in September and is not mobile. He would like to speak with the doctor. Please call and advise.

## 2025-04-16 NOTE — TELEPHONE ENCOUNTER
LMOM asking patient to schedule appt.  Dr Oro needs to see him to fill out handicap placard form.  Has not been seen since July.

## 2025-04-17 ENCOUNTER — OFFICE VISIT (OUTPATIENT)
Facility: CLINIC | Age: 60
End: 2025-04-17
Payer: COMMERCIAL

## 2025-04-17 DIAGNOSIS — I69.351 HEMIPARESIS OF RIGHT DOMINANT SIDE AS LATE EFFECT OF CEREBRAL INFARCTION (HCC): Primary | ICD-10-CM

## 2025-04-17 PROCEDURE — 97112 NEUROMUSCULAR REEDUCATION: CPT

## 2025-04-17 PROCEDURE — 97112 NEUROMUSCULAR REEDUCATION: CPT | Performed by: PHYSICAL THERAPIST

## 2025-04-17 NOTE — PROGRESS NOTES
Daily Note     Today's date: 2025  Patient name: Wilder Vargas  : 1965  MRN: [id number]  Referring provider: Gilmar Oro MD  Dx:   Encounter Diagnosis     ICD-10-CM    1. Hemiparesis of right dominant side as late effect of cerebral infarction (HCC)  I69.351                           Start Time: 345  Stop Time: 425  Total time in clinic (min): 40 minutes    Subjective:   No current complaints other than continued difficulty sleeping.        Objective: See treatment diary below      Assessment:   Increased step up and over onto foam beams with CTG needed.  Per patient, increased hip pain noted on right consistent with iliopsoas tightness- suspect increased discomfort with increased demands responding to TPR.  Backward stepping and turns with cuing for lifting LE.  Added cone to floor bending without LOB, ambulation without device.    Plan: Continue per plan of care.   No complaints end of session.  Continue to progress balance and gait challenges.     Daily Treatment Diary     Precautions: h/o strokes, hemiparesis right, h/o BPPV, DM, HTN      POC Expires Reeval for Medicare to be completed  Unit Limit Auth Expiration Date PT/OT/STVisit Limit   2025 By visit N/A BOMN 2025 N/A    Completed on visit                    Auth Status DATE 1/14 4/10 4/15 4/17   Approved Visit # 1 20 21 22    Remaining       TESTING       TUG 62.5sec      5x STS 25.9sec      SIMONS 34/56      Gait speed nv      3 min walk test nv             THERAPEUTIC EXERCISE HEP       C[employer]                                                                                                                        NEUROMUSCULAR REEDUCATION         Static balance        Gait with HM/ MAFO- SOLO   Std cane 200' MAFO Std cane MAFO with 5# weight 230' Std cane with 5#; 40'x4 FWD/Back   Cone to floor color match     14x with turns and backward stepping   Hurdles   Low with hold 5x2, 2 sets with cane  Stepping up and over foam beams  [id number]   Stance reach floor to overhead cone stacking   10x2  Step up on beam an forward reach CTG/min assist   Step taps    5x2 with assist for right LE placement    Step up     X3 no rise   Gait with resistance posterior    Backward stepping 10' x3    Gait without device with MAFO        Gait over hidden obstacles     Rapid stepping w/ MAFO 80'    Modified hill's        Sit to stand with increased forward translation    Off regular height 5x2, off raised x5 no UE    Sidestepping         Sit to sidelying        Seated forward flex with ext/rotation                THERAPEUTIC ACTIVITY                                        GAIT TRAINING                                        MODALITIES

## 2025-04-17 NOTE — PROGRESS NOTES
Occupational Therapy Daily Note:    Today's date: 2025  Patient name: Wilder Vargas  : 1965  MRN: [id number]  Referring provider: Gilmar Oro MD  Dx:   Encounter Diagnosis   Name Primary?    Hemiparesis of right dominant side as late effect of cerebral infarction (HCC) Yes         Subjective: pt without new complaints       Objective: Pt engaged in skilled OT treatment session with focus on UE NMR, UE strengthening, UE endurance, and family training/education to increase engagement, tolerance, and independence with daily ADL and IADL tasks.     CPT Code Minutes                                           Task Details        Therapeutic Activity               Neuro Re-Ed  NMRE to RUE with use of muscle tapping and vibration to elicit increased motor movement.   -completed isolated target reaching into all planes of sh, elbow, forearm, wrist and finger movement           Seated at mat table:  -able to use RUE to stop a slow rolling small ball, push across mat table and off table to large floor target   -completed with min facilitations of muscle tapping to elicit increased motor control during functional reach             Therapeutic Exercise               Manual          HEP  : sublux reduction exercises, 5 reps 2x/ day          Assessment: Tolerated treatment well. Pt cont to progress towards goals; noted increased motor control during isolated motor movements and improving coordination of functional reach during item reach and retrieval. Pt would benefit from continued skilled OT.    Plan: Continued skilled OT per POC    INTERVENTION COMMENTS:  Diagnosis: Hemiparesis of right dominant side as late effect of cerebral infarction (HCC) [I69.351]  Precautions: R sided weakness, fall   Insurance: Payor: [employer] / Plan: [employer] / Product Type: Medicaid HMO /   19 of 24 visits through 25  POC Expires: 25      Auth Tracker   DATE 4/10 4/15 4/17         Visit # 17 18 19

## 2025-04-22 ENCOUNTER — APPOINTMENT (OUTPATIENT)
Facility: CLINIC | Age: 60
End: 2025-04-22
Payer: COMMERCIAL

## 2025-04-24 ENCOUNTER — OFFICE VISIT (OUTPATIENT)
Facility: CLINIC | Age: 60
End: 2025-04-24
Payer: COMMERCIAL

## 2025-04-24 ENCOUNTER — OFFICE VISIT (OUTPATIENT)
Facility: CLINIC | Age: 60
End: 2025-04-24
Attending: INTERNAL MEDICINE
Payer: COMMERCIAL

## 2025-04-24 DIAGNOSIS — I69.351 HEMIPARESIS OF RIGHT DOMINANT SIDE AS LATE EFFECT OF CEREBRAL INFARCTION (HCC): Primary | ICD-10-CM

## 2025-04-24 PROCEDURE — 97112 NEUROMUSCULAR REEDUCATION: CPT | Performed by: PHYSICAL THERAPIST

## 2025-04-24 PROCEDURE — 97112 NEUROMUSCULAR REEDUCATION: CPT

## 2025-04-24 NOTE — PROGRESS NOTES
Daily Note     Today's date: 2025  Patient name: Wilder Vargas  : 1965  MRN: [id number]  Referring provider: Gilmar Oro MD  Dx:   Encounter Diagnosis     ICD-10-CM    1. Hemiparesis of right dominant side as late effect of cerebral infarction (HCC)  I69.351                             Start Time: 346  Stop Time: 0430  Total time in clinic (min): 44 minutes    Subjective:   Increased right hip discomfort, per patient he had 2 hours sleep last night and is quite tired.      Objective: See treatment diary below      Assessment:   Backward stepping with frequent cuing for LE clearance.  Gait without weight today due to hip pain.  Stepping without device with occasional difficulty with right toe clearance.  Gait with std cane with improved stability, continue to encourage patient to perform gait with std cane at home.  Significant time spent today discussing his progress as he feels he is not getting better enough because he cannot return to his [employer] business, he has not yet come up with an activity he desires to perform at home that is attainable.  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    Plan: Continue per plan of care.   No complaints end of session.  Continue to progress balance and gait challenges.  Re-assessment next session.     Daily Treatment Diary     Precautions: h/o strokes, hemiparesis right, h/o BPPV, DM, HTN      POC Expires Reeval for Medicare to be completed  Unit Limit Auth Expiration Date PT/OT/STVisit Limit   2025 By visit N/A BOMN 2025 N/A    Completed on visit                    Auth Status DATE 1/14 4/15 4/17 4/24   Approved Visit # 1 21 22 23    Remaining       TESTING       TUG 62.5sec      5x STS 25.9sec      SIMONS 34/56      Gait speed nv      3 min walk test nv             THERAPEUTIC EXERCISE HEP       [employer]                                                                                                                         NEUROMUSCULAR REEDUCATION         Static balance        Gait with HM/ MAFO- SOLO   Std cane MAFO with 5# weight 230' Std cane with 5#; 40'x4 FWD/Back No device held weight due to hip; FWD/Back 40'x2   Cone to floor color match    14x with turns and backward stepping    Hurdles    Stepping up and over foam beams [id number]    Stance reach floor to overhead cone stacking    Step up on beam an forward reach CTG/min assist    Step taps   5x2 with assist for right LE placement     Step up    X3 no rise    Gait with resistance posterior   Backward stepping 10' x3  Resisted gait with std cane 100'   Gait without device with MAFO        Gait over hidden obstacles    Rapid stepping w/ MAFO 80'  Rapid stepping w/ MAFO CTG 80'   Modified hill's        Sit to stand with increased forward translation   Off regular height 5x2, off raised x5 no UE  X5 regular height, occ repeated motion for initiation    Sidestepping         Sit to sidelying        Seated forward flex with ext/rotation                THERAPEUTIC ACTIVITY                                        GAIT TRAINING                                        MODALITIES

## 2025-04-24 NOTE — PROGRESS NOTES
OCCUPATIONAL THERAPY RE-EVALUATION        2025  Wilder Vargas  1965  [id number]  Gilmar Oro MD   Diagnosis ICD-10-CM Associated Orders   1. Hemiparesis of right dominant side as late effect of cerebral infarction (HCC)  I69.351             Assessment/Plan      SKILLED ANALYSIS:    Pt is a 59 y.o. male referred to Occupational Therapy s/p Hemiparesis of right dominant side as late effect of cerebral infarction (HCC) [I69.351].  Pt participated in skilled OT focused on NMRE to RUE to improve motor control. Pt with improved active and passive range of motion of RUE, with increased motor control noted with and without use of muscle facilitation of vibration and muscle tapping. Pt cont with a R sh sublux, which decreases with active muscle facilitations. Pt cont to present with the following areas of deficit: FMC/FMS, GMC/GMS, hypertonicity, and impaired motor control of RUE  impacting ability to independently and safely complete ADL/IADL and leisure tasks.  Pt does demo the need for cont skilled Occupational Therapy services 2x/week for 12 weeks with focus on ADL re-training, IADL re-training, fxnl xfers, standing tolerance, standing balance, UE NMR, UE strengthening, UE endurance, FMC/GMC, DME training/education, family training/education, leisure pursuits, and community re-integration to address the goals as listed below. Pt in agreement with POC, POC to  25.          Short Term Goals (to be achieved within 4 weeks):    Pt will tolerate up to 15 min of PROM/ stretching to RUE to increase range of motion and maintain joint integrity MET  Pt will increase AROM of RUE into all planes by 1/4 range to improve functional use PARTIALLY MET   Pt will improve RUE motor control and strength AEB 1/2 grade increase on MMT scale PROGRESSING   Pt will be I with positioning of RUE for best management of R shoulder sublux PROGRESSING         Long Term Goals (to be achieved within 12 weeks):  Pt will improve  "RUE motor control so as to be able to use as functional assist in 80% of all daily activities PROGRESSING    Pt will demo with decreased R shoulder sublux to trace for painfree AROM for improved ADL and IADL engagement PROGRESSING   Pt will demo G gross grasp/ release of R hand to be able to  and manipulate ADL items with min difficulty PROGRESSING   Pt will be setup/ MI with ADLs of bathing, dressing and groom tasks PROGRESSING   Pt will be I with HEP ONGOING             SUBJECTIVE      SUBJECTIVE: \"I only slept 2 hours last night\"     PATIENT GOAL: \"I want to be able to use my right arm\"  \"I want to go hunting and fishing\"      HISTORY OF PRESENT ILLNESS:     Pt is a 59 y.o. male who was referred to Occupational Therapy s/p  Hemiparesis of right dominant side as late effect of cerebral infarction (HCC) [I69.351]. Pt with h/o CVAs, two in 2020 and one recently in Sept 2024 involving left thalamocapsular region. Pt with residual RUE/LE weakness. Following hospitalization in Sept, pt had subacute rehab, followed by [facility]. Pt now presents to OPOT for cont therapy.         PMH:   Past Medical History:   Diagnosis Date    CVA (cerebral vascular accident) (HCC)     Diabetes mellitus (HCC)     Hypertension        Past Surgical Hx:   Past Surgical History:   Procedure Laterality Date    NO PAST SURGERIES          HOME SETUP/ CLOF: lives with mother; 2 SH; bedroom on 2nd floor; currently sleeping on a hospital bed 1st floor; full bath on 2nd floor; BSC on 1st floor; Tub shower combo     ADLs: (mother provides assist with ADLs)  Showers once a week to once every 2 weeks as shower on 2nd floor; sponge bathes mostly  Bathing: A with LUE; A with buttocks and LE/feet  Dressing: I with OH shirt; A with C style shirt; I with pants; A with B socks/shoes  Toileting: A with toilet transfer and A with hygiene  Tub transfer: A with lifting LE over edge of tub     IADLs: pt's mother completes     Functional Mobility: w/c " level    Work: has [employer]     DME: transfer tub bench; BSC on 1st floor; lift chair, w/c, hospital bed     Falls: multiple falls at home, none recently     Pain Levels: R shoulder; 0/10           OBJECTIVE         PHYSICAL ASSESSMENT    (+) R shoulder 1 finger sublux   (+) R scapular winging   Has a R surgical sling and R sarah sling       RONNY Russell                 UPPER EXTREMITY FXN Impaired Intact Dominant Hand: Right                  UE ROM LUE AROM  RUE PROM  RUE AROM COMMENTS   Shoulder Flex WNL  3/4 range (was 1/2 range) 1/8 range with facilitation (was initiated)     Shoulder Ext WFL  WFL WFL ( was 1/2 range)    Shoulder ABD WFL  3/4 range (was 1/2 range) 1/3 range (was initiated)     Horizontal ABD WFL  WFL 1/2 range (was unable)     Horizontal ADD WFL  WFL 1/2 range (was unable)     Shoulder IR  WFL   WFL WFL (was 1/8 range)    Shoulder ER WFL  WFL (was 3/4 range) Able to achieve neutral from IR (was unable)     Elbow Flex WFL  WFL 1/4 range (was initiated)    Elbow Ext  WFL  WFL Initiated to full with facilitation (was unable)     Supination  WFL  3/4 range Initiated to 1/8 range (was unable)     Pronation  WFL  WFL 1/4 range     Wrist Flex WFL  WFL 1/4 range     Wrist Ext WFL  WFL 1/4-1/2 range with facilitations (was unable)     Finger Flex WFL  WFL 1/4-1/2 range (was unable)     Finger Ext WFL  WFL Initiated (was unable)     Opposition  WFL  WFL Initiated (was unable)                                 MMT  LUE RUE   Comments   Shoulder Flex/Ext 5/5 2-/5 (was 1/5)    Shoulder Abd 5/5 2-/5 (was 1/5)    Shoulder ER 5/5 2-/5 (was 0/5)    Shoulder IR 5/5 2-/5 (was 1/5)    Elbow Flex 5/5 2-/5 (was 1/5)    Elbow Ext 5/5 2-/5 (was 0/5)    Wrist Flex 5/5 2-/5    Wrist Ext 5/5 2-/5    Gross Grasp 5/5 2-/5 (was 0/5)          SENSATION LUE RUE Comments   Sharp Dull  Intact Intact    Proprioception Intact Intact    Pain Intact Intact    Hot/Cold Temp Intact Intact      COORDINATION LUE RUE    9  Hole Peg Test NT Unable     Keyhole Peg Test       O'Horacio Finger Dexterity Test       Handwriting (Print)      Handwriting (script)           MODIFIED ZO SCALE (TONE) HARPREET JEFFERSON    No increase in muscle tone (0) 0     Slight Increase in muscle tone with catch and release or min resist at end range (1)  Elbow, fingers    Slight Increase in muscle tone with catch and release, followed by min resistance through remainder of range (1+)      Increased muscle tone through full range, able to be moved easily (2)      Considerable increase in tone, difficult to move (3)      Rigid in Flexion/Extension (4)                  COGNITIVE ASSESSMENT      Cognitive Checklist: Patient indicated that he is experiencing the following symptoms:    Memory: Remembering what people have told you; pt reporting difficulty with remembering details from phone calls     Attention: no deficits     Processing: no deficits     Executive Functions: no deficits      Communication: Word finding in conversation    Visual: no changes     Emotional: emotional lability; difficulty sleeping          VISUAL ASSESSMENT      Comments: (-) glasses     Vision Screen     ROM Intact    Tracking Intact    Saccades Intact    Convergence/ Divergence Intact    Visual Fields  Intact        Today's Session:   NMRE to RUE with use of muscle tapping and vibration to elicit increased motor movement.   -completed isolated target reaching into all planes of sh, elbow, forearm, wrist and finger movement    Item retrieval and transfer:  -R hand gross grasp to retreive medium sized foam blocks from R side of table and transfer to 6 tabletop targets   -completed with min facilitations of vibration and muscle tapping to elicit increased motor control during functional reach and required max manual cues for release at target.           PLANNED THERAPY INTERVENTIONS:  Therapeutic activity  Therapeutic exercise  Neuromuscular re-education   ADL re-training   IADL  re-training  RTW simulations   Manual tx  Hand to target  WBearing strategies   Closed chain activities  Open chain activities       INTERVENTION COMMENTS:  Diagnosis: Hemiparesis of right dominant side as late effect of cerebral infarction (HCC) [I69.351]  Precautions: R sided weakness, fall   Insurance: Payor: [employer] / Plan: [employer] / Product Type: Medicaid HMO /   20 of 24 visits through 12/31/25  POC Expires: 7/17/25      Auth Tracker   DATE 4/24        Visit # 20

## 2025-04-29 ENCOUNTER — OFFICE VISIT (OUTPATIENT)
Facility: CLINIC | Age: 60
End: 2025-04-29
Payer: COMMERCIAL

## 2025-04-29 ENCOUNTER — EVALUATION (OUTPATIENT)
Facility: CLINIC | Age: 60
End: 2025-04-29
Attending: INTERNAL MEDICINE
Payer: COMMERCIAL

## 2025-04-29 DIAGNOSIS — I69.351 HEMIPARESIS OF RIGHT DOMINANT SIDE AS LATE EFFECT OF CEREBRAL INFARCTION (HCC): Primary | ICD-10-CM

## 2025-04-29 PROCEDURE — 97112 NEUROMUSCULAR REEDUCATION: CPT | Performed by: PHYSICAL THERAPIST

## 2025-04-29 PROCEDURE — 97110 THERAPEUTIC EXERCISES: CPT

## 2025-04-29 PROCEDURE — 97112 NEUROMUSCULAR REEDUCATION: CPT

## 2025-04-29 NOTE — PROGRESS NOTES
Occupational Therapy Daily Note:    Today's date: 2025  Patient name: Wilder Vargas  : 1965  MRN: [id number]  Referring provider: Gilmar Oro MD  Dx:   Encounter Diagnosis   Name Primary?    Hemiparesis of right dominant side as late effect of cerebral infarction (HCC) Yes         Subjective: pt reporting he fell on  attempting to get onto his riding mower, (-) head strike, (-) injuries; pt reporting bruising only on L buttocks        Objective: Pt engaged in skilled OT treatment session with focus on UE NMR, UE strengthening, UE endurance, and family training/education to increase engagement, tolerance, and independence with daily ADL and IADL tasks.     CPT Code Minutes                                           Task Details        Therapeutic Activity               Neuro Re-Ed  NMRE to RUE with use of muscle tapping and vibration to elicit increased motor movement.   -completed isolated target reaching into all planes of sh, elbow, forearm, wrist and finger movement           Seated at mat table:  -RUE target reaching, 3 different tabletop targets  -completed with min facilitations of vibration at sh and elbow to elicit increased motor control during functional reach             Therapeutic Exercise  Pt cont with 1 finger sublux RUE; KT tape applied to R shoulder to assist with sublux management.              Manual          HEP  : sublux reduction exercises, 5 reps 2x/ day          Assessment: Tolerated treatment well. Pt cont to progress towards goals, noted improved motor control at wrist and into sh flex; pt with initiated movement at thumb and into finger ext; responds well to muscle facilitations. pt would benefit from continued skilled OT.    Plan: Continued skilled OT per POC      INTERVENTION COMMENTS:  Diagnosis: Hemiparesis of right dominant side as late effect of cerebral infarction (HCC) [I69.351]  Precautions: R sided weakness, fall   Insurance: Payor: [employer] / Plan:  [employer] / Product Type: Medicaid HMO /   21 of 24 visits through 12/31/25  POC Expires: 7/17/25      Auth Tracker   DATE 4/24 4/29       Visit # 20 21
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
## 2025-05-01 ENCOUNTER — OFFICE VISIT (OUTPATIENT)
Facility: CLINIC | Age: 60
End: 2025-05-01
Attending: INTERNAL MEDICINE
Payer: COMMERCIAL

## 2025-05-01 ENCOUNTER — OFFICE VISIT (OUTPATIENT)
Facility: CLINIC | Age: 60
End: 2025-05-01
Payer: COMMERCIAL

## 2025-05-01 DIAGNOSIS — I69.351 HEMIPARESIS OF RIGHT DOMINANT SIDE AS LATE EFFECT OF CEREBRAL INFARCTION (HCC): Primary | ICD-10-CM

## 2025-05-01 PROCEDURE — 97112 NEUROMUSCULAR REEDUCATION: CPT | Performed by: PHYSICAL THERAPIST

## 2025-05-01 PROCEDURE — 97110 THERAPEUTIC EXERCISES: CPT

## 2025-05-01 PROCEDURE — 97112 NEUROMUSCULAR REEDUCATION: CPT

## 2025-05-01 NOTE — PROGRESS NOTES
Occupational Therapy Daily Note:    Today's date: 2025  Patient name: Wilder Vargas  : 1965  MRN: [id number]  Referring provider: Gilmar Oro MD  Dx:   Encounter Diagnosis   Name Primary?    Hemiparesis of right dominant side as late effect of cerebral infarction (HCC) Yes         Subjective: pt reporting he fell twice; once last night and once this morning; both times on the steps; reports no injuries or head strike       Objective: Pt engaged in skilled OT treatment session with focus on UE NMR, UE strengthening, UE endurance, and family training/education to increase engagement, tolerance, and independence with daily ADL and IADL tasks.     CPT Code Minutes                                           Task Details        Therapeutic Activity               Neuro Re-Ed  NMRE to RUE with use of muscle tapping and vibration to elicit increased motor movement.   -completed isolated target reaching into all planes of sh, elbow, forearm, wrist and finger movement                     Therapeutic Exercise  Pt cont with 1 finger sublux RUE; PROM RUE into all planes x10 reps              Manual          HEP  : sublux reduction exercises, 5 reps 2x/ day          Assessment: Tolerated treatment well. Pt cont to progress towards goals and demo's improved motor controt; responds well to muscle facilitations. pt would benefit from continued skilled OT.    Plan: Continued skilled OT per POC      INTERVENTION COMMENTS:  Diagnosis: Hemiparesis of right dominant side as late effect of cerebral infarction (HCC) [I69.351]  Precautions: R sided weakness, fall   Insurance: Payor: [employer] / Plan: [employer] / Product Type: Medicaid O /    of  visits through 25  POC Expires: 25      Auth Tracker   DATE       Visit # 20 21 22

## 2025-05-01 NOTE — PROGRESS NOTES
Daily Note     Today's date: 2025  Patient name: Wilder Vargas  : 1965  MRN: [id number]  Referring provider: Gilmar Oro MD  Dx:   Encounter Diagnosis     ICD-10-CM    1. Hemiparesis of right dominant side as late effect of cerebral infarction (HCC)  I69.351                               Start Time: 253  Stop Time: 405  Total time in clinic (min): 72 minutes    Subjective:   Per patient, he fell twice yesterday on the stairs.  Further discussion revealed patient is not wearing his brace at home as his mom is unable to bend down to get his shoes on and tied and patient is unable to tie his shoes.  No current pain and no injury noted  from fall.      Objective: See treatment diary below      Assessment:   Initiated Stepping on and off larger step height and varied surfaces with SOLO for confidence.  Patient tearful and upset with perturbation in gait due to fear of falling.  Despite encouragement, he remains upset with his current mobility and voices overall depression on his current health.  Discussed with OT, Jada Virgen, who is also following him.  She agreed to message his PCP to further address as he had the same issues in OT today, details to follow.  Zip ties have been ordered for his shoes to allow for progress with education in donning brace and shoes.  Mat transitions performed with ability to roll and come to sit without assist.  Per his report, his mom is pulling him up at home and he indicates his mattress is too high and too soft to allow him to be independent at home.  Educated in need to increase independent motion at home to promote increased muscle strength.  Suggested multiple options including placement of a board between the mattress and the box spring as well as removing the mattress from the frame to lower the bed, he refused all these options stating they would not work.  He does not have a chair upstairs and states there is not room for one, but is able to achieve long  sitting which may afford him the ability to don his shoes in this position, will attempt once laces are received.    Plan: Continue per plan of care.   No complaints end of session.  Continue to progress balance and gait challenges.      Daily Treatment Diary     Precautions: h/o strokes, hemiparesis right, h/o BPPV, DM, HTN      POC Expires Reeval for Medicare to be completed  Unit Limit Auth Expiration Date PT/OT/STVisit Limit   5/22/2025 By visit N/A BOMN 7/1/2025 N/A    Completed on visit                    Auth Status DATE 1/14 4/24 4/29 5/1   Approved Visit # 1 23 24 24    Remaining    15   TESTING   PROGRESSS NOTE    TUG 62.5sec      5x STS 25.9sec      SIMONS 34/56      Gait speed nv      3 min walk test nv             THERAPEUTIC EXERCISE HEP       Codmans                                                                                                                        NEUROMUSCULAR REEDUCATION         Static balance        Gait over steps on compliant foam     4 squares with right lead with std cane x5 laps   Gait with HM/ MAFO- SOLO   No device held weight due to hip; FWD/Back 40'x2  SOLO with std cane stepping up on 3 stack mat, and 2 rise step x10   Gait in SOLO with perturbation     x10   Cone to floor color match        Hurdles        Stance reach floor to overhead cone stacking        Step taps     5x4   Step up        Gait with resistance posterior   Resisted gait with std cane 100'     Gait without device with MAFO        Gait over hidden obstacles    Rapid stepping w/ MAFO CTG 80'     Modified hill's        Sit to stand with increased forward translation   X5 regular height, occ repeated motion for initiation      Sidestepping         Sit to sidelying     x4ea   Seated forward flex with ext/rotation        Transition from sit to supine     5x2   THERAPEUTIC ACTIVITY                                        GAIT TRAINING                                        MODALITIES

## 2025-05-06 ENCOUNTER — OFFICE VISIT (OUTPATIENT)
Facility: CLINIC | Age: 60
End: 2025-05-06
Payer: COMMERCIAL

## 2025-05-06 DIAGNOSIS — I69.351 HEMIPARESIS OF RIGHT DOMINANT SIDE AS LATE EFFECT OF CEREBRAL INFARCTION (HCC): Primary | ICD-10-CM

## 2025-05-06 PROCEDURE — 97530 THERAPEUTIC ACTIVITIES: CPT | Performed by: PHYSICAL THERAPIST

## 2025-05-06 PROCEDURE — 97112 NEUROMUSCULAR REEDUCATION: CPT | Performed by: PHYSICAL THERAPIST

## 2025-05-06 PROCEDURE — 97112 NEUROMUSCULAR REEDUCATION: CPT

## 2025-05-06 NOTE — PROGRESS NOTES
Occupational Therapy Daily Note:    Today's date: 2025  Patient name: Wilder Vargas  : 1965  MRN: [id number]  Referring provider: Gilmar Oro MD  Dx:   Encounter Diagnosis   Name Primary?    Hemiparesis of right dominant side as late effect of cerebral infarction (HCC) Yes         Subjective: no new complaints       Objective: Pt engaged in skilled OT treatment session with focus on UE NMR, UE strengthening, UE endurance, and family training/education to increase engagement, tolerance, and independence with daily ADL and IADL tasks.     CPT Code Minutes                                           Task Details        Therapeutic Activity               Neuro Re-Ed  NMRE to RUE with use of muscle tapping and vibration to elicit increased motor movement.   -completed isolated target reaching into all planes of sh, elbow, forearm, wrist and finger movement         Item retrieval and transfer:  -R hand gross grasp to retreive medium sized foam blocks from R side of table, lift over low obstacle and transfer to L sided target  -completed with min facilitations of vibration to elicit increased motor control during functional reach and required mod manual cues for release at target.             Therapeutic Exercise               Manual          HEP  : sublux reduction exercises, 5 reps 2x/ day          Assessment: Tolerated treatment well. Pt cont to progress towards goals and demo's improved motor controt; responds well to muscle facilitations. pt would benefit from continued skilled OT.    Plan: Continued skilled OT per POC      INTERVENTION COMMENTS:  Diagnosis: Hemiparesis of right dominant side as late effect of cerebral infarction (HCC) [I69.351]  Precautions: R sided weakness, fall   Insurance: Payor: [employer] / Plan: [employer] / Product Type: Medicaid HMO /    visits through 25  POC Expires: 25      Auth Tracker   DATE      Visit # 20 21 22 23

## 2025-05-06 NOTE — PROGRESS NOTES
Daily Note     Today's date: 2025  Patient name: Wilder Vargas  : 1965  MRN: [id number]  Referring provider: Gilmar Oro MD  Dx:   Encounter Diagnosis     ICD-10-CM    1. Hemiparesis of right dominant side as late effect of cerebral infarction (HCC)  I69.351                                 Start Time: 0300  Stop Time: 0348  Total time in clinic (min): 48 minutes    Subjective:   Patient arrived in W/C.  No current complaints, he indicated he tried to don his brace and was unsuccessful.  He also indicates he can't roll over in bed.       Objective: See treatment diary below      Assessment:   Patient given elastic zip laces with ability to don/doff in sitting, cuing for hand placement to ensure success of position.  Supine to from sit with improved positioning.  Rolling reviewed with instruction on LE flexion, trapping his right with left foot to maintain position with good demonstration.  Ambulation with std cane, patient continues to utilize RW at home, continue to reinforce gait to allow for transition to home use.    Plan: Continue per plan of care.   No complaints end of session.  Continue to progress balance and gait challenges.      Daily Treatment Diary     Precautions: h/o strokes, hemiparesis right, h/o BPPV, DM, HTN      POC Expires Reeval for Medicare to be completed  Unit Limit Auth Expiration Date PT/OT/STVisit Limit   2025 By visit N/A BOMN 2025 N/A    Completed on visit                    Auth Status DATE    Approved Visit # 1 24 24 25    Remaining   15 14   TESTING  PROGRESSS NOTE     TUG 62.5sec      5x STS 25.9sec      SIMONS 34/56      Gait speed nv      3 min walk test nv             THERAPEUTIC EXERCISE HEP       Codmans                                                                                                                        NEUROMUSCULAR REEDUCATION         Static balance        Gait over steps on compliant foam    4 squares with right lead  with std cane x5 laps    Gait with HM/ MAFO- SOLO    SOLO with std cane stepping up on 3 stack mat, and 2 rise step x10    Gait in SOLO with perturbation    x10    Cone to floor color match        Hurdles        Stance reach floor to overhead cone stacking        Step taps    5x4    Step up        Gait with resistance posterior        Gait without device with MAFO     Gait with std cane 100' x2 with improved control around corners   Gait over hidden obstacles         Modified hill's        Sit to stand with increased forward translation     5x2   Sidestepping         Sit to sidelying    x4ea    Seated forward flex with ext/rotation        Transition from sit to supine    5x2 3x2   THERAPEUTIC ACTIVITY        Rolling side to side     4x2   Shoe/brace don/doffing     x2                   GAIT TRAINING                                        MODALITIES

## 2025-05-07 ENCOUNTER — OFFICE VISIT (OUTPATIENT)
Dept: FAMILY MEDICINE CLINIC | Facility: HOSPITAL | Age: 60
End: 2025-05-07
Payer: COMMERCIAL

## 2025-05-07 VITALS
WEIGHT: 180 LBS | RESPIRATION RATE: 16 BRPM | DIASTOLIC BLOOD PRESSURE: 80 MMHG | TEMPERATURE: 97.7 F | SYSTOLIC BLOOD PRESSURE: 128 MMHG | HEIGHT: 68 IN | BODY MASS INDEX: 27.28 KG/M2 | HEART RATE: 64 BPM | OXYGEN SATURATION: 92 %

## 2025-05-07 DIAGNOSIS — E11.40 TYPE 2 DIABETES MELLITUS WITH DIABETIC NEUROPATHY, WITH LONG-TERM CURRENT USE OF INSULIN (HCC): ICD-10-CM

## 2025-05-07 DIAGNOSIS — Z99.3 DEPENDENCE ON WHEELCHAIR: ICD-10-CM

## 2025-05-07 DIAGNOSIS — E11.65 TYPE 2 DIABETES MELLITUS WITH HYPERGLYCEMIA, UNSPECIFIED WHETHER LONG TERM INSULIN USE (HCC): Primary | ICD-10-CM

## 2025-05-07 DIAGNOSIS — E78.2 MIXED HYPERLIPIDEMIA: ICD-10-CM

## 2025-05-07 DIAGNOSIS — Z00.00 ANNUAL PHYSICAL EXAM: ICD-10-CM

## 2025-05-07 DIAGNOSIS — I10 PRIMARY HYPERTENSION: ICD-10-CM

## 2025-05-07 DIAGNOSIS — N40.0 BENIGN PROSTATIC HYPERPLASIA WITHOUT LOWER URINARY TRACT SYMPTOMS: ICD-10-CM

## 2025-05-07 DIAGNOSIS — E11.29 TYPE 2 DIABETES MELLITUS WITH DIABETIC MICROALBUMINURIA, WITH LONG-TERM CURRENT USE OF INSULIN (HCC): ICD-10-CM

## 2025-05-07 DIAGNOSIS — I69.351 HEMIPARESIS OF RIGHT DOMINANT SIDE AS LATE EFFECT OF CEREBRAL INFARCTION (HCC): ICD-10-CM

## 2025-05-07 DIAGNOSIS — Z79.4 TYPE 2 DIABETES MELLITUS WITH DIABETIC MICROALBUMINURIA, WITH LONG-TERM CURRENT USE OF INSULIN (HCC): ICD-10-CM

## 2025-05-07 DIAGNOSIS — F32.2 CURRENT SEVERE EPISODE OF MAJOR DEPRESSIVE DISORDER WITHOUT PSYCHOTIC FEATURES WITHOUT PRIOR EPISODE (HCC): ICD-10-CM

## 2025-05-07 DIAGNOSIS — Z79.4 TYPE 2 DIABETES MELLITUS WITH DIABETIC NEUROPATHY, WITH LONG-TERM CURRENT USE OF INSULIN (HCC): ICD-10-CM

## 2025-05-07 DIAGNOSIS — R80.9 TYPE 2 DIABETES MELLITUS WITH DIABETIC MICROALBUMINURIA, WITH LONG-TERM CURRENT USE OF INSULIN (HCC): ICD-10-CM

## 2025-05-07 PROBLEM — D75.1 ERYTHROCYTOSIS: Status: RESOLVED | Noted: 2024-05-22 | Resolved: 2025-05-07

## 2025-05-07 LAB — SL AMB POCT HEMOGLOBIN AIC: 7.7 (ref ?–6.5)

## 2025-05-07 PROCEDURE — 99396 PREV VISIT EST AGE 40-64: CPT | Performed by: INTERNAL MEDICINE

## 2025-05-07 PROCEDURE — 99214 OFFICE O/P EST MOD 30 MIN: CPT | Performed by: INTERNAL MEDICINE

## 2025-05-07 PROCEDURE — 83036 HEMOGLOBIN GLYCOSYLATED A1C: CPT | Performed by: INTERNAL MEDICINE

## 2025-05-07 RX ORDER — PRAVASTATIN SODIUM 20 MG
20 TABLET ORAL
Qty: 100 TABLET | Refills: 3 | Status: SHIPPED | OUTPATIENT
Start: 2025-05-07

## 2025-05-07 RX ORDER — CLOPIDOGREL BISULFATE 75 MG/1
75 TABLET ORAL DAILY
Qty: 90 TABLET | Refills: 3 | Status: SHIPPED | OUTPATIENT
Start: 2025-05-07

## 2025-05-07 RX ORDER — NORTRIPTYLINE HYDROCHLORIDE 25 MG/1
25 CAPSULE ORAL
Qty: 30 CAPSULE | Refills: 1 | Status: SHIPPED | OUTPATIENT
Start: 2025-05-07

## 2025-05-07 RX ORDER — DOXAZOSIN 4 MG/1
4 TABLET ORAL
Qty: 100 TABLET | Refills: 3 | Status: SHIPPED | OUTPATIENT
Start: 2025-05-07

## 2025-05-07 RX ORDER — GABAPENTIN 300 MG/1
300 CAPSULE ORAL 2 TIMES DAILY
Qty: 180 CAPSULE | Refills: 3 | Status: SHIPPED | OUTPATIENT
Start: 2025-05-07

## 2025-05-07 NOTE — ASSESSMENT & PLAN NOTE
Depression Screening Follow-up Plan: Patient's depression screening was positive with a PHQ-2 score of 6. Their PHQ-9 score was 21. Patient assessed for underlying major depression. They have no active suicidal ideations. Brief counseling provided and recommend additional follow-up/re-evaluation next office visit.    Orders:  •  nortriptyline (PAMELOR) 25 mg capsule; Take 1 capsule (25 mg total) by mouth daily at bedtime

## 2025-05-07 NOTE — ASSESSMENT & PLAN NOTE
Lab Results   Component Value Date    HGBA1C 7.7 (A) 05/07/2025       Orders:  •  POCT hemoglobin A1c  •  Albumin / creatinine urine ratio; Future

## 2025-05-07 NOTE — ASSESSMENT & PLAN NOTE
Orders:  •  clopidogrel (PLAVIX) 75 mg tablet; Take 1 tablet (75 mg total) by mouth daily  •  Ambulatory Referral to Social Work Care Management Program; Future

## 2025-05-07 NOTE — PROGRESS NOTES
Adult Annual Physical  Name: Wilder Vargas      : 1965      MRN: [id number]  Encounter Provider: Gilmar Oro MD  Encounter Date: 2025   Encounter department: [facility]IMARY [facility] [address]    :  Assessment & Plan  Type 2 diabetes mellitus with hyperglycemia, unspecified whether long term insulin use (HCC)    Lab Results   Component Value Date    HGBA1C 7.7 (A) 2025       Orders:  •  POCT hemoglobin A1c  •  Albumin / creatinine urine ratio; Future  •  Hemoglobin A1C; Future  •  Comprehensive metabolic panel; Future  •  CBC and Platelet; Future    Primary hypertension    Orders:  •  doxazosin (CARDURA) 4 mg tablet; Take 1 tablet (4 mg total) by mouth daily at bedtime    Current severe episode of major depressive disorder without psychotic features without prior episode (HCC)  Depression Screening Follow-up Plan: Patient's depression screening was positive with a PHQ-2 score of 6. Their PHQ-9 score was 21. Patient assessed for underlying major depression. They have no active suicidal ideations. Brief counseling provided and recommend additional follow-up/re-evaluation next office visit.    Orders:  •  nortriptyline (PAMELOR) 25 mg capsule; Take 1 capsule (25 mg total) by mouth daily at bedtime    Hemiparesis of right dominant side as late effect of cerebral infarction (HCC)    Orders:  •  clopidogrel (PLAVIX) 75 mg tablet; Take 1 tablet (75 mg total) by mouth daily  •  Ambulatory Referral to Social Work Care Management Program; Future    Type 2 diabetes mellitus with diabetic microalbuminuria, with long-term current use of insulin (HCC)    Lab Results   Component Value Date    HGBA1C 7.7 (A) 2025       Orders:  •  POCT hemoglobin A1c  •  Albumin / creatinine urine ratio; Future    Benign prostatic hyperplasia without lower urinary tract symptoms         Mixed hyperlipidemia    Orders:  •  pravastatin (PRAVACHOL) 20 mg tablet; Take 1 tablet (20 mg total) by mouth daily at  bedtime    Type 2 diabetes mellitus with diabetic neuropathy, with long-term current use of insulin ([facility])    Lab Results   Component Value Date    HGBA1C 7.7 (A) 05/07/2025       Orders:  •  gabapentin (NEURONTIN) 300 mg capsule; Take 1 capsule (300 mg total) by mouth 2 (two) times a day    Dependence on wheelchair         Annual physical exam         Type 2 diabetes mellitus with hyperglycemia, unspecified whether long term insulin use ([facility])    Lab Results   Component Value Date    HGBA1C 7.7 (A) 05/07/2025   Patient has type 2 diabetes mellitus.  He checks his blood sugar at bedtime once a day only.  Blood sugars around 200.  Blood sugar is lower patient's mother does not give Lantus.  I asked patient's mom to check his blood sugar twice a day and give me a call in about a week with blood sugar readings so I could advise her how to give patient Lantus.    I will recheck patient's A1c in 3 months along with kidney functions.       Type 2 diabetes mellitus with diabetic microalbuminuria, with long-term current use of insulin ([facility])    Lab Results   Component Value Date    HGBA1C 7.7 (A) 05/07/2025   Patient gave us urine sample to look for microhematuria       Primary hypertension  Blood pressure is acceptable today.  He will continue losartan, carvedilol, doxazosin.  He has not been taking amlodipine  I will check his electrolytes and renal function       Hemiparesis of right dominant side as late effect of cerebral infarction ([facility])  Patient is status post CVA with right-sided hemiparesis.  He is wheelchair-bound.  Asked him to continue clopidogrel and pravastatin.    I ordered social work consult to see what cognitive resources patient's mother may be eligible for to help her take care of patient.       Current severe episode of major depressive disorder without psychotic features without prior episode ([facility])  Depression Screening Follow-up Plan: Patient's depression screening was positive with a PHQ-2 score of 6.  Their PHQ-9 score was 21. Patient assessed for underlying major depression. They have no active suicidal ideations. Brief counseling provided and recommend additional follow-up/re-evaluation next office visit.     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    I increase the dose of gabapentin that may help with his neuropathy as well as his depression.  He has trouble falling asleep and staying asleep.  Will try nortriptyline 25 mg at bedtime that may help with sleep, neuropathic pain of the feet and depression as well.  Will try to have a virtual appointment in a month    Benign prostatic hyperplasia without lower urinary tract symptoms  Patient has BPH.  He feels that he is able to empty his bladder well on doxazosin and Avodart.  Continue same medications       Mixed hyperlipidemia  Continue pravastatin for hyperlipidemia       Type 2 diabetes mellitus with diabetic neuropathy, with long-term current use of insulin ([facility])    Lab Results   Component Value Date    HGBA1C 7.7 (A) 05/07/2025   Patient had decreased sensation on monofilament testing of the feet today.  He likely has neuropathy.  I increased the dose of gabapentin to 3 mg p.o. twice daily       Dependence on wheelchair  Patient is status post CVA with right-sided hemiparesis.  He is dependent on a wheelchair.  He walks very little with PT       Annual physical exam               Preventive Screenings:  - Diabetes Screening: screening not indicated, has diabetes, risks/benefits discussed and orders placed  - Cholesterol Screening: screening not indicated, has hyperlipidemia and risks/benefits discussed   - Colon cancer screening: screening up-to-date and risks/benefits discussed   - Prostate cancer screening: screening up-to-date, patient declines and risks/benefits discussed     Immunizations:  - Immunizations due: Prevnar 20, Tdap and Zoster (Shingrix)  - Risks/benefits  "immunizations discussed      Counseling/Anticipatory Guidance:    - Diet: discussed recommendations for a healthy/well-balanced diet.       Depression Screening and Follow-up Plan: Patient's depression screening was positive with a PHQ-2 score of 6. Their PHQ-9 score was 21.   Patient assessed for underlying major depression. Brief counseling provided and recommend additional follow-up/re-evaluation next office visit.         History of Present Illness     Adult Annual Physical:  Patient presents for annual physical.     Diet and Physical Activity:  - Diet/Nutrition: diabetic diet.  - Exercise:. N/A    Depression Screening:  - PHQ-2 Score: 6  - PHQ-9 Score: 21    General Health:  - Sleep: sleeps poorly.  - Hearing: normal hearing bilateral ears.  - Vision: no vision problems.  - Dental: no dental visits for > 1 year.    /GYN Health:    - History of STDs: no     Health:  - History of STDs: no.     Review of Systems   HENT:  Negative for hearing loss.    Eyes:  Negative for visual disturbance.   Respiratory:  Negative for shortness of breath.    Cardiovascular:  Negative for chest pain.   Gastrointestinal:  Negative for abdominal pain and blood in stool.   Genitourinary:  Negative for difficulty urinating and hematuria.   Musculoskeletal:  Positive for gait problem.   Neurological:  Positive for weakness.   Psychiatric/Behavioral:  Positive for dysphoric mood. Negative for suicidal ideas.          Objective   /80 (BP Location: Left arm, Patient Position: Sitting)   Pulse 64   Temp 97.7 °F (36.5 °C) (Tympanic)   Resp 16   Ht 5' 8\" (1.727 m)   Wt 81.6 kg (180 lb) Comment: pt reported  SpO2 92%   BMI 27.37 kg/m²     Physical Exam  Constitutional:       General: He is not in acute distress.     Appearance: He is well-developed. He is not toxic-appearing.   HENT:      Head: Normocephalic.   Eyes:      Conjunctiva/sclera: Conjunctivae normal.   Cardiovascular:      Rate and Rhythm: Normal rate and regular " rhythm.      Pulses: no weak pulses.           Dorsalis pedis pulses are 2+ on the right side and 2+ on the left side.      Heart sounds: No murmur heard.  Pulmonary:      Effort: No respiratory distress.      Breath sounds: No wheezing or rales.   Abdominal:      General: Bowel sounds are normal.      Palpations: Abdomen is soft.      Tenderness: There is no abdominal tenderness.   Musculoskeletal:      Cervical back: Neck supple.   Feet:      Right foot:      Skin integrity: No ulcer.      Left foot:      Skin integrity: No ulcer.   Skin:     General: Skin is warm and dry.   Neurological:      Mental Status: He is alert and oriented to person, place, and time.      Cranial Nerves: No cranial nerve deficit.      Motor: Weakness present.   Psychiatric:      Comments: Depressed     Diabetic Foot Exam    Patient's shoes and socks removed.    Right Foot/Ankle   Right Foot Inspection  Skin Exam: No ulcer.     Toe Exam: Erythema    Sensory   Monofilament testing: diminished    Vascular  The right DP pulse is 2+.     Left Foot/Ankle  Left Foot Inspection  Skin Exam: No ulcer.     Toe Exam: No erythema.     Sensory   Monofilament testing: diminished    Vascular  The left DP pulse is 2+.     Assign Risk Category  No deformity present  Loss of protective sensation  No weak pulses  Risk: 1

## 2025-05-07 NOTE — ASSESSMENT & PLAN NOTE
Orders:  •  doxazosin (CARDURA) 4 mg tablet; Take 1 tablet (4 mg total) by mouth daily at bedtime

## 2025-05-07 NOTE — ASSESSMENT & PLAN NOTE
Lab Results   Component Value Date    HGBA1C 7.7 (A) 05/07/2025       Orders:  •  gabapentin (NEURONTIN) 300 mg capsule; Take 1 capsule (300 mg total) by mouth 2 (two) times a day

## 2025-05-07 NOTE — ASSESSMENT & PLAN NOTE
Orders:  •  pravastatin (PRAVACHOL) 20 mg tablet; Take 1 tablet (20 mg total) by mouth daily at bedtime

## 2025-05-07 NOTE — PATIENT INSTRUCTIONS
Try to check your blood sugars before breakfast and at bedtime twice a day.  Call me with your results in about a week!

## 2025-05-07 NOTE — ASSESSMENT & PLAN NOTE
Lab Results   Component Value Date    HGBA1C 7.7 (A) 05/07/2025       Orders:  •  POCT hemoglobin A1c  •  Albumin / creatinine urine ratio; Future  •  Hemoglobin A1C; Future  •  Comprehensive metabolic panel; Future  •  CBC and Platelet; Future

## 2025-05-08 ENCOUNTER — EVALUATION (OUTPATIENT)
Facility: CLINIC | Age: 60
End: 2025-05-08
Attending: INTERNAL MEDICINE
Payer: COMMERCIAL

## 2025-05-08 ENCOUNTER — OFFICE VISIT (OUTPATIENT)
Facility: CLINIC | Age: 60
End: 2025-05-08
Payer: COMMERCIAL

## 2025-05-08 DIAGNOSIS — I69.351 HEMIPARESIS OF RIGHT DOMINANT SIDE AS LATE EFFECT OF CEREBRAL INFARCTION (HCC): Primary | ICD-10-CM

## 2025-05-08 PROCEDURE — 97112 NEUROMUSCULAR REEDUCATION: CPT | Performed by: PHYSICAL THERAPIST

## 2025-05-08 PROCEDURE — 97112 NEUROMUSCULAR REEDUCATION: CPT

## 2025-05-08 PROCEDURE — 97110 THERAPEUTIC EXERCISES: CPT

## 2025-05-08 NOTE — PROGRESS NOTES
Occupational Therapy Daily Note:    Today's date: 2025  Patient name: Wilder Vargas  : 1965  MRN: [id number]  Referring provider: Gilmar Oro MD  Dx:   Encounter Diagnosis   Name Primary?    Hemiparesis of right dominant side as late effect of cerebral infarction (HCC) Yes         Subjective: no new complaints       Objective: Pt engaged in skilled OT treatment session with focus on UE NMR, UE strengthening, UE endurance, and family training/education to increase engagement, tolerance, and independence with daily ADL and IADL tasks.     CPT Code Minutes                                           Task Details        Therapeutic Activity               Neuro Re-Ed  NMRE to RUE with use of muscle tapping and vibration to elicit increased motor movement.   -completed isolated target reaching into all planes of sh, elbow, forearm, wrist and finger movement         Item retrieval and transfer:  -R hand gross grasp to retrieve palm sized balls from R side of table and transfer to L side target; able to lift RUE AG to place ball on top of towel target  -R hand retrieval of balls from L side of table and transfer back to R side table target  -completed with min facilitations of vibration to elicit increased motor control during functional reach and required min manual cues for release at target.             Therapeutic Exercise  PROM to RUE completed into all planes to improve ROM and maintain joint integrity              Manual          HEP  : sublux reduction exercises, 5 reps 2x/ day          Assessment: Tolerated treatment well. Pt with improved R sh flex AG to lift UE over obstacle during item transfer; pt with initiated finger flex and wrist ext, improved with use of vibration. pt would benefit from continued skilled OT.    Plan: Continued skilled OT per POC      INTERVENTION COMMENTS:  Diagnosis: Hemiparesis of right dominant side as late effect of cerebral infarction (HCC) [I69.351]  Precautions: R  sided weakness, fall   Insurance: Payor: [employer] / Plan: [employer] / Product Type: Medicaid HMO /   24 of 24 visits through 12/31/25  POC Expires: 7/17/25      Auth Tracker   DATE 4/24 4/29 5/1 5/6 5/8    Visit # 20 21 22 23 24

## 2025-05-08 NOTE — PROGRESS NOTES
Daily Note     Today's date: 2025  Patient name: Wilder Vargas  : 1965  MRN: [id number]  Referring provider: Gilmar Oro MD  Dx:   Encounter Diagnosis     ICD-10-CM    1. Hemiparesis of right dominant side as late effect of cerebral infarction (HCC)  I69.351                                   Start Time: 0130  Stop Time: 215  Total time in clinic (min): 45 minutes    Subjective:   Patient arrived in W/C.  No current complaints.  Per patient, he saw his PCP and has started new meds to assist with his mental health.      Objective: See treatment diary below      Assessment:   Step ups with cane with improved control, eccentric lowering with intermittent adduction on right, cuing with improved correction.  Stepping over foam beam with improved control.  Overall ambulation with nice gains in stability, turns and backward stepping remain challenging.  He is now able to don his brace/shoe on the mat, but indicates his bed is too soft to allow for this at home.  Laces have been assistive for his mom.  Given raised seat for under his current W/C cushion to allow for greater ease in sit to stand off low chair at home.      Plan: Continue per plan of care.   No complaints end of session.  Continue to progress balance and gait challenges.      Daily Treatment Diary     Precautions: h/o strokes, hemiparesis right, h/o BPPV, DM, HTN      POC Expires Reeval for Medicare to be completed  Unit Limit Auth Expiration Date PT/OT/STVisit Limit   2025 By visit N/A BOMN 2025 N/A    Completed on visit                    Auth Status DATE    Approved Visit # 1 25 26 27    Remaining  15 14 13   TESTING       TUG 62.5sec      5x STS 25.9sec      SIMONS 34/56      Gait speed nv      3 min walk test nv             THERAPEUTIC EXERCISE HEP       [employer]                                                                                                                        NEUROMUSCULAR REEDUCATION          Static balance        Gait over steps on compliant foam   4 squares with right lead with std cane x5 laps  Stepping over foam beams 5x2 with cane   Gait with HM/ MAFO- SOLO   SOLO with std cane stepping up on 3 stack mat, and 2 rise step x10  Ambulation with std cane 180' CTG   Gait in SOLO with perturbation   x10  Backward ambulation with std Cane CTG 4 laps   Cone to floor color match        Hurdles        Stance reach floor to overhead cone stacking        Step taps   5x4  5x3   Step up     5x2 off 3 rise step with cane   Gait with resistance posterior        Gait without device with MAFO    Gait with std cane 100' x2 with improved control around corners    Gait over hidden obstacles         Modified hill's        Sit to stand with increased forward translation    5x2 5x2   Sidestepping         Sit to sidelying   x4ea     Seated forward flex with ext/rotation        Transition from sit to supine   5x2 3x2 x2   THERAPEUTIC ACTIVITY        Rolling side to side    4x2    Shoe/brace don/doffing    x2 x1                   GAIT TRAINING                                        MODALITIES

## 2025-05-09 LAB
ALBUMIN/CREAT UR: 18 MG/G CREAT (ref 0–29)
CREAT UR-MCNC: 100.8 MG/DL
MICROALBUMIN UR-MCNC: 18.4 UG/ML

## 2025-05-13 ENCOUNTER — OFFICE VISIT (OUTPATIENT)
Facility: CLINIC | Age: 60
End: 2025-05-13
Attending: INTERNAL MEDICINE
Payer: COMMERCIAL

## 2025-05-13 ENCOUNTER — OFFICE VISIT (OUTPATIENT)
Facility: CLINIC | Age: 60
End: 2025-05-13
Payer: COMMERCIAL

## 2025-05-13 DIAGNOSIS — I69.351 HEMIPARESIS OF RIGHT DOMINANT SIDE AS LATE EFFECT OF CEREBRAL INFARCTION (HCC): Primary | ICD-10-CM

## 2025-05-13 PROCEDURE — 97112 NEUROMUSCULAR REEDUCATION: CPT | Performed by: PHYSICAL THERAPIST

## 2025-05-13 PROCEDURE — 97110 THERAPEUTIC EXERCISES: CPT

## 2025-05-13 PROCEDURE — 97112 NEUROMUSCULAR REEDUCATION: CPT

## 2025-05-13 NOTE — PROGRESS NOTES
"Daily Note     Today's date: 2025  Patient name: Wilder Vargas  : 1965  MRN: [id number]  Referring provider: Gilmar Oro MD  Dx:   Encounter Diagnosis     ICD-10-CM    1. Hemiparesis of right dominant side as late effect of cerebral infarction (HCC)  I69.351                                     Start Time: 0300  Stop Time: 0346  Total time in clinic (min): 46 minutes    Subjective:   Shoulder cracking noted, getting better.  He was able to get up on a , he did scratch his left arm with cuts and bruises from mower.        Objective: See treatment diary below      Assessment:   Initiated stepping over hidden obstacles with std cane with nice gains in stability and self correction.  Stepping onto foam wedge attempted with significant instability noted, repeated on firm wedge with perturbation to allow for improved correction.  Backward stepping with nice gains in step length.  Encouraged ambulation with std cane at home, he indicates his home carpet is thicker and he has difficulty clearing his foot.  Educated in seated hip flexion as well as step taps on lower step with hand hold on rail to encourage increased clearance with good demonstration.      Plan: Continue per plan of care.   No complaints end of session.  Continue to progress balance and gait challenges.  Assess tolerance to hip flexion and continue to progress right LE clearance.  \"I feel I am making progress\".     Daily Treatment Diary     Precautions: h/o strokes, hemiparesis right, h/o BPPV, DM, HTN      POC Expires Reeval for Medicare to be completed  Unit Limit Auth Expiration Date PT/OT/STVisit Limit   2025 By visit N/A BOMN 2025 N/A    Completed on visit                    Auth Status DATE    Approved Visit # 1 26 27 28    Remaining  14 13 12   TESTING       TUG 62.5sec      5x STS 25.9sec      SIMONS 34/56      Gait speed nv      3 min walk test nv             THERAPEUTIC EXERCISE HEP       [employer]      "                                                                                                                   NEUROMUSCULAR REEDUCATION         Static balance        Gait over steps on compliant foam    Stepping over foam beams 5x2 with cane Stepping over foam beams 5x2 with cane, x2 without device   Gait with HM/ MAFO- SOLO    Ambulation with std cane 180' CTG    Gait in SOLO with perturbation    Backward ambulation with std Cane CTG 4 laps Backward stepping with focus on large amplitude 40' x4   Cone to floor color match        Stance on wedge firm with perturbation     10x3, nice gains in correction   Hurdles        Stance reach floor to overhead cone stacking        Step taps    5x3 2 fold foam x10 right, repeated 3 fold foam mat x10   Step up    5x2 off 3 rise step with cane    Gait with resistance posterior        Gait without device with MAFO   Gait with std cane 100' x2 with improved control around corners     Gait over hidden obstacles      Over weights, TB circles and foam beams x6 CTG with std cane   Modified hill's        Sit to stand with increased forward translation   5x2 5x2    Sidestepping         Sit to sidelying        Seated forward flex with ext/rotation        Transition from sit to supine   3x2 x2    THERAPEUTIC ACTIVITY        Rolling side to side   4x2     Shoe/brace don/doffing   x2 x1                    GAIT TRAINING                                        MODALITIES

## 2025-05-13 NOTE — PROGRESS NOTES
Occupational Therapy Daily Note:    Today's date: 2025  Patient name: Wilder Vargas  : 1965  MRN: [id number]  Referring provider: Gilmar Oro MD  Dx:   Encounter Diagnosis   Name Primary?    Hemiparesis of right dominant side as late effect of cerebral infarction (HCC) Yes         Subjective: no new complaints       Objective: Pt engaged in skilled OT treatment session with focus on UE NMR, UE strengthening, UE endurance, and family training/education to increase engagement, tolerance, and independence with daily ADL and IADL tasks.     CPT Code Minutes                                           Task Details        Therapeutic Activity               Neuro Re-Ed  NMRE to RUE with use of muscle tapping and vibration to elicit increased motor movement.   -completed isolated target reaching into all planes of sh, elbow, forearm, wrist and finger movement         Item retrieval and transfer:  -R hand gross grasp to retrieve palm sized blocks from R side of table and transfer to L side target  -completed with min facilitations of vibration to elicit increased motor control during functional reach and required min manual cues for release at target.             Therapeutic Exercise  PROM to RUE completed into all planes to improve ROM and maintain joint integrity              Manual          HEP  : sublux reduction exercises, 5 reps 2x/ day          Assessment: Tolerated treatment well. Pt with improved R sh flex AG to lift UE over obstacle during item transfer; pt with initiated finger flex and wrist ext, improved with use of vibration. pt would benefit from continued skilled OT.    Plan: Continued skilled OT per POC      INTERVENTION COMMENTS:  Diagnosis: Hemiparesis of right dominant side as late effect of cerebral infarction (HCC) [I69.351]  Precautions: R sided weakness, fall   Insurance: Payor: [employer] / Plan: [employer] / Product Type: Medicaid O /   1 of 16 visits through 25  POC  Expires: 7/17/25      Auth Tracker   DATE 5/13        Visit # 1

## 2025-05-15 ENCOUNTER — OFFICE VISIT (OUTPATIENT)
Facility: CLINIC | Age: 60
End: 2025-05-15
Payer: COMMERCIAL

## 2025-05-15 ENCOUNTER — PATIENT OUTREACH (OUTPATIENT)
Dept: CASE MANAGEMENT | Facility: OTHER | Age: 60
End: 2025-05-15

## 2025-05-15 DIAGNOSIS — I69.351 HEMIPARESIS OF RIGHT DOMINANT SIDE AS LATE EFFECT OF CEREBRAL INFARCTION (HCC): Primary | ICD-10-CM

## 2025-05-15 PROCEDURE — 97112 NEUROMUSCULAR REEDUCATION: CPT

## 2025-05-15 PROCEDURE — 97110 THERAPEUTIC EXERCISES: CPT | Performed by: PHYSICAL THERAPIST

## 2025-05-15 PROCEDURE — 97110 THERAPEUTIC EXERCISES: CPT

## 2025-05-15 PROCEDURE — 97112 NEUROMUSCULAR REEDUCATION: CPT | Performed by: PHYSICAL THERAPIST

## 2025-05-15 PROCEDURE — 97140 MANUAL THERAPY 1/> REGIONS: CPT | Performed by: PHYSICAL THERAPIST

## 2025-05-15 NOTE — PROGRESS NOTES
"Daily Note     Today's date: 5/15/2025  Patient name: Wilder Vargas  : 1965  MRN: [id number]  Referring provider: Gilmar Oro MD  Dx:   Encounter Diagnosis     ICD-10-CM    1. Hemiparesis of right dominant side as late effect of cerebral infarction (HCC)  I69.351                                       Start Time: 132  Stop Time: 215  Total time in clinic (min): 43 minutes    Subjective:   Patient states he only 3 hours of sleep last night.  He states he felt more right leg pain and continues to feel this today.  4/10.      Objective: See treatment diary below      Assessment:   Due to limited sleep and increased right LE tightness/tone, initiated stretching with education on HEP to assist in managing tightness.  Nice gains in decreased guarding and decreased pain with TPR.  Consider KT - patient instructed to wear looser pants next session.  Stepping with improved right LE clearance post release, decreased control with fatigue.      Plan: Continue per plan of care.   No complaints end of session.  Continue to progress balance and gait challenges.  My leg is not hurting now.     Daily Treatment Diary     Precautions: h/o strokes, hemiparesis right, h/o BPPV, DM, HTN      POC Expires Reeval for Medicare to be completed  Unit Limit Auth Expiration Date PT/OT/STVisit Limit   2025 By visit N/A BOMN 2025 N/A    Completed on visit                    Auth Status DATE 1/14 5/6 5/8 5/13 5/15   Approved Visit # 1 26 27 28 29    Remaining  14 13 12 11   TESTING        TUG 62.5sec       5x STS 25.9sec       SIMONS 34/56       Gait speed nv       3 min walk test nv               THERAPEUTIC EXERCISE HEP        Codmans         Hamstring stretch-w/ strap      5x2   Piriformis stretch supine      20\" x4 right   Sidelying book stretch      20\" x4                                                                                                      NEUROMUSCULAR REEDUCATION          Static balance         Gait over " "steps on compliant foam    Stepping over foam beams 5x2 with cane Stepping over foam beams 5x2 with cane, x2 without device    Gait with HM/ MAFO- SOLO    Ambulation with std cane 180' CTG  Ambulation with std cane 80'x2   Gait in SOLO with perturbation    Backward ambulation with std Cane CTG 4 laps Backward stepping with focus on large amplitude 40' x4 Backward stepping increased amplitude 40'x2   Cone to floor color match         Stance on wedge firm with perturbation     10x3, nice gains in correction    Hurdles         Stance reach floor to overhead cone stacking         Step taps    5x3 2 fold foam x10 right, repeated 3 fold foam mat x10    Step up    5x2 off 3 rise step with cane     Gait with resistance posterior         Gait without device with MAFO   Gait with std cane 100' x2 with improved control around corners      Gait over hidden obstacles      Over weights, TB circles and foam beams x6 CTG with std cane    Modified hill's         Sit to stand with increased forward translation   5x2 5x2     Sidestepping          Sit to sidelying         Seated forward flex with ext/rotation         Transition from sit to supine   3x2 x2     THERAPEUTIC ACTIVITY         Rolling side to side   4x2      Shoe/brace don/doffing   x2 x1                       GAIT TRAINING                                             Manuals        Assisted stretch/TPR hamstring release, piriformis release and ITB     VM   Contract relax hamstring/ hip abd     10\" x3, 2 sets ea              "

## 2025-05-15 NOTE — PROGRESS NOTES
Occupational Therapy Daily Note:    Today's date: 5/15/2025  Patient name: Wilder Vargas  : 1965  MRN: [id number]  Referring provider: Gilmar Oro MD  Dx:   Encounter Diagnosis   Name Primary?    Hemiparesis of right dominant side as late effect of cerebral infarction (HCC) Yes         Subjective: no new complaints       Objective: Pt engaged in skilled OT treatment session with focus on UE NMR, UE strengthening, UE endurance, and family training/education to increase engagement, tolerance, and independence with daily ADL and IADL tasks.     CPT Code Minutes                                           Task Details        Therapeutic Activity               Neuro Re-Ed  NMRE to RUE with use of muscle tapping and vibration to elicit increased motor movement.   -completed isolated target reaching into all planes of sh, elbow, forearm, wrist and finger movement         Item retrieval and transfer:  -R hand gross grasp to retrieve medium sized foam blocks from R side of table and transfer to L side target; able to lift RUE AG to place on top of towel target  -R hand retrieval of blocks from L side of table and transfer back to R side table target  -completed with min facilitations of vibration to elicit increased motor control during functional reach and required min manual cues for release at target.             Therapeutic Exercise  PROM to RUE completed into all planes to improve ROM and maintain joint integrity              Manual          HEP  : sublux reduction exercises, 5 reps 2x/ day          Assessment: Tolerated treatment well. Cont to demo improving motor control of RUE, with improved active movement into supination and finger flexion. pt would benefit from continued skilled OT.    Plan: Continued skilled OT per POC      INTERVENTION COMMENTS:  Diagnosis: Hemiparesis of right dominant side as late effect of cerebral infarction (HCC) [I69.351]  Precautions: R sided weakness, fall   Insurance: Payor:  [employer] / Plan: [employer] FIRST / Product Type: Medicaid HMO /   2 of 16 visits through 7/18/25  POC Expires: 7/17/25      Auth Tracker   DATE 5/13 5/15       Visit # 1 2

## 2025-05-15 NOTE — PROGRESS NOTES
PEREZ HUA received a referral from patient's PCP as patient's mother, Navdeep is interested in information as to if they qualify for community resources to help her take care of patient who has right sided hemiparesis and is wheelchair bound. PEREZ HUA reviewed patient's chart, assigned self to referral and called Lorena ([phone]). Lorena did not answer. PEREZ HUA left a message including PEREZ HUA contact information and requested a call back. PEREZ HUA will attempt to outreach again at a later date.

## 2025-05-16 DIAGNOSIS — E11.65 TYPE 2 DIABETES MELLITUS WITH HYPERGLYCEMIA, WITHOUT LONG-TERM CURRENT USE OF INSULIN (HCC): ICD-10-CM

## 2025-05-16 RX ORDER — INSULIN GLARGINE 100 [IU]/ML
26 INJECTION, SOLUTION SUBCUTANEOUS
Qty: 15 ML | Refills: 3 | Status: SHIPPED | OUTPATIENT
Start: 2025-05-16

## 2025-05-20 ENCOUNTER — TELEPHONE (OUTPATIENT)
Dept: FAMILY MEDICINE CLINIC | Facility: HOSPITAL | Age: 60
End: 2025-05-20

## 2025-05-20 ENCOUNTER — OFFICE VISIT (OUTPATIENT)
Facility: CLINIC | Age: 60
End: 2025-05-20
Payer: COMMERCIAL

## 2025-05-20 DIAGNOSIS — I69.351 HEMIPARESIS OF RIGHT DOMINANT SIDE AS LATE EFFECT OF CEREBRAL INFARCTION (HCC): Primary | ICD-10-CM

## 2025-05-20 PROCEDURE — 97110 THERAPEUTIC EXERCISES: CPT

## 2025-05-20 PROCEDURE — 97140 MANUAL THERAPY 1/> REGIONS: CPT | Performed by: PHYSICAL THERAPIST

## 2025-05-20 PROCEDURE — 97112 NEUROMUSCULAR REEDUCATION: CPT

## 2025-05-20 PROCEDURE — 97112 NEUROMUSCULAR REEDUCATION: CPT | Performed by: PHYSICAL THERAPIST

## 2025-05-20 NOTE — PROGRESS NOTES
"Daily Note     Today's date: 2025  Patient name: Wilder Vargas  : 1965  MRN: [id number]  Referring provider: Gilmar Oro MD  Dx:   Encounter Diagnosis     ICD-10-CM    1. Hemiparesis of right dominant side as late effect of cerebral infarction (HCC)  I69.351                                         Start Time: 0305  Stop Time: 0350  Total time in clinic (min): 45 minutes    Subjective:   No current complaints of hip pain.  He continues to have difficulty sleeping, expressing frustration with the inability to go fishing with his friend this coming weekend.  No complaints of hip pain noted.      Objective: See treatment diary below      Assessment:   Start of session with ambulation without device, CTG needed due to occasional right toe catch.  Gait with std cane with occ cuing for turns and backward stepping.  Supine stretching for right hip with increased adductor tone noted responding well to STM.  Ambulation post stretch with gains in controlled stepping.  Continue to encourage stretching at home.     Plan: Continue per plan of care.   No complaints end of session.  Continue to progress balance and gait challenges.       Daily Treatment Diary     Precautions: h/o strokes, hemiparesis right, h/o BPPV, DM, HTN      POC Expires Reeval for Medicare to be completed  Unit Limit Auth Expiration Date PT/OT/STVisit Limit   2025 By visit N/A BOMN 2025 N/A    Completed on visit                    Auth Status DATE 1/14 5/13 5/15 5/20   Approved Visit # 1 28 29 30    Remaining  12 11 10   TESTING       TUG 62.5sec      5x STS 25.9sec      SIMONS 34/56      Gait speed nv      3 min walk test nv             THERAPEUTIC EXERCISE HEP       Codmans        Hamstring stretch-w/ strap    5x2    Piriformis stretch supine    20\" x4 right Assisted 20\" [id number]   Sidelying book stretch    20\" x4                                                                                            NEUROMUSCULAR REEDUCATION       " "  Static balance        Gait over steps on compliant foam   Stepping over foam beams 5x2 with cane, x2 without device  Around busy clinic with std cane, occ difficulty with turns noted 180'   Gait with HM/ MAFO- SOLO    Ambulation with std cane 80'x2 Ambulation without device 80' x3   Gait in SOLO with perturbation   Backward stepping with focus on large amplitude 40' x4 Backward stepping increased amplitude 40'x2 Backwardstepping 4 laps   Cone to floor color match        Stance on wedge firm with perturbation   10x3, nice gains in correction     Hurdles        Stance reach floor to overhead cone stacking        Step taps   2 fold foam x10 right, repeated 3 fold foam mat x10     Step up        Gait with resistance posterior        Gait without device with MAFO        Gait over hidden obstacles    Over weights, TB circles and foam beams x6 CTG with std cane     Modified hill's        Sit to stand with increased forward translation        Sidestepping         Sit to sidelying        Seated forward flex with ext/rotation        Transition from sit to supine        THERAPEUTIC ACTIVITY        Rolling side to side        Shoe/brace don/doffing                        GAIT TRAINING                                        Manuals       Assisted stretch/TPR hamstring release, piriformis release and ITB   VM VM   Contract relax hamstring/ hip abd   10\" x3, 2 sets ea               "

## 2025-05-20 NOTE — TELEPHONE ENCOUNTER
----- Message from Gilmar Oro MD sent at 5/16/2025  4:56 PM EDT -----  Please call pt's mother and tell her that I reviewed Madi's blood sugar log that she sent in and I agree with giving Madi 26 units of lantus at night. I sent a new prescription to their pharmacy.

## 2025-05-20 NOTE — PROGRESS NOTES
"Occupational Therapy Daily Note:    Today's date: 2025  Patient name: Wilder Vargas  : 1965  MRN: [id number]  Referring provider: Gilmar Oro MD  Dx:   Encounter Diagnosis   Name Primary?    Hemiparesis of right dominant side as late effect of cerebral infarction (HCC) Yes         Subjective: pt tearful, stating he hasn't slept bc he is sad. Pt stating his vishal has been going fishing every weekend and he usually would have went with him \"it's all I want to do is go hunting and fishing\"       Objective: Pt engaged in skilled OT treatment session with focus on UE NMR, UE strengthening, UE endurance, and family training/education to increase engagement, tolerance, and independence with daily ADL and IADL tasks.     CPT Code Minutes                                           Task Details        Therapeutic Activity               Neuro Re-Ed  NMRE to RUE with use of muscle tapping to elicit increased motor movement.   -completed isolated target reaching into all planes of sh, elbow, forearm, wrist and finger/thumb movement         Item retrieval and transfer:  -R hand gross grasp to retrieve baseball sized lightweight balls from R side of table and transfer to L side target; able to lift RUE AG to place on top of towel target  -R hand retrieval of balls from L side of table and transfer back to R side table target  -max difficulty with sustained gross grasp on ball during transfer; new noted thumb movement during grasp of ball  -completed with min facilitations of vibration to elicit increased motor control during functional reach             Therapeutic Exercise  PROM to RUE completed into all planes to improve ROM and maintain joint integrity      Pt continues with a 1 finger sublux R shoulder. Pt reporting he does have a sarah sling at home. Recommended pt wear sling for sublux support. Pt to bring sling in at next session for assessment of fit.         Manual          HEP  : sublux reduction " exercises, 5 reps 2x/ day          Assessment: Tolerated treatment well. Pt with improved RUE motor control, tone without use of vibration. Pt with new movement into thumb extension/ abduction during item retrieval. Cont with sublux, pt to bring sling to next session. pt would benefit from continued skilled OT.    Plan: Continued skilled OT per POC      INTERVENTION COMMENTS:  Diagnosis: Hemiparesis of right dominant side as late effect of cerebral infarction (HCC) [I69.351]  Precautions: R sided weakness, fall   Insurance: Payor: [employer] / Plan: [employer] [facility] / Product Type: Medicaid O /   3 of 16 visits through 7/18/25  POC Expires: 7/17/25      Auth Tracker   DATE 5/13 5/15 5/20      Visit # 1 2 3

## 2025-05-22 ENCOUNTER — APPOINTMENT (OUTPATIENT)
Facility: CLINIC | Age: 60
End: 2025-05-22
Payer: COMMERCIAL

## 2025-05-22 ENCOUNTER — NURSE TRIAGE (OUTPATIENT)
Age: 60
End: 2025-05-22

## 2025-05-22 ENCOUNTER — TELEPHONE (OUTPATIENT)
Age: 60
End: 2025-05-22

## 2025-05-22 DIAGNOSIS — F32.2 CURRENT SEVERE EPISODE OF MAJOR DEPRESSIVE DISORDER WITHOUT PSYCHOTIC FEATURES WITHOUT PRIOR EPISODE (HCC): Primary | ICD-10-CM

## 2025-05-22 RX ORDER — NORTRIPTYLINE HYDROCHLORIDE 10 MG/1
10 CAPSULE ORAL
Qty: 30 CAPSULE | Refills: 1 | Status: SHIPPED | OUTPATIENT
Start: 2025-05-22

## 2025-05-22 NOTE — TELEPHONE ENCOUNTER
Wilder is very lethargic this morning, he did not sleep, His mom would like to know if it the new pill or something else.  Please call to advise.  Thank you!!

## 2025-05-22 NOTE — TELEPHONE ENCOUNTER
"FOLLOW UP: please advise    REASON FOR CONVERSATION: Medication Problem    SYMPTOMS: lethargic and fatigue    OTHER: Patient only had 2 hours sleep last night    DISPOSITION: Callback by PCP Today      Patient's mother called asking about possible side effects.  Patient's mother also asked if current open lab ordes should be filled prior to 6/10 virtual visit.    Please advise.        Reason for Disposition   Caller has NON-URGENT medicine question about med that PCP or specialist prescribed and triager unable to answer question    Answer Assessment - Initial Assessment Questions  1. NAME of MEDICINE: \"What medicine(s) are you calling about?\"      Nortriptyline  2. QUESTION: \"What is your question?\" (e.g., double dose of medicine, side effect)      Side effects  3. PRESCRIBER: \"Who prescribed the medicine?\" Reason: if prescribed by specialist, call should be referred to that group.      Preethi  4. SYMPTOMS: \"Do you have any symptoms?\" If Yes, ask: \"What symptoms are you having?\"  \"How bad are the symptoms (e.g., mild, moderate, severe)      Lethargic, fatigue    Protocols used: Medication Question Call-Adult-[place]    "

## 2025-05-23 ENCOUNTER — PATIENT OUTREACH (OUTPATIENT)
Dept: CASE MANAGEMENT | Facility: OTHER | Age: 60
End: 2025-05-23

## 2025-05-23 NOTE — LETTER
[address] [facility] [address]  [phone]    Re: Unable to reach   5/23/2025       Dear Wilder,    I am a [facility]  and wanted to be certain you had information to contact me should you desire assistance with or have questions about non-medical aspects of your care such as [but not limited to] medical insurance, housing, transportation, material needs, or emergency needs.  If I do not have an answer I will assist you in finding the appropriate agency or individual who can help.      Please feel free to contact me at [phone] Thank You.    Sincerely,         Mago Camara LCSW

## 2025-05-23 NOTE — PROGRESS NOTES
PEREZ HUA received a referral from patient's PCP as patient's mother, Navdeep is interested in information as to if they qualify for community resources to help her take care of patient who has right sided hemiparesis and is wheelchair bound. PEREZ HUA reviewed patient's chart and called Lorena ([phone]) a second time. Lorena did not answer. PEREZ LUNA left a message including PEREZ HUA contact information. PEREZ HUA will send unable to reach letter via [employer] and will close due to being unable to reach. Please re consult PEREZ HUA as needed.

## 2025-05-27 ENCOUNTER — OFFICE VISIT (OUTPATIENT)
Facility: CLINIC | Age: 60
End: 2025-05-27
Payer: COMMERCIAL

## 2025-05-27 ENCOUNTER — OFFICE VISIT (OUTPATIENT)
Facility: CLINIC | Age: 60
End: 2025-05-27
Attending: INTERNAL MEDICINE
Payer: COMMERCIAL

## 2025-05-27 DIAGNOSIS — I69.351 HEMIPARESIS OF RIGHT DOMINANT SIDE AS LATE EFFECT OF CEREBRAL INFARCTION (HCC): Primary | ICD-10-CM

## 2025-05-27 PROCEDURE — 97112 NEUROMUSCULAR REEDUCATION: CPT | Performed by: PHYSICAL THERAPIST

## 2025-05-27 PROCEDURE — 97110 THERAPEUTIC EXERCISES: CPT | Performed by: PHYSICAL THERAPIST

## 2025-05-27 PROCEDURE — 97112 NEUROMUSCULAR REEDUCATION: CPT

## 2025-05-27 NOTE — PROGRESS NOTES
"Daily Note     Today's date: 2025  Patient name: Wilder Vargas  : 1965  MRN: [id number]  Referring provider: Gilmar Oro MD  Dx:   Encounter Diagnosis     ICD-10-CM    1. Hemiparesis of right dominant side as late effect of cerebral infarction (HCC)  I69.351                                           Start Time: 0303  Stop Time: 034  Total time in clinic (min): 45 minutes    Subjective:   He states he continues to experience intermittent hip pain on right.        Objective: See treatment diary below      Assessment:   Began session with AAROM to allow for decreased hip tone/tightness.  Gait with focus on increased sayra and stride.  Intermittent toe catch remains noted particularly on carpet.  Attempted heel wedge under his toe without improved clearance.  Will consider positional options to further gains in controlled stepping.    Plan: Continue per plan of care.   No complaints end of session.  Continue to progress balance and gait challenges.       Daily Treatment Diary     Precautions: h/o strokes, hemiparesis right, h/o BPPV, DM, HTN      POC Expires Reeval for Medicare to be completed  Unit Limit Auth Expiration Date PT/OT/STVisit Limit   2025 By visit N/A BOMN 2025 N/A    Completed on visit                    Auth Status DATE 1/14 5/15 5/20 5/27   Approved Visit # 1 29 30 31    Remaining  11 10 9   TESTING       TUG 62.5sec      5x STS 25.9sec      SIMONS 34/56      Gait speed nv      3 min walk test nv             THERAPEUTIC EXERCISE HEP       Codmans        Hamstring stretch-w/ strap   5x2  Assisted stretch 20\" x4 right   Piriformis stretch supine   20\" x4 right Assisted 20\" [id number] Assisted 20\" [id number]   Sidelying book stretch   20\" x4                                                                                             NEUROMUSCULAR REEDUCATION         Static balance     Standing weight shift 5x2   Gait over steps on compliant foam    Around busy clinic with std cane, occ " "difficulty with turns noted 180' Around busy clinic std cane, 200'   Gait with HM/ MAFO- SOLO   Ambulation with std cane 80'x2 Ambulation without device 80' x3 Ambulation without device 80'   Gait in SOLO with perturbation   Backward stepping increased amplitude 40'x2 Backwardstepping 4 laps Backward stepping 2 laps   Cone to floor color match        Stance on wedge firm with perturbation        Hurdles        Stance reach floor to overhead cone stacking        Step taps        Step up        Gait with resistance posterior        Gait without device with MAFO        Gait over hidden obstacles         Modified hill's        Sit to stand with increased forward translation        Sidestepping         Sit to sidelying        Seated forward flex with ext/rotation        Transition from sit to supine        THERAPEUTIC ACTIVITY        Rolling side to side        Shoe/brace don/doffing                        GAIT TRAINING                                        Manuals       Assisted stretch/TPR hamstring release, piriformis release and ITB  VM VM VM   Contract relax hamstring/ hip abd  10\" x3, 2 sets ea                "

## 2025-05-27 NOTE — PROGRESS NOTES
Occupational Therapy Daily Note:    Today's date: 2025  Patient name: Wilder Vargas  : 1965  MRN: [id number]  Referring provider: Gilmar Oro MD  Dx:   Encounter Diagnosis   Name Primary?    Hemiparesis of right dominant side as late effect of cerebral infarction (HCC) Yes         Subjective: no new complaints       Objective: Pt engaged in skilled OT treatment session with focus on UE NMR, UE strengthening, UE endurance, and family training/education to increase engagement, tolerance, and independence with daily ADL and IADL tasks.     CPT Code Minutes                                           Task Details        Therapeutic Activity               Neuro Re-Ed  NMRE to RUE with use of muscle tapping to elicit increased motor movement.   -completed isolated target reaching into all planes of sh, elbow, forearm, wrist and finger/thumb movement         Item retrieval and transfer:  -R hand gross grasp to retrieve various palm sized foam shapes from R side of table and transfer to L side target; able to lift RUE AG to place on top of towel target  -mod difficulty with sustained gross grasp during transfer  -completed with min facilitations of vibration to elicit increased motor control during functional reach             Therapeutic Exercise  PROM to RUE completed into all planes to improve ROM and maintain joint integrity      Pt brought sarah-sling to therapy; sling placed on pt's R shoulder at end of session and prior to PT to provide sublux support.           Manual          HEP  : sublux reduction exercises, 5 reps 2x/ day          Assessment: Tolerated treatment well. Pt cont to demo improving motor control of RUE, with increased movement AG and increased finger and thumb control.  pt would benefit from continued skilled OT.    Plan: Continued skilled OT per POC      INTERVENTION COMMENTS:  Diagnosis: Hemiparesis of right dominant side as late effect of cerebral infarction (HCC)  [I69.351]  Precautions: R sided weakness, fall   [facility] / Plan: [employer] FIRST / Product Type: Medicaid HMO /   4 of 16 visits through 7/18/25  POC Expires: 7/17/25      Auth Tracker   DATE 5/13 5/15 5/20 5/27     Visit # 1 2 3 4

## 2025-05-28 ENCOUNTER — TELEPHONE (OUTPATIENT)
Age: 60
End: 2025-05-28

## 2025-05-29 ENCOUNTER — OFFICE VISIT (OUTPATIENT)
Facility: CLINIC | Age: 60
End: 2025-05-29
Payer: COMMERCIAL

## 2025-05-29 DIAGNOSIS — I69.351 HEMIPARESIS OF RIGHT DOMINANT SIDE AS LATE EFFECT OF CEREBRAL INFARCTION (HCC): Primary | ICD-10-CM

## 2025-05-29 PROCEDURE — 97110 THERAPEUTIC EXERCISES: CPT | Performed by: PHYSICAL THERAPIST

## 2025-05-29 PROCEDURE — 97112 NEUROMUSCULAR REEDUCATION: CPT | Performed by: PHYSICAL THERAPIST

## 2025-05-29 PROCEDURE — 97110 THERAPEUTIC EXERCISES: CPT

## 2025-05-29 PROCEDURE — 97112 NEUROMUSCULAR REEDUCATION: CPT

## 2025-05-29 NOTE — PROGRESS NOTES
"Daily Note     Today's date: 2025  Patient name: Wilder Vargas  : 1965  MRN: [id number]  Referring provider: Gilmar Oro MD  Dx:   Encounter Diagnosis     ICD-10-CM    1. Hemiparesis of right dominant side as late effect of cerebral infarction (HCC)  I69.351                   Start Time: 307  Stop Time: 347  Total time in clinic (min): 40 minutes    Subjective:   He states he continues to experience intermittent hip pain on right.        Objective: See treatment diary below      Assessment:   Began session with AAROM to allow for decreased hip tone/tightness.  Educated on ITB stretch for home with good demonstration.  Added 5# weight for gait on right with improving right LE clearance.  Reviewed seated and supported stance exercises for home with weight and given options for weight (I.e. washers or coins in sock) to allow for resisted exercise at home.  Large amplitude stepping with improving weight shift.  Gait after weight removal with nice gains in clearance.  Continue to progress resisted mobility to promote increased clearance and strength.    Plan: Continue per plan of care.   No complaints end of session.  Continue to progress balance and gait challenges.       Daily Treatment Diary     Precautions: h/o strokes, hemiparesis right, h/o BPPV, DM, HTN      POC Expires Reeval for Medicare to be completed  Unit Limit Auth Expiration Date PT/OT/STVisit Limit   2025 By visit N/A BOMN 2025 N/A    Completed on visit                    Auth Status DATE    Approved Visit # 1 30 31 32    Remaining  10 9 8   TESTING       TUG 62.5sec      5x STS 25.9sec      SIMONS 34/56      Gait speed nv      3 min walk test nv             THERAPEUTIC EXERCISE HEP       Codmans        Hamstring stretch-w/ strap    Assisted stretch 20\" x4 right Assisted stretch 20\" x4 right   Piriformis stretch supine   Assisted 20\" [id number] Assisted 20\" [id number] Assisted 20\" x4 right   Sidelying book stretch      "   Resisted flex/ext supine     10x2                                                                                   NEUROMUSCULAR REEDUCATION         Static balance    Standing weight shift 5x2    Gait over steps on compliant foam   Around busy clinic with std cane, occ difficulty with turns noted 180' Around busy clinic std cane, 200'    Gait with HM/ MAFO- SOLO   Ambulation without device 80' x3 Ambulation without device 80' Ambulation with std cane with weight (5#) 200', repeated without weight 100'   Gait in SOLO with perturbation   Backwardstepping 4 laps Backward stepping 2 laps    Large amplitude stepping on colors     Lateral and forward varied 10x2 CTG w/ std cane   Cone to floor color match        Stance on wedge firm with perturbation        Hurdles        Stance reach floor to overhead cone stacking        Step taps        Step up        Gait with resistance posterior        Gait without device with MAFO        Gait over hidden obstacles         Modified hill's        Sit to stand with increased forward translation        Sidestepping         Sit to sidelying        Seated forward flex with ext/rotation        Transition from sit to supine        THERAPEUTIC ACTIVITY        Rolling side to side        Shoe/brace don/doffing                        GAIT TRAINING                                        Manuals       Assisted stretch/TPR hamstring release, piriformis release and ITB  VM VM    Contract relax hamstring/ hip abd

## 2025-05-29 NOTE — PROGRESS NOTES
Occupational Therapy Daily Note:    Today's date: 2025  Patient name: Wilder Vargas  : 1965  MRN: [id number]  Referring provider: Gilmar Oro MD  Dx:   Encounter Diagnosis   Name Primary?    Hemiparesis of right dominant side as late effect of cerebral infarction (HCC) Yes         Subjective: no new complaints       Objective: Pt engaged in skilled OT treatment session with focus on UE NMR, UE strengthening, UE endurance, and family training/education to increase engagement, tolerance, and independence with daily ADL and IADL tasks.     CPT Code Minutes                                           Task Details        Therapeutic Activity               Neuro Re-Ed  NMRE to RUE with use of vibration and muscle tapping to elicit increased motor movement.   -completed isolated target reaching into all planes of sh, elbow, forearm, wrist and finger/thumb movement         Item retrieval and transfer:  -RUE retrieval of foam blocks from R side of table and pushing across tabletop to 3 different targets  -R hand gross grasp to retrieve medium sized foam blocks from R side of table and transfer to L side target; able to lift RUE AG to place on top of towel target  -RUE retrieval of foam blocks from L side of table and transfer to center target across table  -min difficulty with sustained gross grasp during transfer  -completed with min facilitations of vibration to elicit increased motor control during functional reach             Therapeutic Exercise  PROM to RUE completed into all planes to improve ROM and maintain joint integrity                Manual          HEP  : sublux reduction exercises, 5 reps 2x/ day          Assessment: Tolerated treatment well. Cont to demo improving finger/thumb control, with increased active movement and ability to sustain gross grasp. pt would benefit from continued skilled OT.    Plan: Continued skilled OT per POC      INTERVENTION COMMENTS:  Diagnosis: Hemiparesis of right  dominant side as late effect of cerebral infarction (HCC) [I69.351]  Precautions: R sided weakness, fall   Insurance: Payor: [employer] / Plan: [employer] / Product Type: Medicaid HMO /   5 of 16 visits through 7/18/25  POC Expires: 7/17/25      Auth Tracker   DATE 5/13 5/15 5/20 5/27 5/29    Visit # 1 2 3 4 5

## 2025-06-03 ENCOUNTER — OFFICE VISIT (OUTPATIENT)
Facility: CLINIC | Age: 60
End: 2025-06-03
Payer: COMMERCIAL

## 2025-06-03 DIAGNOSIS — I69.351 HEMIPARESIS OF RIGHT DOMINANT SIDE AS LATE EFFECT OF CEREBRAL INFARCTION (HCC): Primary | ICD-10-CM

## 2025-06-03 PROCEDURE — 97110 THERAPEUTIC EXERCISES: CPT

## 2025-06-03 PROCEDURE — 97112 NEUROMUSCULAR REEDUCATION: CPT

## 2025-06-03 PROCEDURE — 97112 NEUROMUSCULAR REEDUCATION: CPT | Performed by: PHYSICAL THERAPIST

## 2025-06-03 NOTE — PROGRESS NOTES
Occupational Therapy Daily Note:    Today's date: 6/3/2025  Patient name: Wilder Vargas  : 1965  MRN: [id number]  Referring provider: Gilmar Oro MD  Dx:   Encounter Diagnosis   Name Primary?    Hemiparesis of right dominant side as late effect of cerebral infarction (HCC) Yes         Subjective: no new complaints       Objective: Pt engaged in skilled OT treatment session with focus on UE NMR, UE strengthening, UE endurance, and family training/education to increase engagement, tolerance, and independence with daily ADL and IADL tasks.     CPT Code Minutes                                           Task Details        Therapeutic Activity               Neuro Re-Ed  NMRE to RUE with use of vibration and muscle tapping to elicit increased motor movement.   -completed isolated target reaching into all planes of sh, elbow, forearm, wrist and finger/thumb movement         Item retrieval and transfer:  -RUE retrieval of foam blocks from R side of table and pushing across tabletop to 3 different targets  -R hand gross grasp to retrieve medium sized foam blocks from R side of table and transfer to L side target; able to lift RUE AG to place on top of towel target  -min difficulty with sustained gross grasp during transfer  -completed with min facilitations of vibration to elicit increased motor control during functional reach             Therapeutic Exercise  PROM to RUE completed into all planes to improve ROM and maintain joint integrity                Manual          HEP  : sublux reduction exercises, 5 reps 2x/ day          Assessment: Tolerated treatment well. Cont to demo improving finger/thumb control, with increased active movement and ability to sustain gross grasp. pt would benefit from continued skilled OT.    Plan: Continued skilled OT per POC      INTERVENTION COMMENTS:  Diagnosis: Hemiparesis of right dominant side as late effect of cerebral infarction (HCC) [I69.351]  Precautions: R sided  weakness, fall   Insurance: Payor: [employer] / Plan: [employer] / Product Type: Medicaid HMO /   6 of 16 visits through 7/18/25  POC Expires: 7/17/25      Auth Tracker   DATE 5/13 5/15 5/20 5/27 5/29 6/3   Visit # 1 2 3 4 5 6
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
## 2025-06-05 ENCOUNTER — OFFICE VISIT (OUTPATIENT)
Facility: CLINIC | Age: 60
End: 2025-06-05
Payer: COMMERCIAL

## 2025-06-05 DIAGNOSIS — I69.351 HEMIPARESIS OF RIGHT DOMINANT SIDE AS LATE EFFECT OF CEREBRAL INFARCTION (HCC): Primary | ICD-10-CM

## 2025-06-05 PROCEDURE — 97112 NEUROMUSCULAR REEDUCATION: CPT | Performed by: PHYSICAL THERAPIST

## 2025-06-05 PROCEDURE — 97112 NEUROMUSCULAR REEDUCATION: CPT

## 2025-06-05 NOTE — PROGRESS NOTES
Occupational Therapy Daily Note:    Today's date: 2025  Patient name: Wilder Vargas  : 1965  MRN: [id number]  Referring provider: Gilmar Oro MD  Dx:   Encounter Diagnosis   Name Primary?    Hemiparesis of right dominant side as late effect of cerebral infarction (HCC) Yes         Subjective: no new complaints       Objective: Pt engaged in skilled OT treatment session with focus on UE NMR, UE strengthening, UE endurance, and family training/education to increase engagement, tolerance, and independence with daily ADL and IADL tasks.     CPT Code Minutes                                           Task Details        Therapeutic Activity               Neuro Re-Ed  NMRE to RUE with use of vibration and muscle tapping to elicit increased motor movement.   -completed isolated target reaching into all planes of sh, elbow, forearm, wrist and finger/thumb movement         Item retrieval and transfer:  -RUE retrieval of foam blocks from R side of table and pushing across tabletop to 3 different targets  -R hand gross grasp to retrieve medium sized foam blocks from R side of table and transfer to L side target; able to lift RUE AG to place on top of towel target  -RUE retrieval of foam blocks from L side of table and transfer to center target across table    -min difficulty with sustained gross grasp during transfer  -completed with min facilitations of vibration to elicit increased motor control during functional reach             Therapeutic Exercise  PROM to RUE completed into all planes to improve ROM and maintain joint integrity                Manual          HEP  : sublux reduction exercises, 5 reps 2x/ day          Assessment: Tolerated treatment well. Cont to demo improving motor control t/o RUE during item retrieval and transfer and with target reaching. pt would benefit from continued skilled OT.    Plan: Continued skilled OT per POC      INTERVENTION COMMENTS:  Diagnosis: Hemiparesis of right  dominant side as late effect of cerebral infarction (HCC) [I69.351]  Precautions: R sided weakness, fall   Insurance: Payor: [employer] / Plan: [employer] / Product Type: Medicaid HMO /   7 of 16 visits through 7/18/25  POC Expires: 7/17/25      Auth Tracker   DATE 6/5        Visit # 7

## 2025-06-05 NOTE — PROGRESS NOTES
"Daily Note     Today's date: 2025  Patient name: Wilder Vargas  : 1965  MRN: [id number]  Referring provider: Gilmar Oro MD  Dx:   Encounter Diagnosis     ICD-10-CM    1. Hemiparesis of right dominant side as late effect of cerebral infarction (HCC)  I69.351                     Start Time: 0122  Stop Time: 0210  Total time in clinic (min): 48 minutes    Subjective:   No current complaints.        Objective: See treatment diary below      Assessment:   He continues to use RW for ambulation at home.  Distance ambulation with std cane with MAFO, improved LE clearance.  Step ups and down off varied height in // bars progressing to use of cane with improved sequencing and clearance.  Educated in need to increase ambulation with cane at home with voiced understanding.      Plan: Continue per plan of care.   No complaints end of session.  Continue to progress balance and gait challenges.       Daily Treatment Diary     Precautions: h/o strokes, hemiparesis right, h/o BPPV, DM, HTN      POC Expires Reeval for Medicare to be completed  Unit Limit Auth Expiration Date PT/OT/STVisit Limit   2025 By visit N/A BOMN 2025 N/A    Completed on visit                    Auth Status DATE 1/14 5/29 6/3 6/5   Approved Visit # 1 32 33 34    Remaining  8 7 6   TESTING       TUG 62.5sec      5x STS 25.9sec  PROGRESS NOTE    SIMONS 34/56      Gait speed nv      3 min walk test nv             THERAPEUTIC EXERCISE HEP       Codmans        Hamstring stretch-w/ strap   Assisted stretch 20\" x4 right     Piriformis stretch supine   Assisted 20\" x4 right     Sidelying book stretch        Resisted flex/ext supine   10x2                                                                                     NEUROMUSCULAR REEDUCATION         Static balance        Gait over steps on compliant foam        Gait with HM/ MAFO- SOLO   Ambulation with std cane with weight (5#) 200', repeated without weight 100'  Ambulation with std cane " 200' , repeated 100' cuing for sequencing   Gait in SOLO with perturbation        Large amplitude stepping on colors   Lateral and forward varied 10x2 CTG w/ std cane     Cone to floor color match        Stance on wedge firm with perturbation        Hurdles        Stance reach floor to overhead cone stacking        Step taps        Step up     1 rise 10x2, 2 rise 10x2, 3 rise 10x2 all with one UE support;  repeated with cane up and over with // bar walk 2 rise 10x2   Gait with resistance posterior        Gait without device with MAFO        Gait over hidden obstacles         Modified hill's        Sit to stand with increased forward translation     5x2   Sidestepping         Sit to sidelying        Seated forward flex with ext/rotation        Transition from sit to supine        THERAPEUTIC ACTIVITY        Rolling side to side        Shoe/brace don/doffing                        GAIT TRAINING                                        Manuals       Assisted stretch/TPR hamstring release, piriformis release and ITB       Contract relax hamstring/ hip abd

## 2025-06-10 ENCOUNTER — OFFICE VISIT (OUTPATIENT)
Facility: CLINIC | Age: 60
End: 2025-06-10
Attending: INTERNAL MEDICINE
Payer: COMMERCIAL

## 2025-06-10 ENCOUNTER — OFFICE VISIT (OUTPATIENT)
Facility: CLINIC | Age: 60
End: 2025-06-10
Payer: COMMERCIAL

## 2025-06-10 DIAGNOSIS — I69.351 HEMIPARESIS OF RIGHT DOMINANT SIDE AS LATE EFFECT OF CEREBRAL INFARCTION (HCC): Primary | ICD-10-CM

## 2025-06-10 PROCEDURE — 97110 THERAPEUTIC EXERCISES: CPT

## 2025-06-10 PROCEDURE — 97140 MANUAL THERAPY 1/> REGIONS: CPT

## 2025-06-10 PROCEDURE — 97112 NEUROMUSCULAR REEDUCATION: CPT

## 2025-06-10 NOTE — HOME EXERCISE EDUCATION
Program_ID:[id number]   Access Code: [email or web address]  URL: [email or web address]  Date: 06-  Prepared By: Shelby Crowell    Program Notes      Exercises      - Supine Quad Set - 1 x daily - 7 x weekly - 3 sets - 10 reps - 5 hold      - Supine Active Straight Leg Raise - 1 x daily - 7 x weekly - 3 sets - 10 reps      - Supine Bridge with Mini Swiss Ball Between Knees - 1 x daily - 7 x weekly - 3 sets - 10 reps - 3 hold      - Hooklying Clamshell with Resistance - 1 x daily - 7 x weekly - 3 sets - 10 reps - 5 hold      - Supine Short Arc Quad - 1 x daily - 7 x weekly - 3 sets - 10 reps - 5 hold      - Seated Long Arc Quad - 1 x daily - 7 x weekly - 3 sets - 10 reps - 5 hold      - Modified Marques Stretch - 2 x daily - 7 x weekly - 1 sets - 3 reps - 30 hold      - Supine Piriformis Stretch with Foot on Ground - 2 x daily - 7 x weekly - 1 sets - 3 reps - 30 hold      - Seated Hamstring Stretch - 2 x daily - 7 x weekly - 1 sets - 3 reps - 30 hold

## 2025-06-10 NOTE — PROGRESS NOTES
" Shannan Rodríguez is a 57 y.o. here for a diabetic follow up exam.     Pt states they are doing well. Following a low carbohydrate diet and is active.     Up to date with eye and foot exams, pcp visits.     Last wt 164   =- 162 today  'tyler cars easy wy to quit smoking'  and chantix   Last bmi 30.03    Last aic 5.4 (5.4, 5.7, 5.9, 6.7)   - aic 5.2   Starting wt 202 lbs    Meds:  Mounjaro 7.5mg weekly - gi vicky - stools are normal on linzess    Lots of ortho issues - in with ortho    Lab Results   Component Value Date    POCGLU 90 08/23/2024    POCGLU 102 (A) 07/19/2024    POCGLU 100 06/18/2024    POCGLU 92 05/17/2024    POCGLU 108 (A) 04/09/2024    HGBA1C 5.2 08/23/2024    HGBA1C 5.4 07/19/2024    HGBA1C 5.4 06/18/2024    HGBA1C 5.7 05/17/2024    HGBA1C 5.7 04/09/2024     /76   Pulse 85   Temp 36.6 °C (97.9 °F)   Ht 1.575 m (5' 2\")   Wt 73.5 kg (162 lb)   SpO2 97%   BMI 29.63 kg/m²    Wt Readings from Last 5 Encounters:   08/23/24 73.5 kg (162 lb)   08/21/24 74.5 kg (164 lb 3.2 oz)   07/23/24 75.8 kg (167 lb 3.2 oz)   07/19/24 75.8 kg (167 lb)   06/18/24 78.9 kg (174 lb)          Review of Systems   Constitutional: Negative.    HENT: Negative.     Respiratory: Negative.     Cardiovascular: Negative.    Gastrointestinal: Negative.    Endocrine: Negative.    Genitourinary: Negative.    Musculoskeletal: Negative.    Skin: Negative.    Neurological: Negative.    Psychiatric/Behavioral: Negative.     All other systems reviewed and are negative.       Physical Exam  Vitals and nursing note reviewed.   Constitutional:       Appearance: Normal appearance.   HENT:      Head: Normocephalic.   Eyes:      Pupils: Pupils are equal, round, and reactive to light.   Cardiovascular:      Rate and Rhythm: Normal rate and regular rhythm.      Heart sounds: Normal heart sounds.   Pulmonary:      Effort: Pulmonary effort is normal.      Breath sounds: Normal breath sounds.   Skin:     General: Skin is warm and dry. " "Daily Note     Today's date: 6/10/2025  Patient name: Wilder Vargas  : 1965  MRN: [id number]  Referring provider: Gilmar Oro MD  Dx:   Encounter Diagnosis     ICD-10-CM    1. Hemiparesis of right dominant side as late effect of cerebral infarction (HCC)  I69.351           Start Time: 1330  Stop Time: 1415  Total time in clinic (min): 45 minutes    Subjective: Wilder reports feeling weak today. States his R leg keeps going out.       Objective: See treatment diary below      Assessment: Wilder Tolerated treatment well with appropriate muscle fatigue experienced with ex's. He is making steady gains towards goals  and remains appropriately challenged with their program. He cont to be challenged with WB on R LE w/o UE support. Noted LOB w/ PreSLS and required increased L UE support with step ups on R LE.  Tolerated TE well. Updated and reviewed HEP for cont strengthening at home. Required vc's t/o session for proper positioning with ex's.  He would benefit from continued PT and compliance with HEP.        Plan: Continue per plan of care.      Daily Treatment Diary     Precautions: h/o strokes, hemiparesis right, h/o BPPV, DM, HTN      POC Expires Reeval for Medicare to be completed  Unit Limit Auth Expiration Date PT/OT/STVisit Limit   2025 By visit N/A BOMN 2025 N/A    Completed on visit                    Auth Status DATE 1/14 5/29 6/3 6/5 6/10   Approved Visit # 1 32 33 [phone] 35    Remaining  8 7 6 5   TESTING        TUG 62.5sec       5x STS 25.9sec  PROGRESS NOTE     SIMONS 34/56       Gait speed nv       3 min walk test nv               THERAPEUTIC EXERCISE HEP        Codmans         Hamstring stretch-w/ strap HEP  Assisted stretch 20\" x4 right   Manual    Piriformis stretch supine HEP  Assisted 20\" x4 right   Manual    Sidelying book stretch         Resisted flex/ext supine   10x2      Seated LAQ    3\"x10   3\"x10   QS    5\"x20   5\"x20   QS w/ SLR     2x5   2x5   SAQ    5\"2x10   5\"2x10    Bridge w/ add "   Neurological:      General: No focal deficit present.      Mental Status: She is alert and oriented to person, place, and time. Mental status is at baseline.   Psychiatric:         Mood and Affect: Mood normal.         Behavior: Behavior normal.         Thought Content: Thought content normal.         Judgment: Judgment normal.        Problem List Items Addressed This Visit             ICD-10-CM    Essential hypertension I10    Class 2 obesity with body mass index (BMI) of 35.0 to 35.9 in adult E66.9, Z68.35     Other Visit Diagnoses         Codes    Type 2 diabetes mellitus with hyperglycemia, without long-term current use of insulin (Multi)    -  Primary E11.65    Relevant Orders    POCT glycosylated hemoglobin (Hb A1C) manually resulted (Completed)    POCT fingerstick glucose manually resulted (Completed)            Laura L Seaver, APRN-CNP    "    3' 2x10   3\" 2x10    Supine clams     Blue TB 5\" 2x10   Blue TB 5\" 2x10    Supine hip flexor st      EOT 30\"                               NEUROMUSCULAR REEDUCATION          Static balance         Gait over steps on compliant foam         Gait with HM/ MAFO- SOLO   Ambulation with std cane with weight (5#) 200', repeated without weight 100'  Ambulation with std cane 200' , repeated 100' cuing for sequencing Amb w/ SPC 30 ft    Gait in SOLO with perturbation         Large amplitude stepping on colors   Lateral and forward varied 10x2 CTG w/ std cane      Cone to floor color match         Stance on wedge firm with perturbation         Hurdles         Stance reach floor to overhead cone stacking         Step taps      Alt toe taps 1 UE 20x     Pre SLS   RLE   NO sup 10-15\" x5    Step up     1 rise 10x2, 2 rise 10x2, 3 rise 10x2 all with one UE support;  repeated with cane up and over with // bar walk 2 rise 10x2 6\" step   Step up R down L   1 UE 10x    Gait with resistance posterior         Gait without device with MAFO         Gait over hidden obstacles          Modified hill's         Sit to stand with increased forward translation     5x2    Sidestepping          Sit to sidelying         Seated forward flex with ext/rotation         Transition from sit to supine         THERAPEUTIC ACTIVITY         Rolling side to side         Shoe/brace don/doffing                           GAIT TRAINING                                             Manuals        Assisted stretch/TPR hamstring release, piriformis release and ITB     AW   Contract relax hamstring/ hip abd              Access Code: [email or web address]  URL: [email or web address]  Date: 06/10/2025  Prepared by: Shelby Crowell    Exercises  - Supine Quad Set  - 1 x daily - 7 x weekly - 3 sets - 10 reps - 5 hold  - Supine Active Straight Leg Raise  - 1 x daily - 7 x weekly - 3 sets - 10 reps  - Supine Bridge with Mini Swiss Ball Between Knees  - 1 x daily - 7 x " weekly - 3 sets - 10 reps - 3 hold  - Hooklying Clamshell with Resistance  - 1 x daily - 7 x weekly - 3 sets - 10 reps - 5 hold  - Supine Short Arc Quad  - 1 x daily - 7 x weekly - 3 sets - 10 reps - 5 hold  - Seated Long Arc Quad  - 1 x daily - 7 x weekly - 3 sets - 10 reps - 5 hold  - Modified Marques Stretch  - 2 x daily - 7 x weekly - 1 sets - 3 reps - 30 hold  - Supine Piriformis Stretch with Foot on Ground  - 2 x daily - 7 x weekly - 1 sets - 3 reps - 30 hold  - Seated Hamstring Stretch  - 2 x daily - 7 x weekly - 1 sets - 3 reps - 30 hold

## 2025-06-10 NOTE — PROGRESS NOTES
Occupational Therapy Daily Note:    Today's date: 6/10/2025  Patient name: Wilder Vargas  : 1965  MRN: [id number]  Referring provider: Gilmar Oro MD  Dx:   Encounter Diagnosis   Name Primary?    Hemiparesis of right dominant side as late effect of cerebral infarction (HCC) Yes         Subjective: no new complaints       Objective: Pt engaged in skilled OT treatment session with focus on UE NMR, UE strengthening, UE endurance, and family training/education to increase engagement, tolerance, and independence with daily ADL and IADL tasks.     CPT Code Minutes                                           Task Details        Therapeutic Activity               Neuro Re-Ed  NMRE to RUE with use of muscle tapping to elicit increased motor movement.     In supine:  -completed isolated target reaching into all planes of sh, elbow, forearm, wrist and finger/thumb movement  -target reaching into all planes of sh movement  -triceps AG  -biceps AG  -place and hold at shoulder with 5 sec hold  -wrist ext AG    -chaining of a functional reach; required min to mod manual cues, with increasing cues as he fatigued          Core stability:  -mod manual cues for supine to long sit  -CG for long sit>sit edge of mat>STS> SPT from mat to w/c             Therapeutic Exercise  PROM to RUE completed into all planes to improve ROM and maintain joint integrity                Manual          HEP  : sublux reduction exercises, 5 reps 2x/ day          Assessment: Tolerated treatment well. Pt with improved motor control in supine, requiring decreased manual cues. pt would benefit from continued skilled OT.    Plan: Continued skilled OT per POC      INTERVENTION COMMENTS:  Diagnosis: Hemiparesis of right dominant side as late effect of cerebral infarction (HCC) [I69.351]  Precautions: R sided weakness, fall   Insurance: Payor: [employer] / Plan: [employer] / Product Type: Medicaid HMO /   8 of 16 visits through 25  POC  Expires: 7/17/25      Auth Tracker   DATE 6/5 6/10       Visit # 7 8

## 2025-06-12 ENCOUNTER — OFFICE VISIT (OUTPATIENT)
Facility: CLINIC | Age: 60
End: 2025-06-12
Payer: COMMERCIAL

## 2025-06-12 DIAGNOSIS — I69.351 HEMIPARESIS OF RIGHT DOMINANT SIDE AS LATE EFFECT OF CEREBRAL INFARCTION (HCC): Primary | ICD-10-CM

## 2025-06-12 PROCEDURE — 97112 NEUROMUSCULAR REEDUCATION: CPT

## 2025-06-12 PROCEDURE — 97112 NEUROMUSCULAR REEDUCATION: CPT | Performed by: PHYSICAL THERAPIST

## 2025-06-12 NOTE — PROGRESS NOTES
"Daily Note     Today's date: 2025  Patient name: Wilder Vargas  : 1965  MRN: [id number]  Referring provider: Gilmar Oro MD  Dx:   Encounter Diagnosis     ICD-10-CM    1. Hemiparesis of right dominant side as late effect of cerebral infarction (HCC)  I69.351             Start Time: 129  Stop Time: 215  Total time in clinic (min): 46 minutes    Subjective:   No present complaints.        Objective: See treatment diary below      Assessment:   Gait with std cane continues to improve with increased toe clearance and improved overall endurance with distance walking.  Stepping challenges with changes in direction with intermittent assist given.  Due to increased shin sensitivity noted, added padding to allow for improved tolerance, he indicates it feels better and no redness noted.      Plan: Continue per plan of care.   No complaints post treatment, continue to advance stepping and compliant surfaces to tolerance.       Daily Treatment Diary     Precautions: h/o strokes, hemiparesis right, h/o BPPV, DM, HTN      POC Expires Reeval for Medicare to be completed  Unit Limit Auth Expiration Date PT/OT/STVisit Limit   2025 By visit N/A BOMN 2025 N/A    Completed on visit                    Auth Status DATE 1/14 6/3 6/5 6/10 6/12   Approved Visit # 1 33 34 35 36    Remaining  7 6 5 4   TESTING        TUG 62.5sec       5x STS 25.9sec PROGRESS NOTE      SIMONS 34/56       Gait speed nv       3 min walk test nv               THERAPEUTIC EXERCISE HEP        Codmans         Hamstring stretch-w/ strap HEP    Manual     Piriformis stretch supine HEP    Manual     Sidelying book stretch         Resisted flex/ext supine         Seated LAQ   3\"x10   3\"x10    QS   5\"x20   5\"x20    QS w/ SLR    2x5   2x5    SAQ   5\"2x10   5\"2x10     Bridge w/ add    3' 2x10   3\" 2x10     Supine clams    Blue TB 5\" 2x10   Blue TB 5\" 2x10     Supine hip flexor st     EOT 30\"                                NEUROMUSCULAR REEDUCATION   " "       Static balance         Gait over steps on compliant foam         Gait with HM/ MAFO- SOLO    Ambulation with std cane 200' , repeated 100' cuing for sequencing Amb w/ SPC 30 ft  Ambulation with ' x2   Gait in SOLO with perturbation         Large amplitude stepping on colors         Cone to floor color match         Stance on wedge firm with perturbation         Hurdles         Stance reach floor to overhead cone stacking         Step taps     Alt toe taps 1 UE 20x     Pre SLS   RLE   NO sup 10-15\" x5  Alt toe taps one UE 20x,    Step up    1 rise 10x2, 2 rise 10x2, 3 rise 10x2 all with one UE support;  repeated with cane up and over with // bar walk 2 rise 10x2 6\" step   Step up R down L   1 UE 10x  3 rise step up and down with right weight bearing 10x2 one UE support   Gait with resistance posterior         Gait without device with MAFO         Gait over hidden obstacles          Modified hill's         Sit to stand with increased forward translation    5x2     Sidestepping          Sit to sidelying         Seated forward flex with ext/rotation         Transition from sit to supine         THERAPEUTIC ACTIVITY         Rolling side to side         Shoe/brace don/doffing                           GAIT TRAINING                                             Manuals        Assisted stretch/TPR hamstring release, piriformis release and ITB    AW    Contract relax hamstring/ hip abd              Access Code: [email or web address]  URL: [email or web address]  Date: 06/10/2025  Prepared by: Shelby Crowell    Exercises  - Supine Quad Set  - 1 x daily - 7 x weekly - 3 sets - 10 reps - 5 hold  - Supine Active Straight Leg Raise  - 1 x daily - 7 x weekly - 3 sets - 10 reps  - Supine Bridge with Mini Swiss Ball Between Knees  - 1 x daily - 7 x weekly - 3 sets - 10 reps - 3 hold  - Hooklying Clamshell with Resistance  - 1 x daily - 7 x weekly - 3 sets - 10 reps - 5 hold  - Supine Short Arc Quad  - 1 x daily - 7 x " weekly - 3 sets - 10 reps - 5 hold  - Seated Long Arc Quad  - 1 x daily - 7 x weekly - 3 sets - 10 reps - 5 hold  - Modified Marques Stretch  - 2 x daily - 7 x weekly - 1 sets - 3 reps - 30 hold  - Supine Piriformis Stretch with Foot on Ground  - 2 x daily - 7 x weekly - 1 sets - 3 reps - 30 hold  - Seated Hamstring Stretch  - 2 x daily - 7 x weekly - 1 sets - 3 reps - 30 hold

## 2025-06-12 NOTE — PROGRESS NOTES
Occupational Therapy Daily Note:    Today's date: 2025  Patient name: Wilder Vargas  : 1965  MRN: [id number]  Referring provider: Gilmar Oro MD  Dx:   Encounter Diagnosis   Name Primary?    Hemiparesis of right dominant side as late effect of cerebral infarction (HCC) Yes         Subjective: no new complaints       Objective: Pt engaged in skilled OT treatment session with focus on UE NMR, UE strengthening, UE endurance, and family training/education to increase engagement, tolerance, and independence with daily ADL and IADL tasks.     CPT Code Minutes                                           Task Details        Therapeutic Activity               Neuro Re-Ed  NMRE to RUE with use of vibration and muscle tapping to elicit increased motor movement.   -completed isolated target reaching into all planes of sh, elbow, forearm, wrist and finger/thumb movement         Item retrieval and transfer:  -RUE retrieval of foam blocks from R side of table and pushing across tabletop to 3 different targets  -R hand gross grasp to retrieve medium sized foam blocks from R side of table and transfer to L side target, focusing on supination during retrieval and lifting RUE AG to place on top of paper target   -RUE retrieval of foam blocks from L side of table and transfer to target on R side of table   -min difficulty with sustained gross grasp during transfer  -completed with min facilitations of vibration to elicit increased motor control during functional reach              Therapeutic Exercise  PROM to RUE completed into all planes to improve ROM and maintain joint integrity                Manual          HEP  : sublux reduction exercises, 5 reps 2x/ day          Assessment: Tolerated treatment well. Pt with improved supination, thumb and finger movement during item retrieval. RUE with increased motor control during item transfer. pt would benefit from continued skilled OT.    Plan: Continued skilled OT per  POC      INTERVENTION COMMENTS:  Diagnosis: Hemiparesis of right dominant side as late effect of cerebral infarction (HCC) [I69.351]  Precautions: R sided weakness, fall   Insurance: Payor: [employer] / Plan: [employer] / Product Type: Medicaid O /   9 of 16 visits through 7/18/25  POC Expires: 7/17/25      Auth Tracker   DATE 6/5 6/10 6/12      Visit # 7 8 9

## 2025-06-17 ENCOUNTER — OFFICE VISIT (OUTPATIENT)
Facility: CLINIC | Age: 60
End: 2025-06-17
Payer: COMMERCIAL

## 2025-06-17 ENCOUNTER — OFFICE VISIT (OUTPATIENT)
Facility: CLINIC | Age: 60
End: 2025-06-17
Attending: INTERNAL MEDICINE
Payer: COMMERCIAL

## 2025-06-17 DIAGNOSIS — I69.351 HEMIPARESIS OF RIGHT DOMINANT SIDE AS LATE EFFECT OF CEREBRAL INFARCTION (HCC): Primary | ICD-10-CM

## 2025-06-17 PROCEDURE — 97112 NEUROMUSCULAR REEDUCATION: CPT

## 2025-06-17 PROCEDURE — 97112 NEUROMUSCULAR REEDUCATION: CPT | Performed by: PHYSICAL THERAPIST

## 2025-06-17 NOTE — PROGRESS NOTES
Occupational Therapy Daily Note:    Today's date: 2025  Patient name: Wilder Vargas  : 1965  MRN: [id number]  Referring provider: Gilmar Oro MD  Dx:   Encounter Diagnosis   Name Primary?    Hemiparesis of right dominant side as late effect of cerebral infarction (HCC) Yes         Subjective: no new complaints       Objective: Pt engaged in skilled OT treatment session with focus on UE NMR, UE strengthening, UE endurance, and family training/education to increase engagement, tolerance, and independence with daily ADL and IADL tasks.     CPT Code Minutes                                           Task Details        Therapeutic Activity               Neuro Re-Ed  NMRE to RUE with use of vibration and muscle tapping to elicit increased motor movement.   -completed isolated target reaching into all planes of sh, elbow, forearm, wrist and finger/thumb movement  -isolated finger flex/ ext with use of slider to increase ease of movement          Item retrieval and transfer:  -RUE retrieval of foam blocks from R side of table and pushing across tabletop to 3 different targets  -R hand gross grasp to retrieve medium sized foam blocks from R side of table and transfer to L side target, focusing on supination during retrieval and lifting RUE AG to place on top of paper target   -completed with min facilitations of vibration to elicit increased motor control during functional reach              Therapeutic Exercise  PROM to RUE completed into all planes to improve ROM and maintain joint integrity                Manual          HEP  : sublux reduction exercises, 5 reps 2x/ day          Assessment: Tolerated treatment well. Pt with new movement into finger extension with use of slider and increased thumb movement during grasp on objects. pt would benefit from continued skilled OT.    Plan: Continued skilled OT per POC      INTERVENTION COMMENTS:  Diagnosis: Hemiparesis of right dominant side as late effect of  cerebral infarction (HCC) [I69.351]  Precautions: R sided weakness, fall   Insurance: Payor: [employer] / Plan: [employer] / Product Type: Medicaid HMO /   10 of 16 visits through 7/18/25  POC Expires: 7/17/25      Auth Tracker   DATE 6/5 6/10 6/12 6/17     Visit # 7 8 9 10

## 2025-06-17 NOTE — PROGRESS NOTES
"Daily Note     Today's date: 2025  Patient name: Wilder Vargas  : 1965  MRN: [id number]  Referring provider: Gilmar Oro MD  Dx:   Encounter Diagnosis     ICD-10-CM    1. Hemiparesis of right dominant side as late effect of cerebral infarction (HCC)  I69.351               Start Time: 128  Stop Time: 215  Total time in clinic (min): 47 minutes    Subjective:   No present complaints.        Objective: See treatment diary below      Assessment:   Introduced BITS with increased static standing reaching left to right without LOB noted.  Nice gains in increased speed with improved right weight shift.  Distance ambulation with increased toe clearance, fatigue continues to be noted with prolonged walking.        Plan: Continue per plan of care.   No complaints post treatment, continue to advance stepping and compliant surfaces to tolerance.       Daily Treatment Diary     Precautions: h/o strokes, hemiparesis right, h/o BPPV, DM, HTN      POC Expires Reeval for Medicare to be completed  Unit Limit Auth Expiration Date PT/OT/STVisit Limit   2025 By visit N/A BOMN 2025 N/A    Completed on visit                    Auth Status DATE 1/14 6/3 6/10 6/12 6/17   Approved Visit # 1 33 35 36 37    Remaining  7 5 4 3   TESTING        TUG 62.5sec       5x STS 25.9sec PROGRESS NOTE      SIMONS 34/56       Gait speed nv       3 min walk test nv               THERAPEUTIC EXERCISE HEP        Codmans         Hamstring stretch-w/ strap HEP   Manual      Piriformis stretch supine HEP   Manual      Sidelying book stretch         Resisted flex/ext supine         Seated LAQ   3\"x10  3\"x10     QS   5\"x20  5\"x20     QS w/ SLR    2x5  2x5     SAQ   5\"2x10  5\"2x10      Bridge w/ add    3' 2x10  3\" 2x10      Supine clams    Blue TB 5\" 2x10  Blue TB 5\" 2x10      Supine hip flexor st    EOT 30\"                                 NEUROMUSCULAR REEDUCATION          Static balance         Gait over steps on compliant foam         Gait " "with HM/ MAFO- SOLO    Amb w/ SPC 30 ft  Ambulation with ' x2 Ambulation w/ ', 100'   Gait in SOLO with perturbation         Large amplitude stepping on colors         Cone to floor color match         Stance on wedge firm with perturbation         Hurdles         Stance reach floor to overhead cone stacking         BITS with sequential taps      1.7, 1.7sec stance with increased weight shift   Stance with reach BITS      2.9 sec , 1.7, 1.5sec on last set      Step taps    Alt toe taps 1 UE 20x     Pre SLS   RLE   NO sup 10-15\" x5  Alt toe taps one UE 20x,  Alt LE toe taps 10x   Step up    6\" step   Step up R down L   1 UE 10x  3 rise step up and down with right weight bearing 10x2 one UE support    Gait with resistance posterior         Gait without device with MAFO         Gait over hidden obstacles          Modified hill's         Sit to stand with increased forward translation         Sidestepping          Sit to sidelying         Seated forward flex with ext/rotation         Transition from sit to supine         THERAPEUTIC ACTIVITY         Rolling side to side         Shoe/brace don/doffing                           GAIT TRAINING                                             Manuals        Assisted stretch/TPR hamstring release, piriformis release and ITB   AW     Contract relax hamstring/ hip abd              Access Code: [email or web address]  URL: [email or web address]  Date: 06/10/2025  Prepared by: Shelby Crowell    Exercises  - Supine Quad Set  - 1 x daily - 7 x weekly - 3 sets - 10 reps - 5 hold  - Supine Active Straight Leg Raise  - 1 x daily - 7 x weekly - 3 sets - 10 reps  - Supine Bridge with Mini Swiss Ball Between Knees  - 1 x daily - 7 x weekly - 3 sets - 10 reps - 3 hold  - Hooklying Clamshell with Resistance  - 1 x daily - 7 x weekly - 3 sets - 10 reps - 5 hold  - Supine Short Arc Quad  - 1 x daily - 7 x weekly - 3 sets - 10 reps - 5 hold  - Seated Long Arc Quad  - 1 x daily - 7 x " weekly - 3 sets - 10 reps - 5 hold  - Modified Marques Stretch  - 2 x daily - 7 x weekly - 1 sets - 3 reps - 30 hold  - Supine Piriformis Stretch with Foot on Ground  - 2 x daily - 7 x weekly - 1 sets - 3 reps - 30 hold  - Seated Hamstring Stretch  - 2 x daily - 7 x weekly - 1 sets - 3 reps - 30 hold

## 2025-06-18 DIAGNOSIS — I10 PRIMARY HYPERTENSION: ICD-10-CM

## 2025-06-18 DIAGNOSIS — Z79.4 TYPE 2 DIABETES MELLITUS WITH DIABETIC MICROALBUMINURIA, WITH LONG-TERM CURRENT USE OF INSULIN (HCC): ICD-10-CM

## 2025-06-18 DIAGNOSIS — R80.9 TYPE 2 DIABETES MELLITUS WITH DIABETIC MICROALBUMINURIA, WITH LONG-TERM CURRENT USE OF INSULIN (HCC): ICD-10-CM

## 2025-06-18 DIAGNOSIS — E11.29 TYPE 2 DIABETES MELLITUS WITH DIABETIC MICROALBUMINURIA, WITH LONG-TERM CURRENT USE OF INSULIN (HCC): ICD-10-CM

## 2025-06-18 RX ORDER — CARVEDILOL 25 MG/1
25 TABLET ORAL 2 TIMES DAILY WITH MEALS
Qty: 180 TABLET | Refills: 1 | Status: SHIPPED | OUTPATIENT
Start: 2025-06-18

## 2025-06-19 ENCOUNTER — OFFICE VISIT (OUTPATIENT)
Facility: CLINIC | Age: 60
End: 2025-06-19
Payer: COMMERCIAL

## 2025-06-19 DIAGNOSIS — I69.351 HEMIPARESIS OF RIGHT DOMINANT SIDE AS LATE EFFECT OF CEREBRAL INFARCTION (HCC): Primary | ICD-10-CM

## 2025-06-19 PROCEDURE — 97112 NEUROMUSCULAR REEDUCATION: CPT | Performed by: PHYSICAL THERAPIST

## 2025-06-19 PROCEDURE — 97110 THERAPEUTIC EXERCISES: CPT

## 2025-06-19 PROCEDURE — 97112 NEUROMUSCULAR REEDUCATION: CPT

## 2025-06-19 NOTE — PROGRESS NOTES
"Occupational Therapy Daily Note:    Today's date: 2025  Patient name: Wilder Vargas  : 1965  MRN: [id number]  Referring provider: Gilmar Oro MD  Dx:   Encounter Diagnosis   Name Primary?    Hemiparesis of right dominant side as late effect of cerebral infarction (HCC) Yes           Subjective: \"I only slept 4 hours last night\"       Objective: Pt engaged in skilled OT treatment session with focus on UE NMR, UE strengthening, UE endurance, and family training/education to increase engagement, tolerance, and independence with daily ADL and IADL tasks.     CPT Code Minutes                                           Task Details        Therapeutic Activity               Neuro Re-Ed  NMRE to RUE with use of vibration and muscle tapping to elicit increased motor movement.   -completed isolated target reaching into all planes of sh, elbow, forearm, wrist and finger/thumb movement  -isolated finger flex/ ext/ abduction/ adduction to target, with use of slider to increase ease of movement          Item retrieval and transfer:  -RUE retrieval of foam blocks from R side of table and pushing across tabletop to 3 different targets  -R hand gross grasp to retrieve medium sized foam blocks from R side of table, transfer over low obstacle in the center of the table and place on L side target, focusing on supination during retrieval and lifting RUE AG over obstacle  -required max manual cues for release at target; min manual cues for grasp during transfer            Therapeutic Exercise  PROM to RUE completed into all planes to improve ROM and maintain joint integrity      Pt c/o pain in R proximal forearm with supination; (+) R forearm tightness, completed gentle trigger point release and muscle massage to decrease tightness and allow for increased movement           Manual          HEP  : sublux reduction exercises, 5 reps 2x/ day          Assessment: Tolerated treatment well. Pt with new movement into finger " ab/adduction with use of slider and increased active finger/ thumb movement into all directions. pt would benefit from continued skilled OT.    Plan: Continued skilled OT per POC      INTERVENTION COMMENTS:  Diagnosis: Hemiparesis of right dominant side as late effect of cerebral infarction (HCC) [I69.351]  Precautions: R sided weakness, fall   Insurance: Payor: [employer] / Plan: [employer] / Product Type: Medicaid O /   11 of 16 visits through 7/18/25  POC Expires: 7/17/25      Auth Tracker   DATE 6/5 6/10 6/12 6/17 6/19    Visit # [phone] 10 11

## 2025-06-19 NOTE — PROGRESS NOTES
"Daily Note     Today's date: 2025  Patient name: Wilder Vargas  : 1965  MRN: [id number]  Referring provider: Gilmar Oro MD  Dx:   Encounter Diagnosis     ICD-10-CM    1. Hemiparesis of right dominant side as late effect of cerebral infarction (HCC)  I69.351                 Start Time: 0122  Stop Time: 0210  Total time in clinic (min): 48 minutes    Subjective:   No present complaints.        Objective: See treatment diary below      Assessment:    Increased distance ambulation with std cane with improved clearance left.  Returned to SOLO to allow for increased independent balance correction.  Backward stepping with improved stability, stepping over foam beams with occasional step correction.  Hidden obstacles with hesitation, however, he is able to self correct.       Plan: Continue per plan of care.   No complaints post treatment, continue to advance stepping and compliant surfaces to tolerance.  Re-assessment next session.     Daily Treatment Diary     Precautions: h/o strokes, hemiparesis right, h/o BPPV, DM, HTN      POC Expires Reeval for Medicare to be completed  Unit Limit Auth Expiration Date PT/OT/STVisit Limit   2025 By visit N/A BOMN 2025 N/A    Completed on visit                    Auth Status DATE 1/14 6/3 6/12 6/17 6/19   Approved Visit # 1 [phone] [id number]    Remaining  7 4 3 2   TESTING        TUG 62.5sec       5x STS 25.9sec PROGRESS NOTE      SIMONS 34/56       Gait speed nv       3 min walk test nv               THERAPEUTIC EXERCISE HEP        Codmans         Hamstring stretch-w/ strap HEP        Piriformis stretch supine HEP        Sidelying book stretch         Resisted flex/ext supine         Seated LAQ   3\"x10       QS   5\"x20       QS w/ SLR    2x5       SAQ   5\"2x10       Bridge w/ add    3' 2x10       Supine clams    Blue TB 5\" 2x10       Supine hip flexor st                                    NEUROMUSCULAR REEDUCATION          Static balance         Gait over steps on " compliant foam      3 beams in SOLO with std cane 4 laps x2   Gait with HM/ MAFO- SOLO    Ambulation with ' x2 Ambulation w/ ', 100' Ambulation w/ ' x2   Backward stepping SOLO      2 laps x2 w/ SPC   Gait in SOLO with perturbation      Over hidden obstacles 4 laps   Large amplitude stepping on colors         Cone to floor color match         Stance on wedge firm with perturbation         Hurdles         Stance reach floor to overhead cone stacking         BITS with sequential taps     1.7, 1.7sec stance with increased weight shift    Stance with reach BITS     2.9 sec , 1.7, 1.5sec on last set       Step taps    Alt toe taps one UE 20x,  Alt LE toe taps 10x    Step up    3 rise step up and down with right weight bearing 10x2 one UE support     Gait with resistance posterior         Gait without device with MAFO         Gait over hidden obstacles          Modified hill's         Sit to stand with increased forward translation         Sidestepping          Sit to sidelying         Seated forward flex with ext/rotation         Transition from sit to supine         THERAPEUTIC ACTIVITY         Rolling side to side         Shoe/brace don/doffing                           GAIT TRAINING                                             Manuals        Assisted stretch/TPR hamstring release, piriformis release and ITB        Contract relax hamstring/ hip abd              Access Code: [email or web address]  URL: [email or web address]  Date: 06/10/2025  Prepared by: Shelby Crowell    Exercises  - Supine Quad Set  - 1 x daily - 7 x weekly - 3 sets - 10 reps - 5 hold  - Supine Active Straight Leg Raise  - 1 x daily - 7 x weekly - 3 sets - 10 reps  - Supine Bridge with Mini Swiss Ball Between Knees  - 1 x daily - 7 x weekly - 3 sets - 10 reps - 3 hold  - Hooklying Clamshell with Resistance  - 1 x daily - 7 x weekly - 3 sets - 10 reps - 5 hold  - Supine Short Arc Quad  - 1 x daily - 7 x weekly - 3 sets - 10 reps - 5  hold  - Seated Long Arc Quad  - 1 x daily - 7 x weekly - 3 sets - 10 reps - 5 hold  - Modified Marques Stretch  - 2 x daily - 7 x weekly - 1 sets - 3 reps - 30 hold  - Supine Piriformis Stretch with Foot on Ground  - 2 x daily - 7 x weekly - 1 sets - 3 reps - 30 hold  - Seated Hamstring Stretch  - 2 x daily - 7 x weekly - 1 sets - 3 reps - 30 hold

## 2025-06-24 ENCOUNTER — OFFICE VISIT (OUTPATIENT)
Facility: CLINIC | Age: 60
End: 2025-06-24
Payer: COMMERCIAL

## 2025-06-24 DIAGNOSIS — I69.351 HEMIPARESIS OF RIGHT DOMINANT SIDE AS LATE EFFECT OF CEREBRAL INFARCTION (HCC): Primary | ICD-10-CM

## 2025-06-24 PROCEDURE — 97112 NEUROMUSCULAR REEDUCATION: CPT

## 2025-06-24 PROCEDURE — 97140 MANUAL THERAPY 1/> REGIONS: CPT

## 2025-06-24 PROCEDURE — 97112 NEUROMUSCULAR REEDUCATION: CPT | Performed by: PHYSICAL THERAPIST

## 2025-06-24 NOTE — PROGRESS NOTES
"Occupational Therapy Daily Note:    Today's date: 2025  Patient name: Wilder Vargas  : 1965  MRN: [id number]  Referring provider: Gilmar Oro MD  Dx:   Encounter Diagnosis   Name Primary?    Hemiparesis of right dominant side as late effect of cerebral infarction (HCC) Yes           Subjective: \"I feel terrible today\" pt reporting increased dizziness since Saturday   (PT did address)       Objective: Pt engaged in skilled OT treatment session with focus on UE NMR, UE strengthening, UE endurance, and family training/education to increase engagement, tolerance, and independence with daily ADL and IADL tasks.     CPT Code Minutes                                           Task Details        Therapeutic Activity               Neuro Re-Ed  NMRE to RUE with use of vibration and muscle tapping to elicit increased motor movement.   -completed isolated target reaching into all planes of sh, elbow, forearm, wrist and finger/thumb movement  -isolated finger flex/ ext/ abduction/ adduction to target, with use of slider to increase ease of movement          Item retrieval and transfer:  -RUE retrieval of beanbags from R side of table and pushing across tabletop to 3 different targets  -required min muscle facilitation of vibration to reach target            Therapeutic Exercise  PROM to RUE completed into all planes to improve ROM and maintain joint integrity                Manual  Continues with tightness in R proximal forearm with supination; completed Graston technique, gentle trigger point release and muscle massage to decrease tightness and allow for increased movement         HEP  : sublux reduction exercises, 5 reps 2x/ day          Assessment: Tolerated treatment well. Pt with improved hand/ finger movement after manual therapy on R forearm; cont to demo improving motor control of R hand. pt would benefit from continued skilled OT.    Plan: Continued skilled OT per POC      INTERVENTION " COMMENTS:  Diagnosis: Hemiparesis of right dominant side as late effect of cerebral infarction (HCC) [I69.351]  Precautions: R sided weakness, fall   Insurance: Payor: [employer] / Plan: [employer] / Product Type: Medicaid HMO /   12 of 16 visits through 7/18/25  POC Expires: 7/17/25      Auth Tracker   DATE 6/5 6/10 6/12 6/17 6/19 6/24   Visit # 7 [phone] 10 11 12
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
## 2025-06-26 ENCOUNTER — OFFICE VISIT (OUTPATIENT)
Facility: CLINIC | Age: 60
End: 2025-06-26
Payer: COMMERCIAL

## 2025-06-26 DIAGNOSIS — I69.351 HEMIPARESIS OF RIGHT DOMINANT SIDE AS LATE EFFECT OF CEREBRAL INFARCTION (HCC): Primary | ICD-10-CM

## 2025-06-26 PROCEDURE — 97112 NEUROMUSCULAR REEDUCATION: CPT

## 2025-06-26 PROCEDURE — 97112 NEUROMUSCULAR REEDUCATION: CPT | Performed by: PHYSICAL THERAPIST

## 2025-06-26 NOTE — PROGRESS NOTES
Daily Note     Today's date: 2025  Patient name: Wilder Vargas  : 1965  MRN: [id number]  Referring provider: Gilmar Oro MD  Dx:   Encounter Diagnosis     ICD-10-CM    1. Hemiparesis of right dominant side as late effect of cerebral infarction (HCC)  I69.351                   Start Time: 128  Stop Time: 215  Total time in clinic (min): 47 minutes    Subjective:   No dizziness after last session, slight neck tightness evident since CRT.          Objective: See treatment diary below      Assessment:    Nice gains overall with distance ambulation with Std cane.  Sit to stand from lower surface remains challenging.  Patient expressed desire to get into his pool with concern regarding sit to stand off step.  Lower sit to stand with cuing to increase forward weight shift improving with reps.  Patient instructed to perform at home to improve transition with good demonstration and voiced understanding.  No dizziness noted since CRT last session, slight neck tightness noted with extended positioning last session.  STM with improved cervical motion and decreased tightness.      Plan: Continue per plan of care.   No complaints post treatment, continue to advance stepping and compliant surfaces to tolerance.       Daily Treatment Diary     Precautions: h/o strokes, hemiparesis right, h/o BPPV, DM, HTN      POC Expires Reeval for Medicare to be completed  Unit Limit Auth Expiration Date PT/OT/STVisit Limit   2025 By visit N/A BOMN 2025 N/A    Completed on visit                    Auth Status DATE    Approved Visit # 1 37 38 [phone] 40    Remaining  3 2 1 2   TESTING        TUG 62.5sec   PROGRESS NOTE    5x STS 25.9sec       SIMONS 34/56       Gait speed nv       3 min walk test nv               THERAPEUTIC EXERCISE HEP        Codmans         Hamstring stretch-w/ strap HEP        Piriformis stretch supine HEP        Sidelying book stretch         Resisted flex/ext supine         Seated  LAQ         QS         QS w/ SLR          SAQ         Bridge w/ add          Supine clams          Supine hip flexor st                                    NEUROMUSCULAR REEDUCATION          Static balance         Gait over steps on compliant foam    3 beams in SOLO with std cane 4 laps x2     Gait with HM/ MAFO- SOLO   Ambulation w/ ', 100' Ambulation w/ ' x2  Ambulation with std cane 280' x2 with one seated rest, nice gains in decreased dyspnea & increased pacing   Backward stepping SOLO    2 laps x2 w/ SPC  2 laps CTG   Gait in SOLO with perturbation    Over hidden obstacles 4 laps     Large amplitude stepping on colors         Cone to floor color match         Stance on wedge firm with perturbation         Hurdles         Stance reach floor to overhead cone stacking         BITS with sequential taps   1.7, 1.7sec stance with increased weight shift      Stance with reach BITS   2.9 sec , 1.7, 1.5sec on last set         Step taps   Alt LE toe taps 10x      Step up         Gait with resistance posterior         Gait without device with MAFO         Gait over hidden obstacles          Modified hill's         Sit to stand with increased forward translation         Sidestepping          Sit to sidelying         Seated forward flex with ext/rotation      Sit to stand off lower surface with assist for increased forward weight shift 5x3, intermittent assist for right UE mgmt   Transition from sit to supine         THERAPEUTIC ACTIVITY         Rolling side to side         Shoe/brace don/doffing                           GAIT TRAINING                                             Manuals        TPR UT, Lev, SCM, subocipitals     VM   Assisted stretch/TPR hamstring release, piriformis release and ITB        Contract relax hamstring/ hip abd              Access Code: [email or web address]  URL: [email or web address]  Date: 06/10/2025  Prepared by: Shelby Crowell    Exercises  - Supine Quad Set  - 1 x daily - 7 x  weekly - 3 sets - 10 reps - 5 hold  - Supine Active Straight Leg Raise  - 1 x daily - 7 x weekly - 3 sets - 10 reps  - Supine Bridge with Mini Swiss Ball Between Knees  - 1 x daily - 7 x weekly - 3 sets - 10 reps - 3 hold  - Hooklying Clamshell with Resistance  - 1 x daily - 7 x weekly - 3 sets - 10 reps - 5 hold  - Supine Short Arc Quad  - 1 x daily - 7 x weekly - 3 sets - 10 reps - 5 hold  - Seated Long Arc Quad  - 1 x daily - 7 x weekly - 3 sets - 10 reps - 5 hold  - Modified Marques Stretch  - 2 x daily - 7 x weekly - 1 sets - 3 reps - 30 hold  - Supine Piriformis Stretch with Foot on Ground  - 2 x daily - 7 x weekly - 1 sets - 3 reps - 30 hold  - Seated Hamstring Stretch  - 2 x daily - 7 x weekly - 1 sets - 3 reps - 30 hold

## 2025-06-26 NOTE — PROGRESS NOTES
Occupational Therapy Daily Note:    Today's date: 2025  Patient name: Wilder Vargas  : 1965  MRN: [id number]  Referring provider: Gilmar Oro MD  Dx:   Encounter Diagnosis   Name Primary?    Hemiparesis of right dominant side as late effect of cerebral infarction (HCC) Yes           Subjective: pt reporting no further dizziness       Objective: Pt engaged in skilled OT treatment session with focus on UE NMR, UE strengthening, UE endurance, and family training/education to increase engagement, tolerance, and independence with daily ADL and IADL tasks.     CPT Code Minutes                                           Task Details        Therapeutic Activity               Neuro Re-Ed  NMRE to RUE with use of vibration to elicit increased motor movement.   -completed isolated target reaching into all planes of sh, elbow, forearm, wrist and finger/thumb movement  -isolated finger flex/ ext/ abduction/ adduction to target, with use of slider to increase ease of movement          Item retrieval and transfer:  -RUE retrieval of beanbags from R side of table and pushing across tabletop to 3 different targets; able to complete with decreased use of vibration for muscle cues   -R hand gross grasp to retrieve nerf golf balls from R side of table, transfer over low obstacle in middle of the table and place into low dish on L side of table  -R hand able to push nerf golf balls across and off table into large bucket  -required min muscle facilitation of vibration to reach target            Therapeutic Exercise  PROM to RUE completed into all planes to improve ROM and maintain joint integrity                Manual          HEP  : sublux reduction exercises, 5 reps 2x/ day          Assessment: Tolerated treatment well. Pt cont to demo improving R hand/ finger movement, with increased AROM at shoulder and elbow, requires decreased use of muscle facilitations. pt would benefit from continued skilled OT.    Plan:  Continued skilled OT per POC      INTERVENTION COMMENTS:  Diagnosis: Hemiparesis of right dominant side as late effect of cerebral infarction (HCC) [I69.351]  Precautions: R sided weakness, fall   Insurance: Payor: [employer] / Plan: [employer] / Product Type: Medicaid HMO /   13 of 16 visits through 7/18/25  POC Expires: 7/17/25      Auth Tracker   DATE 6/26        Visit # 13

## 2025-07-01 ENCOUNTER — OFFICE VISIT (OUTPATIENT)
Facility: CLINIC | Age: 60
End: 2025-07-01
Attending: INTERNAL MEDICINE
Payer: COMMERCIAL

## 2025-07-01 ENCOUNTER — EVALUATION (OUTPATIENT)
Facility: CLINIC | Age: 60
End: 2025-07-01
Attending: INTERNAL MEDICINE
Payer: COMMERCIAL

## 2025-07-01 DIAGNOSIS — I69.351 HEMIPARESIS OF RIGHT DOMINANT SIDE AS LATE EFFECT OF CEREBRAL INFARCTION (HCC): Primary | ICD-10-CM

## 2025-07-01 PROCEDURE — 97112 NEUROMUSCULAR REEDUCATION: CPT

## 2025-07-01 PROCEDURE — 97112 NEUROMUSCULAR REEDUCATION: CPT | Performed by: PHYSICAL THERAPIST

## 2025-07-01 NOTE — PROGRESS NOTES
Daily Note     Today's date: 2025  Patient name: Wilder Vargas  : 1965  MRN: [id number]  Referring provider: Gilmar Oro MD  Dx:   Encounter Diagnosis     ICD-10-CM    1. Hemiparesis of right dominant side as late effect of cerebral infarction (HCC)  I69.351                     Start Time: 218  Stop Time: 303  Total time in clinic (min): 45 minutes    Subjective:   No dizziness or complaints.  He has started to use a cane intermittently at home.  He forgot pictures of his pool to discuss options for getting in but agreed to bring them next session.         Objective: See treatment diary below      Assessment:    Occasional snap back into genu recurvatum noted on right.  Assessed posterior leaf spring with minimal change.  Heel lift to encourage knee extension with minimal change.  Will contact [facility] and assess if changes to brace may be feasible to give additional stability.  Overall toe clearance improving, occ catch with fatigue.       Plan: Continue per plan of care.   No complaints post treatment, continue to advance stepping and compliant surfaces to tolerance.       Daily Treatment Diary     Precautions: h/o strokes, hemiparesis right, h/o BPPV, DM, HTN      POC Expires Reeval for Medicare to be completed  Unit Limit Auth Expiration Date PT/OT/STVisit Limit   2025 By visit N/A BOMN 2025 N/A    Completed on visit                    Auth Status DATE    Approved Visit # 1 38 39 40 41    Remaining  2 1 2    TESTING        TUG 62.5sec  PROGRESS NOTE     5x STS 25.9sec       SIMONS 34/56       Gait speed nv       3 min walk test nv               THERAPEUTIC EXERCISE HEP        Codmans         Hamstring stretch-w/ strap HEP        Piriformis stretch supine HEP        Sidelying book stretch         Resisted flex/ext supine         Seated LAQ         QS         QS w/ SLR          SAQ         Bridge w/ add          Supine clams          Supine hip flexor st                                     NEUROMUSCULAR REEDUCATION          Static balance         Gait over steps on compliant foam   3 beams in SOLO with std cane 4 laps x2      Gait with HM/ MAFO- SOLO   Ambulation w/ ' x2  Ambulation with std cane 280' x2 with one seated rest, nice gains in decreased dyspnea & increased pacing Ambulation around busy clinic with std cane 140' x2, 100' with CTG   Backward stepping SOLO   2 laps x2 w/ SPC  2 laps CTG 1 lap   Gait in SOLO with perturbation   Over hidden obstacles 4 laps      Large amplitude stepping on colors         Cone to floor color match         Stance on wedge firm with perturbation         Hurdles         Stance reach floor to overhead cone stacking         BITS with sequential taps         Stance with reach BITS         Step taps         Step up         Gait with resistance posterior         Gait without device with MAFO         Gait over hidden obstacles          Modified hill's         Sit to stand with increased forward translation         Sidestepping          Sit to sidelying         Seated forward flex with ext/rotation     Sit to stand off lower surface with assist for increased forward weight shift 5x3, intermittent assist for right UE mgmt    Transition from sit to supine         THERAPEUTIC ACTIVITY         Rolling side to side         Shoe/brace don/doffing                           GAIT TRAINING                                             Manuals        TPR UT, Lev, SCM, subocipitals    VM    Assisted stretch/TPR hamstring release, piriformis release and ITB        Contract relax hamstring/ hip abd              Access Code: [email or web address]  URL: [email or web address]  Date: 06/10/2025  Prepared by: Shelby Crowell    Exercises  - Supine Quad Set  - 1 x daily - 7 x weekly - 3 sets - 10 reps - 5 hold  - Supine Active Straight Leg Raise  - 1 x daily - 7 x weekly - 3 sets - 10 reps  - Supine Bridge with Mini Swiss Ball Between Knees  - 1 x daily - 7 x weekly -  3 sets - 10 reps - 3 hold  - Hooklying Clamshell with Resistance  - 1 x daily - 7 x weekly - 3 sets - 10 reps - 5 hold  - Supine Short Arc Quad  - 1 x daily - 7 x weekly - 3 sets - 10 reps - 5 hold  - Seated Long Arc Quad  - 1 x daily - 7 x weekly - 3 sets - 10 reps - 5 hold  - Modified Marques Stretch  - 2 x daily - 7 x weekly - 1 sets - 3 reps - 30 hold  - Supine Piriformis Stretch with Foot on Ground  - 2 x daily - 7 x weekly - 1 sets - 3 reps - 30 hold  - Seated Hamstring Stretch  - 2 x daily - 7 x weekly - 1 sets - 3 reps - 30 hold

## 2025-07-01 NOTE — PROGRESS NOTES
Occupational Therapy Daily Note:    Today's date: 2025  Patient name: Wilder Vargas  : 1965  MRN: [id number]  Referring provider: Gilmar Oro MD  Dx:   Encounter Diagnosis   Name Primary?    Hemiparesis of right dominant side as late effect of cerebral infarction (HCC) Yes         Subjective: no new complaints       Objective: Pt engaged in skilled OT treatment session with focus on UE NMR, UE strengthening, UE endurance, and family training/education to increase engagement, tolerance, and independence with daily ADL and IADL tasks.     CPT Code Minutes                                           Task Details        Therapeutic Activity               Neuro Re-Ed  NMRE to RUE with use of vibration to elicit increased motor movement.   -completed isolated target reaching into all planes of sh, elbow, forearm, wrist and finger/thumb movement  -isolated finger flex/ ext/ abduction/ adduction to target, with use of slider to increase ease of movement          Item retrieval and transfer:  -RUE retrieval of beanbags from R side of table and pushing across tabletop to 3 different targets; able to complete with decreased use of vibration for muscle cues   -R hand gross grasp to retrieve small foam blocks from R side of table, lift off tabletop surface and transfer to place into low dish on L side of table; then able to reverse and retrieve from L side of table and transfer to R side target    -min difficulty with sustained grasp during item transfer   -required min muscle facilitation of vibration to reach target            Therapeutic Exercise  PROM to RUE completed into all planes to improve ROM and maintain joint integrity                Manual          HEP  : sublux reduction exercises, 5 reps 2x/ day          Assessment: Tolerated treatment well. Pt cont to demo improving R hand/ finger movement, with increased AROM at shoulder and elbow, requires decreased use of muscle facilitations. pt would benefit  from continued skilled OT.    Plan: Continued skilled OT per POC      INTERVENTION COMMENTS:  Diagnosis: Hemiparesis of right dominant side as late effect of cerebral infarction (HCC) [I69.351]  Precautions: R sided weakness, fall   Insurance: Payor: [employer] / Plan: [employer] / Product Type: Medicaid HMO /   14 of 16 visits through 7/18/25  POC Expires: 7/17/25      Auth Tracker   DATE 6/26 7/1/25       Visit # 13 14

## 2025-07-03 ENCOUNTER — EVALUATION (OUTPATIENT)
Facility: CLINIC | Age: 60
End: 2025-07-03
Attending: INTERNAL MEDICINE
Payer: COMMERCIAL

## 2025-07-03 ENCOUNTER — OFFICE VISIT (OUTPATIENT)
Facility: CLINIC | Age: 60
End: 2025-07-03
Attending: INTERNAL MEDICINE
Payer: COMMERCIAL

## 2025-07-03 DIAGNOSIS — I69.351 HEMIPARESIS OF RIGHT DOMINANT SIDE AS LATE EFFECT OF CEREBRAL INFARCTION (HCC): Primary | ICD-10-CM

## 2025-07-03 PROCEDURE — 97112 NEUROMUSCULAR REEDUCATION: CPT

## 2025-07-03 PROCEDURE — 97140 MANUAL THERAPY 1/> REGIONS: CPT | Performed by: PHYSICAL THERAPIST

## 2025-07-03 PROCEDURE — 97110 THERAPEUTIC EXERCISES: CPT

## 2025-07-03 PROCEDURE — 97112 NEUROMUSCULAR REEDUCATION: CPT | Performed by: PHYSICAL THERAPIST

## 2025-07-03 NOTE — PROGRESS NOTES
Daily Note     Today's date: 7/3/2025  Patient name: Wilder Vargas  : 1965  MRN: [id number]  Referring provider: Gilmar Oro MD  Dx:   Encounter Diagnosis     ICD-10-CM    1. Hemiparesis of right dominant side as late effect of cerebral infarction (HCC)  I69.351                       Start Time: 216  Stop Time: 030  Total time in clinic (min): 48 minutes    Subjective:    Patient feels very tired today due to not sleeping well.  No current complaints of dizziness, increased right shoulder pain after OT.       Objective: See treatment diary below      Assessment:    [place] distance ambulation with std cane with good tolerance.  Sit to stand with cuing for translation to improve mobility.  Discussed with patient brace modification due to snap back into extension noted.  [facility] contacted and will provide a new brace next week for further assessment in correction with details to follow. STM for posterior right triceps with alleviation of pain.       Plan: Continue per plan of care.   No complaints post treatment, continue to advance stepping and compliant surfaces to tolerance.       Daily Treatment Diary     Precautions: h/o strokes, hemiparesis right, h/o BPPV, DM, HTN      POC Expires Reeval for Medicare to be completed  Unit Limit Auth Expiration Date PT/OT/STVisit Limit   2025 By visit N/A BOMN 2025 N/A    Completed on visit                    Auth Status DATE 1/14 6/24 6/26 7/1 7/3   Approved Visit # 1 [phone] 42    Remaining  1 2  10   TESTING        TUG 62.5sec PROGRESS NOTE      5x STS 25.9sec       SIMONS 34/56       Gait speed nv       3 min walk test nv               THERAPEUTIC EXERCISE HEP        Codmans         Hamstring stretch-w/ strap HEP        Piriformis stretch supine HEP        Sidelying book stretch         Resisted flex/ext supine         Seated LAQ         QS         QS w/ SLR          SAQ         Bridge w/ add          Supine clams          Supine hip flexor st                                     NEUROMUSCULAR REEDUCATION          Static balance         Gait over steps on compliant foam         Gait with HM/ MAFO- SOLO    Ambulation with std cane 280' x2 with one seated rest, nice gains in decreased dyspnea & increased pacing Ambulation around busy clinic with std cane 140' x2, 100' with CTG Ambulation shorter distances today due to fatigue,  100' x3 with std cane   Backward stepping SOLO    2 laps CTG 1 lap 2 laps   Gait in SOLO with perturbation         Large amplitude stepping on colors         Cone to floor color match         Stance on wedge firm with perturbation         Hurdles         Stance reach floor to overhead cone stacking         BITS with sequential taps         Stance with reach BITS         Step taps         Step up         Gait with resistance posterior         Gait without device with MAFO         Gait over hidden obstacles          Modified hill's         Sit to stand with increased forward translation         Sidestepping          Sit to sidelying         Seated forward flex with ext/rotation    Sit to stand off lower surface with assist for increased forward weight shift 5x3, intermittent assist for right UE mgmt  Sit to stand with staggered stance 5x4   Transition from sit to supine         THERAPEUTIC ACTIVITY         Rolling side to side         Shoe/brace don/doffing                           GAIT TRAINING                                             Manuals        TPR UT, Lev, SCM, subocipitals   VM  VM- right teres major & triceps   Assisted stretch/TPR hamstring release, piriformis release and ITB        Contract relax hamstring/ hip abd              Access Code: [email or web address]  URL: [email or web address]  Date: 06/10/2025  Prepared by: Shelby Crowell    Exercises  - Supine Quad Set  - 1 x daily - 7 x weekly - 3 sets - 10 reps - 5 hold  - Supine Active Straight Leg Raise  - 1 x daily - 7 x weekly - 3 sets - 10 reps  - Supine Bridge with Mini Swiss Ball  Between Knees  - 1 x daily - 7 x weekly - 3 sets - 10 reps - 3 hold  - Hooklying Clamshell with Resistance  - 1 x daily - 7 x weekly - 3 sets - 10 reps - 5 hold  - Supine Short Arc Quad  - 1 x daily - 7 x weekly - 3 sets - 10 reps - 5 hold  - Seated Long Arc Quad  - 1 x daily - 7 x weekly - 3 sets - 10 reps - 5 hold  - Modified Marques Stretch  - 2 x daily - 7 x weekly - 1 sets - 3 reps - 30 hold  - Supine Piriformis Stretch with Foot on Ground  - 2 x daily - 7 x weekly - 1 sets - 3 reps - 30 hold  - Seated Hamstring Stretch  - 2 x daily - 7 x weekly - 1 sets - 3 reps - 30 hold

## 2025-07-03 NOTE — PROGRESS NOTES
OCCUPATIONAL THERAPY RE-EVALUATION        7/3/2025  Wilder Vargas  1965  [id number]  Gilmar Oro MD   Diagnosis ICD-10-CM Associated Orders   1. Hemiparesis of right dominant side as late effect of cerebral infarction (HCC)  I69.351               Assessment/Plan      SKILLED ANALYSIS:    Pt is a 59 y.o. male referred to Occupational Therapy s/p Hemiparesis of right dominant side as late effect of cerebral infarction (HCC) [I69.351].  Pt participates in skilled OT focused on NMRE to RUE to improve motor control. Pt with improved active and passive range of motion of RUE, with increased motor control noted with and without use of muscle facilitation of vibration and muscle tapping. Pt with new movement into finger flex/ext/ abduction/ adduction and improved grasp during item transfer. Pt also demonstrating improved ability to lift RUE AG during item transfer. Pt cont with a R sh sublux, which decreases with active muscle facilitations. Pt with increased I with ADLs, including toileting and BSC transfer.  Pt cont to present with the following areas of deficit: FMC/FMS, GMC/GMS, hypertonicity, and impaired motor control of RUE impacting ability to independently and safely complete ADL/IADL and leisure tasks.  Pt does demo the need for cont skilled Occupational Therapy services 2x/week for 12 weeks with focus on ADL re-training, IADL re-training, fxnl xfers, standing tolerance, standing balance, UE NMR, UE strengthening, UE endurance, FMC/GMC, DME training/education, family training/education, leisure pursuits, and community re-integration to address the goals as listed below. Pt in agreement with POC, POC to  25.          Short Term Goals (to be achieved within 4 weeks):    Pt will increase AROM of RUE into all planes by 1/4 range to improve functional use PARTIALLY MET   Pt will improve RUE motor control and strength AEB 1/2 grade increase on MMT scale PROGRESSING   Pt will be I with positioning of  "RUE for best management of R shoulder sublux PROGRESSING         Long Term Goals (to be achieved within 12 weeks):  Pt will improve RUE motor control so as to be able to use as functional assist in 80% of all daily activities PROGRESSING    Pt will demo with decreased R shoulder sublux to trace for painfree AROM for improved ADL and IADL engagement PROGRESSING   Pt will demo G gross grasp/ release of R hand to be able to  and manipulate ADL items with min difficulty PROGRESSING   Pt will be setup/ MI with ADLs of bathing, dressing and groom tasks PROGRESSING   Pt will be I with HEP ONGOING             SUBJECTIVE      SUBJECTIVE: \"I didn't sleep again last night\"     PATIENT GOAL: \"I want to be able to use my right arm\"  \"I want to go hunting and fishing\"      HISTORY OF PRESENT ILLNESS:     Pt is a 59 y.o. male who was referred to Occupational Therapy s/p  Hemiparesis of right dominant side as late effect of cerebral infarction (HCC) [I69.351]. Pt with h/o CVAs, two in 2020 and one recently in Sept 2024 involving left thalamocapsular region. Pt with residual RUE/LE weakness. Following hospitalization in Sept, pt had subacute rehab, followed by [facility]. Pt now presents to OPOT for cont therapy.         PMH:   Past Medical History:   Diagnosis Date    CVA (cerebral vascular accident) (HCC)     Diabetes mellitus (HCC)     Hypertension        Past Surgical Hx:   Past Surgical History:   Procedure Laterality Date    NO PAST SURGERIES          HOME SETUP/ CLOF: lives with mother; 2 SH; bedroom on 2nd floor; currently sleeping on a hospital bed 1st floor; full bath on 2nd floor; BSC on 1st floor; Tub shower combo     ADLs: (mother provides assist with ADLs)  Showers once a week to once every 2 weeks as shower on 2nd floor; sponge bathes mostly  Bathing: A with LUE; A with buttocks and LE/feet  Dressing: I with OH shirt; A with C style shirt; I with pants; A with B socks/shoes  Toileting: pt now able to complete BSC " transfer and toileting by himself, including CM; still requires A with toilet transfer on 2nd floor toilet due to low height ( was A with toilet transfer and A with hygiene)  Tub transfer: A with lifting LE over edge of tub     IADLs: pt's mother completes     Functional Mobility:  pt using RW and SPC for home mobility; uses w/c for coming to therapy   Pt reporting I with using commode on 1st floor, including CM and hygiene     Work: has own [employer]     DME: transfer tub bench; BSC on 1st floor; lift chair, w/c, hospital bed     Falls: multiple falls at home, none recently     Pain Levels: R shoulder; 0/10           OBJECTIVE         PHYSICAL ASSESSMENT    (+) R shoulder 1 finger sublux   (+) R scapular winging   Has a R surgical sling and R sarah sling       RUE LUE Comments                 UPPER EXTREMITY FXN Impaired Intact Dominant Hand: Right                  UE ROM LUE AROM  RUE PROM  RUE AROM COMMENTS   Shoulder Flex WNL   3/4 range (was 3/4 range) 1/4 range (Was 1/8 range with facilitation)     Shoulder Ext WFL  WFL WFL     Shoulder ABD WFL   3/4 range to WFL (was 3/4 range) 1/3 range to just below 1/2 range (was 1/3 range)     Horizontal ABD WFL  WFL 1/2 range (was 1/2 range)     Horizontal ADD WFL  WFL 1/2 range  (was 1/2 range)      Shoulder IR  WFL   WFL WFL     Shoulder ER WFL  WFL  1/4 range (was able to achieve neutral from IR)    Elbow Flex WFL  WFL 1/2 range (was 1/4 range)    Elbow Ext  WFL  WFL initiated to full with facilitation    Supination  WFL  WFL (was 3/4 range) 3/4 range (was initiated to 1/8 range)    Pronation  WFL  WFL Full range (was 1/4 range)     Wrist Flex WFL  WFL 1/4 range     Wrist Ext WFL  WFL 1/4 range without facilitations and 1/2 range with facilitations (1/4-1/2 range with facilitations)    Finger Flex WFL  WFL 1/4-1/2 range    Finger Ext WFL  WFL Initiated to 1/4 range (was Initiated)    Opposition  WFL  WFL 1/4-1/2 range (was initiated)                                MMT  LUE RUE   Comments   Shoulder Flex/Ext 5/5 2-/5 (was 2-/5)    Shoulder Abd 5/5 2-/5 (was 2-/5)    Shoulder ER 5/5 2-/5 (was 2-/5)    Shoulder IR 5/5 2-/5 (was 2-/5)    Elbow Flex 5/5 2-/5 (was 2-/5)    Elbow Ext 5/5 2-/5 (was 2-/5)    Wrist Flex 5/5 2-/5    Wrist Ext 5/5 2-/5    Gross Grasp 5/5 2-/5 (was 2-/5)           SENSATION LUE RUE Comments   Sharp Dull  Intact Intact    Proprioception Intact Intact    Pain Intact Intact    Hot/Cold Temp Intact Intact      COORDINATION LUE RUE    9 Hole Peg Test NT Unable     Keyhole Peg Test       O'Horacio Finger Dexterity Test       Handwriting (Print)      Handwriting (script)           MODIFIED ZO SCALE (TONE) LUE RUE    No increase in muscle tone (0) 0     Slight Increase in muscle tone with catch and release or min resist at end range (1)  Elbow, fingers    Slight Increase in muscle tone with catch and release, followed by min resistance through remainder of range (1+)      Increased muscle tone through full range, able to be moved easily (2)      Considerable increase in tone, difficult to move (3)      Rigid in Flexion/Extension (4)                  COGNITIVE ASSESSMENT      Cognitive Checklist: Patient indicated that he is experiencing the following symptoms:    Memory: Remembering what people have told you; pt reporting difficulty with remembering details from phone calls     Attention: no deficits     Processing: no deficits     Executive Functions: no deficits      Communication: Word finding in conversation    Visual: no changes     Emotional: emotional lability; difficulty sleeping          VISUAL ASSESSMENT      Comments: (-) glasses     Vision Screen     ROM Intact    Tracking Intact    Saccades Intact    Convergence/ Divergence Intact    Visual Fields  Intact        Today's Session:   NMRE to RUE with use of muscle tapping and vibration to elicit increased motor movement.   -completed isolated target reaching into all planes of sh, elbow, forearm,  wrist and finger movement  -isolated finger flex/ ext/ abduction/ adduction to target, with use of slider to increase ease of movement     Item retrieval and transfer:  -RUE retrieval of beanbags from R side of table and pushing across tabletop to 3 different targets  -R hand gross grasp to retrieve small builder blocks from R side of table and transfer to place on L side of table  -min difficulty with sustained grasp during item transfer   -required min muscle facilitation of vibration to reach target            PLANNED THERAPY INTERVENTIONS:  Therapeutic activity  Therapeutic exercise  Neuromuscular re-education   ADL re-training   IADL re-training  RTW simulations   Manual tx  Hand to target  WBearing strategies   Closed chain activities  Open chain activities       INTERVENTION COMMENTS:  Diagnosis: Hemiparesis of right dominant side as late effect of cerebral infarction (HCC) [I69.351]  Precautions: R sided weakness, fall   Insurance: Payor: [employer] / Plan: [employer] / Product Type: Medicaid HMO /   15 of 16 visits through 7/18/25  POC Expires: 9/25/25      Auth Tracker   DATE 7/3        Visit # 15

## 2025-07-08 ENCOUNTER — OFFICE VISIT (OUTPATIENT)
Facility: CLINIC | Age: 60
End: 2025-07-08
Attending: INTERNAL MEDICINE
Payer: COMMERCIAL

## 2025-07-08 DIAGNOSIS — I69.351 HEMIPARESIS OF RIGHT DOMINANT SIDE AS LATE EFFECT OF CEREBRAL INFARCTION (HCC): Primary | ICD-10-CM

## 2025-07-08 PROCEDURE — 97112 NEUROMUSCULAR REEDUCATION: CPT | Performed by: PHYSICAL THERAPIST

## 2025-07-08 PROCEDURE — 97112 NEUROMUSCULAR REEDUCATION: CPT

## 2025-07-08 PROCEDURE — 97110 THERAPEUTIC EXERCISES: CPT

## 2025-07-08 NOTE — PROGRESS NOTES
Occupational Therapy Daily Note:    Today's date: 2025  Patient name: Wilder Vargas  : 1965  MRN: [id number]  Referring provider: Gilmar Oro MD  Dx:   Encounter Diagnosis   Name Primary?    Hemiparesis of right dominant side as late effect of cerebral infarction (HCC) Yes         Subjective: pt reporting he rolled out of bed when sleeping; no sustained injuries       Objective: Pt engaged in skilled OT treatment session with focus on UE NMR, UE strengthening, UE endurance, and family training/education to increase engagement, tolerance, and independence with daily ADL and IADL tasks.     CPT Code Minutes                                           Task Details        Therapeutic Activity               Neuro Re-Ed  NMRE to RUE with use of vibration to elicit increased motor movement.   -completed isolated target reaching into all planes of sh, elbow, wrist and finger/thumb movement  -isolated finger flex/ ext/ abduction/ adduction to target, with use of slider to increase ease of movement          Item retrieval and transfer:  -RUE retrieval of beanbags from R side of table and pushing across tabletop to 3 different targets; able to complete with decreased use of vibration for muscle cues   -R hand gross grasp to retrieve nerf golf balls from R side of table, lift over low obstacle in the middle of the table and transfer to place into low dish on L side of table  -min/mod facilitations to elicit gross grasp   -min difficulty with sustained grasp during item transfer             Therapeutic Exercise  PROM to RUE completed into all planes to improve ROM and maintain joint integrity      Pt c/o pain in R proximal forearm with supination; (+) R forearm tightness, completed gentle trigger point release and muscle massage to decrease tightness and allow for increased movement            Manual          HEP  : sublux reduction exercises, 5 reps 2x/ day          Assessment: Tolerated treatment well. Pt with  improving motor control of RUE, requiring decreased muscle facilitations at shoulder to reach target. pt would benefit from continued skilled OT.    Plan: Continued skilled OT per POC      INTERVENTION COMMENTS:  Diagnosis: Hemiparesis of right dominant side as late effect of cerebral infarction (HCC) [I69.351]  Precautions: R sided weakness, fall   Insurance: Payor: [employer] / Plan: [employer] / Product Type: Medicaid HMO /   16 of 16 visits through 7/18/25  POC Expires: 9/25/25      Auth Tracker   DATE 7/3 7/8       Visit # 15 16

## 2025-07-08 NOTE — PROGRESS NOTES
Daily Note     Today's date: 2025  Patient name: Wilder Vargas  : 1965  MRN: [id number]  Referring provider: Gilmar Oro MD  Dx:   Encounter Diagnosis     ICD-10-CM    1. Hemiparesis of right dominant side as late effect of cerebral infarction (HCC)  I69.351                         Start Time: 221  Stop Time: 307  Total time in clinic (min): 46 minutes    Subjective:   Patient fell out of bed early this morning, he states he rolled out of bed and was not awake until he hit the floor.  No pain or injury noted.      Objective: See treatment diary below      Assessment:    MAFO options for trial from [facility] including posterior leaf spring with df assist and firmer posterior AFO.  Some heel irritation noted with thausne brace, however, improved toe clearance evident.  Distance ambulation continues to result in increased fatigue.        Plan: Continue per plan of care.   No complaints post treatment, continue to advance stepping and compliant surfaces to tolerance.       Daily Treatment Diary     Precautions: h/o strokes, hemiparesis right, h/o BPPV, DM, HTN      POC Expires Reeval for Medicare to be completed  Unit Limit Auth Expiration Date PT/OT/STVisit Limit   2025 By visit N/A BOMN 2025 N/A    Completed on visit                    Auth Status DATE 1/14 6/24 7/1 7/3 7/8   Approved Visit # 1 [phone]    Remaining  1  10 9   TESTING        TUG 62.5sec PROGRESS NOTE      5x STS 25.9sec       SIMONS 34/56       Gait speed nv       3 min walk test nv               THERAPEUTIC EXERCISE HEP        Codmans         Hamstring stretch-w/ strap HEP        Piriformis stretch supine HEP        Sidelying book stretch         Resisted flex/ext supine         Seated LAQ         QS         QS w/ SLR          SAQ         Bridge w/ add          Supine clams          Supine hip flexor st                                    NEUROMUSCULAR REEDUCATION          Static balance         Gait over steps on compliant foam          Gait with HM/ MAFO- SOLO    Ambulation around busy clinic with std cane 140' x2, 100' with CTG Ambulation shorter distances today due to fatigue,  100' x3 with std cane Ambulation with MAFO options 140' x1, 100' x2, one episode of assist for balance correction   Backward stepping SOLO    1 lap 2 laps 2 laps   Gait in SOLO with perturbation         Large amplitude stepping on colors         Cone to floor color match         Stance on wedge firm with perturbation         Hurdles         Stance reach floor to overhead cone stacking         BITS with sequential taps         Stance with reach BITS         Step taps         Step up         Gait with resistance posterior         Gait without device with MAFO         Gait over hidden obstacles          Modified hill's         Sit to stand with increased forward translation         Sidestepping          Sit to sidelying         Seated forward flex with ext/rotation     Sit to stand with staggered stance 5x4 Sit to stand staggered 5x2   Transition from sit to supine         THERAPEUTIC ACTIVITY         Rolling side to side         Shoe/brace don/doffing                           GAIT TRAINING                                             Manuals        TPR UT, Lev, SCM, subocipitals    VM- right teres major & triceps    Assisted stretch/TPR hamstring release, piriformis release and ITB        Contract relax hamstring/ hip abd              Access Code: [email or web address]  URL: [email or web address]  Date: 06/10/2025  Prepared by: Shelby Crowell    Exercises  - Supine Quad Set  - 1 x daily - 7 x weekly - 3 sets - 10 reps - 5 hold  - Supine Active Straight Leg Raise  - 1 x daily - 7 x weekly - 3 sets - 10 reps  - Supine Bridge with Mini Swiss Ball Between Knees  - 1 x daily - 7 x weekly - 3 sets - 10 reps - 3 hold  - Hooklying Clamshell with Resistance  - 1 x daily - 7 x weekly - 3 sets - 10 reps - 5 hold  - Supine Short Arc Quad  - 1 x daily - 7 x weekly - 3 sets - 10  reps - 5 hold  - Seated Long Arc Quad  - 1 x daily - 7 x weekly - 3 sets - 10 reps - 5 hold  - Modified Marques Stretch  - 2 x daily - 7 x weekly - 1 sets - 3 reps - 30 hold  - Supine Piriformis Stretch with Foot on Ground  - 2 x daily - 7 x weekly - 1 sets - 3 reps - 30 hold  - Seated Hamstring Stretch  - 2 x daily - 7 x weekly - 1 sets - 3 reps - 30 hold

## 2025-07-10 ENCOUNTER — OFFICE VISIT (OUTPATIENT)
Facility: CLINIC | Age: 60
End: 2025-07-10
Attending: INTERNAL MEDICINE
Payer: COMMERCIAL

## 2025-07-10 DIAGNOSIS — I69.351 HEMIPARESIS OF RIGHT DOMINANT SIDE AS LATE EFFECT OF CEREBRAL INFARCTION (HCC): Primary | ICD-10-CM

## 2025-07-10 PROCEDURE — 97110 THERAPEUTIC EXERCISES: CPT

## 2025-07-10 PROCEDURE — 97140 MANUAL THERAPY 1/> REGIONS: CPT | Performed by: PHYSICAL THERAPIST

## 2025-07-10 PROCEDURE — 97112 NEUROMUSCULAR REEDUCATION: CPT

## 2025-07-10 PROCEDURE — 97112 NEUROMUSCULAR REEDUCATION: CPT | Performed by: PHYSICAL THERAPIST

## 2025-07-10 NOTE — PROGRESS NOTES
Occupational Therapy Daily Note:    Today's date: 7/10/2025  Patient name: Wilder Vargas  : 1965  MRN: [id number]  Referring provider: Gilmar Oro MD  Dx:   Encounter Diagnosis   Name Primary?    Hemiparesis of right dominant side as late effect of cerebral infarction (HCC) Yes         Subjective: pt reporting he rolled out of bed when sleeping; no sustained injuries       Objective: Pt engaged in skilled OT treatment session with focus on UE NMR, UE strengthening, UE endurance, and family training/education to increase engagement, tolerance, and independence with daily ADL and IADL tasks.     CPT Code Minutes                                           Task Details        Therapeutic Activity               Neuro Re-Ed  NMRE to RUE with use of vibration to elicit increased motor movement.   -completed isolated target reaching into all planes of sh, elbow, wrist and finger/thumb movement  -isolated finger flex/ ext/ abduction/ adduction to target, with use of slider to increase ease of movement          Item retrieval and transfer:  -RUE retrieval of beanbags from R side of table and pushing across tabletop to 3 different targets; able to complete with min use of vibration for muscle cues   -R hand gross grasp to retrieve small blocks from R side of table and transfer to place on large target on L side of table  -mod facilitations to elicit gross grasp   -min difficulty with sustained grasp during item transfer             Therapeutic Exercise  PROM to RUE completed into all planes to improve ROM and maintain joint integrity      Pt c/o pain in R proximal forearm with supination and R biceps; (+) R forearm tightness, completed gentle trigger point release and muscle massage to decrease tightness and allow for increased movement; pt reporting decreased tightness and increase ease of movement after intervention             Manual          HEP  : sublux reduction exercises, 5 reps 2x/ day           Assessment: Tolerated treatment well. Pt cont to report RUE tightness in forearm and biceps during functional reach. Pt reporting decreased pain and increased  ease of movement after muscle massage and trigger point release. pt would benefit from continued skilled OT.    Plan: Continued skilled OT per POC      INTERVENTION COMMENTS:  Diagnosis: Hemiparesis of right dominant side as late effect of cerebral infarction (HCC) [I69.351]  Precautions: R sided weakness, fall   Insurance: Payor: [employer] / Plan: [employer] / Product Type: Medicaid HMO /   1 of 16 visits through 7/18/25  POC Expires: 9/25/25      Auth Tracker   DATE 7/3 7/8 7/10      Visit # 15 16 17

## 2025-07-10 NOTE — PROGRESS NOTES
Daily Note     Today's date: 7/10/2025  Patient name: Wilder Vargas  : 1965  MRN: [id number]  Referring provider: Gilmar Oro MD  Dx:   Encounter Diagnosis     ICD-10-CM    1. Hemiparesis of right dominant side as late effect of cerebral infarction (HCC)  I69.351                           Start Time: 0215  Stop Time: 0300  Total time in clinic (min): 45 minutes    Subjective:   No present complaints.        Objective: See treatment diary below      Assessment:    Added increased sock density with alleviation of heel pain on first posterior leaf spring.  Df assist continues to be beneficial in toe clearance in swing.  Distance ambulation with overall decreased fatigue evident.  Difficulty noted with sit to stand off lower surface with increased cervical strain post attempts.  STM with alleviation of cervical soreness.  Anticipated meeting with Shannan next session to further assess brace adjustment.       Plan: Continue per plan of care.   No complaints post treatment, continue to advance stepping and compliant surfaces to tolerance.  Consider further strengthening for sit to stand from lower surfaces.     Daily Treatment Diary     Precautions: h/o strokes, hemiparesis right, h/o BPPV, DM, HTN      POC Expires Reeval for Medicare to be completed  Unit Limit Auth Expiration Date PT/OT/STVisit Limit   2025 By visit N/A BOMN 2025 N/A    Completed on visit                    Auth Status DATE 1/14 6/24 7/3 7/8 7/10   Approved Visit # 1 [phone]    Remaining  1 10 9 8   TESTING        TUG 62.5sec PROGRESS NOTE      5x STS 25.9sec       SIMONS 34/56       Gait speed nv       3 min walk test nv               THERAPEUTIC EXERCISE HEP        Codmans         Hamstring stretch-w/ strap HEP        Piriformis stretch supine HEP        Sidelying book stretch         Resisted flex/ext supine         Seated LAQ         QS         QS w/ SLR          SAQ         Bridge w/ add          Supine clams          Supine hip  flexor st                                    NEUROMUSCULAR REEDUCATION          Static balance         Gait over steps on compliant foam         Gait with HM/ MAFO- SOLO    Ambulation shorter distances today due to fatigue,  100' x3 with std cane Ambulation with MAFO options 140' x1, 100' x2, one episode of assist for balance correction Ambulation with MAFO options 140' x1, 100' x2; improved comfort with increased sock layer   Backward stepping SOLO    2 laps 2 laps 2 laps   Gait in SOLO with perturbation         Large amplitude stepping on colors         Cone to floor color match         Stance on wedge firm with perturbation         Hurdles         Stance reach floor to overhead cone stacking         BITS with sequential taps         Stance with reach BITS         Step taps         Step up         Gait with resistance posterior         Gait without device with MAFO         Gait over hidden obstacles          Modified hill's         Sit to stand with increased forward translation         Sidestepping          Sit to sidelying         Seated forward flex with ext/rotation    Sit to stand with staggered stance 5x4 Sit to stand staggered 5x2 Sit to stand from lower x3   Transition from sit to supine         THERAPEUTIC ACTIVITY         Rolling side to side         Shoe/brace don/doffing                           GAIT TRAINING                                             Manuals        TPR UT, Lev, SCM, subocipitals   VM- right teres major & triceps  VM- right SCM, suboccipitals   Assisted stretch/TPR hamstring release, piriformis release and ITB        Contract relax hamstring/ hip abd              Access Code: [email or web address]  URL: [email or web address]  Date: 06/10/2025  Prepared by: Shelby Crowell    Exercises  - Supine Quad Set  - 1 x daily - 7 x weekly - 3 sets - 10 reps - 5 hold  - Supine Active Straight Leg Raise  - 1 x daily - 7 x weekly - 3 sets - 10 reps  - Supine Bridge with Mini Swiss Ball Between  Knees  - 1 x daily - 7 x weekly - 3 sets - 10 reps - 3 hold  - Hooklying Clamshell with Resistance  - 1 x daily - 7 x weekly - 3 sets - 10 reps - 5 hold  - Supine Short Arc Quad  - 1 x daily - 7 x weekly - 3 sets - 10 reps - 5 hold  - Seated Long Arc Quad  - 1 x daily - 7 x weekly - 3 sets - 10 reps - 5 hold  - Modified Marques Stretch  - 2 x daily - 7 x weekly - 1 sets - 3 reps - 30 hold  - Supine Piriformis Stretch with Foot on Ground  - 2 x daily - 7 x weekly - 1 sets - 3 reps - 30 hold  - Seated Hamstring Stretch  - 2 x daily - 7 x weekly - 1 sets - 3 reps - 30 hold

## 2025-07-15 ENCOUNTER — OFFICE VISIT (OUTPATIENT)
Facility: CLINIC | Age: 60
End: 2025-07-15
Attending: INTERNAL MEDICINE
Payer: COMMERCIAL

## 2025-07-15 ENCOUNTER — TELEPHONE (OUTPATIENT)
Age: 60
End: 2025-07-15

## 2025-07-15 DIAGNOSIS — F32.2 CURRENT SEVERE EPISODE OF MAJOR DEPRESSIVE DISORDER WITHOUT PSYCHOTIC FEATURES WITHOUT PRIOR EPISODE (HCC): ICD-10-CM

## 2025-07-15 DIAGNOSIS — E11.29 TYPE 2 DIABETES MELLITUS WITH DIABETIC MICROALBUMINURIA, WITH LONG-TERM CURRENT USE OF INSULIN (HCC): Primary | ICD-10-CM

## 2025-07-15 DIAGNOSIS — R80.9 TYPE 2 DIABETES MELLITUS WITH DIABETIC MICROALBUMINURIA, WITH LONG-TERM CURRENT USE OF INSULIN (HCC): Primary | ICD-10-CM

## 2025-07-15 DIAGNOSIS — Z79.4 TYPE 2 DIABETES MELLITUS WITH DIABETIC MICROALBUMINURIA, WITH LONG-TERM CURRENT USE OF INSULIN (HCC): Primary | ICD-10-CM

## 2025-07-15 DIAGNOSIS — I69.351 HEMIPARESIS OF RIGHT DOMINANT SIDE AS LATE EFFECT OF CEREBRAL INFARCTION (HCC): Primary | ICD-10-CM

## 2025-07-15 DIAGNOSIS — I69.351 HEMIPARESIS OF RIGHT DOMINANT SIDE AS LATE EFFECT OF CEREBRAL INFARCTION (HCC): ICD-10-CM

## 2025-07-15 PROCEDURE — 97140 MANUAL THERAPY 1/> REGIONS: CPT | Performed by: PHYSICAL THERAPIST

## 2025-07-15 PROCEDURE — 97112 NEUROMUSCULAR REEDUCATION: CPT | Performed by: PHYSICAL THERAPIST

## 2025-07-15 PROCEDURE — 97112 NEUROMUSCULAR REEDUCATION: CPT

## 2025-07-15 PROCEDURE — 97110 THERAPEUTIC EXERCISES: CPT

## 2025-07-16 ENCOUNTER — TELEMEDICINE (OUTPATIENT)
Dept: FAMILY MEDICINE CLINIC | Facility: HOSPITAL | Age: 60
End: 2025-07-16
Payer: COMMERCIAL

## 2025-07-16 VITALS — WEIGHT: 180 LBS | HEIGHT: 68 IN | BODY MASS INDEX: 27.28 KG/M2

## 2025-07-16 DIAGNOSIS — E11.40 TYPE 2 DIABETES MELLITUS WITH DIABETIC NEUROPATHY, WITH LONG-TERM CURRENT USE OF INSULIN (HCC): Primary | ICD-10-CM

## 2025-07-16 DIAGNOSIS — Z79.4 TYPE 2 DIABETES MELLITUS WITH DIABETIC NEUROPATHY, WITH LONG-TERM CURRENT USE OF INSULIN (HCC): Primary | ICD-10-CM

## 2025-07-16 DIAGNOSIS — F32.2 CURRENT SEVERE EPISODE OF MAJOR DEPRESSIVE DISORDER WITHOUT PSYCHOTIC FEATURES WITHOUT PRIOR EPISODE (HCC): ICD-10-CM

## 2025-07-16 PROCEDURE — 99212 OFFICE O/P EST SF 10 MIN: CPT | Performed by: INTERNAL MEDICINE

## 2025-07-16 RX ORDER — NORTRIPTYLINE HYDROCHLORIDE 10 MG/1
10 CAPSULE ORAL
Qty: 90 CAPSULE | Refills: 1 | Status: SHIPPED | OUTPATIENT
Start: 2025-07-16

## 2025-07-16 RX ORDER — NORTRIPTYLINE HYDROCHLORIDE 10 MG/1
10 CAPSULE ORAL
Qty: 30 CAPSULE | Refills: 1 | OUTPATIENT
Start: 2025-07-16

## 2025-07-16 NOTE — ASSESSMENT & PLAN NOTE
Lab Results   Component Value Date    HGBA1C 7.7 (A) 05/07/2025   Patient has type 2 diabetes mellitus with neuropathy.  A1c was acceptable at 7.7 in May.  I was not able to establish video connection with patient today therefore we had a phone conversation only.    Patient requested referral to podiatry as per the recommendation of physical therapy.  He denies ulcers or skin breakdown of the feet but would like to see podiatry.  I gave referral.    Have A1c done in August and I will see him in September for follow-up    Orders:  •  Ambulatory Referral to Podiatry; Future

## 2025-07-16 NOTE — PROGRESS NOTES
Virtual Brief Visit  Name: Wilder Vargas      : 1965      MRN: [id number]  Encounter Provider: Gilmar Oro MD  Encounter Date: 2025   Encounter department: [facility] [address]  :  Assessment & Plan  Type 2 diabetes mellitus with diabetic neuropathy, with long-term current use of insulin (HCC)    Lab Results   Component Value Date    HGBA1C 7.7 (A) 2025   Patient has type 2 diabetes mellitus with neuropathy.  A1c was acceptable at 7.7 in May.  I was not able to establish video connection with patient today therefore we had a phone conversation only.    Patient requested referral to podiatry as per the recommendation of physical therapy.  He denies ulcers or skin breakdown of the feet but would like to see podiatry.  I gave referral.    Have A1c done in August and I will see him in September for follow-up    Orders:  •  Ambulatory Referral to Podiatry; Future    Current severe episode of major depressive disorder without psychotic features without prior episode (HCC)  He feels that his depression is much better on nortriptyline 10 mg daily.  He denies confusion, somnolence, dry mouth.  He has some constipation for which she takes Colace.  He will continue nortriptyline 10 mg daily because his moods are much better controlled      Orders:  •  nortriptyline (PAMELOR) 10 mg capsule; Take 1 capsule (10 mg total) by mouth daily at bedtime        History of Present Illness   HPI    Administrative Statements   Encounter provider Gilmar Oro MD    The Patient is located at Home and in the following state in which I hold an active license [place].    The patient was identified by name and date of birth. Wilder Vargas was informed that this is a telemedicine visit and that the visit is being conducted through the Epic Embedded platform. He agrees to proceed..  My office door was closed. No one else was in the room.  He acknowledged consent and understanding of privacy and security of  the video platform. The patient has agreed to participate and understands they can discontinue the visit at any time.    I have spent a total time of 10 minutes in caring for this patient on the day of the visit/encounter including Documenting in the medical record and Reviewing/placing orders in the medical record (including tests, medications, and/or procedures), not including the time spent for establishing the audio/video connection.

## 2025-07-16 NOTE — ASSESSMENT & PLAN NOTE
He feels that his depression is much better on nortriptyline 10 mg daily.  He denies confusion, somnolence, dry mouth.  He has some constipation for which she takes Colace.  He will continue nortriptyline 10 mg daily because his moods are much better controlled      Orders:  •  nortriptyline (PAMELOR) 10 mg capsule; Take 1 capsule (10 mg total) by mouth daily at bedtime

## 2025-07-17 ENCOUNTER — OFFICE VISIT (OUTPATIENT)
Facility: CLINIC | Age: 60
End: 2025-07-17
Attending: INTERNAL MEDICINE
Payer: COMMERCIAL

## 2025-07-17 DIAGNOSIS — I69.351 HEMIPARESIS OF RIGHT DOMINANT SIDE AS LATE EFFECT OF CEREBRAL INFARCTION (HCC): Primary | ICD-10-CM

## 2025-07-17 PROCEDURE — 97112 NEUROMUSCULAR REEDUCATION: CPT

## 2025-07-17 PROCEDURE — 97110 THERAPEUTIC EXERCISES: CPT

## 2025-07-17 NOTE — PROGRESS NOTES
Occupational Therapy Daily Note:    Today's date: 2025  Patient name: Wilder Vargas  : 1965  MRN: [id number]  Referring provider: Gilmar Oro MD  Dx:   Encounter Diagnosis   Name Primary?    Hemiparesis of right dominant side as late effect of cerebral infarction (HCC) Yes         Subjective: no new complaints       Objective: Pt engaged in skilled OT treatment session with focus on UE NMR, UE strengthening, UE endurance, and family training/education to increase engagement, tolerance, and independence with daily ADL and IADL tasks.     CPT Code Minutes                                           Task Details        Therapeutic Activity               Neuro Re-Ed  NMRE to RUE with use of vibration to elicit increased motor movement.   -completed isolated target reaching into all planes of sh, elbow, wrist and finger/thumb movement  -isolated finger flex/ ext/ abduction/ adduction to target, with use of slider to increase ease of movement          Item retrieval and transfer:  -RUE retrieval of medium sized foam blocks from R side of table, lifting over low obstacle in the middle of the table and transferring to target on L side of table  -min use of vibration to elicit improved grasp during retrieval and transfer             Therapeutic Exercise  PROM to RUE completed into all planes to improve ROM and maintain joint integrity      (+) R forearm tightness and pain with supination, completed Graston technique, gentle trigger point release and muscle massage to forearm and upper arm to decrease tightness and allow for increased movement             Manual          HEP  : sublux reduction exercises, 5 reps 2x/ day          Assessment: Tolerated treatment well. Pt reporting Graston technique provided relief of muscle tightness; completed prior to movement execution. Pt with improving motor patterns of functional reach.  pt would benefit from continued skilled OT.    Plan: Continued skilled OT per  POC      INTERVENTION COMMENTS:  Diagnosis: Hemiparesis of right dominant side as late effect of cerebral infarction (HCC) [I69.351]  Precautions: R sided weakness, fall   Insurance: Payor: [employer] / Plan: [employer] / Product Type: Medicaid HMO /   3 of 12 visits through 10/31/25  POC Expires: 9/25/25      Auth Tracker   DATE 7/17           Visit # 3

## 2025-07-17 NOTE — PROGRESS NOTES
Daily Note     Today's date: 2025  Patient name: Wilder Vargas  : 1965  MRN: [id number]  Referring provider: Gilmar Oro MD  Dx:   Encounter Diagnosis     ICD-10-CM    1. Hemiparesis of right dominant side as late effect of cerebral infarction (HCC)  I69.351           Start Time: 1415  Stop Time: 1500  Total time in clinic (min): 45 minutes    Subjective: R arm sore, no neck pain. Explained stroke history, and previous therapy experiences since the most recent stroke.      Objective: See treatment diary below      Assessment: Did well walking forwards with no LOB. Instability with backwards and side ways stepping. 5lb weight challenged foot clearance as activity continued dragging toes the last 15ft. Patient would benefit from continued PT      Plan: Continue per plan of care.  Continue to advance stepping and compliant surfaces to tolerance.  Consider further strengthening for sit to stand from lower surfaces.     Daily Treatment Diary     Precautions: h/o strokes, hemiparesis right, h/o BPPV, DM, HTN      POC Expires Reeval for Medicare to be completed  Unit Limit Auth Expiration Date PT/OT/STVisit Limit   2025 By visit N/A BOMN 2025 N/A    Completed on visit                    Auth Status DATE 1/14 6/24 7/8 7/10 7/15 7/17   Approved Visit # [id number] [phone]    Remaining  1 9 8 7 6   TESTING         TUG 62.5sec PROGRESS NOTE       5x STS 25.9sec        SIMONS 34/56        Gait speed nv        3 min walk test nv                 THERAPEUTIC EXERCISE HEP         Codmans          Hamstring stretch-w/ strap HEP         Piriformis stretch supine HEP         Sidelying book stretch          Resisted flex/ext supine          Seated LAQ          QS          QS w/ SLR           SAQ          Bridge w/ add           Supine clams           Supine hip flexor st                                        NEUROMUSCULAR REEDUCATION           Static balance          Gait over steps on compliant foam          Gait  with HM/ MAFO- SOLO    Ambulation with MAFO options 140' x1, 100' x2, one episode of assist for balance correction Ambulation with MAFO options 140' x1, 100' x2; improved comfort with increased sock layer Ambulation with MAFO options 100'x3, standing weight shift x10 Ambulation with MAFO [id number], 20ftx2, fwd, SPC SOLO    Ambulation with MAFO, 5lb R ankle, 80ft, SPC SOLO   Backward stepping SOLO    2 laps 2 laps 2 laps 40ft SPC SOLO   Gait in SOLO with perturbation          Large amplitude stepping on colors          Cone to floor color match          Stance on wedge firm with perturbation          Hurdles          Stance reach floor to overhead cone stacking          BITS with sequential taps          Stance with reach BITS          Step taps          Step up          Gait with resistance posterior          Gait without device with MAFO          Gait over hidden obstacles           Modified hill's          Sit to stand with increased forward translation          Sidestepping        1x20ft L leading with MAFO, SOLO, SPC   Sit to sidelying          Seated forward flex with ext/rotation    Sit to stand staggered 5x2 Sit to stand from lower x3     Transition from sit to supine          THERAPEUTIC ACTIVITY          Rolling side to side          Shoe/brace don/doffing                              GAIT TRAINING                                                  Manuals         TPR UT, Lev, SCM, subocipitals    VM- right SCM, suboccipitals VM- SOR    Assisted stretch/TPR hamstring release, piriformis release and ITB         Contract relax hamstring/ hip abd               Access Code: [email or web address]  URL: [email or web address]  Date: 06/10/2025  Prepared by: Shelby Crowell    Exercises  - Supine Quad Set  - 1 x daily - 7 x weekly - 3 sets - 10 reps - 5 hold  - Supine Active Straight Leg Raise  - 1 x daily - 7 x weekly - 3 sets - 10 reps  - Supine Bridge with Mini Swiss Ball Between Knees  - 1 x daily - 7 x weekly - 3  sets - 10 reps - 3 hold  - Hooklying Clamshell with Resistance  - 1 x daily - 7 x weekly - 3 sets - 10 reps - 5 hold  - Supine Short Arc Quad  - 1 x daily - 7 x weekly - 3 sets - 10 reps - 5 hold  - Seated Long Arc Quad  - 1 x daily - 7 x weekly - 3 sets - 10 reps - 5 hold  - Modified Marques Stretch  - 2 x daily - 7 x weekly - 1 sets - 3 reps - 30 hold  - Supine Piriformis Stretch with Foot on Ground  - 2 x daily - 7 x weekly - 1 sets - 3 reps - 30 hold  - Seated Hamstring Stretch  - 2 x daily - 7 x weekly - 1 sets - 3 reps - 30 hold

## 2025-07-22 ENCOUNTER — OFFICE VISIT (OUTPATIENT)
Facility: CLINIC | Age: 60
End: 2025-07-22
Attending: INTERNAL MEDICINE
Payer: COMMERCIAL

## 2025-07-22 DIAGNOSIS — I69.351 HEMIPARESIS OF RIGHT DOMINANT SIDE AS LATE EFFECT OF CEREBRAL INFARCTION (HCC): Primary | ICD-10-CM

## 2025-07-22 PROCEDURE — 97112 NEUROMUSCULAR REEDUCATION: CPT

## 2025-07-22 PROCEDURE — 97110 THERAPEUTIC EXERCISES: CPT

## 2025-07-22 PROCEDURE — 97112 NEUROMUSCULAR REEDUCATION: CPT | Performed by: PHYSICAL THERAPIST

## 2025-07-22 NOTE — PROGRESS NOTES
Daily Note     Today's date: 2025  Patient name: Wilder Vargas  : 1965  MRN: [id number]  Referring provider: Gilmar Oro MD  Dx:   Encounter Diagnosis     ICD-10-CM    1. Hemiparesis of right dominant side as late effect of cerebral infarction (HCC)  I69.351             Start Time: 0220  Stop Time: 0300  Total time in clinic (min): 40 minutes    Subjective:   He slept better last night, no current complaints.        Objective: See treatment diary below      Assessment:   Outside ambulation with curbs, assist needed to catch his balance down curb with left lead, left foot did not completely clear.  Stepping up with improved stability with right UE support.  Step up in //bars to assist with improved weight shift over right LE for left LE placement, improving control with reps.        Plan: Continue per plan of care.  Continue to advance stepping and compliant surfaces to tolerance.       Daily Treatment Diary     Precautions: h/o strokes, hemiparesis right, h/o BPPV, DM, HTN      POC Expires Reeval for Medicare to be completed  Unit Limit Auth Expiration Date PT/OT/STVisit Limit   2025 By visit N/A BOMN 2025 N/A    Completed on visit                    Auth Status DATE 1/14 6/24 7/15 7/17 7/22   Approved Visit # [phone]    Remaining  1 7 6 5   TESTING        TUG 62.5sec PROGRESS NOTE      5x STS 25.9sec       SIMONS 34/56       Gait speed nv       3 min walk test nv               THERAPEUTIC EXERCISE HEP        Codmans         Hamstring stretch-w/ strap HEP        Piriformis stretch supine HEP        Sidelying book stretch         Resisted flex/ext supine         Seated LAQ         QS         QS w/ SLR          SAQ         Bridge w/ add          Supine clams          Supine hip flexor st                                    NEUROMUSCULAR REEDUCATION          Static balance         Gait over steps on compliant foam         Gait with HM/ MAFO- SOLO    Ambulation with MAFO options 100'x3,  standing weight shift x10 Ambulation with MAFO [id number], 20ftx2, fwd, SPC SOLO    Ambulation with MAFO, 5lb R ankle, 80ft, SPC SOLO Ambulation outside MAFO 250' with CTG, assist on curb down with balance correction, up with CTG to light min assist with std cane   Backward stepping SOLO    2 laps 40ft SPC SOLO 40' x4 laps CTG    Gait in SOLO with perturbation         Large amplitude stepping on colors         Cone to floor color match         Stance on wedge firm with perturbation         Hurdles         Stance reach floor to overhead cone stacking         BITS with sequential taps         Stance with reach BITS         Step taps      3 rise in // bars assist intermittently for right lift and for weight shift right for left clearance, 5x4   Step up         Gait with resistance posterior         Gait without device with MAFO         Gait over hidden obstacles          Modified hill's         Sit to stand with increased forward translation         Sidestepping      1x20ft L leading with MAFO, SOLO, SPC    Sit to sidelying         Seated forward flex with ext/rotation         Transition from sit to supine         THERAPEUTIC ACTIVITY         Rolling side to side         Shoe/brace don/doffing                           GAIT TRAINING                                             Manuals        TPR UT, Lev, SCM, subocipitals   VM- SOR     Assisted stretch/TPR hamstring release, piriformis release and ITB        Contract relax hamstring/ hip abd              Access Code: [email or web address]  URL: [email or web address]  Date: 06/10/2025  Prepared by: Shelby Crowell    Exercises  - Supine Quad Set  - 1 x daily - 7 x weekly - 3 sets - 10 reps - 5 hold  - Supine Active Straight Leg Raise  - 1 x daily - 7 x weekly - 3 sets - 10 reps  - Supine Bridge with Mini Swiss Ball Between Knees  - 1 x daily - 7 x weekly - 3 sets - 10 reps - 3 hold  - Hooklying Clamshell with Resistance  - 1 x daily - 7 x weekly - 3 sets - 10 reps - 5 hold  -  Supine Short Arc Quad  - 1 x daily - 7 x weekly - 3 sets - 10 reps - 5 hold  - Seated Long Arc Quad  - 1 x daily - 7 x weekly - 3 sets - 10 reps - 5 hold  - Modified Marques Stretch  - 2 x daily - 7 x weekly - 1 sets - 3 reps - 30 hold  - Supine Piriformis Stretch with Foot on Ground  - 2 x daily - 7 x weekly - 1 sets - 3 reps - 30 hold  - Seated Hamstring Stretch  - 2 x daily - 7 x weekly - 1 sets - 3 reps - 30 hold

## 2025-07-22 NOTE — PROGRESS NOTES
"Occupational Therapy Daily Note:    Today's date: 2025  Patient name: Wilder Vargas  : 1965  MRN: [id number]  Referring provider: Gilmar Oro MD  Dx:   Encounter Diagnosis   Name Primary?    Hemiparesis of right dominant side as late effect of cerebral infarction (HCC) Yes         Subjective: \"Tata been sleeping a lot better at night.\"       Objective: Pt engaged in skilled OT treatment session with focus on UE NMR, UE strengthening, UE endurance, and family training/education to increase engagement, tolerance, and independence with daily ADL and IADL tasks.     CPT Code Minutes                                           Task Details        Therapeutic Activity               Neuro Re-Ed  NMRE to RUE with use of tapping  to elicit increased motor movement.   -completed isolated target reaching into all planes of sh, elbow, wrist and finger/thumb movement  -isolated finger flex/ ext/ abduction/ adduction to target, with use of slider to increase ease of movement                      Therapeutic Exercise  PROM to RUE completed into all planes to improve ROM and maintain joint integrity      (+) R forearm tightness and pain with supination, completed Graston technique to forearm and upper arm to decrease tightness and allow for increased movement             Manual          HEP  : sublux reduction exercises, 5 reps 2x/ day          Assessment: Tolerated treatment well. Pt continues to report Graston technique provided relief of muscle tightness; completed prior to movement execution. Pt with improving motor patterns of functional reach.  pt would benefit from continued skilled OT.    Plan: Continued skilled OT per POC      INTERVENTION COMMENTS:  Diagnosis: Hemiparesis of right dominant side as late effect of cerebral infarction (HCC) [I69.351]  Precautions: R sided weakness, fall   Insurance: Payor: [employer] / Plan: [employer] / Product Type: Medicaid O /   4 of 12 visits through " 10/31/25  POC Expires: 9/25/25      Auth Tracker   DATE 7/17 7/22          Visit # 3 4

## 2025-07-24 ENCOUNTER — OFFICE VISIT (OUTPATIENT)
Facility: CLINIC | Age: 60
End: 2025-07-24
Attending: INTERNAL MEDICINE
Payer: COMMERCIAL

## 2025-07-24 DIAGNOSIS — I69.351 HEMIPARESIS OF RIGHT DOMINANT SIDE AS LATE EFFECT OF CEREBRAL INFARCTION (HCC): Primary | ICD-10-CM

## 2025-07-24 PROCEDURE — 97112 NEUROMUSCULAR REEDUCATION: CPT

## 2025-07-24 PROCEDURE — 97112 NEUROMUSCULAR REEDUCATION: CPT | Performed by: PHYSICAL THERAPIST

## 2025-07-24 PROCEDURE — 97110 THERAPEUTIC EXERCISES: CPT

## 2025-07-24 NOTE — PROGRESS NOTES
Daily Note     Today's date: 2025  Patient name: Wilder Vargas  : 1965  MRN: [id number]  Referring provider: Gilmar Oro MD  Dx:   Encounter Diagnosis     ICD-10-CM    1. Hemiparesis of right dominant side as late effect of cerebral infarction (HCC)  I69.351               Start Time: 217  Stop Time: 0300  Total time in clinic (min): 43 minutes    Subjective:   No current complaints, brace padding has been coming loose.  Still awaiting new AFO.        Objective: See treatment diary below      Assessment:   Used trial AFO t/o session, distance walking with improved clearance, however, with distracted mental focus increased toe catch noted.  Turns and backward stepping with improving confidence and less hesitation.  Continue to encourage increased gait at home.        Plan: Continue per plan of care.  Continue to advance stepping and compliant surfaces to tolerance.  Consider return to SOLO to allow for increased step challenge with decreased CTG.     Daily Treatment Diary     Precautions: h/o strokes, hemiparesis right, h/o BPPV, DM, HTN      POC Expires Reeval for Medicare to be completed  Unit Limit Auth Expiration Date PT/OT/STVisit Limit   2025 By visit N/A BOMN 2025 N/A    Completed on visit                    Auth Status DATE    Approved Visit # [phone]    Remaining  1 6 5 4   TESTING        TUG 62.5sec PROGRESS NOTE      5x STS 25.9sec       SIMONS 34/56       Gait speed nv       3 min walk test nv               THERAPEUTIC EXERCISE HEP        Codmans         Hamstring stretch-w/ strap HEP        Piriformis stretch supine HEP        Sidelying book stretch         Resisted flex/ext supine         Seated LAQ         QS         QS w/ SLR          SAQ         Bridge w/ add          Supine clams          Supine hip flexor st                                    NEUROMUSCULAR REEDUCATION          Static balance         Gait over steps on compliant foam         Gait  with HM/ MAFO- SOLO    Ambulation with MAFO [id number], 20ftx2, fwd, SPC SOLO    Ambulation with MAFO, 5lb R ankle, 80ft, SPC SOLO Ambulation outside MAFO 250' with CTG, assist on curb down with balance correction, up with CTG to light min assist with std cane Ambulation inside due to high outside temp, ', repeated 100' x2 with mental distraction   Backward stepping SOLO    40ft SPC SOLO 40' x4 laps CTG  40'x2 CTG   Gait in SOLO with perturbation         Large amplitude stepping on colors      Step out and back 5x2, 2 sets   Cone to floor color match         Stance on wedge firm with perturbation         Hurdles         Stance reach floor to overhead cone stacking         BITS with sequential taps         Stance with reach BITS         Step taps     3 rise in // bars assist intermittently for right lift and for weight shift right for left clearance, 5x4    Step up         Gait with resistance posterior         Gait without device with MAFO         Gait over hidden obstacles          Modified hill's         Sit to stand with increased forward translation         Sidestepping     1x20ft L leading with MAFO, SOLO, SPC     Sit to sidelying         Seated forward flex with ext/rotation         Transition from sit to supine         THERAPEUTIC ACTIVITY         Rolling side to side         Shoe/brace don/doffing                           GAIT TRAINING                                             Manuals        TPR UT, Lev, SCM, subocipitals        Assisted stretch/TPR hamstring release, piriformis release and ITB        Contract relax hamstring/ hip abd              Access Code: [email or web address]  URL: [email or web address]  Date: 06/10/2025  Prepared by: Shelby Crowell    Exercises  - Supine Quad Set  - 1 x daily - 7 x weekly - 3 sets - 10 reps - 5 hold  - Supine Active Straight Leg Raise  - 1 x daily - 7 x weekly - 3 sets - 10 reps  - Supine Bridge with Mini Swiss Ball Between Knees  - 1 x daily - 7 x weekly - 3  sets - 10 reps - 3 hold  - Hooklying Clamshell with Resistance  - 1 x daily - 7 x weekly - 3 sets - 10 reps - 5 hold  - Supine Short Arc Quad  - 1 x daily - 7 x weekly - 3 sets - 10 reps - 5 hold  - Seated Long Arc Quad  - 1 x daily - 7 x weekly - 3 sets - 10 reps - 5 hold  - Modified Marques Stretch  - 2 x daily - 7 x weekly - 1 sets - 3 reps - 30 hold  - Supine Piriformis Stretch with Foot on Ground  - 2 x daily - 7 x weekly - 1 sets - 3 reps - 30 hold  - Seated Hamstring Stretch  - 2 x daily - 7 x weekly - 1 sets - 3 reps - 30 hold

## 2025-07-24 NOTE — PROGRESS NOTES
Occupational Therapy Daily Note:    Today's date: 2025  Patient name: Wilder Vargas  : 1965  MRN: [id number]  Referring provider: Gilmar Oro MD  Dx:   Encounter Diagnosis   Name Primary?    Hemiparesis of right dominant side as late effect of cerebral infarction (HCC) Yes         Subjective: no new complaints      Objective: Pt engaged in skilled OT treatment session with focus on UE NMR, UE strengthening, UE endurance, and family training/education to increase engagement, tolerance, and independence with daily ADL and IADL tasks.     CPT Code Minutes                                           Task Details        Therapeutic Activity               Neuro Re-Ed  NMRE to RUE with use of tapping  to elicit increased motor movement.   -completed isolated target reaching into all planes of sh, elbow, wrist and finger/thumb movement  -isolated finger flex/ ext/ abduction/ adduction to target, with use of slider to increase ease of movement                      Therapeutic Exercise  PROM to RUE completed into all planes to improve ROM and maintain joint integrity      (+) R forearm tightness and pain with supination, completed Graston technique, trigger point and massage to forearm and upper arm to decrease tightness and allow for increased movement             Manual          HEP  : sublux reduction exercises, 5 reps 2x/ day          Assessment: Tolerated treatment well. Pt continues to report Graston technique provided relief of muscle tightness; completed prior to movement execution. Pt with improving motor patterns of functional reach.  pt would benefit from continued skilled OT.    Plan: Continued skilled OT per POC      INTERVENTION COMMENTS:  Diagnosis: Hemiparesis of right dominant side as late effect of cerebral infarction (HCC) [I69.351]  Precautions: R sided weakness, fall   Insurance: Payor: [employer] / Plan: [employer] / Product Type: Medicaid O /   5 of 12 visits through  10/31/25  POC Expires: 9/25/25      Auth Tracker   DATE 7/17 7/22 7/24         Visit # 3 4 5

## 2025-07-26 DIAGNOSIS — H81.10 BPPV (BENIGN PAROXYSMAL POSITIONAL VERTIGO), UNSPECIFIED LATERALITY: ICD-10-CM

## 2025-07-28 RX ORDER — MECLIZINE HYDROCHLORIDE 25 MG/1
25 TABLET ORAL EVERY 8 HOURS PRN
Qty: 90 TABLET | Refills: 1 | Status: SHIPPED | OUTPATIENT
Start: 2025-07-28

## 2025-07-29 ENCOUNTER — OFFICE VISIT (OUTPATIENT)
Facility: CLINIC | Age: 60
End: 2025-07-29
Attending: INTERNAL MEDICINE

## 2025-07-29 ENCOUNTER — OFFICE VISIT (OUTPATIENT)
Facility: CLINIC | Age: 60
End: 2025-07-29
Attending: INTERNAL MEDICINE
Payer: COMMERCIAL

## 2025-07-29 DIAGNOSIS — I69.351 HEMIPARESIS OF RIGHT DOMINANT SIDE AS LATE EFFECT OF CEREBRAL INFARCTION (HCC): Primary | ICD-10-CM

## 2025-07-29 PROCEDURE — 97112 NEUROMUSCULAR REEDUCATION: CPT | Performed by: PHYSICAL THERAPIST

## 2025-07-29 PROCEDURE — 97112 NEUROMUSCULAR REEDUCATION: CPT

## 2025-07-29 PROCEDURE — 97110 THERAPEUTIC EXERCISES: CPT

## 2025-07-31 ENCOUNTER — OFFICE VISIT (OUTPATIENT)
Facility: CLINIC | Age: 60
End: 2025-07-31
Attending: INTERNAL MEDICINE

## 2025-07-31 ENCOUNTER — OFFICE VISIT (OUTPATIENT)
Facility: CLINIC | Age: 60
End: 2025-07-31
Attending: INTERNAL MEDICINE
Payer: COMMERCIAL

## 2025-07-31 DIAGNOSIS — I69.351 HEMIPARESIS OF RIGHT DOMINANT SIDE AS LATE EFFECT OF CEREBRAL INFARCTION (HCC): Primary | ICD-10-CM

## 2025-07-31 PROCEDURE — 97112 NEUROMUSCULAR REEDUCATION: CPT

## 2025-07-31 PROCEDURE — 97112 NEUROMUSCULAR REEDUCATION: CPT | Performed by: PHYSICAL THERAPIST

## 2025-07-31 PROCEDURE — 97110 THERAPEUTIC EXERCISES: CPT

## 2025-08-05 ENCOUNTER — OFFICE VISIT (OUTPATIENT)
Facility: CLINIC | Age: 60
End: 2025-08-05
Attending: INTERNAL MEDICINE
Payer: COMMERCIAL

## 2025-08-05 DIAGNOSIS — I69.351 HEMIPARESIS OF RIGHT DOMINANT SIDE AS LATE EFFECT OF CEREBRAL INFARCTION (HCC): Primary | ICD-10-CM

## 2025-08-05 PROCEDURE — 97112 NEUROMUSCULAR REEDUCATION: CPT | Performed by: PHYSICAL THERAPIST

## 2025-08-07 ENCOUNTER — OFFICE VISIT (OUTPATIENT)
Facility: CLINIC | Age: 60
End: 2025-08-07
Attending: INTERNAL MEDICINE
Payer: COMMERCIAL

## 2025-08-07 DIAGNOSIS — I69.351 HEMIPARESIS OF RIGHT DOMINANT SIDE AS LATE EFFECT OF CEREBRAL INFARCTION (HCC): Primary | ICD-10-CM

## 2025-08-07 PROCEDURE — 97112 NEUROMUSCULAR REEDUCATION: CPT

## 2025-08-07 PROCEDURE — 97140 MANUAL THERAPY 1/> REGIONS: CPT

## 2025-08-07 PROCEDURE — 97110 THERAPEUTIC EXERCISES: CPT

## 2025-08-12 ENCOUNTER — OFFICE VISIT (OUTPATIENT)
Facility: CLINIC | Age: 60
End: 2025-08-12
Attending: INTERNAL MEDICINE
Payer: COMMERCIAL

## 2025-08-14 ENCOUNTER — OFFICE VISIT (OUTPATIENT)
Facility: CLINIC | Age: 60
End: 2025-08-14
Attending: INTERNAL MEDICINE
Payer: COMMERCIAL

## 2025-08-19 ENCOUNTER — PATIENT OUTREACH (OUTPATIENT)
Dept: CASE MANAGEMENT | Facility: OTHER | Age: 60
End: 2025-08-19

## 2025-08-19 ENCOUNTER — OFFICE VISIT (OUTPATIENT)
Facility: CLINIC | Age: 60
End: 2025-08-19
Attending: INTERNAL MEDICINE
Payer: COMMERCIAL

## 2025-08-19 DIAGNOSIS — I69.351 HEMIPARESIS OF RIGHT DOMINANT SIDE AS LATE EFFECT OF CEREBRAL INFARCTION (HCC): Primary | ICD-10-CM

## 2025-08-19 PROCEDURE — 97110 THERAPEUTIC EXERCISES: CPT

## 2025-08-19 PROCEDURE — 97112 NEUROMUSCULAR REEDUCATION: CPT | Performed by: PHYSICAL THERAPIST

## 2025-08-19 PROCEDURE — 97112 NEUROMUSCULAR REEDUCATION: CPT

## 2025-08-21 ENCOUNTER — OFFICE VISIT (OUTPATIENT)
Facility: CLINIC | Age: 60
End: 2025-08-21
Attending: INTERNAL MEDICINE
Payer: COMMERCIAL

## 2025-08-21 DIAGNOSIS — I69.351 HEMIPARESIS OF RIGHT DOMINANT SIDE AS LATE EFFECT OF CEREBRAL INFARCTION (HCC): Primary | ICD-10-CM

## 2025-08-21 PROCEDURE — 97110 THERAPEUTIC EXERCISES: CPT

## 2025-08-21 PROCEDURE — 97112 NEUROMUSCULAR REEDUCATION: CPT

## 2025-08-21 PROCEDURE — 97112 NEUROMUSCULAR REEDUCATION: CPT | Performed by: PHYSICAL THERAPIST

## 2025-08-22 ENCOUNTER — TELEPHONE (OUTPATIENT)
Dept: LAB | Facility: HOSPITAL | Age: 60
End: 2025-08-22